# Patient Record
Sex: FEMALE | Race: WHITE | NOT HISPANIC OR LATINO | Employment: FULL TIME | URBAN - METROPOLITAN AREA
[De-identification: names, ages, dates, MRNs, and addresses within clinical notes are randomized per-mention and may not be internally consistent; named-entity substitution may affect disease eponyms.]

---

## 2017-01-03 ENCOUNTER — TELEPHONE (OUTPATIENT)
Dept: FAMILY MEDICINE | Facility: OTHER | Age: 52
End: 2017-01-03

## 2017-01-03 NOTE — TELEPHONE ENCOUNTER
Patient is due/failing the following:   Hep C, COLONOSCOPY, LDL, MAMMOGRAM and PAP    Action needed:   Patient needs office visit for Physical and come fasting. Patient needs to schedule Mammogram and colonoscopy.  Panel Management Review      Patient has the following on her problem list: None      Composite cancer screening  Chart review shows that this patient is due/due soon for the following Pap Smear, Mammogram and Colonoscopy  Summary:    Patient is due/failing the following:   Hep C, COLONOSCOPY, LDL, MAMMOGRAM and PAP    Action needed:   Patient needs office visit for Physical and come fasting. Patient needs to schedule Mammogram and colonoscopy.    Type of outreach:    Phone, spoke to patient.  Patient states she is very busy right now but she will keep this in mind and call back to schedule when things slow down.    Questions for provider review:    None                                                                                                                                    Madiha Swan MA

## 2017-05-26 ENCOUNTER — HEALTH MAINTENANCE LETTER (OUTPATIENT)
Age: 52
End: 2017-05-26

## 2017-12-21 ENCOUNTER — TELEPHONE (OUTPATIENT)
Dept: FAMILY MEDICINE | Facility: OTHER | Age: 52
End: 2017-12-21

## 2017-12-21 ENCOUNTER — OFFICE VISIT (OUTPATIENT)
Dept: FAMILY MEDICINE | Facility: OTHER | Age: 52
End: 2017-12-21
Payer: COMMERCIAL

## 2017-12-21 VITALS
HEART RATE: 60 BPM | DIASTOLIC BLOOD PRESSURE: 72 MMHG | TEMPERATURE: 99.1 F | SYSTOLIC BLOOD PRESSURE: 114 MMHG | BODY MASS INDEX: 40.9 KG/M2 | HEIGHT: 67 IN | WEIGHT: 260.6 LBS | RESPIRATION RATE: 10 BRPM

## 2017-12-21 DIAGNOSIS — R00.2 PALPITATIONS: Primary | ICD-10-CM

## 2017-12-21 DIAGNOSIS — K21.9 GASTROESOPHAGEAL REFLUX DISEASE, ESOPHAGITIS PRESENCE NOT SPECIFIED: ICD-10-CM

## 2017-12-21 PROCEDURE — 99214 OFFICE O/P EST MOD 30 MIN: CPT | Performed by: STUDENT IN AN ORGANIZED HEALTH CARE EDUCATION/TRAINING PROGRAM

## 2017-12-21 ASSESSMENT — ANXIETY QUESTIONNAIRES
7. FEELING AFRAID AS IF SOMETHING AWFUL MIGHT HAPPEN: SEVERAL DAYS
5. BEING SO RESTLESS THAT IT IS HARD TO SIT STILL: SEVERAL DAYS
1. FEELING NERVOUS, ANXIOUS, OR ON EDGE: NOT AT ALL
IF YOU CHECKED OFF ANY PROBLEMS ON THIS QUESTIONNAIRE, HOW DIFFICULT HAVE THESE PROBLEMS MADE IT FOR YOU TO DO YOUR WORK, TAKE CARE OF THINGS AT HOME, OR GET ALONG WITH OTHER PEOPLE: NOT DIFFICULT AT ALL
2. NOT BEING ABLE TO STOP OR CONTROL WORRYING: NOT AT ALL
GAD7 TOTAL SCORE: 5
3. WORRYING TOO MUCH ABOUT DIFFERENT THINGS: SEVERAL DAYS
6. BECOMING EASILY ANNOYED OR IRRITABLE: SEVERAL DAYS

## 2017-12-21 ASSESSMENT — PATIENT HEALTH QUESTIONNAIRE - PHQ9
5. POOR APPETITE OR OVEREATING: SEVERAL DAYS
SUM OF ALL RESPONSES TO PHQ QUESTIONS 1-9: 3

## 2017-12-21 NOTE — MR AVS SNAPSHOT
"              After Visit Summary   12/21/2017    Tiffanie Mcdermott    MRN: [id number]           Patient Information     Date Of Birth          1965        Visit Information        Provider Department      12/21/2017 2:00 PM Maria Eugenia Leal APRN CNP [facility]        Today's Diagnoses     Palpitations    -  1      Care Instructions    Omeprazole (Prilosec) - start 20 mg daily at bedtime for 4 weeks.    Maria Eugenia Leal, EDIL-C            Follow-ups after your visit        Future tests that were ordered for you today     Open Future Orders        Priority Expected Expires Ordered    Exercise Stress Echocardiogram Routine  12/21/2018 12/21/2017            Who to contact     If you have questions or need follow up information about today's clinic visit or your schedule please contact [facility] directly at [phone].  Normal or non-critical lab and imaging results will be communicated to you by MyChart, letter or phone within 4 business days after the clinic has received the results. If you do not hear from us within 7 days, please contact the clinic through [employer] or phone. If you have a critical or abnormal lab result, we will notify you by phone as soon as possible.  Submit refill requests through [employer] or call your pharmacy and they will forward the refill request to us. Please allow 3 business days for your refill to be completed.          Additional Information About Your Visit        MyChart Information     [employer] lets you send messages to your doctor, view your test results, renew your prescriptions, schedule appointments and more. To sign up, go to [email or web address] . Click on \"Log in\" on the left side of the screen, which will take you to the Welcome page. Then click on \"Sign up Now\" on the right side of the page.     You will be asked to enter the access code listed below, as well as some personal information. Please follow the directions to " "create your username and password.     Your access code is: [email or web address]  Expires: 3/21/2018  2:31 PM     Your access code will  in 90 days. If you need help or a new code, please call your [facility] clinic or [phone].        Care EveryWhere ID     This is your Care EveryWhere ID. This could be used by other organizations to access your [facility] medical records  [id number]        Your Vitals Were     Pulse Temperature Respirations Height Last Period BMI (Body Mass Index)    60 99.1  F (37.3  C) (Temporal) 10 5' 6.93\" (1.7 m) 2008 40.9 kg/m2       Blood Pressure from Last 3 Encounters:   17 114/72   16 120/82   06/29/15 100/76    Weight from Last 3 Encounters:   17 260 lb 9.6 oz (118.2 kg)   16 259 lb (117.5 kg)   16 266 lb (120.7 kg)               Primary Care Provider Office Phone # Fax #    Maria Eugenia Yoselin Leal, APRN [facility] [phone] [address] [address] [place] [address]        Equal Access to Services     GREGORIO STANLEY AH: Hadii aad ku hadasho Soomaali, waaxda luqadaha, qaybta kaalmada adeegyada, waxay idiin haycrystaln dean kharaelio lasergo . So [facility] [phone].    ATENCIÓN: Si habla español, tiene a pritchett disposición servicios gratuitos de asistencia lingüística. sin al [phone].    We comply with applicable federal civil rights laws and [place] laws. We do not discriminate on the basis of race, color, national origin, age, disability, sex, sexual orientation, or gender identity.            Thank you!     Thank you for choosing [facility]  for your care. Our goal is always to provide you with excellent care. Hearing back from our patients is one way we can continue to improve our services. Please take a few minutes to complete the written survey that you may receive in the mail after your visit with us. Thank you!             Your Updated Medication List - Protect others around you: Learn how to safely use, store and throw away " your medicines at www.disposemymeds.org.          This list is accurate as of: 12/21/17  2:35 PM.  Always use your most recent med list.                   Brand Name Dispense Instructions for use Diagnosis    fluticasone 50 MCG/ACT spray    FLONASE     Spray 2 sprays into both nostrils daily        ibuprofen 200 MG tablet    ADVIL/MOTRIN     Take 200 mg by mouth every 4 hours as needed.        TYLENOL PO

## 2017-12-21 NOTE — TELEPHONE ENCOUNTER
Tiffanie Mcdermott is a 52 year old female who calls with waking up in middle of night with heart racing.    NURSING ASSESSMENT:  Description:  Spoke with patient.  Woke up when alarm went off in a startle and felt weird and uncomfortable.  Then again when her son's music   Generally feels weird for about a 30 mins or so.  Was able to fall back asleep.     Allergies:   Allergies   Allergen Reactions     Adhesive Tape      Penicillins        MEDICATIONS:   Current Outpatient Prescriptions   Medication     Acetaminophen (TYLENOL PO)     fluticasone (FLONASE) 50 MCG/ACT nasal spray     ibuprofen (ADVIL,MOTRIN) 200 MG tablet     No current facility-administered medications for this visit.        NURSING PLAN: Nursing advice to patient need to schedule follow up appointment. call back with any questions or concern    RECOMMENDED DISPOSITION:  See in 24 hours -   Will comply with recommendation: Yes  If further questions/concerns or if symptoms do not improve, worsen or new symptoms develop, call your PCP or [facility] as soon as possible.      Guideline used:heart rate  Telephone Triage Protocols for Nurses, Fifth Edition, Meggan Parra  Sleep issues.     Mian Rodriguez, RN, BSN

## 2017-12-22 ASSESSMENT — ANXIETY QUESTIONNAIRES: GAD7 TOTAL SCORE: 5

## 2017-12-26 PROBLEM — R00.2 PALPITATIONS: Status: ACTIVE | Noted: 2017-12-26

## 2018-02-05 ENCOUNTER — OFFICE VISIT (OUTPATIENT)
Dept: FAMILY MEDICINE | Facility: CLINIC | Age: 53
End: 2018-02-05
Payer: COMMERCIAL

## 2018-02-05 VITALS
DIASTOLIC BLOOD PRESSURE: 72 MMHG | SYSTOLIC BLOOD PRESSURE: 106 MMHG | HEIGHT: 67 IN | OXYGEN SATURATION: 97 % | WEIGHT: 263.9 LBS | HEART RATE: 113 BPM | TEMPERATURE: 97.9 F | BODY MASS INDEX: 41.42 KG/M2

## 2018-02-05 DIAGNOSIS — J01.01 ACUTE RECURRENT MAXILLARY SINUSITIS: Primary | ICD-10-CM

## 2018-02-05 DIAGNOSIS — Z23 NEED FOR PROPHYLACTIC VACCINATION AND INOCULATION AGAINST INFLUENZA: ICD-10-CM

## 2018-02-05 PROCEDURE — 99213 OFFICE O/P EST LOW 20 MIN: CPT | Mod: 25 | Performed by: FAMILY MEDICINE

## 2018-02-05 PROCEDURE — 90688 IIV4 VACCINE SPLT 0.5 ML IM: CPT | Performed by: FAMILY MEDICINE

## 2018-02-05 PROCEDURE — 90471 IMMUNIZATION ADMIN: CPT | Performed by: FAMILY MEDICINE

## 2018-02-05 RX ORDER — AZITHROMYCIN 250 MG/1
TABLET, FILM COATED ORAL
Qty: 6 TABLET | Refills: 0 | Status: SHIPPED | OUTPATIENT
Start: 2018-02-05 | End: 2022-02-15

## 2018-02-05 NOTE — PROGRESS NOTES
SUBJECTIVE:   Tiffanie Mcdermott is a 52 year old female who presents to clinic today for the following health issues:      Acute Illness   Acute illness concerns: sinusitis   Onset: 10 days     Fever: no    Chills/Sweats: no    Headache (location?): YES- Front and face     Sinus Pressure:YES- Teeth hurt     Conjunctivitis:  no    Ear Pain: YES: right    Rhinorrhea: YES    Congestion: YES- Face     Sore Throat: no     Cough: YES-productive of green sputum    Wheeze: no    Decreased Appetite: no    Nausea: no    Vomiting: no    Diarrhea:  no    Dysuria/Freq.: no    Fatigue/Achiness: Fatigue     Sick/Strep Exposure: no     Therapies Tried and outcome: OTC Mucinex           Problem list and histories reviewed & adjusted, as indicated.  Additional history: as documented        Reviewed and updated as needed this visit by clinical staff       Reviewed and updated as needed this visit by Provider        SUBJECTIVE:  Tiffanie  is a 52 year old female who presents for: Symptoms as noted above.  She is flying tomorrow.    Past Medical History:   Diagnosis Date     Allergy, unspecified not elsewhere classified      Past Surgical History:   Procedure Laterality Date     HC LAPAROSCOPY, SURGICAL; CHOLECYSTECTOMY  10/19/2005    Cholecystectomy, Laparoscopic     Social History   Substance Use Topics     Smoking status: Never Smoker     Smokeless tobacco: Never Used      Comment: no smoking in the home     Alcohol use 1.0 oz/week     Current Outpatient Prescriptions   Medication Sig Dispense Refill     azithromycin (ZITHROMAX) 250 MG tablet Two tablets first day, then one tablet daily for four days. 6 tablet 0     omeprazole (PRILOSEC) 20 MG CR capsule 20 mg twice daily for 7 days then 20 mg at bedtime thereafter. (Patient not taking: Reported on 2/5/2018) 45 capsule 0     Acetaminophen (TYLENOL PO)        fluticasone (FLONASE) 50 MCG/ACT nasal spray Spray 2 sprays into both nostrils daily       ibuprofen (ADVIL,MOTRIN) 200 MG  "tablet Take 200 mg by mouth every 4 hours as needed.         REVIEW OF SYSTEMS:   5 point ROS negative except as noted above in HPI, including Gen., Resp, CV, GI &  system review.     OBJECTIVE:  Vitals: /72 (BP Location: Right arm, Patient Position: Chair, Cuff Size: Adult Large)  Pulse 113  Temp 97.9  F (36.6  C) (Temporal)  Ht 5' 6.93\" (1.7 m)  Wt 263 lb 14.4 oz (119.7 kg)  LMP 08/06/2008  SpO2 97%  BMI 41.42 kg/m2  BMI= Body mass index is 41.42 kg/(m^2).  Appears in no distress.  Nose a bit congested.  Throat with a little drainage posteriorly.  Her TMs look fine ear canals are clear.  Some tenderness to percussion over the maxillary sinus area.  Neck supple no adenopathy.  Lungs are clear to auscultation.  Heart regular rhythm no murmur.  Skin clear.    ASSESSMENT:  Sinusitis    PLAN:  Z-Matias.  She is using some daytime cold medicine with Mucinex.  She should take this before she flies as it has a decongestant in it.  She will get some over-the-counter omeprazole.  Given a flu shot today.        Prosper Mcgee MD  [facility]              "

## 2018-02-05 NOTE — NURSING NOTE
"Chief Complaint   Patient presents with     Sinus Problem     Sinusitis        Initial /72 (BP Location: Right arm, Patient Position: Chair, Cuff Size: Adult Large)  Pulse 113  Temp 97.9  F (36.6  C) (Temporal)  Ht 5' 6.93\" (1.7 m)  Wt 263 lb 14.4 oz (119.7 kg)  LMP 08/06/2008  SpO2 97%  BMI 41.42 kg/m2 Estimated body mass index is 41.42 kg/(m^2) as calculated from the following:    Height as of this encounter: 5' 6.93\" (1.7 m).    Weight as of this encounter: 263 lb 14.4 oz (119.7 kg).  Medication Reconciliation: complete     "

## 2018-02-05 NOTE — PROGRESS NOTES
Injectable Influenza Immunization Documentation    1.  Is the person to be vaccinated sick today?   No    2. Does the person to be vaccinated have an allergy to a component   of the vaccine?   No  Egg Allergy Algorithm Link    3. Has the person to be vaccinated ever had a serious reaction   to influenza vaccine in the past?   No    4. Has the person to be vaccinated ever had Guillain-Barré syndrome?   No    Form completed by Helen PRITCHARD CMA  Prior to injection verified patient identity using patient's name and date of birth.

## 2018-02-05 NOTE — MR AVS SNAPSHOT
"              After Visit Summary   2/5/2018    Tiffanie Mcdermott    MRN: [id number]           Patient Information     Date Of Birth          1965        Visit Information        Provider Department      2/5/2018 1:40 PM Prosper Mcgee MD [facility]        Today's Diagnoses     Acute recurrent maxillary sinusitis    -  1    Need for prophylactic vaccination and inoculation against influenza           Follow-ups after your visit        Who to contact     If you have questions or need follow up information about today's clinic visit or your schedule please contact [facility] directly at [phone].  Normal or non-critical lab and imaging results will be communicated to you by [employer], letter or phone within 4 business days after the clinic has received the results. If you do not hear from us within 7 days, please contact the clinic through [employer] or phone. If you have a critical or abnormal lab result, we will notify you by phone as soon as possible.  Submit refill requests through [employer] or call your pharmacy and they will forward the refill request to us. Please allow 3 business days for your refill to be completed.          Additional Information About Your Visit        MyChart Information     [employer] gives you secure access to your electronic health record. If you see a primary care provider, you can also send messages to your care team and make appointments. If you have questions, please call your primary care clinic.  If you do not have a primary care provider, please call [phone] and they will assist you.        Care EveryWhere ID     This is your Care EveryWhere ID. This could be used by other organizations to access your [facility] medical records  [id number]        Your Vitals Were     Pulse Temperature Height Last Period Pulse Oximetry BMI (Body Mass Index)    113 97.9  F (36.6  C) (Temporal) 5' 6.93\" (1.7 m) 08/06/2008 97% 41.42 kg/m2       Blood Pressure " from Last 3 Encounters:   02/05/18 106/72   12/21/17 114/72   02/24/16 120/82    Weight from Last 3 Encounters:   02/05/18 263 lb 14.4 oz (119.7 kg)   12/21/17 260 lb 9.6 oz (118.2 kg)   06/17/16 259 lb (117.5 kg)              We Performed the Following     FLU VACCINE, 3 YRS +, IM (QUADRIVALENT W/PRESERVATIVES/MULTI-DOSE) [51671]     Vaccine Administration, Initial [40577]          Today's Medication Changes          These changes are accurate as of 2/5/18  2:57 PM.  If you have any questions, ask your nurse or doctor.               Start taking these medicines.        Dose/Directions    azithromycin 250 MG tablet   Commonly known as:  ZITHROMAX   Used for:  Acute recurrent maxillary sinusitis   Started by:  Prosper Mcgee MD        Two tablets first day, then one tablet daily for four days.   Quantity:  6 tablet   Refills:  0            Where to get your medicines      These medications were sent to [facility] [place] [address] [facility]   [address] [facility] , [facility] [address]     Phone:  [phone]     azithromycin 250 MG tablet                Primary Care Provider Office Phone # Fax #    Maria Eugenia Yoselin Leal, HENRIETTA [facility] [phone] [address] [address] [place] [address]        Equal Access to Services     [facility] JADEN AH: Hadii joon ku hadasho Soomaali, waaxda luqadaha, qaybta kaalmada adeegyada, ignacio avelar. So [facility] [phone].    ATENCIÓN: Si habla español, tiene a pritchett disposición servicios gratuitos de asistencia lingüística. Hi al [phone].    We comply with applicable federal civil rights laws and [place] laws. We do not discriminate on the basis of race, color, national origin, age, disability, sex, sexual orientation, or gender identity.            Thank you!     Thank you for choosing [facility]  for your care. Our goal is always to provide you with excellent care. Hearing back from our patients is one  way we can continue to improve our services. Please take a few minutes to complete the written survey that you may receive in the mail after your visit with us. Thank you!             Your Updated Medication List - Protect others around you: Learn how to safely use, store and throw away your medicines at www.disposemymeds.org.          This list is accurate as of 2/5/18  2:57 PM.  Always use your most recent med list.                   Brand Name Dispense Instructions for use Diagnosis    azithromycin 250 MG tablet    ZITHROMAX    6 tablet    Two tablets first day, then one tablet daily for four days.    Acute recurrent maxillary sinusitis       fluticasone 50 MCG/ACT spray    FLONASE     Spray 2 sprays into both nostrils daily        ibuprofen 200 MG tablet    ADVIL/MOTRIN     Take 200 mg by mouth every 4 hours as needed.        omeprazole 20 MG CR capsule    priLOSEC    45 capsule    20 mg twice daily for 7 days then 20 mg at bedtime thereafter.    Gastroesophageal reflux disease, esophagitis presence not specified       TYLENOL PO

## 2018-03-20 ENCOUNTER — MYC MEDICAL ADVICE (OUTPATIENT)
Dept: FAMILY MEDICINE | Facility: OTHER | Age: 53
End: 2018-03-20

## 2018-03-30 ENCOUNTER — TELEPHONE (OUTPATIENT)
Dept: FAMILY MEDICINE | Facility: OTHER | Age: 53
End: 2018-03-30

## 2018-03-30 NOTE — LETTER
[facility]  [address] [place] [address]  Phone: [phone]  March 30, 2018      Tiffanie Mcdermott  [address] [facility] [address]      Dear Tiffanie,    We care about your health and have reviewed your health plan including your medical conditions, medications, and lab results.  Based on this review, it is recommended that you follow up regarding the following health topic(s):  -Breast Cancer Screening  -Colon Cancer Screening  -Cervical Cancer Screening    We recommend you take the following action(s):  -schedule a MAMMOGRAM which is due. Please disregard this reminder if you have had this exam elsewhere within the last 1-2 years please let us know so we can update your records.  -schedule a COLONOSCOPY to look for colon cancer (due every 10 years or 5 years in higher risk situations.)  Colonoscopies can prevent 90-95% of colon cancer deaths.  Problem lesions can be removed before they ever become cancer.  If you do not wish to do a colonoscopy or cannot afford to do one at this time, there is another option called a Fecal Immunochemical Occult Blood Test (FIT) a take home stool sample kit.  It does not replace the colonoscopy for colorectal cancer screening, but it can detect hidden bleeding in the lower colon.  It does need to be repeated every year and if a positive result is obtained, you would be referred for a colonoscopy.  If you have completed either one of these tests at another facility, please have the records sent to our clinic for our records.  -schedule a PAP SMEAR EXAM which is due.  Please disregard this reminder if you have had this exam elsewhere within the last year.  It would be helpful for us to have a copy of your recent pap smear report to update your records.     Please call us at the [facility] - [phone] (or use [employer]) to address the above recommendations.     Thank you for trusting [facility] and we appreciate the  opportunity to serve you.  We look forward to supporting your healthcare needs in the future.    Healthy Regards,    Your Health Care Team  [facility]

## 2018-03-30 NOTE — TELEPHONE ENCOUNTER
Summary:    Patient is due/failing the following:   COLONOSCOPY, MAMMOGRAM and PAP    Action needed:   Patient needs office visit for PAP. and schedule a mammogram and colonoscopy or complete a FIT test     Type of outreach:    Sent letter.    Questions for provider review:    None                                                                                                                                    Briana Ascencio     Chart routed to Care Team .      Panel Management Review      Patient has the following on her problem list: None      Composite cancer screening  Chart review shows that this patient is due/due soon for the following Pap Smear, Mammogram and Colonoscopy

## 2018-05-02 ENCOUNTER — TELEPHONE (OUTPATIENT)
Dept: FAMILY MEDICINE | Facility: OTHER | Age: 53
End: 2018-05-02

## 2018-05-02 NOTE — TELEPHONE ENCOUNTER
5/2/2018    Call Regarding [facility] Mammogram    Attempt 1    Message LEFT WITH MALE    Comments:     Other screenings that are due: Colonoscopy and Pap    Outreach   SV

## 2018-08-07 ENCOUNTER — TELEPHONE (OUTPATIENT)
Dept: FAMILY MEDICINE | Facility: OTHER | Age: 53
End: 2018-08-07

## 2018-08-07 DIAGNOSIS — Z13.220 SCREENING FOR HYPERLIPIDEMIA: Primary | ICD-10-CM

## 2018-08-07 NOTE — TELEPHONE ENCOUNTER
Summary:    Patient is due/failing the following:   COLONOSCOPY, LDL, MAMMOGRAM and PAP    Action needed:   Patient needs office visit for PAP., Patient needs fasting lab only appointment and schedule a mammogram and colonoscopy or complete a FIT test     Type of outreach:    Phone, left message for patient to call back.     Questions for provider review:    None                                                                                                                                    Briana Ascencio       Chart routed to Care Team .        Panel Management Review      Patient has the following on her problem list: None      Composite cancer screening  Chart review shows that this patient is due/due soon for the following Pap Smear, Mammogram and Colonoscopy

## 2018-08-07 NOTE — LETTER
[facility]  [address] [place] [address]  Phone: [phone]  August 23, 2018      Tiffanie Mcdermott  [address] [facility] [address]      Dear Tiffanie,    We care about your health and have reviewed your health plan including your medical conditions, medications, and lab results.  Based on this review, it is recommended that you follow up regarding the following health topic(s):  -Breast Cancer Screening  -Colon Cancer Screening  -Cervical Cancer Screening    We recommend you take the following action(s):  -schedule a MAMMOGRAM which is due. Please disregard this reminder if you have had this exam elsewhere within the last 1-2 years please let us know so we can update your records.  -schedule a COLONOSCOPY to look for colon cancer (due every 10 years or 5 years in higher risk situations.)  Colonoscopies can prevent 90-95% of colon cancer deaths.  Problem lesions can be removed before they ever become cancer.  If you do not wish to do a colonoscopy or cannot afford to do one at this time, there is another option called a Fecal Immunochemical Occult Blood Test (FIT) a take home stool sample kit.  It does not replace the colonoscopy for colorectal cancer screening, but it can detect hidden bleeding in the lower colon.  It does need to be repeated every year and if a positive result is obtained, you would be referred for a colonoscopy.  If you have completed either one of these tests at another facility, please have the records sent to our clinic for our records.  -schedule a PAP SMEAR EXAM which is due.  Please disregard this reminder if you have had this exam elsewhere within the last year.  It would be helpful for us to have a copy of your recent pap smear report to update your records.     Please call us at the [facility] - [phone] (or use [employer]) to address the above recommendations.     Thank you for trusting [facility] and we appreciate  the opportunity to serve you.  We look forward to supporting your healthcare needs in the future.    Healthy Regards,    Your Health Care Team  [facility]

## 2018-08-15 NOTE — TELEPHONE ENCOUNTER
8/15/2018      Patient declined all preventative health screenings.             Outreach ,  Ranulfo Neal

## 2018-09-14 ENCOUNTER — TELEPHONE (OUTPATIENT)
Dept: FAMILY MEDICINE | Facility: OTHER | Age: 53
End: 2018-09-14

## 2018-09-14 NOTE — TELEPHONE ENCOUNTER
Summary:    Patient is due/failing the following:   Hep c, lipids, COLONOSCOPY, MAMMOGRAM, PAP and PHYSICAL    Action needed:   Patient needs office visit for physical and pap. Due now, please help pt schedule.  Patient needs fasting lab only appointment unless doing at appt. Order placed.  Patient needs referral/order: for colonoscopy or FIT as well as mammogram. Please ask pt if willing to do any and will then place orders.    Type of outreach:    Phone, spoke to patient.  Pt declined doing all appts.     Questions for provider review:    None                                                                                                                                    Richa Weinberg CMA ([facility])       Chart routed to Care Team .    Panel Management Review      Patient has the following on her problem list: None      Composite cancer screening  Chart review shows that this patient is due/due soon for the following Pap Smear, Mammogram and Colonoscopy

## 2019-02-13 ENCOUNTER — ALLIED HEALTH/NURSE VISIT (OUTPATIENT)
Dept: FAMILY MEDICINE | Facility: OTHER | Age: 54
End: 2019-02-13
Payer: COMMERCIAL

## 2019-02-13 DIAGNOSIS — Z23 NEED FOR PROPHYLACTIC VACCINATION AND INOCULATION AGAINST INFLUENZA: Primary | ICD-10-CM

## 2019-02-13 PROCEDURE — 99207 ZZC NO CHARGE NURSE ONLY: CPT

## 2019-02-13 PROCEDURE — 90471 IMMUNIZATION ADMIN: CPT

## 2019-02-13 PROCEDURE — 90682 RIV4 VACC RECOMBINANT DNA IM: CPT

## 2019-02-13 NOTE — PROGRESS NOTES

## 2019-12-08 ENCOUNTER — HEALTH MAINTENANCE LETTER (OUTPATIENT)
Age: 54
End: 2019-12-08

## 2019-12-18 ENCOUNTER — ALLIED HEALTH/NURSE VISIT (OUTPATIENT)
Dept: FAMILY MEDICINE | Facility: OTHER | Age: 54
End: 2019-12-18
Payer: COMMERCIAL

## 2019-12-18 DIAGNOSIS — Z23 NEED FOR PROPHYLACTIC VACCINATION AND INOCULATION AGAINST INFLUENZA: Primary | ICD-10-CM

## 2019-12-18 PROCEDURE — 90471 IMMUNIZATION ADMIN: CPT

## 2019-12-18 PROCEDURE — 99207 ZZC NO CHARGE NURSE ONLY: CPT

## 2019-12-18 PROCEDURE — 90682 RIV4 VACC RECOMBINANT DNA IM: CPT

## 2020-03-15 ENCOUNTER — HEALTH MAINTENANCE LETTER (OUTPATIENT)
Age: 55
End: 2020-03-15

## 2021-01-09 ENCOUNTER — HEALTH MAINTENANCE LETTER (OUTPATIENT)
Age: 56
End: 2021-01-09

## 2021-10-23 ENCOUNTER — HEALTH MAINTENANCE LETTER (OUTPATIENT)
Age: 56
End: 2021-10-23

## 2022-02-07 ENCOUNTER — TELEPHONE (OUTPATIENT)
Dept: FAMILY MEDICINE | Facility: CLINIC | Age: 57
End: 2022-02-07

## 2022-02-07 ENCOUNTER — VIRTUAL VISIT (OUTPATIENT)
Dept: FAMILY MEDICINE | Facility: CLINIC | Age: 57
End: 2022-02-07
Payer: COMMERCIAL

## 2022-02-07 DIAGNOSIS — R11.0 NAUSEA: ICD-10-CM

## 2022-02-07 DIAGNOSIS — R30.0 DYSURIA: Primary | ICD-10-CM

## 2022-02-07 DIAGNOSIS — R10.31 RLQ ABDOMINAL PAIN: ICD-10-CM

## 2022-02-07 PROCEDURE — 99204 OFFICE O/P NEW MOD 45 MIN: CPT | Mod: 95 | Performed by: PHYSICIAN ASSISTANT

## 2022-02-07 RX ORDER — SULFAMETHOXAZOLE/TRIMETHOPRIM 800-160 MG
1 TABLET ORAL 2 TIMES DAILY
Qty: 6 TABLET | Refills: 0 | Status: SHIPPED | OUTPATIENT
Start: 2022-02-07 | End: 2022-02-10

## 2022-02-07 RX ORDER — ONDANSETRON 4 MG/1
4 TABLET, ORALLY DISINTEGRATING ORAL EVERY 8 HOURS PRN
Qty: 10 TABLET | Refills: 0 | Status: SHIPPED | OUTPATIENT
Start: 2022-02-07 | End: 2022-02-15

## 2022-02-07 NOTE — PROGRESS NOTES
Tiffanie is a 56 year old who is being evaluated via a billable video visit.      How would you like to obtain your AVS? MyChart  If the video visit is dropped, the invitation should be resent by: Text to cell phone: [phone]  Will anyone else be joining your video visit? No    Video Start Time: 9:59 AM    Assessment & Plan     Dysuria  - sulfamethoxazole-trimethoprim (BACTRIM DS) 800-160 MG tablet; Take 1 tablet by mouth 2 times daily for 3 days  - UA reflex to Microscopic - lab collect; Future  - Urine Culture Aerobic Bacterial - lab collect; Future    Nausea  - ondansetron (ZOFRAN-ODT) 4 MG ODT tab; Take 1 tablet (4 mg) by mouth every 8 hours as needed for nausea  - CBC with platelets and differential; Future  - Comprehensive metabolic panel (BMP + Alb, Alk Phos, ALT, AST, Total. Bili, TP); Future  - UA reflex to Microscopic - lab collect; Future  - Urine Culture Aerobic Bacterial - lab collect; Future    RLQ abdominal pain  - CBC with platelets and differential; Future  - Comprehensive metabolic panel (BMP + Alb, Alk Phos, ALT, AST, Total. Bili, TP); Future  - UA reflex to Microscopic - lab collect; Future  - Urine Culture Aerobic Bacterial - lab collect; Future    Start bactrim today for presumed UTI. Will stop in lab tomorrow for lab testing. Follow up with primary care provider for further evaluation if symptoms are persistent. Clinic will call with lab results.    30 minutes spent on the date of the encounter doing chart review, patient visit and documentation     No follow-ups on file.    Hyun Oquendo PA-C  [facility]   Tiffanie is a 56 year old who presents for the following health issues    HPI     Concern - abdominal pain  Onset: January  Description: stomache pain/ stomach flu and still ongoing and now more pain in back  Intensity: moderate  Progression of Symptoms:  worsening  Accompanying Signs & Symptoms: none  Previous history of similar problem:  unknown  Precipitating factors:        Worsened by: unknown  Alleviating factors:        Improved by: unknown  Therapies tried and outcome: None    Tiffanie presents via telephone for evaluation of abdominal pain. She states on Jan 16 she developed an upset stomach and vomited for about 24 hours. She had associated loose stools, no blood. No significant fever. She had eaten out that weekend and thought she had gotten food poisoning. But she states that since then she has not felt normal. She has had nausea, abdominal discomfort and looser stools than normal. She's been eating mild foods and drinking plenty of water. She notes she feels nauseated when she eats but also when she's hungry. She has tenderness to palpation of her RUQ. In the last couple days, she notes her urine is dark colored and she has some pain in her back around. She has not taken her temperature but questions whether or not she's had a low grade temp in the last couple days. She does have a history of a cholecystectomy several years ago.    Review of Systems   ROS negative except as stated above.        Objective           Vitals:  No vitals were obtained today due to virtual visit.    Physical Exam   GENERAL: Healthy, alert and no distress  EYES: Eyes grossly normal to inspection.  No discharge or erythema, or obvious scleral/conjunctival abnormalities.  RESP: No audible wheeze, cough, or visible cyanosis.  No visible retractions or increased work of breathing.    SKIN: Visible skin clear. No significant rash, abnormal pigmentation or lesions.  NEURO: Cranial nerves grossly intact.  Mentation and speech appropriate for age.  PSYCH: Mentation appears normal, affect normal/bright, judgement and insight intact, normal speech and appearance well-groomed.        Video-Visit Details    Type of service:  Video Visit    Video End Time:10:17 AM    Originating Location (pt. Location): Home    Distant Location (provider location):  [facility]  used for Video Visit: Carol

## 2022-02-07 NOTE — TELEPHONE ENCOUNTER
Patient reports ongoing stomach issues since having a viral GI illness three weeks ago. She reports abdominal pain, poor appetite, and UTI symptoms.    Virtual visit set up today to evaluate symptoms.    Aminta Spear RN

## 2022-02-08 ENCOUNTER — TELEPHONE (OUTPATIENT)
Dept: FAMILY MEDICINE | Facility: CLINIC | Age: 57
End: 2022-02-08

## 2022-02-08 ENCOUNTER — LAB (OUTPATIENT)
Dept: LAB | Facility: CLINIC | Age: 57
End: 2022-02-08
Payer: COMMERCIAL

## 2022-02-08 DIAGNOSIS — R74.8 ELEVATED LIVER ENZYMES: Primary | ICD-10-CM

## 2022-02-08 DIAGNOSIS — R30.0 DYSURIA: ICD-10-CM

## 2022-02-08 DIAGNOSIS — R10.31 RLQ ABDOMINAL PAIN: ICD-10-CM

## 2022-02-08 DIAGNOSIS — R74.8 ELEVATED LIVER ENZYMES: ICD-10-CM

## 2022-02-08 DIAGNOSIS — R11.0 NAUSEA: ICD-10-CM

## 2022-02-08 LAB
ALBUMIN SERPL-MCNC: 3.5 G/DL (ref 3.4–5)
ALBUMIN UR-MCNC: NEGATIVE MG/DL
ALP SERPL-CCNC: 346 U/L (ref 40–150)
ALT SERPL W P-5'-P-CCNC: 175 U/L (ref 0–50)
ANION GAP SERPL CALCULATED.3IONS-SCNC: 6 MMOL/L (ref 3–14)
APPEARANCE UR: CLEAR
AST SERPL W P-5'-P-CCNC: 229 U/L (ref 0–45)
BASOPHILS # BLD AUTO: 0 10E3/UL (ref 0–0.2)
BASOPHILS NFR BLD AUTO: 1 %
BILIRUB SERPL-MCNC: 1.3 MG/DL (ref 0.2–1.3)
BILIRUB UR QL STRIP: NEGATIVE
BUN SERPL-MCNC: 14 MG/DL (ref 7–30)
CALCIUM SERPL-MCNC: 9.1 MG/DL (ref 8.5–10.1)
CHLORIDE BLD-SCNC: 103 MMOL/L (ref 94–109)
CO2 SERPL-SCNC: 26 MMOL/L (ref 20–32)
COLOR UR AUTO: YELLOW
CREAT SERPL-MCNC: 1.04 MG/DL (ref 0.52–1.04)
EOSINOPHIL # BLD AUTO: 0.1 10E3/UL (ref 0–0.7)
EOSINOPHIL NFR BLD AUTO: 2 %
ERYTHROCYTE [DISTWIDTH] IN BLOOD BY AUTOMATED COUNT: 13.5 % (ref 10–15)
GFR SERPL CREATININE-BSD FRML MDRD: 62 ML/MIN/1.73M2
GLUCOSE BLD-MCNC: 168 MG/DL (ref 70–99)
GLUCOSE UR STRIP-MCNC: NEGATIVE MG/DL
HCT VFR BLD AUTO: 46.3 % (ref 35–47)
HGB BLD-MCNC: 14.4 G/DL (ref 11.7–15.7)
HGB UR QL STRIP: NEGATIVE
IMM GRANULOCYTES # BLD: 0 10E3/UL
IMM GRANULOCYTES NFR BLD: 1 %
IRON SATN MFR SERPL: 18 % (ref 15–46)
IRON SERPL-MCNC: 58 UG/DL (ref 35–180)
KETONES UR STRIP-MCNC: NEGATIVE MG/DL
LEUKOCYTE ESTERASE UR QL STRIP: ABNORMAL
LIPASE SERPL-CCNC: <10 U/L (ref 73–393)
LYMPHOCYTES # BLD AUTO: 1.3 10E3/UL (ref 0.8–5.3)
LYMPHOCYTES NFR BLD AUTO: 22 %
MCH RBC QN AUTO: 27.9 PG (ref 26.5–33)
MCHC RBC AUTO-ENTMCNC: 31.1 G/DL (ref 31.5–36.5)
MCV RBC AUTO: 90 FL (ref 78–100)
MONOCYTES # BLD AUTO: 0.6 10E3/UL (ref 0–1.3)
MONOCYTES NFR BLD AUTO: 10 %
NEUTROPHILS # BLD AUTO: 3.8 10E3/UL (ref 1.6–8.3)
NEUTROPHILS NFR BLD AUTO: 64 %
NITRATE UR QL: NEGATIVE
NRBC # BLD AUTO: 0 10E3/UL
NRBC BLD AUTO-RTO: 0 /100
PH UR STRIP: 6.5 [PH] (ref 5–7)
PLATELET # BLD AUTO: 202 10E3/UL (ref 150–450)
POTASSIUM BLD-SCNC: 4.1 MMOL/L (ref 3.4–5.3)
PROT SERPL-MCNC: 7.3 G/DL (ref 6.8–8.8)
RBC # BLD AUTO: 5.16 10E6/UL (ref 3.8–5.2)
RBC URINE: 1 /HPF
SODIUM SERPL-SCNC: 135 MMOL/L (ref 133–144)
SP GR UR STRIP: 1.01 (ref 1–1.03)
SQUAMOUS EPITHELIAL: 1 /HPF
TIBC SERPL-MCNC: 315 UG/DL (ref 240–430)
UROBILINOGEN UR STRIP-MCNC: NORMAL MG/DL
WBC # BLD AUTO: 5.8 10E3/UL (ref 4–11)
WBC URINE: 14 /HPF

## 2022-02-08 PROCEDURE — 83550 IRON BINDING TEST: CPT

## 2022-02-08 PROCEDURE — 36415 COLL VENOUS BLD VENIPUNCTURE: CPT

## 2022-02-08 PROCEDURE — 86706 HEP B SURFACE ANTIBODY: CPT

## 2022-02-08 PROCEDURE — 87086 URINE CULTURE/COLONY COUNT: CPT

## 2022-02-08 PROCEDURE — 85025 COMPLETE CBC W/AUTO DIFF WBC: CPT

## 2022-02-08 PROCEDURE — 83690 ASSAY OF LIPASE: CPT

## 2022-02-08 PROCEDURE — 81001 URINALYSIS AUTO W/SCOPE: CPT

## 2022-02-08 PROCEDURE — 80053 COMPREHEN METABOLIC PANEL: CPT

## 2022-02-08 PROCEDURE — 86803 HEPATITIS C AB TEST: CPT

## 2022-02-08 NOTE — TELEPHONE ENCOUNTER
Reason for Call:  Other questions    Detailed comments: patient did a virtual visit with Hyun Oquendo yesterday. Patient did the labs she ordered today, but wanted provider to know that she is now have pain in her right side. Please call patient to discuss and patient would also like to get her lab results.     Phone Number Patient can be reached at: Cell number on file:    Telephone Information:   Mobile [phone]       Best Time: any    Can we leave a detailed message on this number? YES    Call taken on 2/8/2022 at 9:13 AM by Jacquelin Mendoza

## 2022-02-08 NOTE — TELEPHONE ENCOUNTER
----- Message from Hyun Oquendo PA-C sent at 2/8/2022 11:32 AM CST -----  Please call Tiffanie to give her lab results. She does not have access to her MyChart.     Her liver enzymes are elevated. This may be why she has pain around her liver. I ordered Hep B and Hep C tests and these are in process. Urine shows possible urinary tract infection so I recommend finishing the antibiotic. Avoid alcohol and Tylenol at this time.  Please help her schedule an appointment with primary care provider this week for further evaluation.     Gayle Oquendo PA-C  [facility]

## 2022-02-08 NOTE — TELEPHONE ENCOUNTER
Spoke with patient and informed of results below. Patient has a lot of questions as well in regards to results. And would like a call back from provider as well to discuss.   Opal Gerber MA

## 2022-02-08 NOTE — RESULT ENCOUNTER NOTE
Please call Tiffanie to give her lab results. She does not have access to her MyChart.    Her liver enzymes are elevated. This may be why she has pain around her liver. I ordered Hep B and Hep C tests and these are in process. Urine shows possible urinary tract infection so I recommend finishing the antibiotic. Avoid alcohol and Tylenol at this time.  Please help her schedule an appointment with primary care provider this week for further evaluation.     Gayle Oquendo PA-C  [facility]

## 2022-02-08 NOTE — TELEPHONE ENCOUNTER
Ultrasound ordered. Please call her to schedule this.    Labs ordered to be redrawn next week. Appt already scheduled. Go to emergency room if pain worsens. Pain 2/10 when resting, more severe (6-7?)/10 with pressure to her abdomen.     Gayle Oquendo PA-C  [facility]

## 2022-02-09 LAB
BACTERIA UR CULT: NO GROWTH
HBV SURFACE AB SERPL IA-ACNC: 0 M[IU]/ML
HCV AB SERPL QL IA: NONREACTIVE

## 2022-02-12 ENCOUNTER — HEALTH MAINTENANCE LETTER (OUTPATIENT)
Age: 57
End: 2022-02-12

## 2022-02-14 ENCOUNTER — LAB (OUTPATIENT)
Dept: LAB | Facility: CLINIC | Age: 57
End: 2022-02-14
Payer: COMMERCIAL

## 2022-02-14 DIAGNOSIS — R74.8 ELEVATED LIVER ENZYMES: ICD-10-CM

## 2022-02-14 LAB
ALBUMIN SERPL-MCNC: 3.3 G/DL (ref 3.4–5)
ALP SERPL-CCNC: 405 U/L (ref 40–150)
ALT SERPL W P-5'-P-CCNC: 197 U/L (ref 0–50)
ANION GAP SERPL CALCULATED.3IONS-SCNC: 7 MMOL/L (ref 3–14)
AST SERPL W P-5'-P-CCNC: 315 U/L (ref 0–45)
BILIRUB SERPL-MCNC: 1.4 MG/DL (ref 0.2–1.3)
BUN SERPL-MCNC: 15 MG/DL (ref 7–30)
CALCIUM SERPL-MCNC: 9.9 MG/DL (ref 8.5–10.1)
CHLORIDE BLD-SCNC: 103 MMOL/L (ref 94–109)
CO2 SERPL-SCNC: 26 MMOL/L (ref 20–32)
CREAT SERPL-MCNC: 0.92 MG/DL (ref 0.52–1.04)
GFR SERPL CREATININE-BSD FRML MDRD: 72 ML/MIN/1.73M2
GLUCOSE BLD-MCNC: 169 MG/DL (ref 70–99)
LIPASE SERPL-CCNC: 174 U/L (ref 73–393)
POTASSIUM BLD-SCNC: 4.2 MMOL/L (ref 3.4–5.3)
PROT SERPL-MCNC: 7.6 G/DL (ref 6.8–8.8)
SODIUM SERPL-SCNC: 136 MMOL/L (ref 133–144)

## 2022-02-14 PROCEDURE — 80053 COMPREHEN METABOLIC PANEL: CPT

## 2022-02-14 PROCEDURE — 83690 ASSAY OF LIPASE: CPT

## 2022-02-14 PROCEDURE — 36415 COLL VENOUS BLD VENIPUNCTURE: CPT

## 2022-02-15 ENCOUNTER — HOSPITAL ENCOUNTER (OUTPATIENT)
Dept: ULTRASOUND IMAGING | Facility: CLINIC | Age: 57
Discharge: HOME | End: 2022-02-15
Attending: PHYSICIAN ASSISTANT | Admitting: PHYSICIAN ASSISTANT
Payer: COMMERCIAL

## 2022-02-15 ENCOUNTER — OFFICE VISIT (OUTPATIENT)
Dept: FAMILY MEDICINE | Facility: CLINIC | Age: 57
End: 2022-02-15
Payer: COMMERCIAL

## 2022-02-15 VITALS
HEIGHT: 67 IN | SYSTOLIC BLOOD PRESSURE: 120 MMHG | RESPIRATION RATE: 20 BRPM | WEIGHT: 263 LBS | DIASTOLIC BLOOD PRESSURE: 68 MMHG | OXYGEN SATURATION: 97 % | TEMPERATURE: 97.3 F | HEART RATE: 118 BPM | BODY MASS INDEX: 41.28 KG/M2

## 2022-02-15 DIAGNOSIS — K76.9 HEPATIC LESION: Primary | ICD-10-CM

## 2022-02-15 DIAGNOSIS — R11.0 NAUSEA: ICD-10-CM

## 2022-02-15 DIAGNOSIS — K59.00 CONSTIPATION, UNSPECIFIED CONSTIPATION TYPE: ICD-10-CM

## 2022-02-15 DIAGNOSIS — N63.0 LUMP OR MASS IN BREAST: ICD-10-CM

## 2022-02-15 DIAGNOSIS — R74.8 ELEVATED LIVER ENZYMES: ICD-10-CM

## 2022-02-15 PROCEDURE — 76705 ECHO EXAM OF ABDOMEN: CPT

## 2022-02-15 PROCEDURE — 99214 OFFICE O/P EST MOD 30 MIN: CPT | Performed by: PHYSICIAN ASSISTANT

## 2022-02-15 RX ORDER — SUCRALFATE ORAL 1 G/10ML
1 SUSPENSION ORAL 4 TIMES DAILY PRN
Qty: 420 ML | Refills: 0 | Status: SHIPPED | OUTPATIENT
Start: 2022-02-15 | End: 2022-02-28

## 2022-02-15 RX ORDER — ONDANSETRON 4 MG/1
4 TABLET, ORALLY DISINTEGRATING ORAL EVERY 8 HOURS PRN
Qty: 30 TABLET | Refills: 0 | Status: SHIPPED | OUTPATIENT
Start: 2022-02-15 | End: 2022-08-01

## 2022-02-15 ASSESSMENT — MIFFLIN-ST. JEOR: SCORE: 1805.59

## 2022-02-15 NOTE — PROGRESS NOTES
Assessment & Plan     1. Hepatic lesion    2. Nausea    3. Lump or mass in breast    4. Constipation, unspecified constipation type      Patient was expectedly distraught following today's visit. We discussed the findings from her recent US which identified several lesions over the liver concerning for metastasis. Patient was receptive to recommendation for CT scan of chest/ab/pelvis as well as mammogram. She did not have many questions, but was encouraged to reach out with questions after having time to speak with her spouse. Pending CT scan will likely need to set patient up with oncologist as well as GI specialist for biopsy. Patient will use fiber supplement to help with constipation. She will also have sucralfate to use in adjunct to her current therapies.    Milton Mclean PA-C  [facility]    Jessy Moreno is a 56 year old who presents for the following health issues     HPI     Follow up US abdomen done today. Patient is having a lot of abdominal pain with radiating pain lower right pelvic region, pain scale 8-9/10, severe nausea.  Refill Zofran    Patient is a 56 year old female who presents today for follow up on recent imaging. She was seen by virtually on 02/07 due to concerns about dysuria and nausea. She was treated with bactrim and had laboratory workup. Hepatic enzymes returned elevated which prompted RUQ US.     Patient informs me that her symptoms began around MLK day, 01/17, with vomiting over 24 hours. She recalls that she had been at a work convention the weekend prior to this. However, she says that she wore a mask throughout the event except when eating. Though, there was a buffet which had exposure to all attendees. Her symptoms, nausea/vomiting, persisted over the next 2 weeks prompting the virtual visit. Since completing the bactrim she says that her dysuria has improved, but she has continued to vomit. Last episode, dry heaving, was 2-3 days ago. She  "notices that she feels bloated and is not tolerating large portions of food. She has been trying to eat smaller meals more frequently throughout the day. The zofran has helped with this. She was interested in suggestions for foods which may be better tolerated. Recommended soups, pastas, applesauce, oatmeal, protein shakes, etc.     The patient was informed of the imaging results, enlarged liver with several lesions concerning for malignancy. I did discuss with her the need for additional imaging, including mammogram, as the source of these lesions. The patient informs me that she had chosen not to continue with annual mammograms as the test was painful. She says that she experiences tenderness/pain along the lateral and inferior portions of her breasts and has also had \"lumpy\" undersides of her breasts. I did recommend a breast exam today, patient declined. Denies night sweats, weight loss, low energy, weakness.     Review of Systems   Constitutional, HEENT, cardiovascular, pulmonary, gi and gu systems are negative, except as otherwise noted.      Objective    /68 (BP Location: Right arm, Patient Position: Sitting, Cuff Size: Adult Large)   Pulse 118   Temp 97.3  F (36.3  C) (Temporal)   Resp 20   Ht 1.702 m (5' 7\")   Wt 119.3 kg (263 lb)   LMP 08/06/2008   SpO2 97%   BMI 41.19 kg/m    Body mass index is 41.19 kg/m .  Physical Exam   GENERAL: healthy, alert and no distress  RESP: lungs clear to auscultation - no rales, rhonchi or wheezes  CV: regular rate and rhythm, normal S1 S2, no S3 or S4, no murmur, click or rub, no peripheral edema and peripheral pulses strong  MS: no gross musculoskeletal defects noted, no edema  PSYCH: mentation appears normal, affect normal/bright    US Abdomen Limited    Result Date: 2/15/2022  ULTRASOUND ABDOMEN LIMITED February 15, 2022 7:54 AM CLINICAL HISTORY: Elevated liver enzymes. History of cholecystectomy 2005. TECHNIQUE: Limited abdominal ultrasound. COMPARISON: " Abdominal x-rays dated 1/31/2006. FINDINGS: GALLBLADDER: Absent. BILE DUCTS: Common bile duct is dilated at 1.0 cm. Intrahepatic ducts are difficult to evaluate. LIVER: The liver is enlarged at 24.8 cm in length. There are innumerable hypoechoic lesions with a more hypoechoic rim scattered throughout liver concerning for metastases. Intrahepatic bile ducts are not well seen. RIGHT KIDNEY: No hydronephrosis. PANCREAS: Mostly obscured by bowel gas and could not be evaluated. Abdominal aorta is grossly within normal limits, where seen. Inferior vena cava is obscured. No ascites.     IMPRESSION: Hepatomegaly with innumerable probable hepatic metastases. Further evaluation with CT chest, abdomen and pelvis may be helpful to evaluate for primary malignancy. I discussed the possible multiple metastases in the liver with Jeanette Cerrato, in the absence of Hyun Oquendo, on 2/15/2022 at approximately 8:00 AM. CHETNA RODAS MD   SYSTEM ID:  [id number]

## 2022-02-15 NOTE — H&P (VIEW-ONLY)
Assessment & Plan     1. Hepatic lesion    2. Nausea    3. Lump or mass in breast    4. Constipation, unspecified constipation type      Patient was expectedly distraught following today's visit. We discussed the findings from her recent US which identified several lesions over the liver concerning for metastasis. Patient was receptive to recommendation for CT scan of chest/ab/pelvis as well as mammogram. She did not have many questions, but was encouraged to reach out with questions after having time to speak with her spouse. Pending CT scan will likely need to set patient up with oncologist as well as GI specialist for biopsy. Patient will use fiber supplement to help with constipation. She will also have sucralfate to use in adjunct to her current therapies.    Milton Mclean PA-C  [facility]    Jessy Moreno is a 56 year old who presents for the following health issues     HPI     Follow up US abdomen done today. Patient is having a lot of abdominal pain with radiating pain lower right pelvic region, pain scale 8-9/10, severe nausea.  Refill Zofran    Patient is a 56 year old female who presents today for follow up on recent imaging. She was seen by virtually on 02/07 due to concerns about dysuria and nausea. She was treated with bactrim and had laboratory workup. Hepatic enzymes returned elevated which prompted RUQ US.     Patient informs me that her symptoms began around [place] day, 01/17, with vomiting over 24 hours. She recalls that she had been at a work convention the weekend prior to this. However, she says that she wore a mask throughout the event except when eating. Though, there was a buffet which had exposure to all attendees. Her symptoms, nausea/vomiting, persisted over the next 2 weeks prompting the virtual visit. Since completing the bactrim she says that her dysuria has improved, but she has continued to vomit. Last episode, dry heaving, was 2-3 days ago. She  "notices that she feels bloated and is not tolerating large portions of food. She has been trying to eat smaller meals more frequently throughout the day. The zofran has helped with this. She was interested in suggestions for foods which may be better tolerated. Recommended soups, pastas, applesauce, oatmeal, protein shakes, etc.     The patient was informed of the imaging results, enlarged liver with several lesions concerning for malignancy. I did discuss with her the need for additional imaging, including mammogram, as the source of these lesions. The patient informs me that she had chosen not to continue with annual mammograms as the test was painful. She says that she experiences tenderness/pain along the lateral and inferior portions of her breasts and has also had \"lumpy\" undersides of her breasts. I did recommend a breast exam today, patient declined. Denies night sweats, weight loss, low energy, weakness.     Review of Systems   Constitutional, HEENT, cardiovascular, pulmonary, gi and gu systems are negative, except as otherwise noted.      Objective    /68 (BP Location: Right arm, Patient Position: Sitting, Cuff Size: Adult Large)   Pulse 118   Temp 97.3  F (36.3  C) (Temporal)   Resp 20   Ht 1.702 m (5' 7\")   Wt 119.3 kg (263 lb)   LMP 08/06/2008   SpO2 97%   BMI 41.19 kg/m    Body mass index is 41.19 kg/m .  Physical Exam   GENERAL: healthy, alert and no distress  RESP: lungs clear to auscultation - no rales, rhonchi or wheezes  CV: regular rate and rhythm, normal S1 S2, no S3 or S4, no murmur, click or rub, no peripheral edema and peripheral pulses strong  MS: no gross musculoskeletal defects noted, no edema  PSYCH: mentation appears normal, affect normal/bright    US Abdomen Limited    Result Date: 2/15/2022  ULTRASOUND ABDOMEN LIMITED February 15, 2022 7:54 AM CLINICAL HISTORY: Elevated liver enzymes. History of cholecystectomy 2005. TECHNIQUE: Limited abdominal ultrasound. COMPARISON: " Abdominal x-rays dated 1/31/2006. FINDINGS: GALLBLADDER: Absent. BILE DUCTS: Common bile duct is dilated at 1.0 cm. Intrahepatic ducts are difficult to evaluate. LIVER: The liver is enlarged at 24.8 cm in length. There are innumerable hypoechoic lesions with a more hypoechoic rim scattered throughout liver concerning for metastases. Intrahepatic bile ducts are not well seen. RIGHT KIDNEY: No hydronephrosis. PANCREAS: Mostly obscured by bowel gas and could not be evaluated. Abdominal aorta is grossly within normal limits, where seen. Inferior vena cava is obscured. No ascites.     IMPRESSION: Hepatomegaly with innumerable probable hepatic metastases. Further evaluation with CT chest, abdomen and pelvis may be helpful to evaluate for primary malignancy. I discussed the possible multiple metastases in the liver with Jeanette Cerrato, in the absence of Huyn Oquendo, on 2/15/2022 at approximately 8:00 AM. CHETNA RODAS MD   SYSTEM ID:  [id number]

## 2022-02-15 NOTE — PATIENT INSTRUCTIONS
Patient Education     Tests for Liver Disease  This sheet describes tests that may be done for liver problems. Your healthcare provider will tell you which tests you need.   Blood tests to check the liver     A simple blood test can show how your liver is working.   A small amount of blood may be taken and tested for one or more of the following:     AFP (alpha fetoprotein). This is a protein made by the liver. A high level in the blood can be a sign of liver cancer or liver injury and regeneration in adults.    Albumin. This is a liver function test. It measures a protein made by the liver. When a person has liver disease, the level of albumin in the blood (serum albumin) is often low.    Alk phos (alkaline phosphatase). This is an enzyme that is mostly made in the liver and bones. It s measured with a blood test. A high level suggests a problem with the bile ducts in the liver.    ALT (alanine aminotransferase). ALT is an enzyme made by the liver. When the liver is damaged, ALT leaks into the blood. If a blood test finds a high level of ALT, this can be a sign of liver problems such as inflammation, scarring, or a tumor.    Ammonia. This is a liver function test that shows when a harmful substance is left behind in the blood after digestion. Normally the liver removes ammonia from the blood and turns it into urea. This leaves the body with urine. If a blood test shows that the ammonia level is too high, this process isn t happening as it should. This test is very inaccurate for liver function and should rarely be used.    AST (aspartate aminotransferase). AST is another enzyme made by the liver as well as by other organs such as muscle. It too is measured with a blood test. High levels of AST may be a sign of liver injury, especially if the ALT level is also high.    Bilirubin. This is a liver function test. It measures the yellow substance made when the body breaks down red blood cells. Bilirubin is collected  by the liver to be sent out of the body with stool. When something is wrong with the liver or bile ducts, bilirubin may build up in the body. This causes yellowing of the skin and the whites of the eyes (jaundice). Two measurements may be taken from this test: total bilirubin and direct bilirubin. A high bilirubin level may be the result of liver disease or a blockage in the bile ducts. A high indirect bilirubin can mean a condition called Gilbert syndrome. Only a small portion of people have Gilbert syndrome. Gilbert syndrome is not a sign of disease. A high indirect bilirubin can also be a sign of rapid red blood cell breakdown.    CBC (complete blood count). This is a test that measures all the parts of the blood. These are red blood cells, white blood cells, and platelets. Problems with these counts can mean infection or illness. They can also be a sign of a problem with the spleen. The spleen is an organ close to the liver that can be affected by liver disease. A low platelet count is common with advanced fibrosis of the liver. It also happens when the spleen becomes enlarged and begins to absorb platelets.    GGT (gamma-glutamyl transpeptidase). This is a liver enzyme that s often measured along with other enzymes to gauge liver problems. GGT is measured with a blood test. If alk phos and GGT are both higher than normal, it may be a sign that the bile ducts in the liver may be diseased or blocked. It also can be a sign of fatty liver or alcohol damage.    Glucose. This is a sugar in the blood and the body's most important source of energy. A healthy liver helps the body maintain a normal glucose level. If a blood test shows that glucose is low, this may mean the liver is not working properly.    Infectious hepatitis. This is a disease and can be found with antibody and antigen tests for Hepatitis A, B, C, D, and E.    PT (prothrombin time) or INR (international normalized ratio). This checks how long it takes  for blood to form clots. The liver makes a protein that helps with clotting. Problems with clotting can be a sign of liver disease.     5NT (5'-nucleotidase). This is enzyme is made is several organs, but only released into the blood by the liver. A high or low level may be a sign of liver disease.    SBA (serum bile acid). This test measures the amount of bile acid in the blood. A high level may mean that bile ducts are blocked or that the liver is unable to excrete bile acid. This test is rarely done.    Vitamins A, D, E, and K. These vitamins are stored in the liver and fat and released over time (fat-soluble). They are absorbed by the liver, with help from bile. If a blood test shows that these vitamin levels are low, this could mean the liver is not absorbing them properly.    Zinc. This is a nutrient that is absorbed by the liver. If a blood test shows a low zinc level, this could mean the liver isn t absorbing zinc properly. This can worsen conditions brought on by high levels of ammonia.  Several other lab tests may be done to check for specific liver problems once liver damage is found. These include:     Autoimmune antibodies    Ceruloplasmin (Madhu disease)    An iron panel (hemochromatosis)    Alpha-1-antitrypsin (alpha-1-antitrypsin deficiency)  Other tests to check the liver  The tests below may be done to check the liver s condition or function. These tests can also check related organs, such as the gallbladder or bile ducts.     Liver biopsy. This is a test to look for damage in liver tissue. A needle is used to take a small amount of tissue from the liver. The tissue is sent to a lab, where it is checked for signs of inflammation, scarring, or other problems.    CT scan. A CT scan is a series of X-rays that make a 3-D picture of the liver and gallbladder. This can show gallstones, abscesses, abnormal blood vessels, or tumors.    ERCP (endoscopic retrograde cholangiopancreatography). This test can  show if the bile ducts are blocked or narrowed. It can also take pictures of the gallbladder. During this test, a small flexible tube (endoscope) is put into the mouth. The tube is moved down the esophagus and stomach to the top of the small intestine. This is where the bile ducts are. Dye is released through the tube to make the bile ducts show up on an X-ray. The healthcare provider may also use small tools to take tiny samples of tissue or fluid. These are sent to a lab to be studied.    HIDA scan. This test checks gallbladder and liver function. A small amount of radioactive fluid is put into the body. This fluid will be seen on a scan as it travels through the liver to the gallbladder and into the intestine. It can show if bile ducts are missing or blocked. It can show if the gallbladder is working properly. It can also show other problems in the bile ducts.    MRI. This test uses magnets, radio waves, and a computer to create an image of the organs and tissues in your body.    MRCP (magnetic resonance cholangipancreatography). This is a type of MRI that is more detailed than a standard MRI. It can show abnormal or narrow bile ducts, tumors, gallstones, or all three.    Ultrasound (sonogram). This test uses harmless sound waves and a computer to create a picture of the liver, gallbladder, and bile ducts. It can show gallstones, tumors, or fat in the liver. It is also used to check the condition of the blood vessels and look for bile collections where bile may leak out of the liver. A special ultrasound called elastography gives more information about scarring in the liver (cirrhosis.)  Meryl last reviewed this educational content on 3/1/2020    7105-7859 [employer]. All rights reserved. This information is not intended as a substitute for professional medical care. Always follow your healthcare professional's instructions.           Patient Education     How the Liver Works  The liver is  located in the upper right part of your belly (abdomen). Most of it is protected by your ribs. It is a vital organ that has many jobs. The liver:    Makes bile, a substance that helps your body absorb fat    Makes proteins and blood-clotting factors that your body needs.    Controls sugar (glucose) in the blood and stores extra sugar    Works with your stomach and intestines to digest food    Controls production and removal of cholesterol    Stores vitamins and minerals    Removes poisonous (toxic) substances from your blood, as well as many prescription and over-the-counter medicines    Regulates other organs, such as the kidneys    Is part of the immune system, which helps your body fight infection  The parts of the liver and nearby organs    Biliary tree. A network of tubes that carry bile out of the liver and into the gallbladder and the duodenum.    Bile ducts. Tubes between the liver and small intestine. Bile drains into them from the liver.    Common bile duct. The duct from the liver (hepatic duct) and the duct from the gallbladder (cystic duct) meet to form the common bile duct. The common bile duct carries bile to the duodenum.    Duodenum. The first part of the small intestine. When bile leaves the liver, it flows through the bile ducts into the duodenum. Here, bile starts mixing with food to help digest fat.    Gallbladder. The organ that stores bile.    Spleen. An organ that filters blood. It also helps keep your body healthy and free of infection, as part of the immune system.    Splenic vein. A blood vessel that carries blood from the spleen to the portal vein.    Hepatic portal vein. A blood vessel that carries blood containing nutrients and oxygen from the digestive tract and spleen into the liver.    Inferior vena cava. A large vein that carries blood from the liver, intestines, legs, and kidneys to the heart.    Hepatic veins. Blood vessels that carry blood out of the liver to the inferior vena  cava.    Hepatic artery. A blood vessel that carries blood with a fresh supply of oxygen into the liver from the heart.    Blood flows through the liver  All the blood that flows from the intestines, stomach, and spleen is filtered through the liver. The blood flows into the liver through the hepatic portal vein. It filters through the liver in a system of smaller and smaller veins. As blood passes over liver cells, these cells process nutrients in the blood. This processing makes products like proteins and sugars that your body can use. Blood is carried out of the liver through the hepatic veins to the heart.  Meryl last reviewed this educational content on 6/1/2019 2000-2021 [employer]. All rights reserved. This information is not intended as a substitute for professional medical care. Always follow your healthcare professional's instructions.

## 2022-02-17 ENCOUNTER — PATIENT OUTREACH (OUTPATIENT)
Dept: ONCOLOGY | Facility: CLINIC | Age: 57
End: 2022-02-17
Payer: COMMERCIAL

## 2022-02-17 ENCOUNTER — HOSPITAL ENCOUNTER (OUTPATIENT)
Dept: CT IMAGING | Facility: CLINIC | Age: 57
Discharge: HOME | End: 2022-02-17
Attending: PHYSICIAN ASSISTANT | Admitting: PHYSICIAN ASSISTANT
Payer: COMMERCIAL

## 2022-02-17 DIAGNOSIS — C50.511 MALIGNANT NEOPLASM OF LOWER-OUTER QUADRANT OF RIGHT FEMALE BREAST, UNSPECIFIED ESTROGEN RECEPTOR STATUS (H): Primary | ICD-10-CM

## 2022-02-17 DIAGNOSIS — K76.9 HEPATIC LESION: ICD-10-CM

## 2022-02-17 DIAGNOSIS — C78.7 METASTASIS TO LIVER (H): ICD-10-CM

## 2022-02-17 DIAGNOSIS — R11.0 NAUSEA: ICD-10-CM

## 2022-02-17 PROBLEM — K21.9 GASTROESOPHAGEAL REFLUX DISEASE: Status: ACTIVE | Noted: 2017-12-26

## 2022-02-17 PROCEDURE — 250N000011 HC RX IP 250 OP 636: Performed by: RADIOLOGY

## 2022-02-17 PROCEDURE — 250N000009 HC RX 250: Performed by: RADIOLOGY

## 2022-02-17 PROCEDURE — 74177 CT ABD & PELVIS W/CONTRAST: CPT

## 2022-02-17 RX ORDER — IOPAMIDOL 755 MG/ML
200 INJECTION, SOLUTION INTRAVASCULAR ONCE
Status: COMPLETED | OUTPATIENT
Start: 2022-02-17 | End: 2022-02-17

## 2022-02-17 RX ADMIN — IOPAMIDOL 100 ML: 755 INJECTION, SOLUTION INTRAVENOUS at 08:50

## 2022-02-17 RX ADMIN — SODIUM CHLORIDE 60 ML: 9 INJECTION, SOLUTION INTRAVENOUS at 08:51

## 2022-02-17 NOTE — PROGRESS NOTES
"New Patient Oncology Nurse Navigator Note     Referring provider: Milton Mclean PA-C      Referring Clinic/Organization: [facility]     Referred to (specialty:) Medical Oncology and Cancer Surgery      Date Referral Received: February 17, 2022     Evaluation for:  Findings of metastatic right breast cancer seen on CT     Clinical History (per Nurse review of records provided):    Patient presented to primary care on 2/7/21 with complaints of nausea, dysuria and RLQ abdominal pain.  Tenderness to palpation of RUQ.  Provider ordered abdominal US and that was completed on 2/15 showing hepatomegaly with innumerable probable hepatic metastases. Further evaluation with CT chest, abdomen and pelvis may be helpful to evaluate for primary malignancy.    2/17/2022  CT Chest/abdomen/Pelvis showed findings of metastatic right breast cancer, multiple right breast masses consistent with malignancy with diffuse involvement of the right breast skin, right axillary lymphadenopathy consistent with metastasis, upperabdominal lymphadenopathy also suspicious for metastasis. There are multiple liver metastases and indeterminate 3 mm left upper lobe pulmonary nodule.      Bilateral diagnostic mammogram and bilateral breast ultrasound are scheduled for 2/21/22 at [place]. Tiffanie had declined annual mammograms as the test was painful. She says that she experiences tenderness/pain along the lateral and inferior portions of her breasts and has also had \"lumpy\" undersides of her breasts. She declined clinical breast exam at appointment with Milton Mclean PA-C on 2/15/22.      Records Location: Bookmarked materials     Additional testing needed prior to consult: TBD - at least breast imaging, likely breast biopsy, and possibly liver biopsy (GI specialist for biopsy stated in [facility] note).      Telephoned and spoke with Tiffanie to reassure her we are aware of referrals to medical oncology and cancer surgery and " appointments and timing are being prioritized.  We discussed providers at [facility] and the time frame for consults.  Writer will watch for results of 2/21 imaging and potential biopsies or follow up.

## 2022-02-18 NOTE — PROGRESS NOTES
Tiffanie was schedule by local clinic to meet with Dr. Turner on 2/22.    Telephoned and spoke with patient to review appointment with Dr. Terrazas, medical oncologist.  Call transferred to NPS to finalize appointment on 2/28 at . Kyra Haley RN

## 2022-02-21 ENCOUNTER — HOSPITAL ENCOUNTER (OUTPATIENT)
Dept: MAMMOGRAPHY | Facility: CLINIC | Age: 57
End: 2022-02-21
Attending: PHYSICIAN ASSISTANT
Payer: COMMERCIAL

## 2022-02-21 ENCOUNTER — HOSPITAL ENCOUNTER (OUTPATIENT)
Dept: ULTRASOUND IMAGING | Facility: CLINIC | Age: 57
End: 2022-02-21
Attending: PHYSICIAN ASSISTANT
Payer: COMMERCIAL

## 2022-02-21 DIAGNOSIS — N63.0 LUMP OR MASS IN BREAST: ICD-10-CM

## 2022-02-21 DIAGNOSIS — R11.0 NAUSEA: ICD-10-CM

## 2022-02-21 DIAGNOSIS — N63.0 BREAST MASS: ICD-10-CM

## 2022-02-21 DIAGNOSIS — K76.9 HEPATIC LESION: ICD-10-CM

## 2022-02-21 PROCEDURE — 76641 ULTRASOUND BREAST COMPLETE: CPT | Mod: 50

## 2022-02-21 PROCEDURE — 19083 BX BREAST 1ST LESION US IMAG: CPT | Mod: RT

## 2022-02-21 PROCEDURE — 77062 BREAST TOMOSYNTHESIS BI: CPT

## 2022-02-21 PROCEDURE — 999N000065 MA POST PROCEDURE RIGHT

## 2022-02-21 PROCEDURE — 88305 TISSUE EXAM BY PATHOLOGIST: CPT | Mod: TC | Performed by: PHYSICIAN ASSISTANT

## 2022-02-21 PROCEDURE — 88377 M/PHMTRC ALYS ISHQUANT/SEMIQ: CPT | Mod: 26 | Performed by: MEDICAL GENETICS

## 2022-02-21 PROCEDURE — 272N000431 US BREAST BIOPSY CORE NEEDLE RIGHT

## 2022-02-21 PROCEDURE — 88377 M/PHMTRC ALYS ISHQUANT/SEMIQ: CPT | Performed by: PHYSICIAN ASSISTANT

## 2022-02-21 PROCEDURE — 250N000009 HC RX 250: Performed by: PHYSICIAN ASSISTANT

## 2022-02-21 RX ORDER — LIDOCAINE HYDROCHLORIDE 10 MG/ML
10 INJECTION, SOLUTION EPIDURAL; INFILTRATION; INTRACAUDAL; PERINEURAL ONCE
Status: COMPLETED | OUTPATIENT
Start: 2022-02-21 | End: 2022-02-21

## 2022-02-21 RX ADMIN — LIDOCAINE HYDROCHLORIDE 8 ML: 10 INJECTION, SOLUTION EPIDURAL; INFILTRATION; INTRACAUDAL; PERINEURAL at 15:30

## 2022-02-22 NOTE — PROGRESS NOTES
RECORDS STATUS - BREAST    RECORDS REQUESTED FROM: EPIC   DATE REQUESTED: 2/28/2022   NOTES DETAILS STATUS   OFFICE NOTE from referring provider Complete Epic   Milton Mclean PA-C   OFFICE NOTE from surgeon Complete Internal Biopsy is in Process    OFFICE NOTE from radiation oncologist     DISCHARGE REPORT from the ER     OPERATIVE REPORT Complete Biopsy is in Process    MEDICATION LIST Complete [facility]   CLINICAL TRIAL TREATMENTS TO DATE     LABS     REQUEST BLOCKS FOR ALL BREAST CANCER PTS     PATHOLOGY REPORTS  (Tissue diagnosis, Stage, ER/NC percentage positive and intensity of staining, HER2 IHC, FISH, and all biopsies from breast and any distant metastasis)                  2/21/2022 (In Process)    GENONOMIC TESTING     TYPE:   (Next Generation Sequencing, including Foundation One testing, and Oncotype score)     IMAGING (NEED IMAGES & REPORT)     CT SCANS Complete CT Chest Abdomen 2/17/2022   MRI     MAMMO Complete 2/21/2022   ULTRASOUND Complete US Breast Biopsy 2/21/2022    PET     BONE SCAN     BRAIN MRI       Action    Action Taken 2/22/2022 9:20AM YEHUDA     I called pt Tiffanie- unavailable.

## 2022-02-23 ENCOUNTER — TELEPHONE (OUTPATIENT)
Dept: MAMMOGRAPHY | Facility: CLINIC | Age: 57
End: 2022-02-23

## 2022-02-23 ENCOUNTER — OFFICE VISIT (OUTPATIENT)
Dept: SURGERY | Facility: CLINIC | Age: 57
End: 2022-02-23
Attending: PHYSICIAN ASSISTANT
Payer: COMMERCIAL

## 2022-02-23 VITALS
DIASTOLIC BLOOD PRESSURE: 82 MMHG | BODY MASS INDEX: 41.28 KG/M2 | TEMPERATURE: 97.3 F | HEIGHT: 67 IN | WEIGHT: 263 LBS | SYSTOLIC BLOOD PRESSURE: 118 MMHG

## 2022-02-23 DIAGNOSIS — E66.01 MORBID OBESITY (H): ICD-10-CM

## 2022-02-23 DIAGNOSIS — C50.511 MALIGNANT NEOPLASM OF LOWER-OUTER QUADRANT OF RIGHT FEMALE BREAST, UNSPECIFIED ESTROGEN RECEPTOR STATUS (H): Primary | ICD-10-CM

## 2022-02-23 DIAGNOSIS — C78.7 METASTASIS TO LIVER (H): ICD-10-CM

## 2022-02-23 PROCEDURE — 11104 PUNCH BX SKIN SINGLE LESION: CPT | Performed by: SURGERY

## 2022-02-23 PROCEDURE — 99245 OFF/OP CONSLTJ NEW/EST HI 55: CPT | Mod: 25 | Performed by: SURGERY

## 2022-02-23 PROCEDURE — 88305 TISSUE EXAM BY PATHOLOGIST: CPT | Performed by: DERMATOLOGY

## 2022-02-23 PROCEDURE — 88360 TUMOR IMMUNOHISTOCHEM/MANUAL: CPT | Performed by: DERMATOLOGY

## 2022-02-23 PROCEDURE — 88342 IMHCHEM/IMCYTCHM 1ST ANTB: CPT | Mod: 59 | Performed by: DERMATOLOGY

## 2022-02-23 NOTE — TELEPHONE ENCOUNTER
Message sent to Dr. Turner to inform of patients US Guided Right Breast Needle Biopsy results(2/21/2022) below.  Patient was seen in clinic this afternoon by Dr. Turner and she was able to inform patient of the results and follow up recommendations.     Patient is also scheduled for a Medical Oncology Consult with Dr. Kim on  2/28/22 @ 8:30a.mini Camilo RN BSN  Breast Nurse Care Coordinator  [facility] [place] [place]  [phone]      Tiffanie Mcdermott [id number]  F, 1965  Surgical Pathology Report (Final result) [id number]  Authorizing Provider: Milton Mclean PA-C Ordering Provider: Milton Mclean PA-C  Ordering Location: [facility]  Imaging  Collected: 02/21/2022 03:33 PM  Pathologist: Arnold Espinoza MD Received: 02/21/2022 03:35 PM  .  Specimens  A Breast, Right  .  .  Final Diagnosis  Breast, right, not otherwise specified, biopsy-  Invasive ductal carcinoma, Anahi grade 3  Electronically signed by Arnold Espinoza MD on 2/23/2022 at 1:26 PM

## 2022-02-23 NOTE — PROGRESS NOTES
"  Assessment & Plan   Problem List Items Addressed This Visit        Digestive    Morbid obesity (H)      Other Visit Diagnoses     Malignant neoplasm of lower-outer quadrant of right female breast, unspecified estrogen receptor status (H)    -  Primary    Relevant Orders    Dermatological Path Order and Indications    Metastasis to liver (H)             Patient has stage IV metastatic breast cancer.  I informed patient and her  of this. No surgical intervention is discussed at this time.  Informed the patient that she will need a PowerPort for systemic chemotherapy depending on when this will be scheduled or if it is scheduled per medical oncology.  I recommend that we do a skin punch biopsy as these nodules on her lower aspect of the breast and the nipple areolar complex is the likely skin involvement of the existing breast cancer.  I discussed the risks, benefits, alternatives with the patient patient is understanding of this.  I can place a port for her and we can just have a phone conversation about this once medical oncology is done with their work-up and she is ready for systemic chemotherapy.          We will call her with her final pathology result.  Echo oncology to further notify me regarding the PowerPort.    Review of the result(s) of each unique test - CT CAP, Mammogram, US, final pathology  80 minutes spent on the date of the encounter doing chart review, history and exam, documentation, discussion with radiology regarding her imaging, discussing her pathology and imaging with medical oncology and further activities per the note       BMI:   Estimated body mass index is 41.19 kg/m  as calculated from the following:    Height as of this encounter: 1.702 m (5' 7\").    Weight as of this encounter: 119.3 kg (263 lb).       No follow-ups on file.    Sage Turner MD  [facility] is [facility] 56 year old who presents for the following health issues:    Jan 2022 - " had stomach flu or food poisoning (diarrhea and vomiting); no one else in family had similar symptoms.  But still wasn't feeling well after two weeks.  Saw her PCP who ordered GBUS as she was having RUQ pain and suprapubic pain.  GBUS noted liver lesions concerning for metastatic disease of unknown primary; thus a CT CAP was obtained.  Noted multiple right breast masses, liver mets, and enlarge josue hepatis concerning for metastatic disease.  Thus, pt underwent a mammogram and US followed by a right breast core biopsy which confirmed right breast invasive ductal carcinoma as the primary source of her metastatic disease.  Thus, she presented today for further evaluation.      +fatigue since Dec; decrease appetite the past 2 weeks; +nausea; +abdominal pain.  Pt thought it was gastritis; was placed on carafate and PPI - improved somewhat helps with nausea.  But still having decreased appetite and nausea.   But no melena; no hematochezia, no diarrhea; and no hematoemesis.      Noted right breast mass in axilla in late December but haven't had the chance to checked it out.  Hx of fibrocystic breast so didn't have much red flag to any the masses as she thought it was due to her dense breast.  Pt has hx of dermatitis or cystic dermal issue around the lower portion of her breast so she didn't think much of any changes to the right breast skin lesions and nodule.  No nipple discharge; no nipple inversion.  No mastitis; no signs of active infection; no seepage or discharge from any lesions around the breast.      No significant weight loss despite having no appetite. Last mammogram per EMR is .  Haven't had a colonoscopy.  Never MI; CVA.  Never been smoker.        BREAST-SPECIFIC HISTORY:  Prior breast biopsies: no  Prior breast surgeries: no  Prior radiation history: no  Hormone replacement therapy: no  Bra size: 44D  Dominant hand: right     GYN HISTORY:  [id number]  Age at 1st pregnancy: 34 yo  Age at menarche:  11  Breastfeeding history: yes  Menopausal? post     Reproductive PSH includes: n/a    Cancer history in self: none      FAMILY HISTORY:  Breast ca: no  Ovarian ca: none  Pancreatic ca: none  Gastric ca: no   Melanoma: no  Colon ca: no  Other cancer: no         Review of Systems   Constitutional, HEENT, cardiovascular, pulmonary, GI, , musculoskeletal, neuro, skin, endocrine and psych systems are negative, except as otherwise noted.      Objective    LMP 08/06/2008   There is no height or weight on file to calculate BMI.  Physical Exam  Vitals reviewed.   HENT:      Head: Normocephalic.      Nose: Nose normal.   Eyes:      Conjunctiva/sclera: Conjunctivae normal.   Cardiovascular:      Rate and Rhythm: Normal rate.   Pulmonary:      Effort: Pulmonary effort is normal.   Chest:   Breasts:      Right: Swelling, mass, skin change, tenderness and axillary adenopathy present. No supraclavicular adenopathy.      Left: Normal.         Abdominal:      Palpations: Abdomen is soft.   Musculoskeletal:         General: Normal range of motion.      Cervical back: Normal range of motion.   Lymphadenopathy:      Upper Body:      Right upper body: Axillary adenopathy present. No supraclavicular adenopathy.   Skin:     General: Skin is warm.      Capillary Refill: Capillary refill takes less than 2 seconds.   Neurological:      General: No focal deficit present.      Mental Status: She is alert.   Psychiatric:         Mood and Affect: Mood normal.        MAMMOGRAPHY DIAGNOSTIC BILATERAL W/ OLGA LIDIA, DIGITAL w/CAD;   TARGETED ULTRASOUND, RIGHT BREAST - 2/21/2022 2:26 PM     HISTORY: Hepatic metastases seen on ultrasound. Follow-up CT chest,  abdomen and pelvis demonstrate multiple hepatic metastases and  multiple right breast masses as well as right axillary  lymphadenopathy. This is thought to represent breast cancer.     COMPARISON: Limited abdominal ultrasound dated 2/15/2022, CT chest,  abdomen and pelvis dated  2/17/2022.     BREAST DENSITY: Heterogeneously dense limiting mammographic  sensitivity.     FINDINGS: There are two large masses noted in the right breast  corresponding with the palpable findings. One in the upper aspect of  the right breast measures approximately 3.7 cm in diameter. One in the  lower outer aspect of the right breast measures up to 4.6 cm. There  are multiple other smaller masses scattered through the right breast  likely also the same etiology. There is another mass in the far  posterior aspect of the right upper outer breast near the chest wall  measuring up to 2.3 cm. There is increased density in the retroareolar  right breast. The anterior right breast skin is abnormally thickened  at approximately 0.8 cm in thickness.   No definite mass in left breast is seen. Benign-appearing lymph node  in the upper outer posterior left breast is noted.     Targeted ultrasound of the right breast demonstrates multiple  hypoechoic lobulated shadowing masses in the right breast. One at the  7 o'clock position 8 cm from the nipple measures 3.7 x 3.1 x 3.5 cm.  One in the upper right breast 11 o'clock position 15 cm from the  nipple measures 3.5 x 3.0 x 3.5 cm. These represent the two largest  palpable masses. There are multiple other smaller masses scattered  throughout the breast. One in the far posterior upper outer right  breast is also noted on the live examination, but was not definitely  saved on the images. The skin is abnormally thickened in the anterior  aspect of the breast measuring up to 0.7 cm in thickness.                                                                      IMPRESSION: BI-RADS CATEGORY: 5 - Highly Suggestive of  Malignancy-Appropriate Action Should Be Taken.     RECOMMENDED FOLLOW-UP: Biopsy right breast.     I discussed the findings and recommendations with the patient at the  time of the exam.     CHETNA RODAS MD       MAMMOGRAPHY DIAGNOSTIC BILATERAL W/ OLGA LIDIA, DIGITAL w/CAD;    TARGETED ULTRASOUND, RIGHT BREAST - 2/21/2022 2:26 PM     HISTORY: Hepatic metastases seen on ultrasound. Follow-up CT chest,  abdomen and pelvis demonstrate multiple hepatic metastases and  multiple right breast masses as well as right axillary  lymphadenopathy. This is thought to represent breast cancer.     COMPARISON: Limited abdominal ultrasound dated 2/15/2022, CT chest,  abdomen and pelvis dated 2/17/2022.     BREAST DENSITY: Heterogeneously dense limiting mammographic  sensitivity.     FINDINGS: There are two large masses noted in the right breast  corresponding with the palpable findings. One in the upper aspect of  the right breast measures approximately 3.7 cm in diameter. One in the  lower outer aspect of the right breast measures up to 4.6 cm. There  are multiple other smaller masses scattered through the right breast  likely also the same etiology. There is another mass in the far  posterior aspect of the right upper outer breast near the chest wall  measuring up to 2.3 cm. There is increased density in the retroareolar  right breast. The anterior right breast skin is abnormally thickened  at approximately 0.8 cm in thickness.   No definite mass in left breast is seen. Benign-appearing lymph node  in the upper outer posterior left breast is noted.     Targeted ultrasound of the right breast demonstrates multiple  hypoechoic lobulated shadowing masses in the right breast. One at the  7 o'clock position 8 cm from the nipple measures 3.7 x 3.1 x 3.5 cm.  One in the upper right breast 11 o'clock position 15 cm from the  nipple measures 3.5 x 3.0 x 3.5 cm. These represent the two largest  palpable masses. There are multiple other smaller masses scattered  throughout the breast. One in the far posterior upper outer right  breast is also noted on the live examination, but was not definitely  saved on the images. The skin is abnormally thickened in the anterior  aspect of the breast measuring up to 0.7 cm in  thickness.                                                                      IMPRESSION: BI-RADS CATEGORY: 5 - Highly Suggestive of  Malignancy-Appropriate Action Should Be Taken.     RECOMMENDED FOLLOW-UP: Biopsy right breast.     I discussed the findings and recommendations with the patient at the  time of the exam.     CHETNA RODAS MD

## 2022-02-23 NOTE — PROGRESS NOTES
Malignant Path:  Pathology report reviewed with our breast radiologist Dr. Shubham More, who confirmed the recent breast imaging is concordant with the final surgical pathology results below.    Dr. Turner informed patient today(2/23/22) during clinic visit of Ultrasound Guided Right Breast Biopsy results showing Invasive Ductal Carcinoma.   Recommendations for follow up are Surgical/Medical Oncology consultation and management of care.    Patient is already scheduled for Medical Oncology Consult with Dr. Kim on 3/1/22.    Yuliana Camilo RN, BSN  Breast Care Nurse Coordinator  [facility] [place] [place]  [phone]        Tiffanie Mcdermott [id number]  F, 1965  Surgical Pathology Report (Final result) [id number]  Authorizing Provider: Milton Mclean PA-C Ordering Provider: Milton Mclean PA-C  Ordering Location: [facility]  Imaging  Collected: 02/21/2022 03:33 PM  Pathologist: Arnold Espinoza MD Received: 02/21/2022 03:35 PM  .  Specimens  A Breast, Right  .  .  Final Diagnosis  Breast, right, not otherwise specified, biopsy-    Invasive ductal carcinoma, [place] grade 3    Electronically signed by Arnold Espinoza MD on 2/23/2022 at 1:26 PM

## 2022-02-23 NOTE — LETTER
2/23/2022         RE: Tiffanie Mcdermott  [id number] [address] [place] [id number]        Dear Colleague,    Thank you for referring your patient, Tiffanie Mcdermott, to the [facility]. Please see a copy of my visit note below.      Assessment & Plan   Problem List Items Addressed This Visit        Digestive    Morbid obesity (H)      Other Visit Diagnoses     Malignant neoplasm of lower-outer quadrant of right female breast, unspecified estrogen receptor status (H)    -  Primary    Relevant Orders    Dermatological Path Order and Indications    Metastasis to liver (H)             Patient has stage IV metastatic breast cancer.  I informed patient and her  of this. No surgical intervention is discussed at this time.  Informed the patient that she will need a PowerPort for systemic chemotherapy depending on when this will be scheduled or if it is scheduled per medical oncology.  I recommend that we do a skin punch biopsy as these nodules on her lower aspect of the breast and the nipple areolar complex is the likely skin involvement of the existing breast cancer.  I discussed the risks, benefits, alternatives with the patient patient is understanding of this.  I can place a port for her and we can just have a phone conversation about this once medical oncology is done with their work-up and she is ready for systemic chemotherapy.          We will call her with her final pathology result.  Echo oncology to further notify me regarding the PowerPort.    Review of the result(s) of each unique test - CT CAP, Mammogram, US, final pathology  80 minutes spent on the date of the encounter doing chart review, history and exam, documentation, discussion with radiology regarding her imaging, discussing her pathology and imaging with medical oncology and further activities per the note       BMI:   Estimated body mass index is 41.19 kg/m  as calculated from the following:    Height as of  "this encounter: 1.702 m (5' 7\").    Weight as of this encounter: 119.3 kg (263 lb).       No follow-ups on file.    Sage Turner MD  [facility] 56 year [facility] who presents for the following health issues:    Jan 2022 - had stomach flu or food poisoning (diarrhea and vomiting); no one else in family had similar symptoms.  But still wasn't feeling well after two weeks.  Saw her PCP who ordered GBUS as she was having RUQ pain and suprapubic pain.  GBUS noted liver lesions concerning for metastatic disease of unknown primary; thus a CT CAP was obtained.  Noted multiple right breast masses, liver mets, and enlarge josue hepatis concerning for metastatic disease.  Thus, pt underwent a mammogram and US followed by a right breast core biopsy which confirmed right breast invasive ductal carcinoma as the primary source of her metastatic disease.  Thus, she presented today for further evaluation.      +fatigue since Dec; decrease appetite the past 2 weeks; +nausea; +abdominal pain.  Pt thought it was gastritis; was placed on carafate and PPI - improved somewhat helps with nausea.  But still having decreased appetite and nausea.   But no melena; no hematochezia, no diarrhea; and no hematoemesis.      Noted right breast mass in axilla in late December but haven't had the chance to checked it out.  Hx of fibrocystic breast so didn't have much red flag to any the masses as she thought it was due to her dense breast.  Pt has hx of dermatitis or cystic dermal issue around the lower portion of her breast so she didn't think much of any changes to the right breast skin lesions and nodule.  No nipple discharge; no nipple inversion.  No mastitis; no signs of active infection; no seepage or discharge from any lesions around the breast.      No significant weight loss despite having no appetite. Last mammogram per EMR is 2007.  Haven't had a colonoscopy.  Never MI; CVA.  Never been smoker.  "       BREAST-SPECIFIC HISTORY:  Prior breast biopsies: no  Prior breast surgeries: no  Prior radiation history: no  Hormone replacement therapy: no  Bra size: 44D  Dominant hand: right     GYN HISTORY:  [id number]  Age at 1st pregnancy: 32 yo  Age at menarche: 11  Breastfeeding history: yes  Menopausal? post     Reproductive PSH includes: n/a    Cancer history in self: none      FAMILY HISTORY:  Breast ca: no  Ovarian ca: none  Pancreatic ca: none  Gastric ca: no   Melanoma: no  Colon ca: no  Other cancer: no         Review of Systems   Constitutional, HEENT, cardiovascular, pulmonary, GI, , musculoskeletal, neuro, skin, endocrine and psych systems are negative, except as otherwise noted.      Objective    LMP 2008   There is no height or weight on file to calculate BMI.  Physical Exam  Vitals reviewed.   HENT:      Head: Normocephalic.      Nose: Nose normal.   Eyes:      Conjunctiva/sclera: Conjunctivae normal.   Cardiovascular:      Rate and Rhythm: Normal rate.   Pulmonary:      Effort: Pulmonary effort is normal.   Chest:   Breasts:      Right: Swelling, mass, skin change, tenderness and axillary adenopathy present. No supraclavicular adenopathy.      Left: Normal.         Abdominal:      Palpations: Abdomen is soft.   Musculoskeletal:         General: Normal range of motion.      Cervical back: Normal range of motion.   Lymphadenopathy:      Upper Body:      Right upper body: Axillary adenopathy present. No supraclavicular adenopathy.   Skin:     General: Skin is warm.      Capillary Refill: Capillary refill takes less than 2 seconds.   Neurological:      General: No focal deficit present.      Mental Status: She is alert.   Psychiatric:         Mood and Affect: Mood normal.        MAMMOGRAPHY DIAGNOSTIC BILATERAL W/ OLGA LIDIA, DIGITAL w/CAD;   TARGETED ULTRASOUND, RIGHT BREAST - 2022 2:26 PM     HISTORY: Hepatic metastases seen on ultrasound. Follow-up CT chest,  abdomen and pelvis demonstrate multiple  hepatic metastases and  multiple right breast masses as well as right axillary  lymphadenopathy. This is thought to represent breast cancer.     COMPARISON: Limited abdominal ultrasound dated 2/15/2022, CT chest,  abdomen and pelvis dated 2/17/2022.     BREAST DENSITY: Heterogeneously dense limiting mammographic  sensitivity.     FINDINGS: There are two large masses noted in the right breast  corresponding with the palpable findings. One in the upper aspect of  the right breast measures approximately 3.7 cm in diameter. One in the  lower outer aspect of the right breast measures up to 4.6 cm. There  are multiple other smaller masses scattered through the right breast  likely also the same etiology. There is another mass in the far  posterior aspect of the right upper outer breast near the chest wall  measuring up to 2.3 cm. There is increased density in the retroareolar  right breast. The anterior right breast skin is abnormally thickened  at approximately 0.8 cm in thickness.   No definite mass in left breast is seen. Benign-appearing lymph node  in the upper outer posterior left breast is noted.     Targeted ultrasound of the right breast demonstrates multiple  hypoechoic lobulated shadowing masses in the right breast. One at the  7 o'clock position 8 cm from the nipple measures 3.7 x 3.1 x 3.5 cm.  One in the upper right breast 11 o'clock position 15 cm from the  nipple measures 3.5 x 3.0 x 3.5 cm. These represent the two largest  palpable masses. There are multiple other smaller masses scattered  throughout the breast. One in the far posterior upper outer right  breast is also noted on the live examination, but was not definitely  saved on the images. The skin is abnormally thickened in the anterior  aspect of the breast measuring up to 0.7 cm in thickness.                                                                      IMPRESSION: BI-RADS CATEGORY: 5 - Highly Suggestive of  Malignancy-Appropriate Action  Should Be Taken.     RECOMMENDED FOLLOW-UP: Biopsy right breast.     I discussed the findings and recommendations with the patient at the  time of the exam.     CHETNA RODAS MD       MAMMOGRAPHY DIAGNOSTIC BILATERAL W/ OLGA LIDIA, DIGITAL w/CAD;   TARGETED ULTRASOUND, RIGHT BREAST - 2/21/2022 2:26 PM     HISTORY: Hepatic metastases seen on ultrasound. Follow-up CT chest,  abdomen and pelvis demonstrate multiple hepatic metastases and  multiple right breast masses as well as right axillary  lymphadenopathy. This is thought to represent breast cancer.     COMPARISON: Limited abdominal ultrasound dated 2/15/2022, CT chest,  abdomen and pelvis dated 2/17/2022.     BREAST DENSITY: Heterogeneously dense limiting mammographic  sensitivity.     FINDINGS: There are two large masses noted in the right breast  corresponding with the palpable findings. One in the upper aspect of  the right breast measures approximately 3.7 cm in diameter. One in the  lower outer aspect of the right breast measures up to 4.6 cm. There  are multiple other smaller masses scattered through the right breast  likely also the same etiology. There is another mass in the far  posterior aspect of the right upper outer breast near the chest wall  measuring up to 2.3 cm. There is increased density in the retroareolar  right breast. The anterior right breast skin is abnormally thickened  at approximately 0.8 cm in thickness.   No definite mass in left breast is seen. Benign-appearing lymph node  in the upper outer posterior left breast is noted.     Targeted ultrasound of the right breast demonstrates multiple  hypoechoic lobulated shadowing masses in the right breast. One at the  7 o'clock position 8 cm from the nipple measures 3.7 x 3.1 x 3.5 cm.  One in the upper right breast 11 o'clock position 15 cm from the  nipple measures 3.5 x 3.0 x 3.5 cm. These represent the two largest  palpable masses. There are multiple other smaller masses scattered  throughout  the breast. One in the far posterior upper outer right  breast is also noted on the live examination, but was not definitely  saved on the images. The skin is abnormally thickened in the anterior  aspect of the breast measuring up to 0.7 cm in thickness.                                                                      IMPRESSION: BI-RADS CATEGORY: 5 - Highly Suggestive of  Malignancy-Appropriate Action Should Be Taken.     RECOMMENDED FOLLOW-UP: Biopsy right breast.     I discussed the findings and recommendations with the patient at the  time of the exam.     CHETNA RODAS MD           Again, thank you for allowing me to participate in the care of your patient.        Sincerely,        Sage Turner MD

## 2022-02-24 LAB
INTERPRETATION: NORMAL
PATH REPORT.COMMENTS IMP SPEC: ABNORMAL
PATH REPORT.COMMENTS IMP SPEC: YES
PATH REPORT.FINAL DX SPEC: ABNORMAL
PATH REPORT.GROSS SPEC: ABNORMAL
PATH REPORT.MICROSCOPIC SPEC OTHER STN: ABNORMAL
PATH REPORT.RELEVANT HX SPEC: ABNORMAL
PATHOLOGY SYNOPTIC REPORT: ABNORMAL
PHOTO IMAGE: ABNORMAL

## 2022-02-24 PROCEDURE — 88305 TISSUE EXAM BY PATHOLOGIST: CPT | Mod: 26 | Performed by: PATHOLOGY

## 2022-02-24 PROCEDURE — 88360 TUMOR IMMUNOHISTOCHEM/MANUAL: CPT | Mod: 26 | Performed by: PATHOLOGY

## 2022-02-26 NOTE — PROCEDURES
PUNCH BIOPSY PROCEDURE NOTE    Name: Tiffanie STEWART [facility]: [unfilled]   : 1965, 56 year old Room/Bed: Room/bed info not found   CSN: [id number] Admission: No admission date for patient encounter.   MRN: [id number] Today: 2022,     PCP: No Ref-Primary, Physician Attending: Milton Mclean PA-C       PROCEDURE:   1. Punch biopsy of right breast skin nodule    INDICATION: right breast cancer    PRE-PROCEDURE     : Sage Turner MD  CONSENT: signed an Informed Consent Document.      PROCEDURE     The RIGHT breast  area was prepped and appropriately anesthetized with 1% lidocaine with epinephrine.  There was a small tiny nodule with in the numerous nodule around the nipple areolar complex.  Using the usual technique, punch biopsy size 6 mm was performed.  Entire nodule was completely excised.  This was sent for pathology.  The skin was then closed with a 4-0 Prolene in a simple interrupted fashion x2.  Stasis was achieved by holding pressure.  Specimen was sent to Pathology      POST-PROCEDURE     The patient tolerated the procedure well    COMPLICATIONS: none    Plan:  1. Instructed to keep the wound dry and covered for 24-48h and clean thereafter.  2. Warning signs of infection were reviewed.    3. Recommended that the patient use OTC acetaminophen, OTC ibuprofen as needed for pain.         Electronically signed by: Sage Turner MD; 2022

## 2022-02-28 ENCOUNTER — PATIENT OUTREACH (OUTPATIENT)
Dept: ONCOLOGY | Facility: CLINIC | Age: 57
End: 2022-02-28

## 2022-02-28 ENCOUNTER — ONCOLOGY VISIT (OUTPATIENT)
Dept: ONCOLOGY | Facility: CLINIC | Age: 57
End: 2022-02-28
Attending: PHYSICIAN ASSISTANT
Payer: COMMERCIAL

## 2022-02-28 ENCOUNTER — PATIENT OUTREACH (OUTPATIENT)
Dept: CARE COORDINATION | Facility: CLINIC | Age: 57
End: 2022-02-28

## 2022-02-28 ENCOUNTER — ALLIED HEALTH/NURSE VISIT (OUTPATIENT)
Dept: FAMILY MEDICINE | Facility: CLINIC | Age: 57
End: 2022-02-28
Payer: COMMERCIAL

## 2022-02-28 ENCOUNTER — PRE VISIT (OUTPATIENT)
Dept: ONCOLOGY | Facility: CLINIC | Age: 57
End: 2022-02-28

## 2022-02-28 VITALS
SYSTOLIC BLOOD PRESSURE: 120 MMHG | BODY MASS INDEX: 40.06 KG/M2 | RESPIRATION RATE: 20 BRPM | WEIGHT: 255.2 LBS | OXYGEN SATURATION: 95 % | DIASTOLIC BLOOD PRESSURE: 75 MMHG | HEIGHT: 67 IN | HEART RATE: 123 BPM

## 2022-02-28 DIAGNOSIS — R63.5 ABNORMAL WEIGHT GAIN: ICD-10-CM

## 2022-02-28 DIAGNOSIS — C78.7 BREAST CANCER METASTASIZED TO LIVER, RIGHT (H): ICD-10-CM

## 2022-02-28 DIAGNOSIS — Z48.01 ENCOUNTER FOR SURGICAL WOUND DRESSING CHANGE: Primary | ICD-10-CM

## 2022-02-28 DIAGNOSIS — C50.911 BREAST CANCER METASTASIZED TO LIVER, RIGHT (H): ICD-10-CM

## 2022-02-28 DIAGNOSIS — C50.511 MALIGNANT NEOPLASM OF LOWER-OUTER QUADRANT OF RIGHT FEMALE BREAST, UNSPECIFIED ESTROGEN RECEPTOR STATUS (H): ICD-10-CM

## 2022-02-28 DIAGNOSIS — C50.911 HER2-POSITIVE CARCINOMA OF RIGHT BREAST (H): ICD-10-CM

## 2022-02-28 DIAGNOSIS — Z17.31 HER2-POSITIVE CARCINOMA OF RIGHT BREAST (H): ICD-10-CM

## 2022-02-28 DIAGNOSIS — Z17.1 STAGE IV CARCINOMA OF BREAST, ER-, RIGHT (H): Primary | ICD-10-CM

## 2022-02-28 DIAGNOSIS — C50.911 STAGE IV CARCINOMA OF BREAST, ER-, RIGHT (H): Primary | ICD-10-CM

## 2022-02-28 DIAGNOSIS — R63.0 LOSS OF APPETITE: ICD-10-CM

## 2022-02-28 PROCEDURE — 99204 OFFICE O/P NEW MOD 45 MIN: CPT | Performed by: INTERNAL MEDICINE

## 2022-02-28 PROCEDURE — 99207 PR NO CHARGE NURSE ONLY: CPT

## 2022-02-28 RX ORDER — DIPHENHYDRAMINE HYDROCHLORIDE 50 MG/ML
50 INJECTION INTRAMUSCULAR; INTRAVENOUS
Status: CANCELLED
Start: 2022-03-21

## 2022-02-28 RX ORDER — DIPHENHYDRAMINE HCL 25 MG
50 CAPSULE ORAL
Status: CANCELLED
Start: 2022-04-18

## 2022-02-28 RX ORDER — ALBUTEROL SULFATE 90 UG/1
1-2 AEROSOL, METERED RESPIRATORY (INHALATION)
Status: CANCELLED
Start: 2022-04-25

## 2022-02-28 RX ORDER — ALBUTEROL SULFATE 0.83 MG/ML
2.5 SOLUTION RESPIRATORY (INHALATION)
Status: CANCELLED | OUTPATIENT
Start: 2022-02-28

## 2022-02-28 RX ORDER — ALBUTEROL SULFATE 0.83 MG/ML
2.5 SOLUTION RESPIRATORY (INHALATION)
Status: CANCELLED | OUTPATIENT
Start: 2022-04-04

## 2022-02-28 RX ORDER — HEPARIN SODIUM (PORCINE) LOCK FLUSH IV SOLN 100 UNIT/ML 100 UNIT/ML
5 SOLUTION INTRAVENOUS
Status: CANCELLED | OUTPATIENT
Start: 2022-04-18

## 2022-02-28 RX ORDER — EPINEPHRINE 1 MG/ML
0.3 INJECTION, SOLUTION INTRAMUSCULAR; SUBCUTANEOUS EVERY 5 MIN PRN
Status: CANCELLED | OUTPATIENT
Start: 2022-05-02

## 2022-02-28 RX ORDER — DIPHENHYDRAMINE HCL 25 MG
50 CAPSULE ORAL
Status: CANCELLED
Start: 2022-04-04

## 2022-02-28 RX ORDER — MEPERIDINE HYDROCHLORIDE 25 MG/ML
25 INJECTION INTRAMUSCULAR; INTRAVENOUS; SUBCUTANEOUS EVERY 30 MIN PRN
Status: CANCELLED | OUTPATIENT
Start: 2022-04-04

## 2022-02-28 RX ORDER — MEPERIDINE HYDROCHLORIDE 25 MG/ML
25 INJECTION INTRAMUSCULAR; INTRAVENOUS; SUBCUTANEOUS EVERY 30 MIN PRN
Status: CANCELLED | OUTPATIENT
Start: 2022-04-18

## 2022-02-28 RX ORDER — MEPERIDINE HYDROCHLORIDE 25 MG/ML
25 INJECTION INTRAMUSCULAR; INTRAVENOUS; SUBCUTANEOUS EVERY 30 MIN PRN
Status: CANCELLED | OUTPATIENT
Start: 2022-03-28

## 2022-02-28 RX ORDER — HEPARIN SODIUM,PORCINE 10 UNIT/ML
5 VIAL (ML) INTRAVENOUS
Status: CANCELLED | OUTPATIENT
Start: 2022-04-04

## 2022-02-28 RX ORDER — EPINEPHRINE 1 MG/ML
0.3 INJECTION, SOLUTION INTRAMUSCULAR; SUBCUTANEOUS EVERY 5 MIN PRN
Status: CANCELLED | OUTPATIENT
Start: 2022-04-18

## 2022-02-28 RX ORDER — HEPARIN SODIUM,PORCINE 10 UNIT/ML
5 VIAL (ML) INTRAVENOUS
Status: CANCELLED | OUTPATIENT
Start: 2022-04-11

## 2022-02-28 RX ORDER — EPINEPHRINE 1 MG/ML
0.3 INJECTION, SOLUTION INTRAMUSCULAR; SUBCUTANEOUS EVERY 5 MIN PRN
Status: CANCELLED | OUTPATIENT
Start: 2022-02-28

## 2022-02-28 RX ORDER — DIPHENHYDRAMINE HCL 25 MG
50 CAPSULE ORAL
Status: CANCELLED
Start: 2022-04-11

## 2022-02-28 RX ORDER — ALBUTEROL SULFATE 90 UG/1
1-2 AEROSOL, METERED RESPIRATORY (INHALATION)
Status: CANCELLED
Start: 2022-05-02

## 2022-02-28 RX ORDER — METHYLPREDNISOLONE SODIUM SUCCINATE 125 MG/2ML
125 INJECTION, POWDER, LYOPHILIZED, FOR SOLUTION INTRAMUSCULAR; INTRAVENOUS
Status: CANCELLED
Start: 2022-04-18

## 2022-02-28 RX ORDER — MEPERIDINE HYDROCHLORIDE 25 MG/ML
25 INJECTION INTRAMUSCULAR; INTRAVENOUS; SUBCUTANEOUS EVERY 30 MIN PRN
Status: CANCELLED | OUTPATIENT
Start: 2022-05-02

## 2022-02-28 RX ORDER — DIPHENHYDRAMINE HCL 25 MG
50 CAPSULE ORAL
Status: CANCELLED
Start: 2022-04-25

## 2022-02-28 RX ORDER — NALOXONE HYDROCHLORIDE 0.4 MG/ML
0.2 INJECTION, SOLUTION INTRAMUSCULAR; INTRAVENOUS; SUBCUTANEOUS
Status: CANCELLED | OUTPATIENT
Start: 2022-02-28

## 2022-02-28 RX ORDER — ACETAMINOPHEN 325 MG/1
650 TABLET ORAL ONCE
Status: CANCELLED | OUTPATIENT
Start: 2022-02-28

## 2022-02-28 RX ORDER — EPINEPHRINE 1 MG/ML
0.3 INJECTION, SOLUTION INTRAMUSCULAR; SUBCUTANEOUS EVERY 5 MIN PRN
Status: CANCELLED | OUTPATIENT
Start: 2022-03-28

## 2022-02-28 RX ORDER — NALOXONE HYDROCHLORIDE 0.4 MG/ML
0.2 INJECTION, SOLUTION INTRAMUSCULAR; INTRAVENOUS; SUBCUTANEOUS
Status: CANCELLED | OUTPATIENT
Start: 2022-05-02

## 2022-02-28 RX ORDER — ALBUTEROL SULFATE 90 UG/1
1-2 AEROSOL, METERED RESPIRATORY (INHALATION)
Status: CANCELLED
Start: 2022-03-08

## 2022-02-28 RX ORDER — ACETAMINOPHEN 325 MG/1
650 TABLET ORAL
Status: CANCELLED | OUTPATIENT
Start: 2022-03-28

## 2022-02-28 RX ORDER — MEPERIDINE HYDROCHLORIDE 25 MG/ML
25 INJECTION INTRAMUSCULAR; INTRAVENOUS; SUBCUTANEOUS EVERY 30 MIN PRN
Status: CANCELLED | OUTPATIENT
Start: 2022-03-21

## 2022-02-28 RX ORDER — METHYLPREDNISOLONE SODIUM SUCCINATE 125 MG/2ML
125 INJECTION, POWDER, LYOPHILIZED, FOR SOLUTION INTRAMUSCULAR; INTRAVENOUS
Status: CANCELLED
Start: 2022-04-11

## 2022-02-28 RX ORDER — METHYLPREDNISOLONE SODIUM SUCCINATE 125 MG/2ML
125 INJECTION, POWDER, LYOPHILIZED, FOR SOLUTION INTRAMUSCULAR; INTRAVENOUS
Status: CANCELLED
Start: 2022-03-21

## 2022-02-28 RX ORDER — HEPARIN SODIUM,PORCINE 10 UNIT/ML
5 VIAL (ML) INTRAVENOUS
Status: CANCELLED | OUTPATIENT
Start: 2022-04-18

## 2022-02-28 RX ORDER — ALBUTEROL SULFATE 90 UG/1
1-2 AEROSOL, METERED RESPIRATORY (INHALATION)
Status: CANCELLED
Start: 2022-03-28

## 2022-02-28 RX ORDER — DIPHENHYDRAMINE HYDROCHLORIDE 50 MG/ML
50 INJECTION INTRAMUSCULAR; INTRAVENOUS
Status: CANCELLED
Start: 2022-04-04

## 2022-02-28 RX ORDER — DIPHENHYDRAMINE HCL 25 MG
50 CAPSULE ORAL ONCE
Status: CANCELLED
Start: 2022-03-08

## 2022-02-28 RX ORDER — NALOXONE HYDROCHLORIDE 0.4 MG/ML
0.2 INJECTION, SOLUTION INTRAMUSCULAR; INTRAVENOUS; SUBCUTANEOUS
Status: CANCELLED | OUTPATIENT
Start: 2022-04-04

## 2022-02-28 RX ORDER — HEPARIN SODIUM (PORCINE) LOCK FLUSH IV SOLN 100 UNIT/ML 100 UNIT/ML
5 SOLUTION INTRAVENOUS
Status: CANCELLED | OUTPATIENT
Start: 2022-03-21

## 2022-02-28 RX ORDER — HEPARIN SODIUM (PORCINE) LOCK FLUSH IV SOLN 100 UNIT/ML 100 UNIT/ML
5 SOLUTION INTRAVENOUS
Status: CANCELLED | OUTPATIENT
Start: 2022-03-28

## 2022-02-28 RX ORDER — ALBUTEROL SULFATE 0.83 MG/ML
2.5 SOLUTION RESPIRATORY (INHALATION)
Status: CANCELLED | OUTPATIENT
Start: 2022-04-11

## 2022-02-28 RX ORDER — ALBUTEROL SULFATE 0.83 MG/ML
2.5 SOLUTION RESPIRATORY (INHALATION)
Status: CANCELLED | OUTPATIENT
Start: 2022-03-28

## 2022-02-28 RX ORDER — HEPARIN SODIUM (PORCINE) LOCK FLUSH IV SOLN 100 UNIT/ML 100 UNIT/ML
5 SOLUTION INTRAVENOUS
Status: CANCELLED | OUTPATIENT
Start: 2022-05-02

## 2022-02-28 RX ORDER — HEPARIN SODIUM (PORCINE) LOCK FLUSH IV SOLN 100 UNIT/ML 100 UNIT/ML
5 SOLUTION INTRAVENOUS
Status: CANCELLED | OUTPATIENT
Start: 2022-03-08

## 2022-02-28 RX ORDER — ALBUTEROL SULFATE 0.83 MG/ML
2.5 SOLUTION RESPIRATORY (INHALATION)
Status: CANCELLED | OUTPATIENT
Start: 2022-03-21

## 2022-02-28 RX ORDER — NALOXONE HYDROCHLORIDE 0.4 MG/ML
0.2 INJECTION, SOLUTION INTRAMUSCULAR; INTRAVENOUS; SUBCUTANEOUS
Status: CANCELLED | OUTPATIENT
Start: 2022-03-08

## 2022-02-28 RX ORDER — ACETAMINOPHEN 325 MG/1
650 TABLET ORAL
Status: CANCELLED | OUTPATIENT
Start: 2022-04-18

## 2022-02-28 RX ORDER — HEPARIN SODIUM,PORCINE 10 UNIT/ML
5 VIAL (ML) INTRAVENOUS
Status: CANCELLED | OUTPATIENT
Start: 2022-05-02

## 2022-02-28 RX ORDER — ALBUTEROL SULFATE 90 UG/1
1-2 AEROSOL, METERED RESPIRATORY (INHALATION)
Status: CANCELLED
Start: 2022-04-04

## 2022-02-28 RX ORDER — DIPHENHYDRAMINE HYDROCHLORIDE 50 MG/ML
50 INJECTION INTRAMUSCULAR; INTRAVENOUS
Status: CANCELLED
Start: 2022-05-02

## 2022-02-28 RX ORDER — NALOXONE HYDROCHLORIDE 0.4 MG/ML
0.2 INJECTION, SOLUTION INTRAMUSCULAR; INTRAVENOUS; SUBCUTANEOUS
Status: CANCELLED | OUTPATIENT
Start: 2022-04-18

## 2022-02-28 RX ORDER — DIPHENHYDRAMINE HCL 25 MG
50 CAPSULE ORAL ONCE
Status: CANCELLED
Start: 2022-02-28

## 2022-02-28 RX ORDER — DIPHENHYDRAMINE HYDROCHLORIDE 50 MG/ML
50 INJECTION INTRAMUSCULAR; INTRAVENOUS
Status: CANCELLED
Start: 2022-04-18

## 2022-02-28 RX ORDER — HEPARIN SODIUM,PORCINE 10 UNIT/ML
5 VIAL (ML) INTRAVENOUS
Status: CANCELLED | OUTPATIENT
Start: 2022-03-08

## 2022-02-28 RX ORDER — ALBUTEROL SULFATE 0.83 MG/ML
2.5 SOLUTION RESPIRATORY (INHALATION)
Status: CANCELLED | OUTPATIENT
Start: 2022-04-18

## 2022-02-28 RX ORDER — DIPHENHYDRAMINE HYDROCHLORIDE 50 MG/ML
50 INJECTION INTRAMUSCULAR; INTRAVENOUS
Status: CANCELLED
Start: 2022-02-28

## 2022-02-28 RX ORDER — MEPERIDINE HYDROCHLORIDE 25 MG/ML
25 INJECTION INTRAMUSCULAR; INTRAVENOUS; SUBCUTANEOUS EVERY 30 MIN PRN
Status: CANCELLED | OUTPATIENT
Start: 2022-04-25

## 2022-02-28 RX ORDER — ALBUTEROL SULFATE 0.83 MG/ML
2.5 SOLUTION RESPIRATORY (INHALATION)
Status: CANCELLED | OUTPATIENT
Start: 2022-04-25

## 2022-02-28 RX ORDER — NALOXONE HYDROCHLORIDE 0.4 MG/ML
0.2 INJECTION, SOLUTION INTRAMUSCULAR; INTRAVENOUS; SUBCUTANEOUS
Status: CANCELLED | OUTPATIENT
Start: 2022-03-28

## 2022-02-28 RX ORDER — DIPHENHYDRAMINE HYDROCHLORIDE 50 MG/ML
50 INJECTION INTRAMUSCULAR; INTRAVENOUS
Status: CANCELLED
Start: 2022-04-11

## 2022-02-28 RX ORDER — OLANZAPINE 2.5 MG/1
2.5 TABLET, FILM COATED ORAL AT BEDTIME
Qty: 30 TABLET | Refills: 3 | Status: SHIPPED | OUTPATIENT
Start: 2022-02-28 | End: 2022-05-19

## 2022-02-28 RX ORDER — METHYLPREDNISOLONE SODIUM SUCCINATE 125 MG/2ML
125 INJECTION, POWDER, LYOPHILIZED, FOR SOLUTION INTRAMUSCULAR; INTRAVENOUS
Status: CANCELLED
Start: 2022-04-25

## 2022-02-28 RX ORDER — ALBUTEROL SULFATE 90 UG/1
1-2 AEROSOL, METERED RESPIRATORY (INHALATION)
Status: CANCELLED
Start: 2022-04-11

## 2022-02-28 RX ORDER — NALOXONE HYDROCHLORIDE 0.4 MG/ML
0.2 INJECTION, SOLUTION INTRAMUSCULAR; INTRAVENOUS; SUBCUTANEOUS
Status: CANCELLED | OUTPATIENT
Start: 2022-03-21

## 2022-02-28 RX ORDER — DIPHENHYDRAMINE HCL 25 MG
50 CAPSULE ORAL
Status: CANCELLED
Start: 2022-05-02

## 2022-02-28 RX ORDER — HEPARIN SODIUM,PORCINE 10 UNIT/ML
5 VIAL (ML) INTRAVENOUS
Status: CANCELLED | OUTPATIENT
Start: 2022-03-21

## 2022-02-28 RX ORDER — METHYLPREDNISOLONE SODIUM SUCCINATE 125 MG/2ML
125 INJECTION, POWDER, LYOPHILIZED, FOR SOLUTION INTRAMUSCULAR; INTRAVENOUS
Status: CANCELLED
Start: 2022-03-08

## 2022-02-28 RX ORDER — HEPARIN SODIUM,PORCINE 10 UNIT/ML
5 VIAL (ML) INTRAVENOUS
Status: CANCELLED | OUTPATIENT
Start: 2022-03-28

## 2022-02-28 RX ORDER — HEPARIN SODIUM (PORCINE) LOCK FLUSH IV SOLN 100 UNIT/ML 100 UNIT/ML
5 SOLUTION INTRAVENOUS
Status: CANCELLED | OUTPATIENT
Start: 2022-04-11

## 2022-02-28 RX ORDER — ALBUTEROL SULFATE 0.83 MG/ML
2.5 SOLUTION RESPIRATORY (INHALATION)
Status: CANCELLED | OUTPATIENT
Start: 2022-03-08

## 2022-02-28 RX ORDER — HEPARIN SODIUM (PORCINE) LOCK FLUSH IV SOLN 100 UNIT/ML 100 UNIT/ML
5 SOLUTION INTRAVENOUS
Status: CANCELLED | OUTPATIENT
Start: 2022-04-04

## 2022-02-28 RX ORDER — DIPHENHYDRAMINE HCL 25 MG
50 CAPSULE ORAL
Status: CANCELLED
Start: 2022-03-28

## 2022-02-28 RX ORDER — HEPARIN SODIUM,PORCINE 10 UNIT/ML
5 VIAL (ML) INTRAVENOUS
Status: CANCELLED | OUTPATIENT
Start: 2022-02-28

## 2022-02-28 RX ORDER — DIPHENHYDRAMINE HYDROCHLORIDE 50 MG/ML
50 INJECTION INTRAMUSCULAR; INTRAVENOUS
Status: CANCELLED
Start: 2022-03-08

## 2022-02-28 RX ORDER — MEPERIDINE HYDROCHLORIDE 25 MG/ML
25 INJECTION INTRAMUSCULAR; INTRAVENOUS; SUBCUTANEOUS EVERY 30 MIN PRN
Status: CANCELLED | OUTPATIENT
Start: 2022-04-11

## 2022-02-28 RX ORDER — EPINEPHRINE 1 MG/ML
0.3 INJECTION, SOLUTION INTRAMUSCULAR; SUBCUTANEOUS EVERY 5 MIN PRN
Status: CANCELLED | OUTPATIENT
Start: 2022-04-25

## 2022-02-28 RX ORDER — MEPERIDINE HYDROCHLORIDE 25 MG/ML
25 INJECTION INTRAMUSCULAR; INTRAVENOUS; SUBCUTANEOUS EVERY 30 MIN PRN
Status: CANCELLED | OUTPATIENT
Start: 2022-02-28

## 2022-02-28 RX ORDER — EPINEPHRINE 1 MG/ML
0.3 INJECTION, SOLUTION INTRAMUSCULAR; SUBCUTANEOUS EVERY 5 MIN PRN
Status: CANCELLED | OUTPATIENT
Start: 2022-03-21

## 2022-02-28 RX ORDER — METHYLPREDNISOLONE SODIUM SUCCINATE 125 MG/2ML
125 INJECTION, POWDER, LYOPHILIZED, FOR SOLUTION INTRAMUSCULAR; INTRAVENOUS
Status: CANCELLED
Start: 2022-02-28

## 2022-02-28 RX ORDER — DIPHENHYDRAMINE HYDROCHLORIDE 50 MG/ML
50 INJECTION INTRAMUSCULAR; INTRAVENOUS
Status: CANCELLED
Start: 2022-03-28

## 2022-02-28 RX ORDER — EPINEPHRINE 1 MG/ML
0.3 INJECTION, SOLUTION INTRAMUSCULAR; SUBCUTANEOUS EVERY 5 MIN PRN
Status: CANCELLED | OUTPATIENT
Start: 2022-03-08

## 2022-02-28 RX ORDER — METHYLPREDNISOLONE SODIUM SUCCINATE 125 MG/2ML
125 INJECTION, POWDER, LYOPHILIZED, FOR SOLUTION INTRAMUSCULAR; INTRAVENOUS
Status: CANCELLED
Start: 2022-05-02

## 2022-02-28 RX ORDER — HEPARIN SODIUM,PORCINE 10 UNIT/ML
5 VIAL (ML) INTRAVENOUS
Status: CANCELLED | OUTPATIENT
Start: 2022-04-25

## 2022-02-28 RX ORDER — ALBUTEROL SULFATE 0.83 MG/ML
2.5 SOLUTION RESPIRATORY (INHALATION)
Status: CANCELLED | OUTPATIENT
Start: 2022-05-02

## 2022-02-28 RX ORDER — EPINEPHRINE 1 MG/ML
0.3 INJECTION, SOLUTION INTRAMUSCULAR; SUBCUTANEOUS EVERY 5 MIN PRN
Status: CANCELLED | OUTPATIENT
Start: 2022-04-04

## 2022-02-28 RX ORDER — NALOXONE HYDROCHLORIDE 0.4 MG/ML
0.2 INJECTION, SOLUTION INTRAMUSCULAR; INTRAVENOUS; SUBCUTANEOUS
Status: CANCELLED | OUTPATIENT
Start: 2022-04-11

## 2022-02-28 RX ORDER — METHYLPREDNISOLONE SODIUM SUCCINATE 125 MG/2ML
125 INJECTION, POWDER, LYOPHILIZED, FOR SOLUTION INTRAMUSCULAR; INTRAVENOUS
Status: CANCELLED
Start: 2022-04-04

## 2022-02-28 RX ORDER — ALBUTEROL SULFATE 90 UG/1
1-2 AEROSOL, METERED RESPIRATORY (INHALATION)
Status: CANCELLED
Start: 2022-02-28

## 2022-02-28 RX ORDER — ALBUTEROL SULFATE 90 UG/1
1-2 AEROSOL, METERED RESPIRATORY (INHALATION)
Status: CANCELLED
Start: 2022-04-18

## 2022-02-28 RX ORDER — ALBUTEROL SULFATE 90 UG/1
1-2 AEROSOL, METERED RESPIRATORY (INHALATION)
Status: CANCELLED
Start: 2022-03-21

## 2022-02-28 RX ORDER — DIPHENHYDRAMINE HYDROCHLORIDE 50 MG/ML
50 INJECTION INTRAMUSCULAR; INTRAVENOUS
Status: CANCELLED
Start: 2022-04-25

## 2022-02-28 RX ORDER — HEPARIN SODIUM (PORCINE) LOCK FLUSH IV SOLN 100 UNIT/ML 100 UNIT/ML
5 SOLUTION INTRAVENOUS
Status: CANCELLED | OUTPATIENT
Start: 2022-02-28

## 2022-02-28 RX ORDER — MEPERIDINE HYDROCHLORIDE 25 MG/ML
25 INJECTION INTRAMUSCULAR; INTRAVENOUS; SUBCUTANEOUS EVERY 30 MIN PRN
Status: CANCELLED | OUTPATIENT
Start: 2022-03-08

## 2022-02-28 RX ORDER — DIPHENHYDRAMINE HCL 25 MG
50 CAPSULE ORAL
Status: CANCELLED
Start: 2022-03-21

## 2022-02-28 RX ORDER — HEPARIN SODIUM (PORCINE) LOCK FLUSH IV SOLN 100 UNIT/ML 100 UNIT/ML
5 SOLUTION INTRAVENOUS
Status: CANCELLED | OUTPATIENT
Start: 2022-04-25

## 2022-02-28 RX ORDER — NALOXONE HYDROCHLORIDE 0.4 MG/ML
0.2 INJECTION, SOLUTION INTRAMUSCULAR; INTRAVENOUS; SUBCUTANEOUS
Status: CANCELLED | OUTPATIENT
Start: 2022-04-25

## 2022-02-28 RX ORDER — METHYLPREDNISOLONE SODIUM SUCCINATE 125 MG/2ML
125 INJECTION, POWDER, LYOPHILIZED, FOR SOLUTION INTRAMUSCULAR; INTRAVENOUS
Status: CANCELLED
Start: 2022-03-28

## 2022-02-28 RX ORDER — EPINEPHRINE 1 MG/ML
0.3 INJECTION, SOLUTION INTRAMUSCULAR; SUBCUTANEOUS EVERY 5 MIN PRN
Status: CANCELLED | OUTPATIENT
Start: 2022-04-11

## 2022-02-28 ASSESSMENT — PAIN SCALES - GENERAL: PAINLEVEL: NO PAIN (0)

## 2022-02-28 NOTE — PROGRESS NOTES
Social Work Note: Telephone Call  Oncology Clinic    Data/Intervention:  Patient Name:  Tiffanie Mcdermott  /Age:  1965 (56 year old)    Call From: FRANDY  Reason for Call:  Psychosocial introduction    Assessment:  SW called and left message, introducing self and reason for call. FRANDY provided contact information and enouraged Tiffanie to call back when they are able to discuss support and resource needs.         Plan:  SW will await Tiffanie's call back.     HUGO Sawyer, [facility]  Clinical , Adult Oncology  Phone: [phone]

## 2022-02-28 NOTE — PROGRESS NOTES
Per Dr Kim request, sent IB to [employer] nurse for trial of  trial for patient.  Dolly Frank RN  Care Coordinator- Cancer Clinic

## 2022-02-28 NOTE — PROGRESS NOTES
Suture removal:     Date sutures applied: 2/21/22         Where (setting) in which they applied:hospital stay    Description:  Type: sutures  Location: Right lower breast    History:    Cause of laceration: Breast Biopsy     Accompanying Signs & Symptoms: (staff: if yes-describe)  Redness: no  Warmth: no  Drainage: no  Still bleeding: no  Fevers: no    Last tetanus shot: tetanus status unknown to the patient        Amina Gardner RN on 2/28/2022 at 2:11 PM

## 2022-02-28 NOTE — PROGRESS NOTES
[facility] Hematology / Oncology  Initial Visit / Consultation Note 2022  Name: Tiffanie Mcdermott  :  1965  MRN:  [id number]    --------------------    Assessment / Plan:  Clinical stage IV, hormone negative, HER-2 positive breast cancer  Subacute liver failure secondary to above.  Neoplastic fatigue, loss of appetite and queasiness secondary to above.    Over the course of our visit, Abdelrahman Moreno and I reviewed the natural history of what appears to be stage IV hormone negative HER-2 positive breast cancer with dense liver involvement.  I do suspect the burden of her symptoms relates to her metastatic disease as well as heavy liver involvement.  We reviewed that the goal of any therapy is control not cure.  To achieve this, we will look at a regimen of systemic therapy using weekly Taxol x12 weeks accompanied by Herceptin and Perjeta every 3 weeks.  I think getting this started as soon as possible will be the fastest way to get her feeling better and hopefully into remission.  We reviewed favorable response rates associated with this regimen.  From a staging standpoint it would be nice to get a PET scan prior to starting therapy to evaluate for bony metastatic disease as well as a brain MRI.  Following completion of therapy we would also likely restage and reassess her treatment response with either a PET or a CT scan.  We will be hopeful that Dr. Turner could assist with port placement.  We will plan on getting an echocardiogram around the start of therapy but would not delay starting chemotherapy for the port or echo for staging above.  For the low-level queasiness as well as appetite stimulant, will plan to start low-dose Zyprexa.  We placed a dietitian consult.  We will consider palliative care consult pending response to therapy.  We reviewed that this could go 1 of 2 ways and that she may have progressive subacute liver failure or she could demonstrate a nice response to therapy and get  her disease under control.    Thank you kindly for this consultation.  Adiel Kim MD    --------------------    Interval History:  Tiffanie present for evaluation of breast cancer.  She presented with worsening abdominal pain.  Abdominal ultrasound February 2022 revealed hepatomegaly with liver measuring up to 24.8 cm in length with innumerable hypoechoic lesions scattered throughout the liver.  The common bile duct was dilated at 1 cm.  This was then followed up with a CT scan of the chest abdomen and pelvis that revealed multiple right breast masses consistent with malignancy and diffuse involvement of the right breast skin as well as right axillary adenopathy,, innumerable liver metastasis, and indeterminate 3 mm left upper lobe pulmonary nodule and some incidental findings of right intrarenal calculus, colonic diverticulosis and diffuse hepatic steatosis.  Breast biopsy was performed February 2022 which revealed grade 3 invasive ductal carcinoma, ER negative, ND negative, HER-2 positive.  At the time of our visit, Tiffanie is not doing super well.  She is lost weight.  She is incredibly tired.  She has been dealing with more abdominal pain.    --------------------    Review of Systems:  10 point ROS negative except for that above.    Past Medical / Surgical History:  Past Medical History:   Diagnosis Date     Allergy, unspecified not elsewhere classified      Past Surgical History:   Procedure Laterality Date     HC LAPAROSCOPY, SURGICAL; CHOLECYSTECTOMY  10/19/2005    Cholecystectomy, Laparoscopic       Family History:  Family History   Problem Relation Age of Onset     Allergies Mother      Anesthesia Reaction Mother         vomiting     Lipids Mother      Gastrointestinal Disease Mother      Heart Disease Maternal Grandmother      Hypertension Maternal Grandmother      Gastrointestinal Disease Maternal Grandmother         cholesystectyomy     Alcohol/Drug Maternal Grandfather      Allergies Sister   "      Social History:  Social History     Socioeconomic History     Marital status:      Spouse name: roxana     Number of children: 1     Years of education: 16     Highest education level: None   Occupational History     Occupation:      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   Tobacco Use     Smoking status: Never Smoker     Smokeless tobacco: Never Used     Tobacco comment: no smoking in the home   Vaping Use     Vaping Use: Never used   Substance and Sexual Activity     Alcohol use: Yes     Alcohol/week: 1.7 standard drinks     Drug use: No     Sexual activity: Yes     Partners: Male     Comment: none   Other Topics Concern      Service No     Blood Transfusions No     Caffeine Concern No     Comment: 0     Occupational Exposure No     Hobby Hazards No     Sleep Concern Yes     Comment: with work     Stress Concern Yes     Comment: with work     Weight Concern No     Special Diet No     Back Care No     Exercise No     Bike Helmet Not Asked     Comment: na     Seat Belt Yes     Self-Exams No     Comment: bse     Parent/sibling w/ CABG, MI or angioplasty before 65F 55M? Not Asked   Social History Narrative     None     Social Determinants of Health     Financial Resource Strain: Not on file   Food Insecurity: Not on file   Transportation Needs: Not on file   Physical Activity: Not on file   Stress: Not on file   Social Connections: Not on file   Intimate Partner Violence: Not on file   Housing Stability: Not on file       Medications / Allergies:  Reviewed in EMR.    --------------------    Physical Exam:  VS: /75 (BP Location: Left arm)   Pulse (!) 123   Resp 20   Ht 1.702 m (5' 7.01\")   Wt 115.8 kg (255 lb 3.2 oz)   LMP 08/06/2008   SpO2 95%   BMI 39.96 kg/m    GEN: Unwell appearing.  HEENT: PERRL, EOMI, no scleral icterus or injection; OP clear, moist mucous membranes, no oral lesions.  NECK: Supple.  KENNEDY: No cervical, supraclavicular, axillary or inguinal " KENNEDY.  CHEST: Breathing comfortably, good airflow bilaterally, no crackles, no wheezing.  CV: RRR, s1/s2, no murmurs; strong distal pulses.  ABD: Non-tender, non-distended, soft, positive bowel sounds.  EXT: Warm and well perfused; no edema; no clubbing.  SKIN: Warm and dry, no visible rashes.  NEURO: Alert, engaged; CN 2-12 grossly intact; no gross sensorimotor deficits; gait and coordination intact; speech clear and fluent.    Labs / Imaging / Path:  We reviewed labs, personally reviewed imaging and reviewed pathology reports

## 2022-02-28 NOTE — NURSING NOTE
"Oncology Rooming Note    February 28, 2022 8:41 AM   Tiffanie Mcdermott is a 56 year old female who presents for:    Chief Complaint   Patient presents with     Oncology Clinic Visit     New Patient     Initial Vitals: /75 (BP Location: Left arm)   Pulse (!) 123   Resp 20   Ht 1.702 m (5' 7.01\")   Wt 115.8 kg (255 lb 3.2 oz)   LMP 08/06/2008   SpO2 95%   BMI 39.96 kg/m   Estimated body mass index is 39.96 kg/m  as calculated from the following:    Height as of this encounter: 1.702 m (5' 7.01\").    Weight as of this encounter: 115.8 kg (255 lb 3.2 oz). Body surface area is 2.34 meters squared.  No Pain (0) Comment: Data Unavailable   Patient's last menstrual period was 08/06/2008.  Allergies reviewed: Yes  Medications reviewed: Yes    Medications: Medication refills not needed today.  Pharmacy name entered into [facility]:    [facility] [place], [place] - [address] [place] [address] PHARMACY - [facility] [place], [place] [facility] [address]     Clinical concerns: Extreme fatigue, nausea- has lost 7 lb in the past 5 days alone.       Jennifer Blanchard LPN              "

## 2022-02-28 NOTE — PATIENT INSTRUCTIONS
1) PET scan.  2) Brain MRI.  3) ECHO.  4) PORT w/ Dr. Turner.  5) Taxol / Herceptin / Perjeta STAT (do not wait for studies above).  6) Provider weeks 1/4/7/10.    Adiel Kim MD.

## 2022-03-01 ENCOUNTER — TELEPHONE (OUTPATIENT)
Dept: SURGERY | Facility: CLINIC | Age: 57
End: 2022-03-01

## 2022-03-01 ENCOUNTER — OFFICE VISIT (OUTPATIENT)
Dept: SURGERY | Facility: CLINIC | Age: 57
End: 2022-03-01
Payer: COMMERCIAL

## 2022-03-01 VITALS
BODY MASS INDEX: 40.02 KG/M2 | HEART RATE: 82 BPM | HEIGHT: 67 IN | TEMPERATURE: 96.6 F | DIASTOLIC BLOOD PRESSURE: 82 MMHG | RESPIRATION RATE: 18 BRPM | OXYGEN SATURATION: 100 % | WEIGHT: 255 LBS | SYSTOLIC BLOOD PRESSURE: 122 MMHG

## 2022-03-01 DIAGNOSIS — E66.01 MORBID OBESITY (H): ICD-10-CM

## 2022-03-01 DIAGNOSIS — C50.511 MALIGNANT NEOPLASM OF LOWER-OUTER QUADRANT OF RIGHT FEMALE BREAST, UNSPECIFIED ESTROGEN RECEPTOR STATUS (H): Primary | ICD-10-CM

## 2022-03-01 LAB
PATH REPORT.COMMENTS IMP SPEC: ABNORMAL
PATH REPORT.COMMENTS IMP SPEC: YES
PATH REPORT.FINAL DX SPEC: ABNORMAL
PATH REPORT.GROSS SPEC: ABNORMAL
PATH REPORT.MICROSCOPIC SPEC OTHER STN: ABNORMAL
PATH REPORT.RELEVANT HX SPEC: ABNORMAL
SARS-COV-2 RNA RESP QL NAA+PROBE: NEGATIVE

## 2022-03-01 PROCEDURE — 99214 OFFICE O/P EST MOD 30 MIN: CPT | Performed by: SURGERY

## 2022-03-01 PROCEDURE — 87635 SARS-COV-2 COVID-19 AMP PRB: CPT | Performed by: SURGERY

## 2022-03-01 ASSESSMENT — PAIN SCALES - GENERAL: PAINLEVEL: EXTREME PAIN (8)

## 2022-03-01 NOTE — PROGRESS NOTES
"  Assessment & Plan   Problem List Items Addressed This Visit        Digestive    Morbid obesity (H)       Other    Malignant neoplasm of lower-outer quadrant of right female breast, unspecified estrogen receptor status (H) - Primary    Relevant Orders    Asymptomatic COVID-19 Virus (Coronavirus) by PCR Nasopharyngeal (Completed)    Case Request: Placement of power port, left possible right (Completed)         I discussed about a PowerPort placement and its indication.  Metastatic right breast Cancer;  thus I will plan to place the PowerPort on the right internal jugular.  I explained the risks, benefits, alternatives to the patient including pneumothorax, delayed pneumothorax, signs and symptoms of pneumothorax, bleeding, infection, injury to the neurovascular bundle with in the neck.  We also discussed a positioning of the port, the port not functioning secondary to occlusion from fibrin sheath formation, and the need to replace the PowerPort or other more invasive procedures.  All of questions were answered to his/her satisfaction.          25 minutes spent on the date of the encounter doing chart review, history and exam, documentation and further activities per the note       BMI:   Estimated body mass index is 39.93 kg/m  as calculated from the following:    Height as of this encounter: 1.702 m (5' 7.01\").    Weight as of this encounter: 115.7 kg (255 lb).       No follow-ups on file.    Sage Turner MD  [facility] GOMEZ Moreno is a 56 year old who presents for the following health issues:    Patient has confirmed metastatic disease from primary breast cancer.  The liver is diffusely involved.  Her biomarkers came back as ER/TX negative with HER-2 positive.  He has seen a medical oncologist who plans for systemic chemotherapy starting with Taxol.  She understands that chemotherapy is not curative and it will decrease the disease burden.  She is having nausea which is somewhat " "controlled with Zofran.  Overall she does not feeling well mentally since this diagnosis.       Review of Systems   Constitutional, HEENT, cardiovascular, pulmonary, gi and gu systems are negative, except as otherwise noted.      Objective    /82   Pulse 82   Temp (!) 96.6  F (35.9  C) (Temporal)   Resp 18   Ht 1.702 m (5' 7.01\")   Wt 115.7 kg (255 lb)   LMP 08/06/2008   SpO2 100%   BMI 39.93 kg/m    Body mass index is 39.93 kg/m .  Physical Exam   n/a            "

## 2022-03-01 NOTE — TELEPHONE ENCOUNTER
Type of surgery: Placement of power port, left possible right (Left)     Location of surgery: [facility]  Date and time of surgery: 3/2  Surgeon: Turner  Pre-Op Appt Date: Turner texting oncologist for clearance   Post-Op Appt Date: TBD   Packet sent out: No  Pre-cert/Authorization completed:  Not Applicable  Date: na

## 2022-03-01 NOTE — PROGRESS NOTES
[facility]: Chemotherapy Education Notes    Chemotherapy education was completed with patient today in clinic. Handouts from [facility] were discussed and provided to the patient to take home. Reviewed the following information with the patient and her .     Chemotherapy Regimen and Schedule: Taxol weekly with , herceptin and projeta every 3 weeks.    Tests and/or procedures required prior to chemotherapy start:   1. port  2. chemo  3. PET scan    General Chemotherapy Information: Specific medication names and medication delivery methods; possible chemotherapy side effects and management of side effects, including but not limited to: skin changes/nail changes, anemia, neutropenia, thrombocytopenia, diarrhea/constipation, nausea/vomiting, hair loss, memory changes, mouth sores, taste changes, appetite changes, peripheral neuropathy, fatigue, infertility, myelosuppression, increased risk for infection, increased risk for bleeding and/or bruising, possible allergic or hypersensitivity reaction.  Reviewed the importance of infection prevention, and ways to stay healthy during chemotherapy including eating a health, well-balanced diet, adequate protein intake, and drinking plenty of fluids.  Reviewed the practice of closely monitoring lab values throughout chemotherapy treatment, what lab tests will be checked (and what changes of these values meant), along with the possibility of IV hydration or blood product transfusion, or the need to defer or hold treatment.  Also reviewed signs/symptoms that should be reported to care team or on-call provider immediately, including: temperature of 100.4 degrees or higher, shortness of breath, chest pain, unusual bruising, bleeding symptoms, extreme fatigue, >4-6 episodes of diarrhea in 24 hours, uncontrolled nausea/vomiting, symptoms of dehydration, or signs of an allergic reaction.      Reviewed importance of Central line care (port-a-cath) or IV site care.   "Provided [employer] handout \"Vascular Access Port Implantation,\" discussed port placement procedure and rationale for port placement, as well as usage and purpose of EMLA cream prior to port access. Patient will need ordered from [facility]    MD has discussed potential gonadotoxicity and detrimental effects on fertility related to chemotherapy treatment.  Patient verbalizes understanding.     Written Information: Patient was provided with the following handouts from [employer]: \"Getting Ready for Chemotherapy: What to Expect, Before, During, and After your Treatment,\" \"Eating Hints: Before, During, and After Cancer Treatment,\" printouts on possible chemotherapy side effects/ways to cope with side effects, \"When to Call the Doctor,\" and \"Self-Care Tips During Cancer Treatment.\"  Patient was also provided with drug-specific handouts from [facility].  Patient was also provided with information regarding various programs offered at [facility], hair loss resources (if applicable), a list of resources for cancer patients, as well as important contact information for our cancer clinic including scheduling team, nurse triage line, direct phone number for RN Care Coordinator, and the after-hours Nurse Advice Line.    No barriers to learning identified. Patient and  verbalized understanding of all written and verbal information. All questions answered to patient s satisfaction.  Learning barriers and method preference are documented in the patient education flowsheet. Patient states understanding and is in agreement with this plan.  Patient understands that they will be receiving a phone call from a member of our scheduling team to schedule future appointments.  Patient instructed to call with further questions or concerns.    Dolly Frank RN  Oncology Care Coordinator  [facility]        "

## 2022-03-01 NOTE — LETTER
"    3/1/2022         RE: Tiffanie Mcdermott  [address] [place] [id number]        Dear Colleague,    Thank you for referring your patient, Tiffanie Mcdermott, to the [facility]. Please see a copy of my visit note below.      Assessment & Plan   Problem List Items Addressed This Visit        Digestive    Morbid obesity (H)       Other    Malignant neoplasm of lower-outer quadrant of right female breast, unspecified estrogen receptor status (H) - Primary    Relevant Orders    Asymptomatic COVID-19 Virus (Coronavirus) by PCR Nasopharyngeal (Completed)    Case Request: Placement of power port, left possible right (Completed)         I discussed about a PowerPort placement and its indication.  Metastatic right breast Cancer;  thus I will plan to place the PowerPort on the right internal jugular.  I explained the risks, benefits, alternatives to the patient including pneumothorax, delayed pneumothorax, signs and symptoms of pneumothorax, bleeding, infection, injury to the neurovascular bundle with in the neck.  We also discussed a positioning of the port, the port not functioning secondary to occlusion from fibrin sheath formation, and the need to replace the PowerPort or other more invasive procedures.  All of questions were answered to his/her satisfaction.          25 minutes spent on the date of the encounter doing chart review, history and exam, documentation and further activities per the note       BMI:   Estimated body mass index is 39.93 kg/m  as calculated from the following:    Height as of this encounter: 1.702 m (5' 7.01\").    Weight as of this encounter: 115.7 kg (255 lb).       No follow-ups on file.    Sage Turner MD  [facility]    Subjective   Tiffanie is a 56 year old who presents for the following health issues:    Patient has confirmed metastatic disease from primary breast cancer.  The liver is diffusely involved.  Her biomarkers came back " "as ER/MA negative with HER-2 positive.  He has seen a medical oncologist who plans for systemic chemotherapy starting with Taxol.  She understands that chemotherapy is not curative and it will decrease the disease burden.  She is having nausea which is somewhat controlled with Zofran.  Overall she does not feeling well mentally since this diagnosis.       Review of Systems   Constitutional, HEENT, cardiovascular, pulmonary, gi and gu systems are negative, except as otherwise noted.      Objective    /82   Pulse 82   Temp (!) 96.6  F (35.9  C) (Temporal)   Resp 18   Ht 1.702 m (5' 7.01\")   Wt 115.7 kg (255 lb)   LMP 08/06/2008   SpO2 100%   BMI 39.93 kg/m    Body mass index is 39.93 kg/m .  Physical Exam   n/a                Again, thank you for allowing me to participate in the care of your patient.        Sincerely,        Sage Turner MD    "

## 2022-03-02 ENCOUNTER — APPOINTMENT (OUTPATIENT)
Dept: GENERAL RADIOLOGY | Facility: CLINIC | Age: 57
End: 2022-03-02
Attending: SURGERY
Payer: COMMERCIAL

## 2022-03-02 ENCOUNTER — ANESTHESIA (OUTPATIENT)
Dept: SURGERY | Facility: CLINIC | Age: 57
End: 2022-03-02
Payer: COMMERCIAL

## 2022-03-02 ENCOUNTER — HOSPITAL ENCOUNTER (OUTPATIENT)
Dept: MRI IMAGING | Facility: CLINIC | Age: 57
End: 2022-03-02
Attending: INTERNAL MEDICINE | Admitting: SURGERY
Payer: COMMERCIAL

## 2022-03-02 ENCOUNTER — HOSPITAL ENCOUNTER (OUTPATIENT)
Facility: CLINIC | Age: 57
Discharge: HOME | End: 2022-03-02
Attending: SURGERY | Admitting: SURGERY
Payer: COMMERCIAL

## 2022-03-02 ENCOUNTER — ANESTHESIA EVENT (OUTPATIENT)
Dept: SURGERY | Facility: CLINIC | Age: 57
End: 2022-03-02
Payer: COMMERCIAL

## 2022-03-02 VITALS
RESPIRATION RATE: 16 BRPM | DIASTOLIC BLOOD PRESSURE: 80 MMHG | TEMPERATURE: 97.5 F | HEART RATE: 110 BPM | OXYGEN SATURATION: 95 % | SYSTOLIC BLOOD PRESSURE: 119 MMHG

## 2022-03-02 DIAGNOSIS — C50.911 HER2-POSITIVE CARCINOMA OF RIGHT BREAST (H): ICD-10-CM

## 2022-03-02 DIAGNOSIS — C50.911 STAGE IV CARCINOMA OF BREAST, ER-, RIGHT (H): ICD-10-CM

## 2022-03-02 DIAGNOSIS — Z17.1 STAGE IV CARCINOMA OF BREAST, ER-, RIGHT (H): ICD-10-CM

## 2022-03-02 DIAGNOSIS — C50.511 MALIGNANT NEOPLASM OF LOWER-OUTER QUADRANT OF RIGHT FEMALE BREAST, UNSPECIFIED ESTROGEN RECEPTOR STATUS (H): Primary | ICD-10-CM

## 2022-03-02 DIAGNOSIS — Z17.31 HER2-POSITIVE CARCINOMA OF RIGHT BREAST (H): ICD-10-CM

## 2022-03-02 PROCEDURE — 77001 FLUOROGUIDE FOR VEIN DEVICE: CPT | Mod: 26 | Performed by: SURGERY

## 2022-03-02 PROCEDURE — 999N000179 XR SURGERY CARM FLUORO LESS THAN 5 MIN W STILLS

## 2022-03-02 PROCEDURE — 272N000001 HC OR GENERAL SUPPLY STERILE: Performed by: SURGERY

## 2022-03-02 PROCEDURE — 250N000011 HC RX IP 250 OP 636: Performed by: NURSE ANESTHETIST, CERTIFIED REGISTERED

## 2022-03-02 PROCEDURE — 255N000002 HC RX 255 OP 636: Performed by: INTERNAL MEDICINE

## 2022-03-02 PROCEDURE — 710N000012 HC RECOVERY PHASE 2, PER MINUTE: Performed by: SURGERY

## 2022-03-02 PROCEDURE — 250N000013 HC RX MED GY IP 250 OP 250 PS 637: Performed by: SURGERY

## 2022-03-02 PROCEDURE — 999N000141 HC STATISTIC PRE-PROCEDURE NURSING ASSESSMENT: Performed by: SURGERY

## 2022-03-02 PROCEDURE — 360N000082 HC SURGERY LEVEL 2 W/ FLUORO, PER MIN: Performed by: SURGERY

## 2022-03-02 PROCEDURE — 999N000065 XR CHEST PORT 1 VIEW

## 2022-03-02 PROCEDURE — 250N000009 HC RX 250: Performed by: SURGERY

## 2022-03-02 PROCEDURE — 250N000011 HC RX IP 250 OP 636: Performed by: SURGERY

## 2022-03-02 PROCEDURE — C1769 GUIDE WIRE: HCPCS | Performed by: SURGERY

## 2022-03-02 PROCEDURE — A9585 GADOBUTROL INJECTION: HCPCS | Performed by: INTERNAL MEDICINE

## 2022-03-02 PROCEDURE — 70553 MRI BRAIN STEM W/O & W/DYE: CPT

## 2022-03-02 PROCEDURE — 370N000017 HC ANESTHESIA TECHNICAL FEE, PER MIN: Performed by: SURGERY

## 2022-03-02 PROCEDURE — 250N000009 HC RX 250: Performed by: NURSE ANESTHETIST, CERTIFIED REGISTERED

## 2022-03-02 PROCEDURE — 36561 INSERT TUNNELED CV CATH: CPT | Performed by: SURGERY

## 2022-03-02 PROCEDURE — C1788 PORT, INDWELLING, IMP: HCPCS | Performed by: SURGERY

## 2022-03-02 PROCEDURE — 76937 US GUIDE VASCULAR ACCESS: CPT | Mod: 26 | Performed by: SURGERY

## 2022-03-02 PROCEDURE — 258N000003 HC RX IP 258 OP 636: Performed by: SURGERY

## 2022-03-02 PROCEDURE — 258N000003 HC RX IP 258 OP 636: Performed by: NURSE ANESTHETIST, CERTIFIED REGISTERED

## 2022-03-02 DEVICE — CATH PORT POWERPORT 8FR SL W/SUTURE PLUGS 1708000: Type: IMPLANTABLE DEVICE | Site: NECK | Status: FUNCTIONAL

## 2022-03-02 RX ORDER — LIDOCAINE 40 MG/G
CREAM TOPICAL
Status: DISCONTINUED | OUTPATIENT
Start: 2022-03-02 | End: 2022-03-02 | Stop reason: HOSPADM

## 2022-03-02 RX ORDER — SODIUM CHLORIDE, SODIUM LACTATE, POTASSIUM CHLORIDE, CALCIUM CHLORIDE 600; 310; 30; 20 MG/100ML; MG/100ML; MG/100ML; MG/100ML
INJECTION, SOLUTION INTRAVENOUS CONTINUOUS
Status: DISCONTINUED | OUTPATIENT
Start: 2022-03-02 | End: 2022-03-02 | Stop reason: HOSPADM

## 2022-03-02 RX ORDER — ONDANSETRON 4 MG/1
4 TABLET, ORALLY DISINTEGRATING ORAL EVERY 30 MIN PRN
Status: DISCONTINUED | OUTPATIENT
Start: 2022-03-02 | End: 2022-03-02 | Stop reason: HOSPADM

## 2022-03-02 RX ORDER — LIDOCAINE HYDROCHLORIDE 20 MG/ML
INJECTION, SOLUTION INFILTRATION; PERINEURAL PRN
Status: DISCONTINUED | OUTPATIENT
Start: 2022-03-02 | End: 2022-03-02

## 2022-03-02 RX ORDER — HEPARIN SODIUM (PORCINE) LOCK FLUSH IV SOLN 100 UNIT/ML 100 UNIT/ML
SOLUTION INTRAVENOUS PRN
Status: DISCONTINUED | OUTPATIENT
Start: 2022-03-02 | End: 2022-03-02 | Stop reason: HOSPADM

## 2022-03-02 RX ORDER — ACETAMINOPHEN 325 MG/1
975 TABLET ORAL ONCE
Status: COMPLETED | OUTPATIENT
Start: 2022-03-02 | End: 2022-03-02

## 2022-03-02 RX ORDER — OXYCODONE HYDROCHLORIDE 5 MG/1
5 TABLET ORAL
Status: DISCONTINUED | OUTPATIENT
Start: 2022-03-02 | End: 2022-03-02 | Stop reason: HOSPADM

## 2022-03-02 RX ORDER — PROPOFOL 10 MG/ML
INJECTION, EMULSION INTRAVENOUS CONTINUOUS PRN
Status: DISCONTINUED | OUTPATIENT
Start: 2022-03-02 | End: 2022-03-02

## 2022-03-02 RX ORDER — BUPIVACAINE HYDROCHLORIDE AND EPINEPHRINE 2.5; 5 MG/ML; UG/ML
INJECTION, SOLUTION EPIDURAL; INFILTRATION; INTRACAUDAL; PERINEURAL PRN
Status: DISCONTINUED | OUTPATIENT
Start: 2022-03-02 | End: 2022-03-02 | Stop reason: HOSPADM

## 2022-03-02 RX ORDER — ONDANSETRON 2 MG/ML
4 INJECTION INTRAMUSCULAR; INTRAVENOUS EVERY 30 MIN PRN
Status: DISCONTINUED | OUTPATIENT
Start: 2022-03-02 | End: 2022-03-02 | Stop reason: HOSPADM

## 2022-03-02 RX ORDER — FENTANYL CITRATE 50 UG/ML
25 INJECTION, SOLUTION INTRAMUSCULAR; INTRAVENOUS
Status: DISCONTINUED | OUTPATIENT
Start: 2022-03-02 | End: 2022-03-02 | Stop reason: HOSPADM

## 2022-03-02 RX ORDER — ALBUTEROL SULFATE 0.83 MG/ML
2.5 SOLUTION RESPIRATORY (INHALATION) EVERY 4 HOURS PRN
Status: CANCELLED | OUTPATIENT
Start: 2022-03-02

## 2022-03-02 RX ORDER — PROPOFOL 10 MG/ML
INJECTION, EMULSION INTRAVENOUS PRN
Status: DISCONTINUED | OUTPATIENT
Start: 2022-03-02 | End: 2022-03-02

## 2022-03-02 RX ORDER — GADOBUTROL 604.72 MG/ML
10 INJECTION INTRAVENOUS ONCE
Status: COMPLETED | OUTPATIENT
Start: 2022-03-02 | End: 2022-03-02

## 2022-03-02 RX ORDER — OXYCODONE HYDROCHLORIDE 5 MG/1
5 TABLET ORAL EVERY 6 HOURS PRN
Qty: 6 TABLET | Refills: 0 | Status: ON HOLD | OUTPATIENT
Start: 2022-03-02 | End: 2022-03-17

## 2022-03-02 RX ORDER — ONDANSETRON 2 MG/ML
INJECTION INTRAMUSCULAR; INTRAVENOUS PRN
Status: DISCONTINUED | OUTPATIENT
Start: 2022-03-02 | End: 2022-03-02

## 2022-03-02 RX ORDER — CLINDAMYCIN PHOSPHATE 900 MG/50ML
900 INJECTION, SOLUTION INTRAVENOUS SEE ADMIN INSTRUCTIONS
Status: DISCONTINUED | OUTPATIENT
Start: 2022-03-02 | End: 2022-03-02 | Stop reason: HOSPADM

## 2022-03-02 RX ORDER — LIDOCAINE HYDROCHLORIDE AND EPINEPHRINE 10; 10 MG/ML; UG/ML
INJECTION, SOLUTION INFILTRATION; PERINEURAL PRN
Status: DISCONTINUED | OUTPATIENT
Start: 2022-03-02 | End: 2022-03-02 | Stop reason: HOSPADM

## 2022-03-02 RX ORDER — CLINDAMYCIN PHOSPHATE 900 MG/50ML
900 INJECTION, SOLUTION INTRAVENOUS
Status: COMPLETED | OUTPATIENT
Start: 2022-03-02 | End: 2022-03-02

## 2022-03-02 RX ORDER — SCOLOPAMINE TRANSDERMAL SYSTEM 1 MG/1
1 PATCH, EXTENDED RELEASE TRANSDERMAL ONCE
Status: COMPLETED | OUTPATIENT
Start: 2022-03-02 | End: 2022-03-02

## 2022-03-02 RX ADMIN — SCOPALAMINE 1 PATCH: 1 PATCH, EXTENDED RELEASE TRANSDERMAL at 09:39

## 2022-03-02 RX ADMIN — LIDOCAINE HYDROCHLORIDE 60 MG: 20 INJECTION, SOLUTION INFILTRATION; PERINEURAL at 09:55

## 2022-03-02 RX ADMIN — PROPOFOL 50 MG: 10 INJECTION, EMULSION INTRAVENOUS at 09:55

## 2022-03-02 RX ADMIN — PROPOFOL 20 MG: 10 INJECTION, EMULSION INTRAVENOUS at 10:11

## 2022-03-02 RX ADMIN — ACETAMINOPHEN 975 MG: 325 TABLET, FILM COATED ORAL at 07:30

## 2022-03-02 RX ADMIN — ONDANSETRON 4 MG: 2 INJECTION INTRAMUSCULAR; INTRAVENOUS at 10:02

## 2022-03-02 RX ADMIN — GADOBUTROL 10 ML: 604.72 INJECTION INTRAVENOUS at 16:57

## 2022-03-02 RX ADMIN — LIDOCAINE HYDROCHLORIDE 0.1 ML: 10 INJECTION, SOLUTION EPIDURAL; INFILTRATION; INTRACAUDAL; PERINEURAL at 08:05

## 2022-03-02 RX ADMIN — PROPOFOL 150 MCG/KG/MIN: 10 INJECTION, EMULSION INTRAVENOUS at 09:55

## 2022-03-02 RX ADMIN — MIDAZOLAM 2 MG: 1 INJECTION INTRAMUSCULAR; INTRAVENOUS at 09:45

## 2022-03-02 RX ADMIN — PROPOFOL 20 MG: 10 INJECTION, EMULSION INTRAVENOUS at 10:16

## 2022-03-02 RX ADMIN — CLINDAMYCIN PHOSPHATE 900 MG: 900 INJECTION, SOLUTION INTRAVENOUS at 08:58

## 2022-03-02 RX ADMIN — SODIUM CHLORIDE, POTASSIUM CHLORIDE, SODIUM LACTATE AND CALCIUM CHLORIDE: 600; 310; 30; 20 INJECTION, SOLUTION INTRAVENOUS at 08:04

## 2022-03-02 NOTE — INTERVAL H&P NOTE
I have reviewed the surgical (or preoperative) H&P that is linked to this encounter, and examined the patient. There are no significant changes    [facility], DO

## 2022-03-02 NOTE — DISCHARGE INSTRUCTIONS
Discharge Instructions   Following Port/Tunneled Catheter Placement    Today you had a Tunneled catheter placed, left internal jugular    Your site(s) has been closed with: Dermabond (skin glue). This thin layer will slough off in 7-10 days., and dressed with Nothing    If there is any oozing or bleeding from the site, apply direct pressure for 5-10 minutes with a gauze pad. If bleeding continues after 10 minutes call your primary doctor. If bleeding cannot be controlled with direct pressure, call 911    Call your Doctor if:    Bleeding as above    Swelling in your neck or arm    Fever greater than 100.5 degrees F    Other signs of infection such as, redness, tenderness, or drainage from the  wound  If you were given sedation:    We recommend an adult stay with you for the first 24 hours    No driving or alcoholic beverages for 24 hours     ADDITIONAL INSTRUCTIONS     No heavy lifting greater than 10 lbs. for one week     No tub bath, hot tub, or swimming until Derma Gao (Skin Glue) is removed     It is OK to shower and get the incision web but immediately pat it dry     Call Your Physician if You Have:    Redness, increased swelling or cloudy drainage from your incision.    A temperature of more than 101 degrees F.    Worsening pain in your incision not relieved by your prescription pain pills and/or a short rest.  Persistent nausea/vomiting. Jaundice. Worsening abdominal pain. Shortness of breath or difficulty swallowing.    Go to Emergency department immediately if you have chest pain, shortness of breath, difficulty breathing.      Any questions or concerns about your recovery, please call [phone].    Follow-up Care:    Make an appointment 1 week after your surgery.  Call [phone].    I have received and understand my discharge instructions and I have all of my personal belongings.      __________________________________________________      Patient/Significant Other's  Signature     Relationship        __________________________________________________  Nurse Signature  3/8/2018

## 2022-03-02 NOTE — ANESTHESIA PREPROCEDURE EVALUATION
Anesthesia Pre-Procedure Evaluation    Patient: Tiffanie Mcdermott   MRN: [id number] : 1965        Procedure : Procedure(s):  Placement of power port, left possible right          Past Medical History:   Diagnosis Date     Allergy, unspecified not elsewhere classified      PONV (postoperative nausea and vomiting)     requests scopalomine patch      Past Surgical History:   Procedure Laterality Date     HC LAPAROSCOPY, SURGICAL; CHOLECYSTECTOMY  10/19/2005    Cholecystectomy, Laparoscopic      Allergies   Allergen Reactions     Adhesive Tape      Penicillins       Social History     Tobacco Use     Smoking status: Never Smoker     Smokeless tobacco: Never Used     Tobacco comment: no smoking in the home   Substance Use Topics     Alcohol use: Yes     Alcohol/week: 1.7 standard drinks      Wt Readings from Last 1 Encounters:   22 115.7 kg (255 lb)        Anesthesia Evaluation   Pt has had prior anesthetic.     History of anesthetic complications  - PONV.      ROS/MED HX  ENT/Pulmonary:     (+) sleep apnea, doesn't use CPAP,     Neurologic:  - neg neurologic ROS     Cardiovascular:  - neg cardiovascular ROS   (+) Dyslipidemia hypertension-----No previous cardiac testing     METS/Exercise Tolerance:     Hematologic:       Musculoskeletal:       GI/Hepatic:     (+) GERD, Symptomatic,     Renal/Genitourinary:       Endo:     (+) Obesity,     Psychiatric/Substance Use:     (+) psychiatric history anxiety and depression     Infectious Disease:       Malignancy:   (+) Malignancy, History of Breast.Breast CA Active status post.        Other:            Physical Exam    Airway        Mallampati: II   TM distance: > 3 FB   Neck ROM: full   Mouth opening: > 3 cm    Respiratory Devices and Support         Dental  no notable dental history         Cardiovascular   cardiovascular exam normal       Rhythm and rate: regular and normal     Pulmonary   pulmonary exam normal        breath sounds clear to auscultation            OUTSIDE LABS:  CBC:   Lab Results   Component Value Date    WBC 5.8 02/08/2022    WBC 7.7 01/31/2006    HGB 14.4 02/08/2022    HGB 12.5 08/22/2008    HCT 46.3 02/08/2022    HCT 38.3 01/31/2006     02/08/2022     01/31/2006     BMP:   Lab Results   Component Value Date     02/14/2022     02/08/2022    POTASSIUM 4.2 02/14/2022    POTASSIUM 4.1 02/08/2022    CHLORIDE 103 02/14/2022    CHLORIDE 103 02/08/2022    CO2 26 02/14/2022    CO2 26 02/08/2022    BUN 15 02/14/2022    BUN 14 02/08/2022    CR 0.92 02/14/2022    CR 1.04 02/08/2022     (H) 02/14/2022     (H) 02/08/2022     COAGS: No results found for: PTT, INR, FIBR  POC:   Lab Results   Component Value Date    HCG Negative 01/31/2006     HEPATIC:   Lab Results   Component Value Date    ALBUMIN 3.3 (L) 02/14/2022    PROTTOTAL 7.6 02/14/2022     (H) 02/14/2022     (H) 02/14/2022    ALKPHOS 405 (H) 02/14/2022    BILITOTAL 1.4 (H) 02/14/2022     OTHER:   Lab Results   Component Value Date    PH 7.0 03/27/2003    SARAI 9.9 02/14/2022    LIPASE 174 02/14/2022    AMYLASE 45 01/31/2006       Anesthesia Plan    ASA Status:  3   NPO Status:  NPO Appropriate    Anesthesia Type: MAC.     - Reason for MAC: chronic cardiopulmonary disease, immobility needed, straight local not clinically adequate   Induction: Intravenous, Propofol.   Maintenance: TIVA.        Consents    Anesthesia Plan(s) and associated risks, benefits, and realistic alternatives discussed. Questions answered and patient/representative(s) expressed understanding.    - Discussed:     - Discussed with:  Patient         Postoperative Care    Pain management: IV analgesics.   PONV prophylaxis: Ondansetron (or other 5HT-3)     Comments:    Other Comments: The risks and benefits of anesthesia, and the alternatives where applicable, have been discussed with the patient, and they wish to proceed.            HENRIETTA Lancaster CRNA

## 2022-03-02 NOTE — ANESTHESIA CARE TRANSFER NOTE
Patient: Tiffanie Mcdermott    Procedure: Procedure(s):  Placement of power port, left side       Diagnosis: Malignant neoplasm of lower-outer quadrant of right female breast, unspecified estrogen receptor status (H) [C50.511]  Diagnosis Additional Information: No value filed.    Anesthesia Type:   No value filed.     Note:    Oropharynx: oropharynx clear of all foreign objects and spontaneously breathing  Level of Consciousness: drowsy  Oxygen Supplementation: face mask    Independent Airway: airway patency satisfactory and stable  Dentition: dentition unchanged  Vital Signs Stable: post-procedure vital signs reviewed and stable  Report to RN Given: handoff report given  Patient transferred to: Phase II    Handoff Report: Identifed the Patient, Identified the Reponsible Provider, Reviewed the pertinent medical history, Discussed the surgical course, Reviewed Intra-OP anesthesia mangement and issues during anesthesia, Set expectations for post-procedure period and Allowed opportunity for questions and acknowledgement of understanding      Vitals:  Vitals Value Taken Time   BP     Temp     Pulse     Resp     SpO2         Electronically Signed By: HENRIETTA Rodriguez CRNA  March 2, 2022  11:15 AM

## 2022-03-02 NOTE — ANESTHESIA POSTPROCEDURE EVALUATION
Patient: Tiffanie Mcdermott    Procedure: Procedure(s):  Placement of power port, left side       Anesthesia Type:  No value filed.    Note:  Disposition: Outpatient   Postop Pain Control: Uneventful            Sign Out: Well controlled pain   PONV: No   Neuro/Psych: Uneventful            Sign Out: Acceptable/Baseline neuro status   Airway/Respiratory: Uneventful            Sign Out: Acceptable/Baseline resp. status   CV/Hemodynamics: Uneventful            Sign Out: Acceptable CV status   Other NRE: NONE   DID A NON-ROUTINE EVENT OCCUR? No    Event details/Postop Comments:  Pt was happy with anesthesia care.  No complications.  I will follow up with the pt if needed.           Last vitals:  Vitals Value Taken Time   /90 03/02/22 1145   Temp     Pulse 103 03/02/22 1145   Resp 16 03/02/22 1130   SpO2 95 % 03/02/22 1156   Vitals shown include unvalidated device data.    Electronically Signed By: HENRIETTA Rodriguez CRNA  March 2, 2022  11:58 AM

## 2022-03-02 NOTE — OP NOTE
OPERATIVE NOTE  [facility] SURGERY    DATE:   3/2/2022    SURGEON:   Sage Turner DO    ASSISTANT: Dr. Rodriguez : A second attending surgeon was needed due to the complex nature of the case and high risk anatomy and as no qualified alternative practitioner (resident, certified first assistant or physician assistant) was available.      PRE-OPERATIVE DIAGNOSIS:   Malignant neoplasm of lower-outer quadrant of right female breast, unspecified estrogen receptor status (H) [C50.511]  Metastatic breast cancer    POSTOPERATIVE DIAGNOSIS:   same    OPERATION:  Placement of left internal jugular intravascular tunneled Power Port.    ANESTHESIA: Monitored anesthesia care.     INDICATIONS FOR PROCEDURE: Tiffanie Mcdermott is a 56 year old female who presented with diagnosis of metastatic breast cancer and was recommended for intravascular access for chemotherapy.    PROCEDURE:   The patient was brought to the operating room and placed in the supine position upon the operating table. Nursing and anesthesia assured proper positioning prior to the procedure. A time-out was taken to assure proper patient, site and procedure with all in the operating room. Ultrasound was utilized to localize the left internal jugular vein low in the neck. Local anesthesia was instilled and finder needle used to gain access. Wire was next threaded and visualized in the superior vena cava (SVC) utilizing fluoroscopy. My attention was next turned to the chest and transverse incision created in the superior lateral chest and pocket created for the port and up to the prior neck incision in the neck after injection of local. The power port was placed and catheter tunneled to the prior neck nick incision.  Fluoroscopy was used and catheter cut to appropriate length to lay in the distal SVC/right atrium. Dilator was placed; however catheter was not able to advance appropriately under fluoroscopy.  Thus the catheter was removed wire was then placed  back in, these were done under fluoroscopy.  We attempted this several times with different wires, and dilator and finally the wire went down as normal with no kinked or curling on itself.  Thus dilator was then placed and the catheter was then placed with ease under fluoroscopy and was in the appropriate place.  The port was secured with 2 prolene stitches. Final fluoroscopy picture was taken and noted the catheter tip at the appropiate place and no pneumothorax was noted.   Access from the port noted it flushes and draws well.  The tubing was then heparized. The chest incision re approximated deeply with 3-0 monocryl. Skin incisions were closed with 4-0 Monocryl in a subcuticular fashion followed by Exofin.  The patient tolerated the procedure well and was transferred to the postanesthesia recovery room in stable condition.    [facility], [facility] Surgery

## 2022-03-03 ENCOUNTER — PATIENT OUTREACH (OUTPATIENT)
Dept: CARE COORDINATION | Facility: CLINIC | Age: 57
End: 2022-03-03

## 2022-03-03 NOTE — PROGRESS NOTES
Social Work Note: Telephone Call  Oncology Clinic    Data/Intervention:  Patient Name:  Tiffanie Mcdermott  /Age:  1965 (56 year old)    Call From:  FRANDY  Reason for Call:  Psychosocial check in    Assessment:  SW called and left message, introducing self and reason for call, offering support and resource navigation. SW provided contact information and enouraged Tiffanie to call back when they are able to discuss support and resource needs.       Plan:  SW will continue to remain available for ongoing support and resource needs.       HUGO Sawyer, [facility]  Clinical , Adult Oncology  Phone: [phone]

## 2022-03-04 ENCOUNTER — TELEPHONE (OUTPATIENT)
Dept: ONCOLOGY | Facility: CLINIC | Age: 57
End: 2022-03-04
Payer: COMMERCIAL

## 2022-03-04 RX ORDER — LIDOCAINE/PRILOCAINE 2.5 %-2.5%
CREAM (GRAM) TOPICAL
Qty: 5 G | Refills: 3 | Status: SHIPPED | OUTPATIENT
Start: 2022-03-04 | End: 2023-02-02

## 2022-03-04 NOTE — TELEPHONE ENCOUNTER
Patient called requesting EMLA cream for port site prior to infusion. Script was sent for patient. Patient did have questions regarding chemo as well. Writer did discuss homecare and important signs and symptoms to watch for. Writer also discussed preparing for chemo. Writer will meet with patient Monday to have a face to face discussion for chemo teaching as well as giving patient hand outs on medications. Patient comfortable with this plan.      Amina Gardner RN on 3/4/2022 at 1:57 PM

## 2022-03-07 ENCOUNTER — INFUSION THERAPY VISIT (OUTPATIENT)
Dept: INFUSION THERAPY | Facility: CLINIC | Age: 57
End: 2022-03-07
Attending: INTERNAL MEDICINE
Payer: COMMERCIAL

## 2022-03-07 VITALS
OXYGEN SATURATION: 94 % | RESPIRATION RATE: 24 BRPM | TEMPERATURE: 98.1 F | BODY MASS INDEX: 38.44 KG/M2 | DIASTOLIC BLOOD PRESSURE: 78 MMHG | HEART RATE: 116 BPM | SYSTOLIC BLOOD PRESSURE: 150 MMHG | WEIGHT: 245.5 LBS

## 2022-03-07 VITALS — WEIGHT: 254.5 LBS | BODY MASS INDEX: 39.85 KG/M2

## 2022-03-07 DIAGNOSIS — C50.511 MALIGNANT NEOPLASM OF LOWER-OUTER QUADRANT OF RIGHT FEMALE BREAST, UNSPECIFIED ESTROGEN RECEPTOR STATUS (H): Primary | ICD-10-CM

## 2022-03-07 LAB
ALBUMIN SERPL-MCNC: 2.7 G/DL (ref 3.4–5)
ALP SERPL-CCNC: 682 U/L (ref 40–150)
ALT SERPL W P-5'-P-CCNC: 196 U/L (ref 0–50)
ANION GAP SERPL CALCULATED.3IONS-SCNC: 9 MMOL/L (ref 3–14)
AST SERPL W P-5'-P-CCNC: 815 U/L (ref 0–45)
BASOPHILS # BLD AUTO: 0 10E3/UL (ref 0–0.2)
BASOPHILS NFR BLD AUTO: 0 %
BILIRUB SERPL-MCNC: 11.3 MG/DL (ref 0.2–1.3)
BUN SERPL-MCNC: 17 MG/DL (ref 7–30)
CALCIUM SERPL-MCNC: 10.6 MG/DL (ref 8.5–10.1)
CANCER AG27-29 SERPL-ACNC: 406 U/ML (ref 0–39)
CHLORIDE BLD-SCNC: 100 MMOL/L (ref 94–109)
CO2 SERPL-SCNC: 24 MMOL/L (ref 20–32)
CREAT SERPL-MCNC: 0.9 MG/DL (ref 0.52–1.04)
EOSINOPHIL # BLD AUTO: 0 10E3/UL (ref 0–0.7)
EOSINOPHIL NFR BLD AUTO: 1 %
ERYTHROCYTE [DISTWIDTH] IN BLOOD BY AUTOMATED COUNT: 18.8 % (ref 10–15)
GFR SERPL CREATININE-BSD FRML MDRD: 74 ML/MIN/1.73M2
GLUCOSE BLD-MCNC: 86 MG/DL (ref 70–99)
HCT VFR BLD AUTO: 41.4 % (ref 35–47)
HGB BLD-MCNC: 13.6 G/DL (ref 11.7–15.7)
IMM GRANULOCYTES # BLD: 0.1 10E3/UL
IMM GRANULOCYTES NFR BLD: 1 %
LYMPHOCYTES # BLD AUTO: 0.9 10E3/UL (ref 0.8–5.3)
LYMPHOCYTES NFR BLD AUTO: 18 %
MCH RBC QN AUTO: 28.5 PG (ref 26.5–33)
MCHC RBC AUTO-ENTMCNC: 32.9 G/DL (ref 31.5–36.5)
MCV RBC AUTO: 87 FL (ref 78–100)
MONOCYTES # BLD AUTO: 0.6 10E3/UL (ref 0–1.3)
MONOCYTES NFR BLD AUTO: 12 %
NEUTROPHILS # BLD AUTO: 3.4 10E3/UL (ref 1.6–8.3)
NEUTROPHILS NFR BLD AUTO: 68 %
NRBC # BLD AUTO: 0 10E3/UL
NRBC BLD AUTO-RTO: 0 /100
PLATELET # BLD AUTO: 175 10E3/UL (ref 150–450)
POTASSIUM BLD-SCNC: 3.9 MMOL/L (ref 3.4–5.3)
PROT SERPL-MCNC: 6.2 G/DL (ref 6.8–8.8)
RBC # BLD AUTO: 4.78 10E6/UL (ref 3.8–5.2)
SODIUM SERPL-SCNC: 133 MMOL/L (ref 133–144)
WBC # BLD AUTO: 5 10E3/UL (ref 4–11)

## 2022-03-07 PROCEDURE — 250N000013 HC RX MED GY IP 250 OP 250 PS 637: Performed by: INTERNAL MEDICINE

## 2022-03-07 PROCEDURE — 85025 COMPLETE CBC W/AUTO DIFF WBC: CPT | Performed by: INTERNAL MEDICINE

## 2022-03-07 PROCEDURE — 258N000003 HC RX IP 258 OP 636: Performed by: INTERNAL MEDICINE

## 2022-03-07 PROCEDURE — 96375 TX/PRO/DX INJ NEW DRUG ADDON: CPT

## 2022-03-07 PROCEDURE — 96417 CHEMO IV INFUS EACH ADDL SEQ: CPT

## 2022-03-07 PROCEDURE — 80053 COMPREHEN METABOLIC PANEL: CPT | Performed by: INTERNAL MEDICINE

## 2022-03-07 PROCEDURE — 86300 IMMUNOASSAY TUMOR CA 15-3: CPT | Performed by: INTERNAL MEDICINE

## 2022-03-07 PROCEDURE — 36415 COLL VENOUS BLD VENIPUNCTURE: CPT | Performed by: INTERNAL MEDICINE

## 2022-03-07 PROCEDURE — 250N000011 HC RX IP 250 OP 636: Performed by: INTERNAL MEDICINE

## 2022-03-07 PROCEDURE — 250N000009 HC RX 250: Performed by: INTERNAL MEDICINE

## 2022-03-07 PROCEDURE — 96415 CHEMO IV INFUSION ADDL HR: CPT

## 2022-03-07 PROCEDURE — 96413 CHEMO IV INFUSION 1 HR: CPT

## 2022-03-07 RX ORDER — LORAZEPAM 2 MG/ML
0.5 INJECTION INTRAMUSCULAR EVERY 4 HOURS PRN
Status: DISCONTINUED | OUTPATIENT
Start: 2022-03-07 | End: 2022-03-07 | Stop reason: HOSPADM

## 2022-03-07 RX ORDER — ACETAMINOPHEN 325 MG/1
650 TABLET ORAL ONCE
Status: COMPLETED | OUTPATIENT
Start: 2022-03-07 | End: 2022-03-07

## 2022-03-07 RX ORDER — DIPHENHYDRAMINE HYDROCHLORIDE 50 MG/ML
50 INJECTION INTRAMUSCULAR; INTRAVENOUS ONCE
Status: COMPLETED | OUTPATIENT
Start: 2022-03-07 | End: 2022-03-07

## 2022-03-07 RX ORDER — HEPARIN SODIUM (PORCINE) LOCK FLUSH IV SOLN 100 UNIT/ML 100 UNIT/ML
5 SOLUTION INTRAVENOUS
Status: DISCONTINUED | OUTPATIENT
Start: 2022-03-07 | End: 2022-03-07 | Stop reason: HOSPADM

## 2022-03-07 RX ORDER — DEXAMETHASONE SODIUM PHOSPHATE 10 MG/ML
20 INJECTION, SOLUTION INTRAMUSCULAR; INTRAVENOUS ONCE
Status: COMPLETED | OUTPATIENT
Start: 2022-03-07 | End: 2022-03-07

## 2022-03-07 RX ADMIN — ACETAMINOPHEN 650 MG: 325 TABLET, FILM COATED ORAL at 13:26

## 2022-03-07 RX ADMIN — Medication 5 ML: at 17:25

## 2022-03-07 RX ADMIN — FAMOTIDINE 20 MG: 10 INJECTION, SOLUTION INTRAVENOUS at 15:34

## 2022-03-07 RX ADMIN — PACLITAXEL 187 MG: 6 INJECTION, SOLUTION INTRAVENOUS at 16:07

## 2022-03-07 RX ADMIN — SODIUM CHLORIDE 990 MG: 9 INJECTION, SOLUTION INTRAVENOUS at 14:28

## 2022-03-07 RX ADMIN — DEXAMETHASONE SODIUM PHOSPHATE 20 MG: 10 INJECTION, SOLUTION INTRAMUSCULAR; INTRAVENOUS at 15:29

## 2022-03-07 RX ADMIN — DIPHENHYDRAMINE HYDROCHLORIDE 50 MG: 50 INJECTION, SOLUTION INTRAMUSCULAR; INTRAVENOUS at 13:33

## 2022-03-07 RX ADMIN — PERTUZUMAB 840 MG: 30 INJECTION, SOLUTION, CONCENTRATE INTRAVENOUS at 12:22

## 2022-03-07 RX ADMIN — LORAZEPAM 0.5 MG: 2 INJECTION INTRAMUSCULAR; INTRAVENOUS at 14:02

## 2022-03-07 RX ADMIN — SODIUM CHLORIDE 250 ML: 9 INJECTION, SOLUTION INTRAVENOUS at 11:33

## 2022-03-07 ASSESSMENT — PAIN SCALES - GENERAL: PAINLEVEL: MODERATE PAIN (4)

## 2022-03-07 NOTE — PROGRESS NOTES
1343-Patient c/o chills, muscles feel tense, shaky about 10 min after benadryl IVP given.  Patient has been anxious and nauseous off/on today. Difficulty with eating tends to vomit  after coughing. Patient did vomit  with dry heaves about 100ml . VS taken.  Hands cold to touch and pale. Temp- up to 99.4 to 100.4 temporal. Lorazepam given. DR. Kim called and updated. Plan to continue with treatment today. Plan for IVF over the next 3 days.   Patient feeling better, resting quietly in recliner. Updated on POC.

## 2022-03-07 NOTE — PROGRESS NOTES
"Infusion Nursing Note:  Tiffanie Mcdermott presents today for cycle 1 day 1 of chemotherapy.    Patient seen by provider today: No   present during visit today: Not Applicable.    Note: Patient here today for c1d1 of treatment.  Patient oriented to room and plan of care for the day. Patient started with Perjeta infusion. After the completion the Perjeta patient was given oral tylenol and IV benadryl.  Approx 5-10 minutes after the tylenol and benadryl patient stated that she felt \"a chill\" go through her body.  Patient then proceeded to develop nausea and vomiting.    was paged and returned call. After some discussion it was decided to proceed with treatment.  Patient was given IV ativan as well to help with the nausea and anxiety which seemed to help.  Patient tolerated the trazimera without incident.  Decadron and pepcid given after completion of trazimera, prior to the start of taxol. Patient tolerated the taxol without incident .     After talking with , because of patients treatment and the patients increasing nausea and vomiting will have patient come in this week for IV hydration.  states he will place orders. Staff reminder sent to him.        Intravenous Access:  Implanted Port.    Treatment Conditions:  Lab Results   Component Value Date    HGB 13.6 03/07/2022    WBC 5.0 03/07/2022    ANEU 5.5 01/31/2006    ANEUTAUTO 3.4 03/07/2022     03/07/2022      Lab Results   Component Value Date     03/07/2022    POTASSIUM 3.9 03/07/2022    CR 0.90 03/07/2022    SARAI 10.6 (H) 03/07/2022    BILITOTAL 11.3 (H) 03/07/2022    ALBUMIN 2.7 (L) 03/07/2022     (H) 03/07/2022     (HH) 03/07/2022     Results reviewed, labs MET treatment parameters, ok to proceed with treatment.      Post Infusion Assessment:  Patient tolerated infusion without incident.  Patient observed for 15 minutes post infusion per protocol.  Blood return noted pre and post " infusion.  Site patent and intact, free from redness, edema or discomfort.  No evidence of extravasations.  Access discontinued per protocol.   Patient did have some bleeding from port site.  Pressure dressing placed over port.       Discharge Plan:   Patient discharged in stable condition accompanied by: self.  Departure Mode: Wheelchair  Patient will return tomorrow to infusion center for IV hydration. .      Cindy Robbins RN

## 2022-03-07 NOTE — PROGRESS NOTES
Infusion Nursing Note:  Tiffanie Mcdermott presents today for Port labs.    Patient seen by provider today: No   present during visit today: Not Applicable.    Note: Port side very bruised from neck to below port site. Tender to touch. Incisions intact with glue.      Intravenous Access:  Labs drawn without difficulty.  Implanted Port.    RED/GEL, GREEN AND PURPLE TUBES DRAWN.  Flushed with 20ml NS after.     Alicia Mcgee RN

## 2022-03-08 ENCOUNTER — INFUSION THERAPY VISIT (OUTPATIENT)
Dept: INFUSION THERAPY | Facility: CLINIC | Age: 57
End: 2022-03-08
Attending: INTERNAL MEDICINE
Payer: COMMERCIAL

## 2022-03-08 ENCOUNTER — HOSPITAL ENCOUNTER (OUTPATIENT)
Dept: CARDIOLOGY | Facility: CLINIC | Age: 57
End: 2022-03-08
Attending: INTERNAL MEDICINE
Payer: COMMERCIAL

## 2022-03-08 VITALS
OXYGEN SATURATION: 94 % | TEMPERATURE: 98.2 F | DIASTOLIC BLOOD PRESSURE: 81 MMHG | HEART RATE: 110 BPM | RESPIRATION RATE: 24 BRPM | SYSTOLIC BLOOD PRESSURE: 137 MMHG

## 2022-03-08 DIAGNOSIS — Z17.1 STAGE IV CARCINOMA OF BREAST, ER-, RIGHT (H): Primary | ICD-10-CM

## 2022-03-08 DIAGNOSIS — Z17.31 HER2-POSITIVE CARCINOMA OF RIGHT BREAST (H): ICD-10-CM

## 2022-03-08 DIAGNOSIS — E86.0 DEHYDRATION: Primary | ICD-10-CM

## 2022-03-08 DIAGNOSIS — C50.511 MALIGNANT NEOPLASM OF LOWER-OUTER QUADRANT OF RIGHT FEMALE BREAST, UNSPECIFIED ESTROGEN RECEPTOR STATUS (H): ICD-10-CM

## 2022-03-08 DIAGNOSIS — C50.911 HER2-POSITIVE CARCINOMA OF RIGHT BREAST (H): ICD-10-CM

## 2022-03-08 DIAGNOSIS — C50.911 STAGE IV CARCINOMA OF BREAST, ER-, RIGHT (H): Primary | ICD-10-CM

## 2022-03-08 LAB — LVEF ECHO: NORMAL

## 2022-03-08 PROCEDURE — 93325 DOPPLER ECHO COLOR FLOW MAPG: CPT | Mod: 26 | Performed by: INTERNAL MEDICINE

## 2022-03-08 PROCEDURE — 96360 HYDRATION IV INFUSION INIT: CPT

## 2022-03-08 PROCEDURE — 93308 TTE F-UP OR LMTD: CPT

## 2022-03-08 PROCEDURE — 250N000011 HC RX IP 250 OP 636: Performed by: INTERNAL MEDICINE

## 2022-03-08 PROCEDURE — 93321 DOPPLER ECHO F-UP/LMTD STD: CPT | Mod: 26 | Performed by: INTERNAL MEDICINE

## 2022-03-08 PROCEDURE — 93325 DOPPLER ECHO COLOR FLOW MAPG: CPT

## 2022-03-08 PROCEDURE — 258N000003 HC RX IP 258 OP 636: Performed by: INTERNAL MEDICINE

## 2022-03-08 PROCEDURE — 93308 TTE F-UP OR LMTD: CPT | Mod: 26 | Performed by: INTERNAL MEDICINE

## 2022-03-08 PROCEDURE — 96361 HYDRATE IV INFUSION ADD-ON: CPT

## 2022-03-08 RX ORDER — HEPARIN SODIUM (PORCINE) LOCK FLUSH IV SOLN 100 UNIT/ML 100 UNIT/ML
5 SOLUTION INTRAVENOUS
Status: CANCELLED | OUTPATIENT
Start: 2022-03-08

## 2022-03-08 RX ORDER — HEPARIN SODIUM,PORCINE 10 UNIT/ML
5 VIAL (ML) INTRAVENOUS
Status: CANCELLED | OUTPATIENT
Start: 2022-03-08

## 2022-03-08 RX ORDER — HEPARIN SODIUM (PORCINE) LOCK FLUSH IV SOLN 100 UNIT/ML 100 UNIT/ML
5 SOLUTION INTRAVENOUS
Status: DISCONTINUED | OUTPATIENT
Start: 2022-03-08 | End: 2022-03-08 | Stop reason: HOSPADM

## 2022-03-08 RX ORDER — HEPARIN SODIUM,PORCINE 10 UNIT/ML
5 VIAL (ML) INTRAVENOUS
Status: DISCONTINUED | OUTPATIENT
Start: 2022-03-08 | End: 2022-03-08 | Stop reason: HOSPADM

## 2022-03-08 RX ADMIN — Medication 5 ML: at 13:35

## 2022-03-08 RX ADMIN — SODIUM CHLORIDE, POTASSIUM CHLORIDE, SODIUM LACTATE AND CALCIUM CHLORIDE 1000 ML: 600; 310; 30; 20 INJECTION, SOLUTION INTRAVENOUS at 12:12

## 2022-03-08 ASSESSMENT — PAIN SCALES - GENERAL: PAINLEVEL: MODERATE PAIN (4)

## 2022-03-08 NOTE — PROGRESS NOTES
Infusion Nursing Note:  Tiffanie Mcdermott presents today for IV hydration.    Patient seen by provider today: No   present during visit today: Not Applicable.    Note: Patient presents to infusion for IV hydration today.  First day of chemo was yesterday. Denies any changes in her physical assessment except for slightly looser stools. .      Intravenous Access:  Implanted Port.    Treatment Conditions:  Not Applicable.      Post Infusion Assessment:  Patient tolerated infusion without incident.  Blood return noted pre and post infusion.  Site patent and intact, free from redness, edema or discomfort.  No evidence of extravasations.  Access discontinued per protocol.   Patient did have some bleeding from needle insertion port site.  Pressure applied for approx 5 minutes. Once bleeding had stopped a pressure dressing was applied. Patient was sent home with tape and gauze to use as needed.       Discharge Plan:   Patient discharged in stable condition accompanied by: son.  Departure Mode:  Wheelchair.      Cindy Robbins RN

## 2022-03-08 NOTE — PROGRESS NOTES
Hematology/Oncology Visit    Care Team:  - Oncologist: Dr. Kim  - PCP: Physician No Ref-Primary    Reason for visit: lab and symptom check post initiation of C1 paclitaxel, pertuzumab, trazimera    Diagnosis: stage IV breast cancer, HR-, HER2+    Cancer and Treatment Hx:  Feb 2022: presented with several weeks of abdominal pain and nausea, found to have elevated liver enzymes, abdominal US noted hepatomegaly with innumerable hepatic metastasis. 2/21 biopsy of R breast mass demonstrated invasive ductal carcinoma, Anahi grade 3, HR-, HER2+. 2/23 punch biopsy of right breast skin nodule identified cutaneous involvement of breast cancer.  March 2022: weekly paclitaxel + trazimera and pertuzumab    Interval History:  Tiffanie presents today for symptom check after initiation of cycle 1 chemo. She reports that the first infusion went well but she felt chilled and the shakes with the second infusion (trazimera). She has generally been feeling well since chemo 2 days ago. Her main symptom is fatigue. Nausea is improved with use of zyprexa. She is eating small meals/snacks every few hours. She drinks at least 60oz of water per day. She has noticed mild diarrhea, about 5 small loose stools daily. She has not taken anything for it yet. No fevers, abdominal cramping, or blood in stool. She continues to have dull R-sided abdominal discomfort, no other pain. Port placement went well aside from bruising and bleeding with accessing port. No other bleeding noted. She is the main source of household income and is wondering about length of treatment and resources given her current use of FMLA. She has a child's wedding this August and is hoping to be well enough to participate. She is also wondering about a handicapped parking pass. We discussed liver failure and symptoms that would indicate ED visit.    Medications:  Discussed pertinent medications.    Physical Exam:   GEN: pleasantly conversant female no acute distress, in  "wheelchair for comfort  SKIN: mild jaundiced appearance, resolving ecchymosis around L IVAD, not accessed   ENT: icteric sclera, mucous membranes are slightly yellowed, no notable oral sores  LYMPH: no notable cervical or supraclavicular lymphadenopathy  BREAST: right breast with nodularity on and below nipple with skin crusting, no visible drainage, small scab from biopsy  RESP: clear to auscultation bilaterally, on room air  CARDS: regular rate and rhythm, no notable murmurs   GI: abd soft, pt preferred exam in wheelchair so abdominal exam limited due to positioning and body habitus, unable to specifically appreciate hepatomegaly, reports mild tender to palpation of entire abdomen  MSK: no notable lower extremity edema  NEURO: alert and oriented without obvious focal deficit    /62   Pulse 67   Temp 98.1  F (36.7  C) (Temporal)   Ht 1.702 m (5' 7\")   Wt 115.2 kg (254 lb)   LMP 08/06/2008   SpO2 95%   BMI 39.78 kg/m       Wt Readings from Last 4 Encounters:   03/09/22 115.2 kg (254 lb)   03/07/22 111.4 kg (245 lb 8 oz)   03/07/22 115.4 kg (254 lb 8 oz)   03/01/22 115.7 kg (255 lb)     Labs:  Results for orders placed or performed in visit on 03/09/22   Ionized Calcium     Status: Normal   Result Value Ref Range    Calcium Ionized 4.8 4.4 - 5.2 mg/dL   Bilirubin Direct and Total     Status: Abnormal   Result Value Ref Range    Bilirubin Direct 10.7 (H) 0.0 - 0.2 mg/dL    Bilirubin Total 13.6 (H) 0.2 - 1.3 mg/dL     Imaging:  Reviewed read of CT C/A/P from 2/17/22:  IMPRESSION:  1.  Findings of metastatic right breast cancer.  2.  Multiple right breast masses consistent with malignancy with  diffuse involvement of the right breast skin.  3.  Right axillary lymphadenopathy consistent with metastasis. Upper  abdominal lymphadenopathy also suspicious for metastasis.  4.  Multiple liver metastases.  5.  Indeterminate 3 mm left upper lobe pulmonary nodule.  6.  Diffuse hepatic steatosis.  7.  Nonobstructing " right intrarenal calculus.  8.  Colonic diverticulosis.    Reviewed read of mammogram from 2/21/22:  FINDINGS: There are two large masses noted in the right breast  corresponding with the palpable findings. One in the upper aspect of  the right breast measures approximately 3.7 cm in diameter. One in the  lower outer aspect of the right breast measures up to 4.6 cm. There  are multiple other smaller masses scattered through the right breast  likely also the same etiology. There is another mass in the far  posterior aspect of the right upper outer breast near the chest wall  measuring up to 2.3 cm. There is increased density in the retroareolar  right breast. The anterior right breast skin is abnormally thickened  at approximately 0.8 cm in thickness.   No definite mass in left breast is seen. Benign-appearing lymph node  in the upper outer posterior left breast is noted.  Targeted ultrasound of the right breast demonstrates multiple  hypoechoic lobulated shadowing masses in the right breast. One at the  7 o'clock position 8 cm from the nipple measures 3.7 x 3.1 x 3.5 cm.  One in the upper right breast 11 o'clock position 15 cm from the  nipple measures 3.5 x 3.0 x 3.5 cm. These represent the two largest  palpable masses. There are multiple other smaller masses scattered  throughout the breast. One in the far posterior upper outer right  breast is also noted on the live examination, but was not definitely  saved on the images. The skin is abnormally thickened in the anterior  aspect of the breast measuring up to 0.7 cm in thickness.                                                              IMPRESSION: BI-RADS CATEGORY: 5 - Highly Suggestive of  Malignancy-Appropriate Action Should Be Taken.    Reviewed read of brain MRI from 3/3/22:  IMPRESSION:  1.  No acute infarct, mass, mass effect, or hemorrhage.  2.  No definite enhancement to suggest intracranial metastases.  3.  Subtle increase in T2 signal within left  posterior skull near vertex with subtle enhancement (best appreciated on coronal postcontrast T1 sequence image 86). In view of patient's history, evaluation with whole-body nuclear medicine bone scan recommended.    Assessment and Plan:  Metastatic breast cancer, HR-, HER2+  - Initiated paclitaxel, pertuzumab, and trazimera on 3/7   - Discussed with pharmacy, no hepatic dose reduction for paclitaxel  - Baseline echo 3/8 demonstrated EF 60-65%, normal strain, repeat every 3 months  - Baseline PET scheduled for 3/18  - Baseline CA 27-29 is 406  - Follow with Oncology weekly for monitoring while initiating treatment    Transaminitis  Indirect Hyperbilirubinemia  - Bilirubin prior to starting chemotherapy was 11.3, recheck today 13.6  - Liver US 2/15 with innumerable hyperechoic lesions throughout liver concerning for metastasis, intrahepatic bile ducts not well seen  - Discussed with Dr. Kim, given innumerable liver lesions, stent placement would likely not be beneficial, will continue to monitor post-initiation of chemotherapy  - Discussed symptoms that would indicate emergent evaluation in ED such as encephalopathy/confusion, bleeding, or acute severe new pain    Asymptomatic hypercalcemia of malignancy  - When corrected for albumin, calcium level was around 12 on 3/7  - Received IVF daily x3, ionized calcium WNL today    Diarrhea  -  Discussed initiation of imodium    Nausea  - Improved, continue zyprexa at bedtime with zofran PRN  - Appt with dietician 3/15      Future Appointments   Date Time Provider Department Center   3/8/2022 11:00 AM PHECHR1 [facility]   3/8/2022 12:00 PM PH INFUSION NURSE [facility]   3/9/2022  9:00 AM Gina Dudley APRN CNP [facility]   3/9/2022 10:00 AM PH SPECIALTY RN [facility]   3/9/2022 11:30 AM PH INFUSION NURSE [facility]   3/10/2022  2:00 PM PH INFUSION NURSE [facility]   3/14/2022 11:30 AM PH INFUSION NURSE [facility]    3/14/2022 12:00 PM PH INFUSION NURSE [facility]   3/15/2022 12:30 PM Jeanette Price RD [facility]   3/18/2022  3:30 PM PHPET1 [facility]   3/21/2022 12:30 PM PH INFUSION CHAIR 8 [facility]   3/21/2022  1:00 PM PH INFUSION NURSE [facility]   3/28/2022 10:00 AM PH INFUSION CHAIR 13 [facility]   3/28/2022 10:30 AM PH INFUSION CHAIR 13 [facility]   4/4/2022 10:00 AM PH INFUSION CHAIR 8 [facility]   4/4/2022 10:30 AM PH INFUSION CHAIR 8 [facility]   4/6/2022  9:00 AM Gina Dudley APRN CNP [facility]   The total time of this encounter amounted to 40 minutes today. This time includes face-to-face time spent with the patient, prep work, ordering tests, and performing post-visit documentation.    Gina Dudley, CNP

## 2022-03-09 ENCOUNTER — INFUSION THERAPY VISIT (OUTPATIENT)
Dept: INFUSION THERAPY | Facility: CLINIC | Age: 57
End: 2022-03-09
Attending: INTERNAL MEDICINE
Payer: COMMERCIAL

## 2022-03-09 ENCOUNTER — ALLIED HEALTH/NURSE VISIT (OUTPATIENT)
Dept: FAMILY MEDICINE | Facility: CLINIC | Age: 57
End: 2022-03-09
Payer: COMMERCIAL

## 2022-03-09 ENCOUNTER — ONCOLOGY VISIT (OUTPATIENT)
Dept: ONCOLOGY | Facility: CLINIC | Age: 57
End: 2022-03-09
Payer: COMMERCIAL

## 2022-03-09 VITALS
WEIGHT: 254 LBS | BODY MASS INDEX: 39.87 KG/M2 | TEMPERATURE: 98.1 F | OXYGEN SATURATION: 95 % | HEIGHT: 67 IN | SYSTOLIC BLOOD PRESSURE: 122 MMHG | DIASTOLIC BLOOD PRESSURE: 62 MMHG | HEART RATE: 67 BPM

## 2022-03-09 VITALS — HEART RATE: 93 BPM | DIASTOLIC BLOOD PRESSURE: 83 MMHG | SYSTOLIC BLOOD PRESSURE: 135 MMHG

## 2022-03-09 DIAGNOSIS — C50.911 STAGE IV CARCINOMA OF BREAST, ER-, RIGHT (H): Primary | ICD-10-CM

## 2022-03-09 DIAGNOSIS — E83.52 HYPERCALCEMIA OF MALIGNANCY: ICD-10-CM

## 2022-03-09 DIAGNOSIS — R19.7 DIARRHEA, UNSPECIFIED TYPE: ICD-10-CM

## 2022-03-09 DIAGNOSIS — C50.911 HER2-POSITIVE CARCINOMA OF RIGHT BREAST (H): ICD-10-CM

## 2022-03-09 DIAGNOSIS — Z17.1 STAGE IV CARCINOMA OF BREAST, ER-, RIGHT (H): Primary | ICD-10-CM

## 2022-03-09 DIAGNOSIS — C78.7 BREAST CANCER METASTASIZED TO LIVER, RIGHT (H): ICD-10-CM

## 2022-03-09 DIAGNOSIS — E80.6 HYPERBILIRUBINEMIA: ICD-10-CM

## 2022-03-09 DIAGNOSIS — E86.0 DEHYDRATION: Primary | ICD-10-CM

## 2022-03-09 DIAGNOSIS — C50.911 BREAST CANCER METASTASIZED TO LIVER, RIGHT (H): ICD-10-CM

## 2022-03-09 DIAGNOSIS — C50.511 MALIGNANT NEOPLASM OF LOWER-OUTER QUADRANT OF RIGHT FEMALE BREAST, UNSPECIFIED ESTROGEN RECEPTOR STATUS (H): ICD-10-CM

## 2022-03-09 DIAGNOSIS — E86.0 DEHYDRATION: ICD-10-CM

## 2022-03-09 DIAGNOSIS — R74.01 TRANSAMINITIS: ICD-10-CM

## 2022-03-09 DIAGNOSIS — Z17.31 HER2-POSITIVE CARCINOMA OF RIGHT BREAST (H): ICD-10-CM

## 2022-03-09 DIAGNOSIS — N63.0 BREAST MASS: Primary | ICD-10-CM

## 2022-03-09 DIAGNOSIS — R11.2 NON-INTRACTABLE VOMITING WITH NAUSEA, UNSPECIFIED VOMITING TYPE: ICD-10-CM

## 2022-03-09 LAB
BILIRUB DIRECT SERPL-MCNC: 10.7 MG/DL (ref 0–0.2)
BILIRUB SERPL-MCNC: 13.6 MG/DL (ref 0.2–1.3)
CA-I BLD-MCNC: 4.8 MG/DL (ref 4.4–5.2)

## 2022-03-09 PROCEDURE — 82330 ASSAY OF CALCIUM: CPT

## 2022-03-09 PROCEDURE — 99207 PR NO CHARGE NURSE ONLY: CPT

## 2022-03-09 PROCEDURE — 99215 OFFICE O/P EST HI 40 MIN: CPT | Performed by: NURSE PRACTITIONER

## 2022-03-09 PROCEDURE — 258N000003 HC RX IP 258 OP 636: Performed by: INTERNAL MEDICINE

## 2022-03-09 PROCEDURE — 96360 HYDRATION IV INFUSION INIT: CPT

## 2022-03-09 PROCEDURE — 36591 DRAW BLOOD OFF VENOUS DEVICE: CPT

## 2022-03-09 PROCEDURE — 82248 BILIRUBIN DIRECT: CPT

## 2022-03-09 PROCEDURE — 96361 HYDRATE IV INFUSION ADD-ON: CPT

## 2022-03-09 PROCEDURE — 250N000011 HC RX IP 250 OP 636: Performed by: INTERNAL MEDICINE

## 2022-03-09 RX ORDER — LOPERAMIDE HCL 2 MG
2 CAPSULE ORAL ONCE
Status: DISCONTINUED | OUTPATIENT
Start: 2022-03-09 | End: 2022-03-09 | Stop reason: HOSPADM

## 2022-03-09 RX ORDER — HEPARIN SODIUM (PORCINE) LOCK FLUSH IV SOLN 100 UNIT/ML 100 UNIT/ML
500 SOLUTION INTRAVENOUS ONCE
Status: COMPLETED | OUTPATIENT
Start: 2022-03-09 | End: 2022-03-09

## 2022-03-09 RX ORDER — SODIUM CHLORIDE, SODIUM LACTATE, POTASSIUM CHLORIDE, CALCIUM CHLORIDE 600; 310; 30; 20 MG/100ML; MG/100ML; MG/100ML; MG/100ML
INJECTION, SOLUTION INTRAVENOUS CONTINUOUS
Status: CANCELLED
Start: 2022-03-09

## 2022-03-09 RX ORDER — SODIUM CHLORIDE, SODIUM LACTATE, POTASSIUM CHLORIDE, CALCIUM CHLORIDE 600; 310; 30; 20 MG/100ML; MG/100ML; MG/100ML; MG/100ML
INJECTION, SOLUTION INTRAVENOUS CONTINUOUS
Status: ACTIVE | OUTPATIENT
Start: 2022-03-09 | End: 2022-03-09

## 2022-03-09 RX ORDER — HEPARIN SODIUM (PORCINE) LOCK FLUSH IV SOLN 100 UNIT/ML 100 UNIT/ML
500 SOLUTION INTRAVENOUS ONCE
Status: CANCELLED
Start: 2022-03-09 | End: 2022-03-09

## 2022-03-09 RX ADMIN — SODIUM CHLORIDE, POTASSIUM CHLORIDE, SODIUM LACTATE AND CALCIUM CHLORIDE: 600; 310; 30; 20 INJECTION, SOLUTION INTRAVENOUS at 11:40

## 2022-03-09 RX ADMIN — Medication 500 UNITS: at 13:19

## 2022-03-09 ASSESSMENT — PAIN SCALES - GENERAL: PAINLEVEL: MILD PAIN (3)

## 2022-03-09 NOTE — PROGRESS NOTES
DISCHARGE PLAN:  Next appointments: See patient instruction section  Departure Mode: W/C  Accompanied by: self  5 minutes for nursing discharge (face to face time)     Tiffanie Mcdermott is here today for breast cancer.  Writing nurse saw patient after Medical Oncology appointment to address questions/concerns/coordinate care.  Patient to infusion to schedule port labs (if needed), follow up, and infusion.  Imaging scheduled if ordered.  See patient instructions and Oncologist's Progress note for further details. Questions and concerns addressed to patient's satisfaction. Patient verbalized and demonstrated understanding of plan.  Contact information provided and patient is encouraged to call with any that arise in the interim of care.    Amina Gardner RN  [facility]   [phone]  3/9/2022, 11:52 AM

## 2022-03-09 NOTE — PROGRESS NOTES
"Chemotherapy Education    Patient is here today for chemotherapy education, accompanied by self.  Pt has a cancer diagnosis of Stage IV carcinoma of breast.  Their Oncologist is Dr. Kim, and their PCP is Milton Mclean PA-C.    Reviewed the following with the patient and their support person:    Treatment Goal: Curative    Regimen: Taxol, Trazimera, Perjeta    Duration: x 3 Cycles.  Reviewed rationale for strict adherence, specific supportive medication, patient is using imodium, and Zyprexa side effects (including long-term and short-term) and management of treatment; skin changes/hand-foot syndrome, anemia, neutropenia, thrombocytopenia, diarrhea/constipation, hair loss syndrome, memory changes/ \"chemobrain\", mouth sores, taste changes, neuropathy, fatigue, infertility, myelosuppression, and risk of extravasation or infiltration.    Infection prevention and monitoring of lab values, what lab tests and what changes of these values meant, along with the possibility of hydration or blood product transfusion, or the need to defer or hold treatment.      Reviewed pathophysiology of disease and role chemotherapy plays in treatment. Reinforced patient and families knowledge of own disease. Reviewed details as needed.    General Chemotherapy Information, including ways it is excreted from the body and cleaning and containment of vomitus or other bodily fluid, use of the bathroom, sexual health and intimacy, what to do if needing to miss a treatment, when to call a provider and the need for staff to wear protective equipment.    Oral Chemotherapy: No    Importance of Central line care (port) or IV site care.    Written Information:    Health Folder including:  \"Getting Ready for Chemotherapy: What to Expect, Before, During, and After your Treatment\"  \"Self-Care Tips During Cancer Treatment\"  Via Oncology medication information sheets  Information on when to contact the provider  Clinic, infusion, and after-hours " nurse advice contact information  Various programs offered at [facility]  Business card with contact information given; Oncology Clinic, RN Case Manager.  No barriers to learning identified. Patient and family verbalized understanding of all written and verbal information. All questions answered to patients satisfaction.   General Orientation to the Medical Oncology department, Infusion Services department, Huc/scheduling, bathrooms and usual flow of the treatment day provided as well as introduction to the Infusion nurses, including tour of facility and demonstration for check in process and where to go.    Bottle Pump Infuser: N/A General review.  Infusion nurses will review in detail and instructions provided by home infusion. [facility] contact information provided.      Neulasta Onpro Kit: N/A General review.  Infusion nurses will review in detail.    Other Concerns: None    Comments:None    Pt instructed to call with further questions or concerns. Patient states understanding and is in agreement with this plan. Copy of appointments and after visit summary if done in appointment.    Amina Gardner RN  [facility]  [phone]  3/9/2022, 11:31 AM

## 2022-03-09 NOTE — PROGRESS NOTES
Infusion Nursing Note:  Tiffanie STEWART Sharron presents today for IVF.    Patient seen by provider today: Yes: Gina Dudley.   present during visit today: Not Applicable.    Note: Patient had office visit and also education with Oncology Care Coordinator prior to IVF.   Requested Imodium due to loose stools so provider was contacted and order placed but patient's  came to give her Imodium that he had purchased in town.       Intravenous Access:  Implanted Port. L chest wall.  Significant bruising around site, mild swelling. Tender.   Accessed with Noncoring 20G,1in. Needle.   Good blood return, flushes easily, no swelling with saline flushes.   Labs drawn without difficulty.     Treatment Conditions:  Not Applicable.      Post Infusion Assessment:  Patient tolerated infusion without incident.  Blood return noted pre and post infusion.  Site patent and intact, free from redness, edema or discomfort.  No evidence of extravasations.  Access discontinued per protocol.   Gauze pressure drsg placed. Pressure held on site for 5-10 minutes and no active bleeding or soaking gauze at discharge.       Discharge Plan:   Copy of AVS reviewed with patient.  Patient will return 3/10 for next appointment.  Patient discharged in stable condition accompanied by: .  Departure Mode: Wheelchair.      Constance Maynard RN

## 2022-03-09 NOTE — PATIENT INSTRUCTIONS
- Imodium for diarrhea  - Repeat labs today  - IVF today  - Follow up in 1 week with Oncology       If you have any questions or concerns please feel free to call.    If you need to reschedule please call:  Clinic or Lab Appointment - [phone]  Infusion - [phone]  Imaging - [phone]    Amina Gardner RN  [facility]   [phone]  3/9/2022, 11:52 AM    After Hours Oncology Nurse Line - [phone]

## 2022-03-09 NOTE — LETTER
3/9/2022         RE: Tiffanie Mcdermott  [address] [address] [place] [id number]      Hematology/Oncology Visit    Care Team:  - Oncologist: Dr. Kim  - PCP: Physician No Ref-Primary    Reason for visit: lab and symptom check post initiation of C1 paclitaxel, pertuzumab, trazimera    Diagnosis: stage IV breast cancer, HR-, HER2+    Cancer and Treatment Hx:  Feb 2022: presented with several weeks of abdominal pain and nausea, found to have elevated liver enzymes, abdominal US noted hepatomegaly with innumerable hepatic metastasis. 2/21 biopsy of R breast mass demonstrated invasive ductal carcinoma, [place] grade 3, HR-, HER2+. 2/23 punch biopsy of right breast skin nodule identified cutaneous involvement of breast cancer.  March 2022: weekly paclitaxel + trazimera and pertuzumab    Interval History:  Tiffanie presents today for symptom check after initiation of cycle 1 chemo. She reports that the first infusion went well but she felt chilled and the shakes with the second infusion (trazimera). She has generally been feeling well since chemo 2 days ago. Her main symptom is fatigue. Nausea is improved with use of zyprexa. She is eating small meals/snacks every few hours. She drinks at least 60oz of water per day. She has noticed mild diarrhea, about 5 small loose stools daily. She has not taken anything for it yet. No fevers, abdominal cramping, or blood in stool. She continues to have dull R-sided abdominal discomfort, no other pain. Port placement went well aside from bruising and bleeding with accessing port. No other bleeding noted. She is the main source of household income and is wondering about length of treatment and resources given her current use of FMLA. She has a child's wedding this August and is hoping to be well enough to participate. She is also wondering about a handicapped parking pass. We discussed liver failure and symptoms that would indicate ED visit.    Medications:  Discussed  "pertinent medications.    Physical Exam:   GEN: pleasantly conversant female no acute distress, in wheelchair for comfort  SKIN: mild jaundiced appearance, resolving ecchymosis around L IVAD, not accessed   ENT: icteric sclera, mucous membranes are slightly yellowed, no notable oral sores  LYMPH: no notable cervical or supraclavicular lymphadenopathy  BREAST: right breast with nodularity on and below nipple with skin crusting, no visible drainage, small scab from biopsy  RESP: clear to auscultation bilaterally, on room air  CARDS: regular rate and rhythm, no notable murmurs   GI: abd soft, pt preferred exam in wheelchair so abdominal exam limited due to positioning and body habitus, unable to specifically appreciate hepatomegaly, reports mild tender to palpation of entire abdomen  MSK: no notable lower extremity edema  NEURO: alert and oriented without obvious focal deficit    /62   Pulse 67   Temp 98.1  F (36.7  C) (Temporal)   Ht 1.702 m (5' 7\")   Wt 115.2 kg (254 lb)   LMP 08/06/2008   SpO2 95%   BMI 39.78 kg/m       Wt Readings from Last 4 Encounters:   03/09/22 115.2 kg (254 lb)   03/07/22 111.4 kg (245 lb 8 oz)   03/07/22 115.4 kg (254 lb 8 oz)   03/01/22 115.7 kg (255 lb)     Labs:  Results for orders placed or performed in visit on 03/09/22   Ionized Calcium     Status: Normal   Result Value Ref Range    Calcium Ionized 4.8 4.4 - 5.2 mg/dL   Bilirubin Direct and Total     Status: Abnormal   Result Value Ref Range    Bilirubin Direct 10.7 (H) 0.0 - 0.2 mg/dL    Bilirubin Total 13.6 (H) 0.2 - 1.3 mg/dL     Imaging:  Reviewed read of CT C/A/P from 2/17/22:  IMPRESSION:  1.  Findings of metastatic right breast cancer.  2.  Multiple right breast masses consistent with malignancy with  diffuse involvement of the right breast skin.  3.  Right axillary lymphadenopathy consistent with metastasis. Upper  abdominal lymphadenopathy also suspicious for metastasis.  4.  Multiple liver metastases.  5.  " Indeterminate 3 mm left upper lobe pulmonary nodule.  6.  Diffuse hepatic steatosis.  7.  Nonobstructing right intrarenal calculus.  8.  Colonic diverticulosis.    Reviewed read of mammogram from 2/21/22:  FINDINGS: There are two large masses noted in the right breast  corresponding with the palpable findings. One in the upper aspect of  the right breast measures approximately 3.7 cm in diameter. One in the  lower outer aspect of the right breast measures up to 4.6 cm. There  are multiple other smaller masses scattered through the right breast  likely also the same etiology. There is another mass in the far  posterior aspect of the right upper outer breast near the chest wall  measuring up to 2.3 cm. There is increased density in the retroareolar  right breast. The anterior right breast skin is abnormally thickened  at approximately 0.8 cm in thickness.   No definite mass in left breast is seen. Benign-appearing lymph node  in the upper outer posterior left breast is noted.  Targeted ultrasound of the right breast demonstrates multiple  hypoechoic lobulated shadowing masses in the right breast. One at the  7 o'clock position 8 cm from the nipple measures 3.7 x 3.1 x 3.5 cm.  One in the upper right breast 11 o'clock position 15 cm from the  nipple measures 3.5 x 3.0 x 3.5 cm. These represent the two largest  palpable masses. There are multiple other smaller masses scattered  throughout the breast. One in the far posterior upper outer right  breast is also noted on the live examination, but was not definitely  saved on the images. The skin is abnormally thickened in the anterior  aspect of the breast measuring up to 0.7 cm in thickness.                                                              IMPRESSION: BI-RADS CATEGORY: 5 - Highly Suggestive of  Malignancy-Appropriate Action Should Be Taken.    Reviewed read of brain MRI from 3/3/22:  IMPRESSION:  1.  No acute infarct, mass, mass effect, or hemorrhage.  2.  No  definite enhancement to suggest intracranial metastases.  3.  Subtle increase in T2 signal within left posterior skull near vertex with subtle enhancement (best appreciated on coronal postcontrast T1 sequence image 86). In view of patient's history, evaluation with whole-body nuclear medicine bone scan recommended.    Assessment and Plan:  Metastatic breast cancer, HR-, HER2+  - Initiated paclitaxel, pertuzumab, and trazimera on 3/7   - Discussed with pharmacy, no hepatic dose reduction for paclitaxel  - Baseline echo 3/8 demonstrated EF 60-65%, normal strain, repeat every 3 months  - Baseline PET scheduled for 3/18  - Baseline CA 27-29 is 406  - Follow with Oncology weekly for monitoring while initiating treatment    Transaminitis  Indirect Hyperbilirubinemia  - Bilirubin prior to starting chemotherapy was 11.3, recheck today 13.6  - Liver US 2/15 with innumerable hyperechoic lesions throughout liver concerning for metastasis, intrahepatic bile ducts not well seen  - Discussed with Dr. Kim, given innumerable liver lesions, stent placement would likely not be beneficial, will continue to monitor post-initiation of chemotherapy  - Discussed symptoms that would indicate emergent evaluation in ED such as encephalopathy/confusion, bleeding, or acute severe new pain    Asymptomatic hypercalcemia of malignancy  - When corrected for albumin, calcium level was around 12 on 3/7  - Received IVF daily x3, ionized calcium WNL today    Diarrhea  -  Discussed initiation of imodium    Nausea  - Improved, continue zyprexa at bedtime with zofran PRN  - Appt with dietician 3/15      Future Appointments   Date Time Provider Department Center   3/8/2022 11:00 [facility]   3/8/2022 12:00 PM PH INFUSION NURSE YANCI MARISCAL [facility]   3/9/2022  9:00 AM Gina Dudley APRN CNP [facility]   3/9/2022 10:00 AM PH SPECIALTY RN PHFP [facility]   3/9/2022 11:30 AM PH INFUSION NURSE YANCI MARISCAL [facility]   3/10/2022   2:00 PM PH INFUSION NURSE [facility]   3/14/2022 11:30 AM PH INFUSION NURSE [facility]   3/14/2022 12:00 PM PH INFUSION NURSE [facility]   3/15/2022 12:30 PM Jeanette Price, CED [facility]   3/18/2022  3:30 PM PHPET1 [facility]   3/21/2022 12:30 PM PH INFUSION CHAIR 8 [facility]   3/21/2022  1:00 PM PH INFUSION NURSE [facility]   3/28/2022 10:00 AM PH INFUSION CHAIR 13 [facility]   3/28/2022 10:30 AM PH INFUSION CHAIR 13 [facility]   4/4/2022 10:00 AM PH INFUSION CHAIR 8 [facility]   4/4/2022 10:30 AM PH INFUSION CHAIR 8 [facility]   4/6/2022  9:00 AM Gina Dudley APRN CNP [facility]   The total time of this encounter amounted to 40 minutes today. This time includes face-to-face time spent with the patient, prep work, ordering tests, and performing post-visit documentation.    Gina Dudley CNP      DISCHARGE PLAN:  Next appointments: See patient instruction section  Departure Mode: W/C  Accompanied by: self  5 minutes for nursing discharge (face to face time)     Tiffanie Mcdermott is here today for breast cancer.  Writing nurse saw patient after Medical Oncology appointment to address questions/concerns/coordinate care.  Patient to infusion to schedule port labs (if needed), follow up, and infusion.  Imaging scheduled if ordered.  See patient instructions and Oncologist's Progress note for further details. Questions and concerns addressed to patient's satisfaction. Patient verbalized and demonstrated understanding of plan.  Contact information provided and patient is encouraged to call with any that arise in the interim of care.    Amina Gardner RN  [facility]   [phone]  3/9/2022, 11:52 AM          HENRIETTA Landis CNP

## 2022-03-10 ENCOUNTER — PATIENT OUTREACH (OUTPATIENT)
Dept: CARE COORDINATION | Facility: CLINIC | Age: 57
End: 2022-03-10

## 2022-03-10 ENCOUNTER — INFUSION THERAPY VISIT (OUTPATIENT)
Dept: INFUSION THERAPY | Facility: CLINIC | Age: 57
End: 2022-03-10
Attending: INTERNAL MEDICINE
Payer: COMMERCIAL

## 2022-03-10 VITALS
DIASTOLIC BLOOD PRESSURE: 72 MMHG | TEMPERATURE: 97.5 F | RESPIRATION RATE: 20 BRPM | HEART RATE: 94 BPM | SYSTOLIC BLOOD PRESSURE: 140 MMHG | OXYGEN SATURATION: 97 %

## 2022-03-10 DIAGNOSIS — E86.0 DEHYDRATION: Primary | ICD-10-CM

## 2022-03-10 PROCEDURE — 96360 HYDRATION IV INFUSION INIT: CPT

## 2022-03-10 PROCEDURE — 258N000003 HC RX IP 258 OP 636: Performed by: INTERNAL MEDICINE

## 2022-03-10 PROCEDURE — 250N000011 HC RX IP 250 OP 636: Performed by: INTERNAL MEDICINE

## 2022-03-10 RX ORDER — SODIUM CHLORIDE, SODIUM LACTATE, POTASSIUM CHLORIDE, CALCIUM CHLORIDE 600; 310; 30; 20 MG/100ML; MG/100ML; MG/100ML; MG/100ML
INJECTION, SOLUTION INTRAVENOUS CONTINUOUS
Status: CANCELLED
Start: 2022-03-10

## 2022-03-10 RX ORDER — SODIUM CHLORIDE, SODIUM LACTATE, POTASSIUM CHLORIDE, CALCIUM CHLORIDE 600; 310; 30; 20 MG/100ML; MG/100ML; MG/100ML; MG/100ML
INJECTION, SOLUTION INTRAVENOUS CONTINUOUS
Status: ACTIVE | OUTPATIENT
Start: 2022-03-10 | End: 2022-03-10

## 2022-03-10 RX ORDER — HEPARIN SODIUM (PORCINE) LOCK FLUSH IV SOLN 100 UNIT/ML 100 UNIT/ML
500 SOLUTION INTRAVENOUS ONCE
Status: CANCELLED
Start: 2022-03-10 | End: 2022-03-10

## 2022-03-10 RX ORDER — HEPARIN SODIUM (PORCINE) LOCK FLUSH IV SOLN 100 UNIT/ML 100 UNIT/ML
500 SOLUTION INTRAVENOUS ONCE
Status: COMPLETED | OUTPATIENT
Start: 2022-03-10 | End: 2022-03-10

## 2022-03-10 RX ADMIN — SODIUM CHLORIDE, POTASSIUM CHLORIDE, SODIUM LACTATE AND CALCIUM CHLORIDE: 600; 310; 30; 20 INJECTION, SOLUTION INTRAVENOUS at 14:36

## 2022-03-10 RX ADMIN — Medication 500 UNITS: at 16:04

## 2022-03-10 ASSESSMENT — PAIN SCALES - GENERAL: PAINLEVEL: MILD PAIN (2)

## 2022-03-10 NOTE — PROGRESS NOTES
Oncology Distress Screening Follow-up  Clinical Social Work  [facility]    Identified Concern and Score From Distress Screenin. How concerned are you about your ability to eat?  8 Abnormal        2. How concerned are you about unintended weight loss or your current weight?  8 Abnormal        3. How concerned are you about feeling depressed or very sad?  5       4. How concerned are you about feeling anxious or very scared?  7 Abnormal        5. Do you struggle with the loss of meaning and allen in your life?  Quite a bit Abnormal        6. How concerned are you about work and home life issues that may be affected by your cancer?  5       7. How concerned are you about knowing what resources are available to help you?  5       8. Do you currently have what you would describe as Anabaptism or spiritual struggles?             Not at all                 Date of Distress Screening: 3/9/22      Data: FRANDY met with Tiffanie in the infusion center today, she is here for IVF.      Intervention/Education Provided:: FRANDY met with Tiffanie in infusion center, introducing self and reason for for check in. Provided education on SW role. Tiffanie was pleasant and engaged well in discussion. She acknowledges that this has been very hard, there is a lot of anxiety as she navigates this process and states the call earlier this week was quite worrying. FRANDY provided some supportive counseling. Discussed strategies for managing anxiety, including meditation resources, grounding activities, and the idea of working with a counselor. At this time, Tiffanie isn't sure if she wants counseling or support groups. She plans to think through it and will let FRANDY know.     Tiffanie lives with her  and 18 year old son, her 24 year old son. 24 year old is getting  in August, which brings another layer of worry for her as she is facing this advanced illness. She has been very active in her community and has been working. She has been contacting people to take  more of these things off of her plate. SW acknowledged at how these losses are a significant and certainly something she can take time to grieve.     Tiffanie is navigating FMLA, short term disability and decisions about early detention/social security, etc. SW talked Tiffanie through some of these processes as well as helped her consider questions to ask and who she needs to talk to. Also reviewed steps to start with so she doesn't get too overwhelmed. Reviewed healthcare and legal/financial directives. Tiffanie does not have these documents at this time, but is thinking/working on them. SW offered to help with Honoring Choices form if she needs that support.     No other immediate needs at this time. FRANDY provided her card and encouraged Tiffanie to call back at any time.      Follow-up Required: FRANDY will plan to connect with Tiffanie again in the coming weeks to follow up on any questions or needs.           HUGO Sawyer, [facility]  Clinical , Adult Oncology  Phone: [phone]

## 2022-03-10 NOTE — PROGRESS NOTES
"Infusion Nursing Note:  Tiffanie Mcdermott presents today for IVF.    Patient seen by provider today: No   present during visit today: Not Applicable.    Note: Patient does not have new symptoms or concerns stated today. 3/10 widespread intermittent dull aching abdominal pain. Skin and sclera jaundiced. Also states her skin is drier and itchier than usual. VSS, afebrile.       Intravenous Access:  Implanted Port.    Treatment Conditions:  Not Applicable.      Post Infusion Assessment:  Patient tolerated infusion without incident. Did mention that her stomach was a little \"gurgly and gassy\" during last part of infusion. No other symptoms. Pt did have a snack and juice during appt.   Blood return noted pre and post infusion.  Site patent and intact, free from increased edema or discomfort.  No evidence of extravasations.  Access discontinued per protocol.   Gauze pressure drsg held in place for approx 8 minutes and no active bleeding or blood noted through gauze at discharge.       Discharge Plan:   Copy of AVS reviewed with patient and/or family.  Patient will return 3/14 for next appointment.  Patient discharged in stable condition accompanied by: .  Departure Mode: Wheelchair.      Constance Maynard RN                    "

## 2022-03-14 ENCOUNTER — TELEPHONE (OUTPATIENT)
Dept: ONCOLOGY | Facility: CLINIC | Age: 57
End: 2022-03-14

## 2022-03-14 ENCOUNTER — INFUSION THERAPY VISIT (OUTPATIENT)
Dept: INFUSION THERAPY | Facility: CLINIC | Age: 57
End: 2022-03-14
Attending: INTERNAL MEDICINE
Payer: COMMERCIAL

## 2022-03-14 VITALS
SYSTOLIC BLOOD PRESSURE: 141 MMHG | RESPIRATION RATE: 20 BRPM | DIASTOLIC BLOOD PRESSURE: 84 MMHG | WEIGHT: 238.2 LBS | HEART RATE: 110 BPM | TEMPERATURE: 98.6 F | OXYGEN SATURATION: 96 % | BODY MASS INDEX: 37.31 KG/M2

## 2022-03-14 DIAGNOSIS — C50.911 STAGE IV CARCINOMA OF BREAST, ER-, RIGHT (H): Primary | ICD-10-CM

## 2022-03-14 DIAGNOSIS — C50.511 MALIGNANT NEOPLASM OF LOWER-OUTER QUADRANT OF RIGHT FEMALE BREAST, UNSPECIFIED ESTROGEN RECEPTOR STATUS (H): ICD-10-CM

## 2022-03-14 DIAGNOSIS — Z17.1 STAGE IV CARCINOMA OF BREAST, ER-, RIGHT (H): Primary | ICD-10-CM

## 2022-03-14 DIAGNOSIS — E86.0 DEHYDRATION: Primary | ICD-10-CM

## 2022-03-14 DIAGNOSIS — C50.511 MALIGNANT NEOPLASM OF LOWER-OUTER QUADRANT OF RIGHT FEMALE BREAST, UNSPECIFIED ESTROGEN RECEPTOR STATUS (H): Primary | ICD-10-CM

## 2022-03-14 DIAGNOSIS — E86.0 DEHYDRATION: ICD-10-CM

## 2022-03-14 LAB
ALBUMIN SERPL-MCNC: 2.5 G/DL (ref 3.4–5)
ALP SERPL-CCNC: 457 U/L (ref 40–150)
ALT SERPL W P-5'-P-CCNC: 115 U/L (ref 0–50)
APTT PPP: 33 SECONDS (ref 22–38)
AST SERPL W P-5'-P-CCNC: 195 U/L (ref 0–45)
BASOPHILS # BLD AUTO: 0 10E3/UL (ref 0–0.2)
BASOPHILS NFR BLD AUTO: 3 %
BILIRUB DIRECT SERPL-MCNC: 4.1 MG/DL (ref 0–0.2)
BILIRUB SERPL-MCNC: 5.6 MG/DL (ref 0.2–1.3)
EOSINOPHIL # BLD AUTO: 0.1 10E3/UL (ref 0–0.7)
EOSINOPHIL NFR BLD AUTO: 6 %
ERYTHROCYTE [DISTWIDTH] IN BLOOD BY AUTOMATED COUNT: 16.7 % (ref 10–15)
FIBRINOGEN PPP-MCNC: 565 MG/DL (ref 170–490)
HCT VFR BLD AUTO: 38 % (ref 35–47)
HGB BLD-MCNC: 12.3 G/DL (ref 11.7–15.7)
IMM GRANULOCYTES # BLD: 0 10E3/UL
IMM GRANULOCYTES NFR BLD: 0 %
INR PPP: 1.06 (ref 0.85–1.15)
LYMPHOCYTES # BLD AUTO: 0.6 10E3/UL (ref 0.8–5.3)
LYMPHOCYTES NFR BLD AUTO: 75 %
MCH RBC QN AUTO: 28.5 PG (ref 26.5–33)
MCHC RBC AUTO-ENTMCNC: 32.4 G/DL (ref 31.5–36.5)
MCV RBC AUTO: 88 FL (ref 78–100)
MONOCYTES # BLD AUTO: 0.1 10E3/UL (ref 0–1.3)
MONOCYTES NFR BLD AUTO: 12 %
NEUTROPHILS # BLD AUTO: 0 10E3/UL (ref 1.6–8.3)
NEUTROPHILS NFR BLD AUTO: 4 %
NRBC # BLD AUTO: 0 10E3/UL
NRBC BLD AUTO-RTO: 0 /100
PLATELET # BLD AUTO: 111 10E3/UL (ref 150–450)
PROT SERPL-MCNC: 6.2 G/DL (ref 6.8–8.8)
RBC # BLD AUTO: 4.31 10E6/UL (ref 3.8–5.2)
WBC # BLD AUTO: 0.8 10E3/UL (ref 4–11)

## 2022-03-14 PROCEDURE — 85610 PROTHROMBIN TIME: CPT | Performed by: INTERNAL MEDICINE

## 2022-03-14 PROCEDURE — 85025 COMPLETE CBC W/AUTO DIFF WBC: CPT | Performed by: INTERNAL MEDICINE

## 2022-03-14 PROCEDURE — 96360 HYDRATION IV INFUSION INIT: CPT

## 2022-03-14 PROCEDURE — 80076 HEPATIC FUNCTION PANEL: CPT | Performed by: INTERNAL MEDICINE

## 2022-03-14 PROCEDURE — 258N000003 HC RX IP 258 OP 636: Performed by: INTERNAL MEDICINE

## 2022-03-14 PROCEDURE — 85384 FIBRINOGEN ACTIVITY: CPT | Performed by: INTERNAL MEDICINE

## 2022-03-14 PROCEDURE — 85730 THROMBOPLASTIN TIME PARTIAL: CPT | Performed by: INTERNAL MEDICINE

## 2022-03-14 PROCEDURE — 250N000011 HC RX IP 250 OP 636: Performed by: INTERNAL MEDICINE

## 2022-03-14 PROCEDURE — 87086 URINE CULTURE/COLONY COUNT: CPT | Performed by: INTERNAL MEDICINE

## 2022-03-14 RX ORDER — HEPARIN SODIUM (PORCINE) LOCK FLUSH IV SOLN 100 UNIT/ML 100 UNIT/ML
500 SOLUTION INTRAVENOUS ONCE
Status: COMPLETED | OUTPATIENT
Start: 2022-03-14 | End: 2022-03-14

## 2022-03-14 RX ORDER — HEPARIN SODIUM (PORCINE) LOCK FLUSH IV SOLN 100 UNIT/ML 100 UNIT/ML
500 SOLUTION INTRAVENOUS ONCE
Status: CANCELLED
Start: 2022-03-14 | End: 2022-03-14

## 2022-03-14 RX ORDER — SODIUM CHLORIDE, SODIUM LACTATE, POTASSIUM CHLORIDE, CALCIUM CHLORIDE 600; 310; 30; 20 MG/100ML; MG/100ML; MG/100ML; MG/100ML
INJECTION, SOLUTION INTRAVENOUS CONTINUOUS
Status: CANCELLED
Start: 2022-03-14

## 2022-03-14 RX ORDER — SODIUM CHLORIDE, SODIUM LACTATE, POTASSIUM CHLORIDE, CALCIUM CHLORIDE 600; 310; 30; 20 MG/100ML; MG/100ML; MG/100ML; MG/100ML
INJECTION, SOLUTION INTRAVENOUS CONTINUOUS
Status: ACTIVE | OUTPATIENT
Start: 2022-03-14 | End: 2022-03-14

## 2022-03-14 RX ORDER — MEPERIDINE HYDROCHLORIDE 25 MG/ML
25 INJECTION INTRAMUSCULAR; INTRAVENOUS; SUBCUTANEOUS EVERY 30 MIN PRN
Status: CANCELLED | OUTPATIENT
Start: 2022-03-21

## 2022-03-14 RX ORDER — DIPHENHYDRAMINE HCL 25 MG
50 CAPSULE ORAL ONCE
Status: CANCELLED
Start: 2022-03-21

## 2022-03-14 RX ORDER — HEPARIN SODIUM,PORCINE 10 UNIT/ML
5 VIAL (ML) INTRAVENOUS
Status: CANCELLED | OUTPATIENT
Start: 2022-03-21

## 2022-03-14 RX ORDER — NALOXONE HYDROCHLORIDE 0.4 MG/ML
0.2 INJECTION, SOLUTION INTRAMUSCULAR; INTRAVENOUS; SUBCUTANEOUS
Status: CANCELLED | OUTPATIENT
Start: 2022-03-21

## 2022-03-14 RX ORDER — LORAZEPAM 2 MG/ML
0.5 INJECTION INTRAMUSCULAR EVERY 4 HOURS PRN
Status: CANCELLED | OUTPATIENT
Start: 2022-03-21

## 2022-03-14 RX ORDER — EPINEPHRINE 1 MG/ML
0.3 INJECTION, SOLUTION, CONCENTRATE INTRAVENOUS EVERY 5 MIN PRN
Status: CANCELLED | OUTPATIENT
Start: 2022-03-21

## 2022-03-14 RX ORDER — METHYLPREDNISOLONE SODIUM SUCCINATE 125 MG/2ML
125 INJECTION, POWDER, LYOPHILIZED, FOR SOLUTION INTRAMUSCULAR; INTRAVENOUS
Status: CANCELLED
Start: 2022-03-21

## 2022-03-14 RX ORDER — ALBUTEROL SULFATE 0.83 MG/ML
2.5 SOLUTION RESPIRATORY (INHALATION)
Status: CANCELLED | OUTPATIENT
Start: 2022-03-21

## 2022-03-14 RX ORDER — DIPHENHYDRAMINE HYDROCHLORIDE 50 MG/ML
50 INJECTION INTRAMUSCULAR; INTRAVENOUS
Status: CANCELLED
Start: 2022-03-21

## 2022-03-14 RX ORDER — ALBUTEROL SULFATE 90 UG/1
1-2 AEROSOL, METERED RESPIRATORY (INHALATION)
Status: CANCELLED
Start: 2022-03-21

## 2022-03-14 RX ORDER — HEPARIN SODIUM (PORCINE) LOCK FLUSH IV SOLN 100 UNIT/ML 100 UNIT/ML
5 SOLUTION INTRAVENOUS
Status: CANCELLED | OUTPATIENT
Start: 2022-03-21

## 2022-03-14 RX ADMIN — SODIUM CHLORIDE, POTASSIUM CHLORIDE, SODIUM LACTATE AND CALCIUM CHLORIDE 1000 ML: 600; 310; 30; 20 INJECTION, SOLUTION INTRAVENOUS at 13:55

## 2022-03-14 RX ADMIN — Medication 500 UNITS: at 15:22

## 2022-03-14 ASSESSMENT — PAIN SCALES - GENERAL: PAINLEVEL: SEVERE PAIN (7)

## 2022-03-14 NOTE — PROGRESS NOTES
Infusion Nursing Note:  Tiffanie Mcdermott presents today for port labs.          Intravenous Access:  Labs drawn without difficulty.  Implanted Port.  Flushed with 20 ml NS after.      Alicia Mcgee RN

## 2022-03-14 NOTE — PROGRESS NOTES
Infusion Nursing Note:  Tiffanie Mcdermott presents today for C1D8 .    Patient seen by provider today: No   present during visit today: Not Applicable.    Note: Patient c/o mouth sores, worse at night and in am. Doing salt/soda rinses. Difficulty drinking/eating at times, but feels it helps.   Burning with starting to urinate since sat britney/sunday am. No odor. Afebrile. Feels she hasn't had adequate fluids in d/t mouth sores.   Rash areas on back and sides, but looks like bruising from possibly scratching or rubbing skin.     Dr. Kim notified and updated on assessment and labs. Orders received for more labs, Urine culture, defer chemo, Granix daily x 4 days w/CBC. If ANC above 1.0 to Hold  Granix dose. Magic mouthwash will be ordered.       Intravenous Access:  Labs drawn without difficulty.  Implanted Port.    Treatment Conditions:  Lab Results   Component Value Date    HGB 12.3 03/14/2022    WBC 0.8 (LL) 03/14/2022    ANEU 5.5 01/31/2006    ANEUTAUTO 0.0 (LL) 03/14/2022     (L) 03/14/2022      Results reviewed, labs did NOT meet treatment parameters: ANC.  Additional labs ordered. .  Patient unable to give UA specimen, drinking water. IVF given. UA obtained before discharge.    Post Infusion Assessment:  Patient tolerated infusion without incident.  Blood return noted pre and post infusion.  Access discontinued per protocol.       Discharge Plan:   Discharge instructions reviewed with: Patient.  Patient and/or family verbalized understanding of discharge instructions and all questions answered.  Patient discharged in stable condition accompanied by: self and .  Departure Mode: Wheelchair.  Neutropenic precautions reviewed. Instructed patient to do good handwashing and monitor temp.   Plan to return tomorrow for CBC and Neupogen. .      Alicia Mcgee RN

## 2022-03-14 NOTE — PROGRESS NOTES
"Tiffanie is a 56 year old who is being evaluated via a billable video visit.      How would you like to obtain your AVS? MyChart  If the video visit is dropped, the invitation should be resent by: Text to cell phone: [phone]  Will anyone else be joining your video visit? No  Chaya Johnstonradha VF    Video-Visit Details  Video Start Time: 11:30  Video End Time:11:56  Originating Location (pt. Location): Home  Distant Location (provider location):  [facility]   Platform used for Video Visit: [employer]    Hematology/Oncology Video Visit  Care Team:  - Oncologist: Dr. Kim  - PCP: Physician No Ref-Primary    Reason for visit: review labs and symptom check    Diagnosis: stage IV breast cancer, HR-, HER2+    Cancer and Treatment Hx:  Feb 2022: presented with several weeks of abdominal pain and nausea, found to have elevated liver enzymes, abdominal US noted hepatomegaly with innumerable hepatic metastasis. 2/21 biopsy of R breast mass demonstrated invasive ductal carcinoma, Anahi grade 3, HR-, HER2+. 2/23 punch biopsy of right breast skin nodule identified cutaneous involvement of breast cancer.  March 2022: weekly paclitaxel + trazimera and pertuzumab    Interval History:  Tiffanie has experienced a number of side effects since starting chemotherapy. She developed mouth sores 3/12 and yesterday was only able to eat smoothies and drink fluids. She started using salt/soda rinses and also MAGIC mouthwash. We discussed that mouth sores will not heal until WBC has recovered but rinses can help with discomfort. She was having regular stools but had a loose BM this AM that was \"explosive.\" No fevers, abdominal pain, or further diarrhea so far. We discussed that day 8 treatment was deferred due to neutropenia and that she would receive daily neupogen to help WBC recover. She reports being stressed that she lost her EMLA cream and her insurance will not pay for another one until end of April, " co-pay is around $40. We discussed liver labs showing improvement indicating response to treatment and she was happy to hear that. No other questions or concerns. We discussed altering her schedule so she is seen in-person prior to the start of each cycle.    Medications:  Discussed pertinent medications.    Physical Exam:  GEN: pleasantly conversant female no acute distress  SKIN: generally intact, no visible rash, bruising, or sores   ENT: eyes non-icteric, EOMI  RESP: breathing easily on room air, no cough  MSK: appears to have normal range of motion based on visualized movements  NEURO:  no notable tremors, facial movements symmetric, alert and oriented without obvious focal deficit  The rest of a comprehensive physical examination is deferred due to PHE (public health emergency) video restrictions    Labs:   3/14/2022 12:01   Albumin 2.5 (L)   Protein Total 6.2 (L)   Bilirubin Total 5.6(H)   Alkaline Phos 457 (H)    (H)    (H)   Bilirubin Direct 4.1 (H)   WBC 0.8 (LL)   Hemoglobin 12.3   Platelet Count 111 (L)   ANC 0.0 (LL)   INR 1.06   PTT 33   Fibrinogen 565 (H)     Imaging:  Reviewed read of recent MRI brain, CT C/A/P, and mammogram.    Assessment and Plan:  Metastatic breast cancer, HR-, HER2+  Neutropenia secondary to chemotherapy  Thrombocytopenia secondary to chemotherapy  - Continues with paclitaxel, pertuzumab, and trazimera   - Day 8 paclitaxel deferred 1wk due to neutropenia   - Neupogen daily until WBC > 1.0  - Baseline echo 3/8 demonstrated EF 60-65%, normal strain, repeat every 3 months  - Baseline PET scheduled for 3/18  - Baseline CA 27-29 is 406  - Follow with Oncology weekly for monitoring while initiating treatment, try to arrange schedule to be seen in-person prior to start of each cycle    **ADDENDUM: informed by infusion nurses that patient was tachycardic 110s and temp of 99.5. Recommended they administer 1L IVF and give patient instructions regarding neutropenic fever (a  temp of 101F at any time or 100.4 that lasts for an hour or more). Neutropenic fever is an oncologic emergency and requires urgent infectious work-up and antibiotics, she should proceed to ED if fever occurs.     Transaminitis, improved  Indirect Hyperbilirubinemia, improved  - Liver US 2/15 with innumerable hyperechoic lesions throughout liver concerning for metastasis  - Bilirubin (peak 13.6) improved to 5.6 after initiation of chemo  - Liver enzymes remain elevated but are significantly improved since initiation of chemo  - Avoid hepatotoxic medications     Mucositis secondary to chemotherapy  - Continue salt/soda rinses and MAGIC mouthwash as needed  - Prescribed viscous lidocaine for spot treatment of sores as well  - Meeting with Dietician later today    Rash on sides  - Unable to assess via video visit but not painful or pruritic  - Continue Aquafor, consider hydrocortisone 1% if not improved    Diarrhea  - Improved after use of imodium once, continue to monitor     Nausea  - Improved with use of zyprexa at bedtime, zofran PRN      Future Appointments   Date Time Provider Department Center   3/15/2022 11:30 AM Gina Dudley APRN CNP [facility]   3/15/2022 12:30 PM Jeanette Price, CED [facility]   3/15/2022  1:45 PM [facility]   3/15/2022  2:00 PM PH INFUSION NURSE [facility]   3/16/2022  2:30 PM PH LAB [facility]   3/16/2022  3:00 PM PH INFUSION CHAIR 13 [facility]   3/17/2022  2:30 PM PH LAB [facility]   3/17/2022  3:00 PM PH INFUSION NURSE [facility]   3/18/2022  2:00 PM PH INFUSION CHAIR 12 [facility]   3/18/2022  3:30 PM PHP[facility]   The total time of this encounter amounted to 30 minutes today. This time includes video time spent with the patient, prep work, ordering tests, and performing post-visit documentation.    Gina Dudley, CLAYTON

## 2022-03-14 NOTE — TELEPHONE ENCOUNTER
Per provider, ok to send magic mouthwash to pharm for patient.    Amina Gardner RN on 3/14/2022 at 3:45 PM

## 2022-03-15 ENCOUNTER — TELEPHONE (OUTPATIENT)
Dept: ONCOLOGY | Facility: CLINIC | Age: 57
End: 2022-03-15

## 2022-03-15 ENCOUNTER — VIRTUAL VISIT (OUTPATIENT)
Dept: ONCOLOGY | Facility: CLINIC | Age: 57
End: 2022-03-15
Attending: NURSE PRACTITIONER
Payer: COMMERCIAL

## 2022-03-15 ENCOUNTER — APPOINTMENT (OUTPATIENT)
Dept: LAB | Facility: CLINIC | Age: 57
End: 2022-03-15
Payer: COMMERCIAL

## 2022-03-15 ENCOUNTER — VIRTUAL VISIT (OUTPATIENT)
Dept: ONCOLOGY | Facility: CLINIC | Age: 57
End: 2022-03-15
Payer: COMMERCIAL

## 2022-03-15 ENCOUNTER — APPOINTMENT (OUTPATIENT)
Dept: GENERAL RADIOLOGY | Facility: CLINIC | Age: 57
End: 2022-03-15
Attending: FAMILY MEDICINE
Payer: COMMERCIAL

## 2022-03-15 ENCOUNTER — HOSPITAL ENCOUNTER (INPATIENT)
Facility: CLINIC | Age: 57
LOS: 3 days | Discharge: HOME | End: 2022-03-18
Attending: FAMILY MEDICINE | Admitting: HOSPITALIST
Payer: COMMERCIAL

## 2022-03-15 ENCOUNTER — INFUSION THERAPY VISIT (OUTPATIENT)
Dept: INFUSION THERAPY | Facility: CLINIC | Age: 57
End: 2022-03-15
Attending: INTERNAL MEDICINE
Payer: COMMERCIAL

## 2022-03-15 VITALS
OXYGEN SATURATION: 96 % | RESPIRATION RATE: 16 BRPM | SYSTOLIC BLOOD PRESSURE: 137 MMHG | HEART RATE: 118 BPM | TEMPERATURE: 100 F | DIASTOLIC BLOOD PRESSURE: 80 MMHG

## 2022-03-15 DIAGNOSIS — Z17.31 HER2-POSITIVE CARCINOMA OF RIGHT BREAST (H): ICD-10-CM

## 2022-03-15 DIAGNOSIS — K12.31 MUCOSITIS DUE TO CHEMOTHERAPY: ICD-10-CM

## 2022-03-15 DIAGNOSIS — C50.911 HER2-POSITIVE CARCINOMA OF RIGHT BREAST (H): ICD-10-CM

## 2022-03-15 DIAGNOSIS — E86.0 DEHYDRATION: ICD-10-CM

## 2022-03-15 DIAGNOSIS — C78.7 BREAST CANCER METASTASIZED TO LIVER, RIGHT (H): ICD-10-CM

## 2022-03-15 DIAGNOSIS — A41.89 SEPSIS DUE TO OTHER ETIOLOGY (H): Primary | ICD-10-CM

## 2022-03-15 DIAGNOSIS — E83.52 HYPERCALCEMIA OF MALIGNANCY: ICD-10-CM

## 2022-03-15 DIAGNOSIS — C50.511 MALIGNANT NEOPLASM OF LOWER-OUTER QUADRANT OF RIGHT FEMALE BREAST, UNSPECIFIED ESTROGEN RECEPTOR STATUS (H): Primary | ICD-10-CM

## 2022-03-15 DIAGNOSIS — C50.911 BREAST CANCER METASTASIZED TO LIVER, RIGHT (H): ICD-10-CM

## 2022-03-15 DIAGNOSIS — R50.81 NEUTROPENIC FEVER (H): ICD-10-CM

## 2022-03-15 DIAGNOSIS — R19.7 DIARRHEA, UNSPECIFIED TYPE: ICD-10-CM

## 2022-03-15 DIAGNOSIS — E80.6 HYPERBILIRUBINEMIA: ICD-10-CM

## 2022-03-15 DIAGNOSIS — C50.911 STAGE IV CARCINOMA OF BREAST, ER-, RIGHT (H): ICD-10-CM

## 2022-03-15 DIAGNOSIS — Z17.1 STAGE IV CARCINOMA OF BREAST, ER-, RIGHT (H): ICD-10-CM

## 2022-03-15 DIAGNOSIS — R11.2 NON-INTRACTABLE VOMITING WITH NAUSEA, UNSPECIFIED VOMITING TYPE: ICD-10-CM

## 2022-03-15 DIAGNOSIS — C50.911 STAGE IV CARCINOMA OF BREAST, ER-, RIGHT (H): Primary | ICD-10-CM

## 2022-03-15 DIAGNOSIS — R21 RASH: ICD-10-CM

## 2022-03-15 DIAGNOSIS — Z17.1 STAGE IV CARCINOMA OF BREAST, ER-, RIGHT (H): Primary | ICD-10-CM

## 2022-03-15 DIAGNOSIS — D70.1 AGRANULOCYTOSIS SECONDARY TO CANCER CHEMOTHERAPY (CODE) (H): ICD-10-CM

## 2022-03-15 DIAGNOSIS — Z11.52 ENCOUNTER FOR SCREENING LABORATORY TESTING FOR SEVERE ACUTE RESPIRATORY SYNDROME CORONAVIRUS 2 (SARS-COV-2): ICD-10-CM

## 2022-03-15 DIAGNOSIS — R74.01 TRANSAMINITIS: ICD-10-CM

## 2022-03-15 DIAGNOSIS — C50.919 MALIGNANT NEOPLASM OF FEMALE BREAST, UNSPECIFIED ESTROGEN RECEPTOR STATUS, UNSPECIFIED LATERALITY, UNSPECIFIED SITE OF BREAST (H): ICD-10-CM

## 2022-03-15 DIAGNOSIS — D70.9 NEUTROPENIC FEVER (H): ICD-10-CM

## 2022-03-15 LAB
ALBUMIN SERPL-MCNC: 2.5 G/DL (ref 3.4–5)
ALBUMIN UR-MCNC: NEGATIVE MG/DL
ALP SERPL-CCNC: 360 U/L (ref 40–150)
ALT SERPL W P-5'-P-CCNC: 100 U/L (ref 0–50)
ANION GAP SERPL CALCULATED.3IONS-SCNC: 4 MMOL/L (ref 3–14)
APPEARANCE UR: CLEAR
AST SERPL W P-5'-P-CCNC: 143 U/L (ref 0–45)
BASOPHILS # BLD AUTO: 0 10E3/UL (ref 0–0.2)
BASOPHILS # BLD MANUAL: 0 10E3/UL (ref 0–0.2)
BASOPHILS NFR BLD MANUAL: 0 %
BILIRUB SERPL-MCNC: 5 MG/DL (ref 0.2–1.3)
BILIRUB UR QL STRIP: NEGATIVE
BUN SERPL-MCNC: 11 MG/DL (ref 7–30)
CALCIUM SERPL-MCNC: 8.8 MG/DL (ref 8.5–10.1)
CHLORIDE BLD-SCNC: 103 MMOL/L (ref 94–109)
CO2 SERPL-SCNC: 25 MMOL/L (ref 20–32)
COLOR UR AUTO: ABNORMAL
CREAT SERPL-MCNC: 0.8 MG/DL (ref 0.52–1.04)
CRP SERPL-MCNC: 113 MG/L (ref 0–8)
EOSINOPHIL # BLD AUTO: 0 10E3/UL (ref 0–0.7)
EOSINOPHIL # BLD MANUAL: 0 10E3/UL (ref 0–0.7)
EOSINOPHIL NFR BLD MANUAL: 0 %
ERYTHROCYTE [DISTWIDTH] IN BLOOD BY AUTOMATED COUNT: 15.9 % (ref 10–15)
ERYTHROCYTE [DISTWIDTH] IN BLOOD BY AUTOMATED COUNT: 15.9 % (ref 10–15)
GFR SERPL CREATININE-BSD FRML MDRD: 85 ML/MIN/1.73M2
GLUCOSE BLD-MCNC: 101 MG/DL (ref 70–99)
GLUCOSE UR STRIP-MCNC: NEGATIVE MG/DL
HCT VFR BLD AUTO: 37.5 % (ref 35–47)
HCT VFR BLD AUTO: 39.5 % (ref 35–47)
HGB BLD-MCNC: 11.9 G/DL (ref 11.7–15.7)
HGB BLD-MCNC: 12.5 G/DL (ref 11.7–15.7)
HGB UR QL STRIP: NEGATIVE
KETONES UR STRIP-MCNC: NEGATIVE MG/DL
LACTATE SERPL-SCNC: 1.2 MMOL/L (ref 0.7–2)
LEUKOCYTE ESTERASE UR QL STRIP: NEGATIVE
LYMPHOCYTES # BLD AUTO: 0.7 10E3/UL (ref 0.8–5.3)
LYMPHOCYTES # BLD MANUAL: 1 10E3/UL (ref 0.8–5.3)
LYMPHOCYTES NFR BLD AUTO: 50 %
LYMPHOCYTES NFR BLD MANUAL: 54 %
MCH RBC QN AUTO: 28.3 PG (ref 26.5–33)
MCH RBC QN AUTO: 28.5 PG (ref 26.5–33)
MCHC RBC AUTO-ENTMCNC: 31.6 G/DL (ref 31.5–36.5)
MCHC RBC AUTO-ENTMCNC: 31.7 G/DL (ref 31.5–36.5)
MCV RBC AUTO: 89 FL (ref 78–100)
MCV RBC AUTO: 90 FL (ref 78–100)
MONOCYTES # BLD AUTO: 0.5 10E3/UL (ref 0–1.3)
MONOCYTES # BLD MANUAL: 0.7 10E3/UL (ref 0–1.3)
MONOCYTES NFR BLD AUTO: 37 %
MONOCYTES NFR BLD MANUAL: 39 %
MUCOUS THREADS #/AREA URNS LPF: PRESENT /LPF
NEUTROPHILS # BLD AUTO: 0.2 10E3/UL (ref 1.6–8.3)
NEUTROPHILS # BLD MANUAL: 0.1 10E3/UL (ref 1.6–8.3)
NEUTROPHILS NFR BLD AUTO: 13 %
NEUTROPHILS NFR BLD MANUAL: 7 %
NITRATE UR QL: NEGATIVE
NRBC # BLD AUTO: 0 10E3/UL
NRBC BLD AUTO-RTO: 0 /100
PH UR STRIP: 6 [PH] (ref 5–7)
PLAT MORPH BLD: ABNORMAL
PLATELET # BLD AUTO: 182 10E3/UL (ref 150–450)
PLATELET # BLD AUTO: 191 10E3/UL (ref 150–450)
POTASSIUM BLD-SCNC: 4.3 MMOL/L (ref 3.4–5.3)
PROT SERPL-MCNC: 6.6 G/DL (ref 6.8–8.8)
RBC # BLD AUTO: 4.18 10E6/UL (ref 3.8–5.2)
RBC # BLD AUTO: 4.42 10E6/UL (ref 3.8–5.2)
RBC MORPH BLD: ABNORMAL
RBC URINE: 1 /HPF
SODIUM SERPL-SCNC: 132 MMOL/L (ref 133–144)
SP GR UR STRIP: 1.01 (ref 1–1.03)
UROBILINOGEN UR STRIP-MCNC: 2 MG/DL
WBC # BLD AUTO: 1.3 10E3/UL (ref 4–11)
WBC # BLD AUTO: 1.3 10E3/UL (ref 4–11)
WBC # BLD AUTO: 1.8 10E3/UL (ref 4–11)
WBC URINE: 0 /HPF

## 2022-03-15 PROCEDURE — 96372 THER/PROPH/DIAG INJ SC/IM: CPT | Mod: XS | Performed by: INTERNAL MEDICINE

## 2022-03-15 PROCEDURE — 85027 COMPLETE CBC AUTOMATED: CPT | Performed by: FAMILY MEDICINE

## 2022-03-15 PROCEDURE — 36415 COLL VENOUS BLD VENIPUNCTURE: CPT | Performed by: FAMILY MEDICINE

## 2022-03-15 PROCEDURE — 97802 MEDICAL NUTRITION INDIV IN: CPT | Mod: 95 | Performed by: DIETITIAN, REGISTERED

## 2022-03-15 PROCEDURE — 99285 EMERGENCY DEPT VISIT HI MDM: CPT | Mod: 25 | Performed by: FAMILY MEDICINE

## 2022-03-15 PROCEDURE — 250N000013 HC RX MED GY IP 250 OP 250 PS 637: Performed by: FAMILY MEDICINE

## 2022-03-15 PROCEDURE — 258N000003 HC RX IP 258 OP 636: Performed by: INTERNAL MEDICINE

## 2022-03-15 PROCEDURE — 258N000003 HC RX IP 258 OP 636: Performed by: FAMILY MEDICINE

## 2022-03-15 PROCEDURE — 85027 COMPLETE CBC AUTOMATED: CPT | Performed by: INTERNAL MEDICINE

## 2022-03-15 PROCEDURE — 86140 C-REACTIVE PROTEIN: CPT | Performed by: FAMILY MEDICINE

## 2022-03-15 PROCEDURE — 71045 X-RAY EXAM CHEST 1 VIEW: CPT

## 2022-03-15 PROCEDURE — 99214 OFFICE O/P EST MOD 30 MIN: CPT | Mod: 95 | Performed by: NURSE PRACTITIONER

## 2022-03-15 PROCEDURE — 96367 TX/PROPH/DG ADDL SEQ IV INF: CPT | Performed by: FAMILY MEDICINE

## 2022-03-15 PROCEDURE — 80053 COMPREHEN METABOLIC PANEL: CPT | Performed by: FAMILY MEDICINE

## 2022-03-15 PROCEDURE — 83605 ASSAY OF LACTIC ACID: CPT | Performed by: FAMILY MEDICINE

## 2022-03-15 PROCEDURE — 96365 THER/PROPH/DIAG IV INF INIT: CPT | Performed by: FAMILY MEDICINE

## 2022-03-15 PROCEDURE — 250N000011 HC RX IP 250 OP 636: Performed by: FAMILY MEDICINE

## 2022-03-15 PROCEDURE — 99285 EMERGENCY DEPT VISIT HI MDM: CPT | Performed by: FAMILY MEDICINE

## 2022-03-15 PROCEDURE — C9803 HOPD COVID-19 SPEC COLLECT: HCPCS | Performed by: FAMILY MEDICINE

## 2022-03-15 PROCEDURE — 96360 HYDRATION IV INFUSION INIT: CPT

## 2022-03-15 PROCEDURE — 250N000011 HC RX IP 250 OP 636: Performed by: INTERNAL MEDICINE

## 2022-03-15 PROCEDURE — 87040 BLOOD CULTURE FOR BACTERIA: CPT | Performed by: FAMILY MEDICINE

## 2022-03-15 PROCEDURE — 87086 URINE CULTURE/COLONY COUNT: CPT | Performed by: FAMILY MEDICINE

## 2022-03-15 PROCEDURE — 120N000001 HC R&B MED SURG/OB

## 2022-03-15 PROCEDURE — 81001 URINALYSIS AUTO W/SCOPE: CPT | Performed by: FAMILY MEDICINE

## 2022-03-15 PROCEDURE — 87635 SARS-COV-2 COVID-19 AMP PRB: CPT | Performed by: FAMILY MEDICINE

## 2022-03-15 RX ORDER — HEPARIN SODIUM (PORCINE) LOCK FLUSH IV SOLN 100 UNIT/ML 100 UNIT/ML
500 SOLUTION INTRAVENOUS ONCE
Status: CANCELLED
Start: 2022-03-15 | End: 2022-03-15

## 2022-03-15 RX ORDER — LORATADINE 10 MG/1
10 TABLET ORAL DAILY
COMMUNITY

## 2022-03-15 RX ORDER — CEFAZOLIN SODIUM 1 G/50ML
1250 SOLUTION INTRAVENOUS EVERY 12 HOURS
Status: DISCONTINUED | OUTPATIENT
Start: 2022-03-15 | End: 2022-03-18 | Stop reason: HOSPADM

## 2022-03-15 RX ORDER — HEPARIN SODIUM (PORCINE) LOCK FLUSH IV SOLN 100 UNIT/ML 100 UNIT/ML
500 SOLUTION INTRAVENOUS ONCE
Status: COMPLETED | OUTPATIENT
Start: 2022-03-15 | End: 2022-03-15

## 2022-03-15 RX ORDER — SODIUM CHLORIDE 9 MG/ML
INJECTION, SOLUTION INTRAVENOUS CONTINUOUS
Status: DISCONTINUED | OUTPATIENT
Start: 2022-03-15 | End: 2022-03-16

## 2022-03-15 RX ORDER — SODIUM CHLORIDE, SODIUM LACTATE, POTASSIUM CHLORIDE, CALCIUM CHLORIDE 600; 310; 30; 20 MG/100ML; MG/100ML; MG/100ML; MG/100ML
INJECTION, SOLUTION INTRAVENOUS CONTINUOUS
Status: CANCELLED
Start: 2022-03-15

## 2022-03-15 RX ORDER — LIDOCAINE HYDROCHLORIDE 20 MG/ML
5 SOLUTION OROPHARYNGEAL
Qty: 100 ML | Refills: 1 | Status: SHIPPED | OUTPATIENT
Start: 2022-03-15 | End: 2022-06-29

## 2022-03-15 RX ORDER — ACETAMINOPHEN 325 MG/1
975 TABLET ORAL ONCE
Status: COMPLETED | OUTPATIENT
Start: 2022-03-15 | End: 2022-03-15

## 2022-03-15 RX ORDER — SODIUM CHLORIDE, SODIUM LACTATE, POTASSIUM CHLORIDE, CALCIUM CHLORIDE 600; 310; 30; 20 MG/100ML; MG/100ML; MG/100ML; MG/100ML
INJECTION, SOLUTION INTRAVENOUS CONTINUOUS
Status: ACTIVE | OUTPATIENT
Start: 2022-03-15 | End: 2022-03-15

## 2022-03-15 RX ORDER — NYSTATIN 100000/ML
SUSPENSION, ORAL (FINAL DOSE FORM) ORAL
Status: ON HOLD | COMMUNITY
Start: 2022-03-14 | End: 2022-03-17

## 2022-03-15 RX ADMIN — Medication 500 UNITS: at 17:10

## 2022-03-15 RX ADMIN — FILGRASTIM 480 MCG: 480 INJECTION, SOLUTION INTRAVENOUS; SUBCUTANEOUS at 14:56

## 2022-03-15 RX ADMIN — SODIUM CHLORIDE 1000 ML: 9 INJECTION, SOLUTION INTRAVENOUS at 21:12

## 2022-03-15 RX ADMIN — CEFEPIME HYDROCHLORIDE 2 G: 2 INJECTION, POWDER, FOR SOLUTION INTRAVENOUS at 23:09

## 2022-03-15 RX ADMIN — VANCOMYCIN HYDROCHLORIDE 1250 MG: 1 INJECTION, POWDER, LYOPHILIZED, FOR SOLUTION INTRAVENOUS at 23:43

## 2022-03-15 RX ADMIN — SODIUM CHLORIDE, POTASSIUM CHLORIDE, SODIUM LACTATE AND CALCIUM CHLORIDE: 600; 310; 30; 20 INJECTION, SOLUTION INTRAVENOUS at 15:56

## 2022-03-15 RX ADMIN — ACETAMINOPHEN 975 MG: 325 TABLET, FILM COATED ORAL at 21:15

## 2022-03-15 RX ADMIN — SODIUM CHLORIDE: 9 INJECTION, SOLUTION INTRAVENOUS at 23:46

## 2022-03-15 ASSESSMENT — PAIN SCALES - GENERAL: PAINLEVEL: SEVERE PAIN (6)

## 2022-03-15 NOTE — PROGRESS NOTES
DISCHARGE PLAN:  Next appointments: See patient instruction section  VIDEO VISIT  0 minutes for nursing discharge (face to face time)     Tiffanie STEWART Sharron is here today for follow up breast cancer.  Patient was not seen by writing nurse at time of appointment.  Patient to infusion to schedule port labs (if needed), follow up, and infusion.  Imaging scheduled if ordered.  See patient instructions and Oncologist's Progress note for further details. Questions and concerns addressed to patient's satisfaction. Patient verbalized and demonstrated understanding of plan.  Contact information provided and patient is encouraged to call with any that arise in the interim of care.    Amina Gardner RN  [facility]   [phone]  3/15/2022, 2:36 PM

## 2022-03-15 NOTE — PATIENT INSTRUCTIONS
Symone Moreno,     I have attached the following resources for your reference (attached to your requested email):  --Sources of Protein  --High calorie/High Protein recipes for shakes, smoothies, soft foods etc  --Tips to increase calories in your diet    Here are your nutrition needs:  --Calories: >3100-7305/day  --Protein: >75-90 grams/day  --Fluids: >8 cups water/electrolyte beverages/day  --5-6 small, frequent meals - ensure to have a protein source with each meal    Please reach out to me with any questions along the way!    Be well,     Jeanette Morales RD, LD  [facility]  Oncology Services  [address] [place] [address]  [email or web address]  [email or web address]  Office: [phone]  Fax: [phone]

## 2022-03-15 NOTE — LETTER
"    3/15/2022         RE: Tiffanie Mcdermott  [id number] [address] [place] [id number]      Tiffanie is a 56 year old who is being evaluated via a billable video visit.      How would you like to obtain your AVS? MyChart  If the video visit is dropped, the invitation should be resent by: Text to cell phone: [phone]  Will anyone else be joining your video visit? No  Chaya GALLEGOS    Video-Visit Details  Video Start Time: 11:30  Video End Time:11:56  Originating Location (pt. Location): Home  Distant Location (provider location):  [facility]   Platform used for Video Visit: [employer]    Hematology/Oncology Video Visit  Care Team:  - Oncologist: Dr. Kim  - PCP: Physician No Ref-Primary    Reason for visit: review labs and symptom check    Diagnosis: stage IV breast cancer, HR-, HER2+    Cancer and Treatment Hx:  Feb 2022: presented with several weeks of abdominal pain and nausea, found to have elevated liver enzymes, abdominal US noted hepatomegaly with innumerable hepatic metastasis. 2/21 biopsy of R breast mass demonstrated invasive ductal carcinoma, Fowler grade 3, HR-, HER2+. 2/23 punch biopsy of right breast skin nodule identified cutaneous involvement of breast cancer.  March 2022: weekly paclitaxel + trazimera and pertuzumab    Interval History:  Tiffanie has experienced a number of side effects since starting chemotherapy. She developed mouth sores 3/12 and yesterday was only able to eat smoothies and drink fluids. She started using salt/soda rinses and also MAGIC mouthwash. We discussed that mouth sores will not heal until WBC has recovered but rinses can help with discomfort. She was having regular stools but had a loose BM this AM that was \"explosive.\" No fevers, abdominal pain, or further diarrhea so far. We discussed that day 8 treatment was deferred due to neutropenia and that she would receive daily neupogen to help WBC recover. She reports being stressed " that she lost her EMLA cream and her insurance will not pay for another one until end of April, co-pay is around $40. We discussed liver labs showing improvement indicating response to treatment and she was happy to hear that. No other questions or concerns. We discussed altering her schedule so she is seen in-person prior to the start of each cycle.    Medications:  Discussed pertinent medications.    Physical Exam:  GEN: pleasantly conversant female no acute distress  SKIN: generally intact, no visible rash, bruising, or sores   ENT: eyes non-icteric, EOMI  RESP: breathing easily on room air, no cough  MSK: appears to have normal range of motion based on visualized movements  NEURO:  no notable tremors, facial movements symmetric, alert and oriented without obvious focal deficit  The rest of a comprehensive physical examination is deferred due to PHE (public health emergency) video restrictions    Labs:   3/14/2022 12:01   Albumin 2.5 (L)   Protein Total 6.2 (L)   Bilirubin Total 5.6(H)   Alkaline Phos 457 (H)    (H)    (H)   Bilirubin Direct 4.1 (H)   WBC 0.8 (LL)   Hemoglobin 12.3   Platelet Count 111 (L)   ANC 0.0 (LL)   INR 1.06   PTT 33   Fibrinogen 565 (H)     Imaging:  Reviewed read of recent MRI brain, CT C/A/P, and mammogram.    Assessment and Plan:  Metastatic breast cancer, HR-, HER2+  Neutropenia secondary to chemotherapy  Thrombocytopenia secondary to chemotherapy  - Continues with paclitaxel, pertuzumab, and trazimera   - Day 8 paclitaxel deferred 1wk due to neutropenia   - Neupogen daily until WBC > 1.0  - Baseline echo 3/8 demonstrated EF 60-65%, normal strain, repeat every 3 months  - Baseline PET scheduled for 3/18  - Baseline CA 27-29 is 406  - Follow with Oncology weekly for monitoring while initiating treatment, try to arrange schedule to be seen in-person prior to start of each cycle     Transaminitis, improved  Indirect Hyperbilirubinemia, improved  - Liver US 2/15 with  innumerable hyperechoic lesions throughout liver concerning for metastasis  - Bilirubin (peak 13.6) improved to 5.6 after initiation of chemo  - Liver enzymes remain elevated but are significantly improved since initiation of chemo  - Avoid hepatotoxic medications     Mucositis secondary to chemotherapy  - Continue salt/soda rinses and MAGIC mouthwash as needed  - Prescribed viscous lidocaine for spot treatment of sores as well  - Meeting with Dietician later today    Rash on sides  - Unable to assess via video visit but not painful or pruritic  - Continue Aquafor, consider hydrocortisone 1% if not improved    Diarrhea  - Improved after use of imodium once, continue to monitor     Nausea  - Improved with use of zyprexa at bedtime, zofran PRN      Future Appointments   Date Time Provider Department Center   3/15/2022 11:30 AM Gina Dudley APRN CNP [facility]   3/15/2022 12:30 PM Jeanette Price, CED [facility]   3/15/2022  1:45 PM PH LAB [facility]   3/15/2022  2:00 PM PH INFUSION NURSE [facility]   3/16/2022  2:30 PM PH LAB [facility]   3/16/2022  3:00 PM PH INFUSION CHAIR 13 [facility]   3/17/2022  2:30 PM PH LAB [facility]   3/17/2022  3:00 PM PH INFUSION NURSE [facility]   3/18/2022  2:00 PM PH INFUSION CHAIR 12 [facility]   3/18/2022  3:30 PM PHPET1 [facility]   The total time of this encounter amounted to 30 minutes today. This time includes video time spent with the patient, prep work, ordering tests, and performing post-visit documentation.    Gina Dudley CNP      DISCHARGE PLAN:  Next appointments: See patient instruction section  VIDEO VISIT  0 minutes for nursing discharge (face to face time)     Tiffanie Mcdermott is here today for follow up breast cancer.  Patient was not seen by writing nurse at time of appointment.  Patient to infusion to schedule port labs (if needed), follow up, and infusion.  Imaging  scheduled if ordered.  See patient instructions and Oncologist's Progress note for further details. Questions and concerns addressed to patient's satisfaction. Patient verbalized and demonstrated understanding of plan.  Contact information provided and patient is encouraged to call with any that arise in the interim of care.    Amina Gardner RN  [facility]   [phone]  3/15/2022, 2:36 PM          HENRIETTA Landis CNP

## 2022-03-15 NOTE — PROGRESS NOTES
Infusion Nursing Note:  Tiffanie Mcdermott presents today for neupogen.    Patient seen by provider today: Yes: Patient had a visti today with Gina Dudley and also with diatician.    present during visit today: Not Applicable.    Note: Patient initially came to infusion today for neupogen injection. Patient was noted to have an elevated temporal temperature while here that did elevate by one degree after injection to 100.5.  Gina Dudley was contacted in the clinic.  Verbal order given to initiate her IV hydration therapy plan.  Port was accessed. 1 liter of IV fluids given to patient.  Temps prior to hydration, during, and after were taken orally for better accuracy.  At time of discharge patients temp was 100.0 oral.  Reviewed with patient and her  the importance of presenting to the ED immediately for any temperature over 101.0 orally.  Also instructed them that if temp is in the range of 100.4-100.9 orally for more then an hour they also need to present to the ED.  This was also written down for them as a reminder.     During visit patient also complained of skin peeling and painful lips.  Gina Dudley ordered a prescription for the patient and the patient will pick that up at the pharmacy when she leaves today.  Mouth swabs were sent home with her to use to apply the medication to her lips per Gina Do request.       Intravenous Access:  Implanted Port.    Treatment Conditions:  Lab Results   Component Value Date    HGB 12.5 03/15/2022    WBC 1.3 (L) 03/15/2022    WBC 1.3 (L) 03/15/2022    ANEU 5.5 01/31/2006    ANEUTAUTO 0.2 (LL) 03/15/2022     03/15/2022      Results reviewed, labs MET treatment parameters, ok to proceed with treatment.      Post Infusion Assessment:  Patient tolerated infusion without incident.  Patient tolerated injection without incident.  Blood return noted pre and post infusion.  Site patent and intact, free from redness, edema or discomfort.  No evidence  of extravasations.       Discharge Plan:   Patient discharged in stable condition accompanied by: .  Departure Mode: Wheelchair.      Cindy Robbins RN

## 2022-03-15 NOTE — TELEPHONE ENCOUNTER
Prior Authorization Retail Medication Request    Medication/Dose: lidocaine-prilocaine (EMLA) 2.5-2.5 % external cream  ICD code (if different than what is on RX): Stage IV carcinoma of breast, ER-, right (H) [C50.911, Z17.1] - HER2-positive carcinoma of right breast (H) [C50.911] - Breast cancer metastasized to liver, right (H) [C50.911, C78.7] - Malignant neoplasm of lower-outer quadrant of right female breast, unspecified estrogen receptor status (H) [C50.511]   Previously Tried and Failed:    Rationale:      Insurance Name:  [employer]  Insurance ID: [id number]    Pharmacy Information (if different than what is on RX)  Name:  [facility] [address]   Phone: [phone]

## 2022-03-15 NOTE — PATIENT INSTRUCTIONS
Today:  Follow up as planned     If you have any questions or concerns please feel free to call.    If you need to reschedule please call:  Clinic or Lab Appointment - [phone]  Infusion - [phone]  Imaging - [phone]    Amina Gardner RN  [facility]   [phone]  3/15/2022, 2:34 PM    After Hours Oncology Nurse Line - [phone]

## 2022-03-15 NOTE — PROGRESS NOTES
"Video-Visit Details     Type of service:  Video Visit     Video Start Time (time video started): 12:22pm     Video End Time (time video stopped): 12:46pm    Originating Location (pt. Location): Home     Distant Location (provider location):  [facility] NUTRITION SERVICES      Mode of Communication:  Video Conference via [facility] NUTRITION SERVICES - ASSESSMENT NOTE    Tiffanie Mcdermott 56 year old referred for MNT related to breast cancer    Time Spent: self minutes  Visit Type: video  Pt accompanied by: her son  Referring Physician: Julio 2/28/22  C50.911, Z17.1 (ICD-10-CM) - Stage IV carcinoma of breast, ER-, right (H)    NUTRITION HISTORY  Factors affecting nutrition intake include:decreased appetite, mouth sores and nausea  Current diet/appetite: general, bland diet/poor appetite and poor intake  Chemotherapy: [facility] tells me that her intake has been very poor since she started chemo.   -She has lost 17 lb (7%) in the past several weeks.    -She has tried Ensure shakes but cannot drink them as they are too sweet.  She tried adding them to smoothies and cannot drink them that way either.    -She has been trying to focus on small, frequent meals which has helped her nausea.  She is also taking zyprexa at night which has also seemed to help her nausea.    -She is receptive to trying suggested CIB shakes as she is already drinking whole milk.    -She has to be careful how much dairy she eats as it tends to cause more phlegm.      Diet Recall  Breakfast 1 egg, jello   Lunch skips   Dinner tator tot dish or tortilla with sour cream and cheese   Snacks Fruit smoothie (strawberries w/ whole)   Beverages Mostly water      ANTHROPOMETRICS  Height: 67\"  Weight: 254 lbs/115kg  BMI: 37  Weight Status:  Obesity Grade II BMI 35-39.9  IBW: 135 lbs (188%)  Weight History:   Wt Readings from Last 7 Encounters:   03/14/22 108 kg (238 lb 3.2 oz)   03/09/22 115.2 kg (254 lb)   03/07/22 111.4 " kg (245 lb 8 oz)   03/07/22 115.4 kg (254 lb 8 oz)   03/01/22 115.7 kg (255 lb)   02/28/22 115.8 kg (255 lb 3.2 oz)   02/23/22 119.3 kg (263 lb)     Dosing Weight: 74kg    Medications/vitamins/minerals/herbals:   Reviewed    Labs:   Labs reviewed    NUTRITION FOCUSED PHYSICAL ASSESSMENT FOR DIAGNOSING MALNUTRITION:  Not observed due to Covid 19 pandemic - no clinic contact  Consult for education only      ASSESSED NUTRITION NEEDS:  Estimated Energy Needs: 1800 kcals (25 Kcal/Kg)  Justification: maintenance  Estimated Protein Needs: 75-90 grams protein (1-1.2 g pro/Kg)  Justification: Repletion  Estimated Fluid Needs: 2000  mL   Justification: maintenance    MALNUTRITION:  % Weight Loss:  > 5% in 1 month (severe malnutrition)  % Intake:  </= 50% for >/= 5 days (severe malnutrition)  Subcutaneous Fat Loss:  None observed  Muscle Loss:  None observed  Fluid Retention:  None noted    Malnutrition Diagnosis: Severe malnutrition  In Context of:  Acute illness or injury    NUTRITION DIAGNOSIS:  Inadequate oral intake related to decreased appetite with mucositis, nausea as evidenced by 7% wt loss x past 1 week.     INTERVENTIONS  Provided written & verbal education:     - Reviewed nutrition and hydration needs. Advised pt to aim for at least 1800kcal and 75-90g protein daily. Advised pt to aim for 8 cups non-caffeine containing beverages (water/electrolytes) daily.    - Discussed strategies to help fortify meals and snacks. Encouraged to focus on small, frequent meals.   - Reviewed sources of protein. Encouraged to have a protein source with each meal and snack.    - Reviewed common barriers to eating with cancer treatment.  Discussed ways to cope with mucositis and nausea.   - Reviewed oral nutrition supplement options (Culdesac breakfast essential with whole milk).  Reviewed where to find CIB in the grocery stores.  Discussed that this is a better option for patients with taste aversions.   - Encouraged utilizing these  ONS in home made shakes/smoothies to prevent flavor fatigue.      Provided pt with corresponding education materials/handouts on:  High Calorie/High Protein Recipe booklet, Tips to Increase the Calories in Your Diet and Sources of Protein    Pt verbalize understanding of materials provided during consult.   Patient Understanding: good  Expected patient engagement: good    Goals  1.  Aim for 5-6 small frequent meals  2.  Aim for 1800kcal and 75-90g protein/day  3. Weight maintenance     Follow-Up Plans: Pt has RD contact information for questions.      Jeanette Morales RD, LD

## 2022-03-16 ENCOUNTER — TELEPHONE (OUTPATIENT)
Dept: NUTRITION | Facility: CLINIC | Age: 57
End: 2022-03-16

## 2022-03-16 LAB
ALBUMIN SERPL-MCNC: 2.1 G/DL (ref 3.4–5)
ALP SERPL-CCNC: 280 U/L (ref 40–150)
ALT SERPL W P-5'-P-CCNC: 80 U/L (ref 0–50)
ANION GAP SERPL CALCULATED.3IONS-SCNC: 7 MMOL/L (ref 3–14)
AST SERPL W P-5'-P-CCNC: 100 U/L (ref 0–45)
BASOPHILS # BLD MANUAL: 0 10E3/UL (ref 0–0.2)
BASOPHILS NFR BLD MANUAL: 1 %
BILIRUB SERPL-MCNC: 4 MG/DL (ref 0.2–1.3)
BUN SERPL-MCNC: 10 MG/DL (ref 7–30)
CALCIUM SERPL-MCNC: 7.8 MG/DL (ref 8.5–10.1)
CHLORIDE BLD-SCNC: 108 MMOL/L (ref 94–109)
CO2 SERPL-SCNC: 20 MMOL/L (ref 20–32)
CREAT SERPL-MCNC: 0.76 MG/DL (ref 0.52–1.04)
EOSINOPHIL # BLD MANUAL: 0 10E3/UL (ref 0–0.7)
EOSINOPHIL NFR BLD MANUAL: 0 %
ERYTHROCYTE [DISTWIDTH] IN BLOOD BY AUTOMATED COUNT: 15.9 % (ref 10–15)
GFR SERPL CREATININE-BSD FRML MDRD: 90 ML/MIN/1.73M2
GLUCOSE BLD-MCNC: 158 MG/DL (ref 70–99)
HCT VFR BLD AUTO: 33.2 % (ref 35–47)
HGB BLD-MCNC: 10.5 G/DL (ref 11.7–15.7)
LACTATE SERPL-SCNC: 1.1 MMOL/L (ref 0.7–2)
LYMPHOCYTES # BLD MANUAL: 1.4 10E3/UL (ref 0.8–5.3)
LYMPHOCYTES NFR BLD MANUAL: 46 %
MCH RBC QN AUTO: 28.8 PG (ref 26.5–33)
MCHC RBC AUTO-ENTMCNC: 31.6 G/DL (ref 31.5–36.5)
MCV RBC AUTO: 91 FL (ref 78–100)
MONOCYTES # BLD MANUAL: 1.2 10E3/UL (ref 0–1.3)
MONOCYTES NFR BLD MANUAL: 40 %
NEUTROPHILS # BLD MANUAL: 0.4 10E3/UL (ref 1.6–8.3)
NEUTROPHILS NFR BLD MANUAL: 13 %
PLAT MORPH BLD: ABNORMAL
PLATELET # BLD AUTO: 223 10E3/UL (ref 150–450)
POTASSIUM BLD-SCNC: 4.4 MMOL/L (ref 3.4–5.3)
PROT SERPL-MCNC: 5.8 G/DL (ref 6.8–8.8)
RBC # BLD AUTO: 3.65 10E6/UL (ref 3.8–5.2)
RBC MORPH BLD: ABNORMAL
SARS-COV-2 RNA RESP QL NAA+PROBE: NEGATIVE
SODIUM SERPL-SCNC: 135 MMOL/L (ref 133–144)
STOMATOCYTES BLD QL SMEAR: SLIGHT
WBC # BLD AUTO: 3 10E3/UL (ref 4–11)
WBC # BLD AUTO: 3.1 10E3/UL (ref 4–11)

## 2022-03-16 PROCEDURE — 80053 COMPREHEN METABOLIC PANEL: CPT | Performed by: NURSE PRACTITIONER

## 2022-03-16 PROCEDURE — 85014 HEMATOCRIT: CPT | Performed by: NURSE PRACTITIONER

## 2022-03-16 PROCEDURE — 250N000013 HC RX MED GY IP 250 OP 250 PS 637: Performed by: HOSPITALIST

## 2022-03-16 PROCEDURE — 83605 ASSAY OF LACTIC ACID: CPT | Performed by: INTERNAL MEDICINE

## 2022-03-16 PROCEDURE — 250N000011 HC RX IP 250 OP 636: Performed by: HOSPITALIST

## 2022-03-16 PROCEDURE — 258N000003 HC RX IP 258 OP 636: Performed by: HOSPITALIST

## 2022-03-16 PROCEDURE — 99207 PR NOT IN PERSON INPATIENT CONSULT STATISTICAL MARKER: CPT | Performed by: HOSPITALIST

## 2022-03-16 PROCEDURE — 36415 COLL VENOUS BLD VENIPUNCTURE: CPT | Performed by: NURSE PRACTITIONER

## 2022-03-16 PROCEDURE — 120N000001 HC R&B MED SURG/OB

## 2022-03-16 PROCEDURE — 99223 1ST HOSP IP/OBS HIGH 75: CPT | Mod: AI | Performed by: HOSPITALIST

## 2022-03-16 PROCEDURE — 36415 COLL VENOUS BLD VENIPUNCTURE: CPT | Performed by: INTERNAL MEDICINE

## 2022-03-16 PROCEDURE — 85048 AUTOMATED LEUKOCYTE COUNT: CPT | Performed by: HOSPITALIST

## 2022-03-16 PROCEDURE — 250N000013 HC RX MED GY IP 250 OP 250 PS 637: Performed by: INTERNAL MEDICINE

## 2022-03-16 PROCEDURE — 85048 AUTOMATED LEUKOCYTE COUNT: CPT | Performed by: NURSE PRACTITIONER

## 2022-03-16 PROCEDURE — 82040 ASSAY OF SERUM ALBUMIN: CPT | Performed by: NURSE PRACTITIONER

## 2022-03-16 PROCEDURE — 250N000013 HC RX MED GY IP 250 OP 250 PS 637: Performed by: FAMILY MEDICINE

## 2022-03-16 PROCEDURE — 250N000009 HC RX 250: Performed by: HOSPITALIST

## 2022-03-16 RX ORDER — LORAZEPAM 0.5 MG/1
0.5 TABLET ORAL EVERY 4 HOURS PRN
Status: DISCONTINUED | OUTPATIENT
Start: 2022-03-16 | End: 2022-03-18 | Stop reason: HOSPADM

## 2022-03-16 RX ORDER — DIPHENHYDRAMINE HYDROCHLORIDE AND LIDOCAINE HYDROCHLORIDE AND ALUMINUM HYDROXIDE AND MAGNESIUM HYDRO
5 KIT EVERY 4 HOURS PRN
Status: DISCONTINUED | OUTPATIENT
Start: 2022-03-16 | End: 2022-03-16

## 2022-03-16 RX ORDER — HEPARIN SODIUM,PORCINE 10 UNIT/ML
5-10 VIAL (ML) INTRAVENOUS
Status: DISCONTINUED | OUTPATIENT
Start: 2022-03-16 | End: 2022-03-18 | Stop reason: HOSPADM

## 2022-03-16 RX ORDER — PROCHLORPERAZINE MALEATE 5 MG
10 TABLET ORAL EVERY 6 HOURS PRN
Status: DISCONTINUED | OUTPATIENT
Start: 2022-03-16 | End: 2022-03-18 | Stop reason: HOSPADM

## 2022-03-16 RX ORDER — IBUPROFEN 200 MG
400 TABLET ORAL EVERY 6 HOURS PRN
Status: DISCONTINUED | OUTPATIENT
Start: 2022-03-16 | End: 2022-03-18 | Stop reason: HOSPADM

## 2022-03-16 RX ORDER — ONDANSETRON 2 MG/ML
4 INJECTION INTRAMUSCULAR; INTRAVENOUS EVERY 6 HOURS PRN
Status: DISCONTINUED | OUTPATIENT
Start: 2022-03-16 | End: 2022-03-18 | Stop reason: HOSPADM

## 2022-03-16 RX ORDER — DEXTROSE MONOHYDRATE, SODIUM CHLORIDE, AND POTASSIUM CHLORIDE 50; 1.49; 4.5 G/1000ML; G/1000ML; G/1000ML
INJECTION, SOLUTION INTRAVENOUS CONTINUOUS
Status: DISCONTINUED | OUTPATIENT
Start: 2022-03-16 | End: 2022-03-17

## 2022-03-16 RX ORDER — BENZOCAINE/MENTHOL 6 MG-10 MG
LOZENGE MUCOUS MEMBRANE 2 TIMES DAILY PRN
Status: DISCONTINUED | OUTPATIENT
Start: 2022-03-16 | End: 2022-03-18 | Stop reason: HOSPADM

## 2022-03-16 RX ORDER — NALOXONE HYDROCHLORIDE 0.4 MG/ML
0.2 INJECTION, SOLUTION INTRAMUSCULAR; INTRAVENOUS; SUBCUTANEOUS
Status: DISCONTINUED | OUTPATIENT
Start: 2022-03-16 | End: 2022-03-18 | Stop reason: HOSPADM

## 2022-03-16 RX ORDER — LIDOCAINE 40 MG/G
CREAM TOPICAL
Status: DISCONTINUED | OUTPATIENT
Start: 2022-03-16 | End: 2022-03-18 | Stop reason: HOSPADM

## 2022-03-16 RX ORDER — DIPHENHYDRAMINE HYDROCHLORIDE AND LIDOCAINE HYDROCHLORIDE AND ALUMINUM HYDROXIDE AND MAGNESIUM HYDRO
5 KIT EVERY 4 HOURS
Status: DISCONTINUED | OUTPATIENT
Start: 2022-03-16 | End: 2022-03-17

## 2022-03-16 RX ORDER — ACETAMINOPHEN 325 MG/1
650 TABLET ORAL EVERY 4 HOURS PRN
Status: DISCONTINUED | OUTPATIENT
Start: 2022-03-16 | End: 2022-03-18 | Stop reason: HOSPADM

## 2022-03-16 RX ORDER — NALOXONE HYDROCHLORIDE 0.4 MG/ML
0.4 INJECTION, SOLUTION INTRAMUSCULAR; INTRAVENOUS; SUBCUTANEOUS
Status: DISCONTINUED | OUTPATIENT
Start: 2022-03-16 | End: 2022-03-18 | Stop reason: HOSPADM

## 2022-03-16 RX ORDER — PROCHLORPERAZINE 25 MG
25 SUPPOSITORY, RECTAL RECTAL EVERY 12 HOURS PRN
Status: DISCONTINUED | OUTPATIENT
Start: 2022-03-16 | End: 2022-03-18 | Stop reason: HOSPADM

## 2022-03-16 RX ORDER — HYDROMORPHONE HCL IN WATER/PF 6 MG/30 ML
0.2 PATIENT CONTROLLED ANALGESIA SYRINGE INTRAVENOUS
Status: DISCONTINUED | OUTPATIENT
Start: 2022-03-16 | End: 2022-03-18 | Stop reason: HOSPADM

## 2022-03-16 RX ORDER — HEPARIN SODIUM (PORCINE) LOCK FLUSH IV SOLN 100 UNIT/ML 100 UNIT/ML
5-10 SOLUTION INTRAVENOUS
Status: DISCONTINUED | OUTPATIENT
Start: 2022-03-16 | End: 2022-03-18 | Stop reason: HOSPADM

## 2022-03-16 RX ORDER — LORAZEPAM 2 MG/ML
0.5 INJECTION INTRAMUSCULAR EVERY 4 HOURS PRN
Status: DISCONTINUED | OUTPATIENT
Start: 2022-03-16 | End: 2022-03-18 | Stop reason: HOSPADM

## 2022-03-16 RX ORDER — OXYCODONE HYDROCHLORIDE 5 MG/1
5-10 TABLET ORAL
Status: DISCONTINUED | OUTPATIENT
Start: 2022-03-16 | End: 2022-03-18 | Stop reason: HOSPADM

## 2022-03-16 RX ORDER — OLANZAPINE 2.5 MG/1
2.5 TABLET, FILM COATED ORAL AT BEDTIME
Status: DISCONTINUED | OUTPATIENT
Start: 2022-03-16 | End: 2022-03-18 | Stop reason: HOSPADM

## 2022-03-16 RX ORDER — ONDANSETRON 4 MG/1
4 TABLET, ORALLY DISINTEGRATING ORAL EVERY 6 HOURS PRN
Status: DISCONTINUED | OUTPATIENT
Start: 2022-03-16 | End: 2022-03-18 | Stop reason: HOSPADM

## 2022-03-16 RX ORDER — HEPARIN SODIUM,PORCINE 10 UNIT/ML
5-10 VIAL (ML) INTRAVENOUS EVERY 24 HOURS
Status: DISCONTINUED | OUTPATIENT
Start: 2022-03-16 | End: 2022-03-18 | Stop reason: HOSPADM

## 2022-03-16 RX ADMIN — CEFEPIME HYDROCHLORIDE 2 G: 2 INJECTION, POWDER, FOR SOLUTION INTRAVENOUS at 16:13

## 2022-03-16 RX ADMIN — SODIUM CHLORIDE: 9 INJECTION, SOLUTION INTRAVENOUS at 01:54

## 2022-03-16 RX ADMIN — IBUPROFEN 400 MG: 200 TABLET, FILM COATED ORAL at 22:07

## 2022-03-16 RX ADMIN — Medication 5 ML: at 16:11

## 2022-03-16 RX ADMIN — VANCOMYCIN HYDROCHLORIDE 1250 MG: 1 INJECTION, POWDER, LYOPHILIZED, FOR SOLUTION INTRAVENOUS at 12:18

## 2022-03-16 RX ADMIN — ENOXAPARIN SODIUM 40 MG: 40 INJECTION SUBCUTANEOUS at 02:22

## 2022-03-16 RX ADMIN — Medication 10 ML: at 08:41

## 2022-03-16 RX ADMIN — CEFEPIME HYDROCHLORIDE 2 G: 2 INJECTION, POWDER, FOR SOLUTION INTRAVENOUS at 23:26

## 2022-03-16 RX ADMIN — POTASSIUM CHLORIDE, DEXTROSE MONOHYDRATE AND SODIUM CHLORIDE: 150; 5; 450 INJECTION, SOLUTION INTRAVENOUS at 02:20

## 2022-03-16 RX ADMIN — POTASSIUM CHLORIDE, DEXTROSE MONOHYDRATE AND SODIUM CHLORIDE: 150; 5; 450 INJECTION, SOLUTION INTRAVENOUS at 11:31

## 2022-03-16 RX ADMIN — IBUPROFEN 400 MG: 200 TABLET, FILM COATED ORAL at 10:36

## 2022-03-16 RX ADMIN — OLANZAPINE 2.5 MG: 2.5 TABLET, FILM COATED ORAL at 00:14

## 2022-03-16 RX ADMIN — OLANZAPINE 2.5 MG: 2.5 TABLET, FILM COATED ORAL at 22:08

## 2022-03-16 RX ADMIN — ENOXAPARIN SODIUM 40 MG: 40 INJECTION SUBCUTANEOUS at 22:07

## 2022-03-16 RX ADMIN — LIDOCAINE: 40 CREAM TOPICAL at 06:45

## 2022-03-16 RX ADMIN — DIPHENHYDRAMINE HYDROCHLORIDE AND LIDOCAINE HYDROCHLORIDE AND ALUMINUM HYDROXIDE AND MAGNESIUM HYDRO 5 ML: KIT at 12:17

## 2022-03-16 RX ADMIN — CEFEPIME HYDROCHLORIDE 2 G: 2 INJECTION, POWDER, FOR SOLUTION INTRAVENOUS at 10:03

## 2022-03-16 ASSESSMENT — ACTIVITIES OF DAILY LIVING (ADL)
ADLS_ACUITY_SCORE: 3
DRESSING/BATHING_DIFFICULTY: NO
FALL_HISTORY_WITHIN_LAST_SIX_MONTHS: NO
TOILETING_ISSUES: NO
ADLS_ACUITY_SCORE: 3
WEAR_GLASSES_OR_BLIND: NO
ADLS_ACUITY_SCORE: 3
ADLS_ACUITY_SCORE: 3
DIFFICULTY_COMMUNICATING: NO
WALKING_OR_CLIMBING_STAIRS_DIFFICULTY: NO
ADLS_ACUITY_SCORE: 3
ADLS_ACUITY_SCORE: 3
CHANGE_IN_FUNCTIONAL_STATUS_SINCE_ONSET_OF_CURRENT_ILLNESS/INJURY: NO
ADLS_ACUITY_SCORE: 3
ADLS_ACUITY_SCORE: 3
HEARING_DIFFICULTY_OR_DEAF: NO
DOING_ERRANDS_INDEPENDENTLY_DIFFICULTY: NO
ADLS_ACUITY_SCORE: 3
ADLS_ACUITY_SCORE: 3
ADLS_ACUITY_SCORE: 7
DIFFICULTY_EATING/SWALLOWING: NO
ADLS_ACUITY_SCORE: 3
ADLS_ACUITY_SCORE: 3
CONCENTRATING,_REMEMBERING_OR_MAKING_DECISIONS_DIFFICULTY: NO
ADLS_ACUITY_SCORE: 3
ADLS_ACUITY_SCORE: 7
ADLS_ACUITY_SCORE: 3

## 2022-03-16 NOTE — PROGRESS NOTES
CLINICAL NUTRITION SERVICES - BRIEF NOTE    Patient is currently admitted inpatient. RD discussed with patient  concerns about ability to eat. Spoke with patient for 10 minutes regarding ways to increase kcals/protein given ongoing nausea, mouth sores, taste changes.    Cameron Thompson RDN, LD  Clinical Dietitian   Office: [phone]

## 2022-03-16 NOTE — ED PROVIDER NOTES
History     Chief Complaint   Patient presents with     Fever     HPI  Tiffanie Mcdermott is a 56 year old female who presents to the emergency department with concerns of a fever in light of being neutropenic and getting cancer treatment.  Patient is currently being treated for metastatic breast cancer.  Had her first round of chemo a week ago.  Patient had a follow-up visit yesterday and found out the patient was neutropenic.  They had the patient set up today for a Neupogen infusion to try and get her neutrophils up, when she was leaving the appointment they noted that she had a very low-grade fever.  She was told to come to the emergency department if it did get over 101.  Around 5 or 6:00 tonight she did start getting fevers over 101 so she proceeded to come in.  Patient denies any cough or sore throat or runny nose.  Denies any dysuria or hematuria.  Denies any diarrhea.  Denies any new rashes in the last few days.    Allergies:  Allergies   Allergen Reactions     Adhesive Tape      Penicillins        Problem List:    Patient Active Problem List    Diagnosis Date Noted     Rash 03/15/2022     Priority: Medium     Mucositis due to chemotherapy 03/15/2022     Priority: Medium     Neutropenic fever (H) 03/15/2022     Priority: Medium     Hyperbilirubinemia 03/09/2022     Priority: Medium     Hypercalcemia of malignancy 03/09/2022     Priority: Medium     Stage IV carcinoma of breast, ER-, right (H) 03/09/2022     Priority: Medium     HER2-positive carcinoma of right breast (H) 03/09/2022     Priority: Medium     Breast cancer metastasized to liver, right (H) 03/09/2022     Priority: Medium     Transaminitis 03/09/2022     Priority: Medium     Diarrhea, unspecified type 03/09/2022     Priority: Medium     Non-intractable vomiting with nausea, unspecified vomiting type 03/09/2022     Priority: Medium     Dehydration 03/08/2022     Priority: Medium     Malignant neoplasm of lower-outer quadrant of right female  breast, unspecified estrogen receptor status (H) 02/28/2022     Priority: Medium     Morbid obesity (H) 02/23/2022     Priority: Medium     Gastroesophageal reflux disease, esophagitis presence not specified 12/26/2017     Priority: Medium     IMO Regulatory Load OCT 2020       Palpitations 12/26/2017     Priority: Medium     Calculus of gallbladder with other cholecystitis, without mention of obstruction 09/30/2005     Priority: Medium     Allergic state      Priority: Medium     Problem list name updated by automated process. Provider to review          Past Medical History:    Past Medical History:   Diagnosis Date     Allergy, unspecified not elsewhere classified      Motion sickness      PONV (postoperative nausea and vomiting)        Past Surgical History:    Past Surgical History:   Procedure Laterality Date     HC LAPAROSCOPY, SURGICAL; CHOLECYSTECTOMY  10/19/2005    Cholecystectomy, Laparoscopic     INSERT PORT VASCULAR ACCESS Left 3/2/2022    Procedure: Placement of power port, left side;  Surgeon: Sage Turner MD;  Location:  OR       Family History:    Family History   Problem Relation Age of Onset     Allergies Mother      Anesthesia Reaction Mother         vomiting     Lipids Mother      Gastrointestinal Disease Mother      Heart Disease Maternal Grandmother      Hypertension Maternal Grandmother      Gastrointestinal Disease Maternal Grandmother         cholesystectyomy     Alcohol/Drug Maternal Grandfather      Allergies Sister        Social History:  Marital Status:   [2]  Social History     Tobacco Use     Smoking status: Never Smoker     Smokeless tobacco: Never Used     Tobacco comment: no smoking in the home   Vaping Use     Vaping Use: Never used   Substance Use Topics     Alcohol use: Yes     Alcohol/week: 1.7 standard drinks     Drug use: No        Medications:    Acetaminophen (TYLENOL PO)  fluticasone (FLONASE) 50 MCG/ACT nasal spray  ibuprofen (ADVIL,MOTRIN) 200 MG  tablet  lidocaine, viscous, (XYLOCAINE) 2 % solution  lidocaine-prilocaine (EMLA) 2.5-2.5 % external cream  magic mouthwash (ENTER INGREDIENTS IN COMMENTS) suspension  methylcellulose (CITRUCEL) powder  Multiple Vitamins-Minerals (MULTIPLE VITAMINS/WOMENS PO)  nystatin (MYCOSTATIN) 418227 UNIT/ML suspension  OLANZapine (ZYPREXA) 2.5 MG tablet  omeprazole (PRILOSEC) 20 MG CR capsule  ondansetron (ZOFRAN-ODT) 4 MG ODT tab  oxyCODONE (ROXICODONE) 5 MG tablet          Review of Systems   All other systems reviewed and are negative.      Physical Exam   BP: 132/76  Pulse: (!) 134  Temp: (!) 101.1  F (38.4  C)  Resp: 20  Weight: 108 kg (238 lb)  SpO2: 99 %      Physical Exam  Vitals and nursing note reviewed.   Constitutional:       General: She is not in acute distress.     Appearance: She is well-developed. She is not diaphoretic.   HENT:      Head: Normocephalic and atraumatic.      Nose: Nose normal.      Mouth/Throat:      Pharynx: No oropharyngeal exudate.      Comments: There are some aphthous ulcer lesions noted on the inner lip area.  Eyes:      Conjunctiva/sclera: Conjunctivae normal.   Cardiovascular:      Rate and Rhythm: Regular rhythm. Tachycardia present.      Heart sounds: Normal heart sounds. No murmur heard.    No friction rub.   Pulmonary:      Effort: Pulmonary effort is normal. No respiratory distress.      Breath sounds: Normal breath sounds. No stridor. No wheezing or rales.   Abdominal:      General: Bowel sounds are normal. There is no distension.      Palpations: Abdomen is soft. There is no mass.      Tenderness: There is no abdominal tenderness. There is no guarding.   Musculoskeletal:         General: No tenderness. Normal range of motion.      Cervical back: Normal range of motion and neck supple.   Skin:     General: Skin is warm and dry.      Capillary Refill: Capillary refill takes less than 2 seconds.      Findings: Rash (There is a dry macular rash noted on her sides and left face area,  consistent with a dry skin dermatitis.) present. No erythema.   Neurological:      Mental Status: She is alert and oriented to person, place, and time.   Psychiatric:         Judgment: Judgment normal.         ED Course                 Procedures        Results for orders placed or performed during the hospital encounter of 03/15/22 (from the past 24 hour(s))   Comprehensive metabolic panel   Result Value Ref Range    Sodium 132 (L) 133 - 144 mmol/L    Potassium 4.3 3.4 - 5.3 mmol/L    Chloride 103 94 - 109 mmol/L    Carbon Dioxide (CO2) 25 20 - 32 mmol/L    Anion Gap 4 3 - 14 mmol/L    Urea Nitrogen 11 7 - 30 mg/dL    Creatinine 0.80 0.52 - 1.04 mg/dL    Calcium 8.8 8.5 - 10.1 mg/dL    Glucose 101 (H) 70 - 99 mg/dL    Alkaline Phosphatase 360 (H) 40 - 150 U/L     (H) 0 - 45 U/L     (H) 0 - 50 U/L    Protein Total 6.6 (L) 6.8 - 8.8 g/dL    Albumin 2.5 (L) 3.4 - 5.0 g/dL    Bilirubin Total 5.0 (H) 0.2 - 1.3 mg/dL    GFR Estimate 85 >60 mL/min/1.73m2   CBC with platelets differential    Narrative    The following orders were created for panel order CBC with platelets differential.  Procedure                               Abnormality         Status                     ---------                               -----------         ------                     CBC with platelets and d...[[id number]]  Abnormal            Final result               Manual Differential[[id number]]          Abnormal            Final result                 Please view results for these tests on the individual orders.   Lactic acid whole blood STAT   Result Value Ref Range    Lactic Acid 1.2 0.7 - 2.0 mmol/L   CRP inflammation   Result Value Ref Range    CRP Inflammation 113.0 (H) 0.0 - 8.0 mg/L   CBC with platelets and differential   Result Value Ref Range    WBC Count 1.8 (L) 4.0 - 11.0 10e3/uL    RBC Count 4.18 3.80 - 5.20 10e6/uL    Hemoglobin 11.9 11.7 - 15.7 g/dL    Hematocrit 37.5 35.0 - 47.0 %    MCV 90 78 - 100 fL    MCH 28.5  26.5 - 33.0 pg    MCHC 31.7 31.5 - 36.5 g/dL    RDW 15.9 (H) 10.0 - 15.0 %    Platelet Count 191 150 - 450 10e3/uL   Manual Differential   Result Value Ref Range    % Neutrophils 7 %    % Lymphocytes 54 %    % Monocytes 39 %    % Eosinophils 0 %    % Basophils 0 %    Absolute Neutrophils 0.1 (LL) 1.6 - 8.3 10e3/uL    Absolute Lymphocytes 1.0 0.8 - 5.3 10e3/uL    Absolute Monocytes 0.7 0.0 - 1.3 10e3/uL    Absolute Eosinophils 0.0 0.0 - 0.7 10e3/uL    Absolute Basophils 0.0 0.0 - 0.2 10e3/uL    RBC Morphology Confirmed RBC Indices     Platelet Assessment (A) Automated Count Confirmed. Platelet morphology is normal.     Automated Count Confirmed. Giant platelets are present.   UA with Microscopic reflex to Culture    Specimen: Urine, Clean Catch   Result Value Ref Range    Color Urine Anayeli (A) Colorless, Straw, Light Yellow, Yellow    Appearance Urine Clear Clear    Glucose Urine Negative Negative mg/dL    Bilirubin Urine Negative Negative    Ketones Urine Negative Negative mg/dL    Specific Gravity Urine 1.012 1.003 - 1.035    Blood Urine Negative Negative    pH Urine 6.0 5.0 - 7.0    Protein Albumin Urine Negative Negative mg/dL    Urobilinogen Urine 2.0 Normal, 2.0 mg/dL    Nitrite Urine Negative Negative    Leukocyte Esterase Urine Negative Negative    Mucus Urine Present (A) None Seen /LPF    RBC Urine 1 <=2 /HPF    WBC Urine 0 <=5 /HPF    Narrative    Urine Culture not indicated   XR Chest Port 1 View    Narrative    EXAM: XR CHEST PORT 1 VIEW  LOCATION: [facility]  DATE/TIME: 3/15/2022 10:31 PM    INDICATION: fever, on chemo for breast ca  COMPARISON: 3/2/2022.    FINDINGS: Left chest wall port. No pneumothorax. Small calcified granuloma in the right lung apex. The lungs are otherwise clear. The heart size is normal.      Impression    IMPRESSION: No acute abnormality.       Medications   0.9% sodium chloride BOLUS ( Intravenous Restarted 3/15/22 3685)     Followed by   sodium  chloride 0.9% infusion (has no administration in time range)   ceFEPIme (MAXIPIME) 2 g vial to attach to  ml bag for ADULTS or 50 ml bag for PEDS (2 g Intravenous New Bag 3/15/22 2309)   vancomycin (VANCOCIN) 1,250 mg in sodium chloride 0.9 % 250 mL intermittent infusion (has no administration in time range)   acetaminophen (TYLENOL) tablet 975 mg (975 mg Oral Given 3/15/22 2115)     This is a 56-year-old female with metastatic breast cancer who is 1 week out from her last chemo infusion, is known to be neutropenic, presents with fever today.  There is no obvious source.  Work-up does show the patient is neutropenic here but does not appear to be septic.  Chest x-ray and urine were clear.  I discussed the case with oncology at the [facility] and they do agree with her tachycardia and neutropenia, would recommend starting her on IV antibiotics and admission to the hospital while we await culture results.  Patient was given some Tylenol and fever is starting to come down, given some fluids and her heart rate is starting to improve also.  Patient was started on cefepime and vancomycin.  Oncology said cefepime was a good choice in the vancomycin may be could hold further doses depending on what the culture is and how the patient looks.  Will discuss the case with the hospitalist and plan on admission to the hospital for observation stay.    Assessments & Plan (with Medical Decision Making)  Neutropenic fever     I have reviewed the nursing notes.    I have reviewed the findings, diagnosis, plan and need for follow up with the patient.          Final diagnoses:   Neutropenic fever (H)       3/15/2022   [facility] EMERGENCY DEPT     Valdez Dhillon MD  03/15/22 4022

## 2022-03-16 NOTE — PLAN OF CARE
S-(situation): Patient arrives to room 254 via cart from ED    B-(background): Neutropenic fever, Metastatic Breast Cancer with start of chemotherapy last week.    A-(assessment): Alert and oriented. Ambulated from cart to bed. Vitals stable. Denies need for pain and nausea intervention. Oral cares for mouth pain. Right breast lesions around areola. Bruising and edema around port site on left chest wall. Port not access from ED. Fluids infusing left hand.     R-(recommendations): Orders reviewed with patient. Will monitor patient per MD orders.     Inpatient nursing criteria listed below were met:    Health care directives status obtained and documented: Yes  SCD's Documented: Yes  Skin issues/needs documented:Yes  Isolation addressed and Signage used: Yes  Fall Prevention: Care plan updated NA Education given and documented NA  Care Plan initiated and Co-Morbidities added: Yes  Education Assessment documented:Yes  Admission Education Documented: Yes  If present CAUTI/CLABI Education done: No, Central line not accessed.   New medication patient education completed and documented (Possible Side Effects of Common Medications handout): Yes  Allergies Reviewed: Yes  Admission Medication Reconciliation completed: Yes  Home medications if not able to send immediately home with family stored here: Yes  Reminder note placed in discharge instructions regarding home meds: Yes  Individualized care needs/preferences addressed and charted: Yes

## 2022-03-16 NOTE — ED TRIAGE NOTES
Started chemo infusions - infusion today low grade fever. Told to come to ED if fever went over 101 degrees. Tonight was 101.8 and 101.6 - has not taken any otc meds. Pt has a cough but mouth sores so coughing has been per baseline - not new.

## 2022-03-16 NOTE — ED NOTES
Pt transferred from ED 03 to ED 11 - repositioned for comfort, son at bedside. Provided ice water. Call light in reach.

## 2022-03-16 NOTE — PROGRESS NOTES
[facility]    Medicine Progress Note - Hospitalist Service    Date of Admission:  3/15/2022    Assessment & Plan    Tiffanie is a 56-year-old female with past medical history as outlined below who presented to [facility] emergency department on 3/15/2022 for evaluation and management of tachycardia and temperature greater than 100.4.  She is currently undergoing chemotherapy for stage IV metastatic breast cancer.  Chemotherapy was deferred on 3/15/2022 due to neutropenia and she was given a dose of Neupogen with instruction to present to the emergency department for any fever greater than 100.4.  In the emergency department she was noted to be tachycardic, febrile to 101.1.  WBC 1.3 in the emergency department and she is without obvious source of sepsis.  She was deemed suitable for observation admission for ongoing management of presumed neutropenic fever.    Concern for neutropenic fever versus sepsis  Tachycardia, secondary to fever versus sepsis  Tiffanie is without obvious source of sepsis.  Chest x-ray is without obvious infiltrate or other source of infection, urinalysis is reassuring.  Blood and urine cultures are pending.  COVID-19 negative.  Her heart rate improved with administration of IV fluid, fever improved with Tylenol.  The emergency department physician spoke with on-call [facility] oncology and she was initiated on empiric cefepime and 1 dose of vancomycin with subsequent doses of vancomycin based on culture and patient condition.  Per medical record review patient has a preliminary urine culture on 3/14/2022 with 10-50,000 E. coli.  Urinalysis in the emergency department was reassuring and repeat urine culture is pending.  -Continue to follow fever curve  -Continue to monitor for obvious signs of sepsis  -Continue to monitor WBC  -Follow blood and urine cultures, narrow antibiotics as appropriate  -Continue cefepime  -Discussed vancomycin with pharmacist,  will continue vancomycin for now  -Continue IV fluid for now    Mucositis secondary to chemotherapy, treated  Skin rash, bilateral sides  Stomatitis developed on 3/12/2022 and she has been using salt/soda rinses and Magic mouthwash.  She has been advised by oncology that the sores will not heal until her WBC count has recovered.  -Continue Magic mouthwash  -May use Aquaphor or similar product or as needed hydrocortisone 1% cream for side rash if needed    Nausea, treated  Diarrhea, secondary to chemotherapy  She shares with me she had 1 unformed, nonbloody stool this morning.  Intermittent nausea has been noted over the past several weeks, however her nausea is improved and she is able to eat every 3 hours.  -Encouraged patient to have family bring food she likes and is able to eat from home  -Zofran and Compazine available as needed    Status post port implantation, 3/2/2022  Dr. Turner, general surgery, placed left chest port on 3/2/2022 at [facility].  On exam the port has ecchymosis, no obvious signs of infection, and the lázaro-port tissue is not exquisitely tender.  Nursing staff had difficulty obtaining port access on 3/15/2022, however the port was successfully accessed on 3/16/2022 but patient has some ongoing burning.  Dr. Turner has reviewed the chest x-ray and notes that the port is in adequate position.  -Continue to monitor, no obvious signs of infection at this time  -Leave port un-accessed at this time, use PIV   -I spoke to Dr. Turner; she will be available to look at the port later today    Breast cancer, stage IV, HR negative, HER-2 positive   Transaminitis, improving, secondary to liver metastasis  Per medical record review patient presented with several weeks of abdominal pain and nausea; she was noted to have elevated liver enzymes and abdominal ultrasound noted hepatomegaly with innumerable hepatic metastases.  On 2/21/2022 biopsy of a right breast mass demonstrated invasive ductal carcinoma,  "[place] grade 3, HR negative, HER-2 positive.  On 2/23/2022 punch biopsy of the right breast skin nodule identified cutaneous involvement of breast cancer.  She was initiated on weekly paclitaxel + trazimera and pertuzumab with her last dose of chemotherapy on 3/7/2022.  She has had mid multiple side effects due to chemotherapy including stomatitis, loose stools.  On 3/15/2022 she presented to oncology and treatment was deferred due to neutropenia; she was given a dose of Neupogen to assist with RBC recovery.  Oncology notes that transaminitis has improved.  Her primary oncologist is Dr. Kim, [facility] oncology.  -Chemotherapy has been on hold  -Highly recommend continued follow-up post hospital stay with [place] oncology  -[place] [facility]ology is aware of her hospital admission    Hyponatremia, on admission  Likely secondary to mild dehydration.  -Continue to monitor sodium          Obesity: Estimated body mass index is 37.79 kg/m  as calculated from the following: Height as of this encounter: 1.702 m (5' 7\"). Weight as of this encounter: 109.5 kg (241 lb 4.8 oz).    -Continue to monitor as she has recently lost approximately 17 pounds with new diagnosis of cancer       Diet: Regular Diet Adult  Room Service    DVT Prophylaxis: Enoxaparin (Lovenox) SQ  Phipps Catheter: Not present  Central Lines: PRESENT     Cardiac Monitoring: ACTIVE order. Indication: tachycardia, sepsis  Code Status: Full Code      Disposition Plan   Expected Discharge:3/18/2022  Anticipated discharge location: Home with  and son.  Delays: on-going fever        The patient's care was discussed with the Attending Physician, Dr. Camara, bedside nurse, and during multidisciplinary rounding.    HENRIETTA Garcia, CNP  Hospitalist Service  [facility]  Securely message with the Vocera Web Console (learn more here)  Text page via [employer] Paging/Directory "     ______________________________________________________________________    Interval History   No new events overnight.  She specifically denies headache, shortness of breath, chest pain, abdominal pain.  One loose stool this morning which she describes as nonformed, nonbloody.  Her appetite is poor however she has nausea if she does not eat.  Difficulty with food choices given stomatitis.    Data reviewed today: I reviewed all medications, new labs and imaging results over the last 24 hours. I personally reviewed no images or EKG's today.    Physical Exam   Vital Signs: Temp: (!) 102  F (38.9  C) Temp src: Oral BP: 115/56 Pulse: 118   Resp: 20 SpO2: 95 % O2 Device: None (Room air)    Weight: 241 lbs 4.8 oz  General Appearance: Ill-appearing 56-year-old female laying in bed.  Respiratory: Lung fields are clear.  No tachypnea.  She is not hypoxic.  Cardiovascular: Regular rate and rhythm, S1, S2.  No obvious murmur, rub, or gallop.  She appears adequately perfused.  GI: Soft, nondistended, nontender.  Skin: No obvious new concerning rashes or lesions on exposed skin.  Stomatitis noted.  Extremities: No obvious peripheral edema.  Neurologic: Awake, alert, oriented x4.  No obvious focal neurologic deficit.  Psychologic/mental health: Very flat affect.    Data   Recent Labs   Lab 03/15/22  2131 03/15/22  1350 03/14/22  1255 03/14/22  1201   WBC 1.8* 1.3*  1.3*  --  0.8*   HGB 11.9 12.5  --  12.3   MCV 90 89  --  88    182  --  111*   INR  --   --  1.06  --    *  --   --   --    POTASSIUM 4.3  --   --   --    CHLORIDE 103  --   --   --    CO2 25  --   --   --    BUN 11  --   --   --    CR 0.80  --   --   --    ANIONGAP 4  --   --   --    SARAI 8.8  --   --   --    *  --   --   --    ALBUMIN 2.5*  --  2.5*  --    PROTTOTAL 6.6*  --  6.2*  --    BILITOTAL 5.0*  --  5.6*  --    ALKPHOS 360*  --  457*  --    *  --  115*  --    *  --  195*  --      Recent Results (from the past 24 hour(s))    XR Chest Port 1 View    Narrative    EXAM: XR CHEST PORT 1 VIEW  LOCATION: [facility]  DATE/TIME: 3/15/2022 10:31 PM    INDICATION: fever, on chemo for breast ca  COMPARISON: 3/2/2022.    FINDINGS: Left chest wall port. No pneumothorax. Small calcified granuloma in the right lung apex. The lungs are otherwise clear. The heart size is normal.      Impression    IMPRESSION: No acute abnormality.

## 2022-03-16 NOTE — ED NOTES
ED Nursing criteria listed below was addressed during verbal handoff:     Abnormal vitals: No  Abnormal results: Yes  Med Reconciliation completed: Yes  Meds given in ED: Yes  Any Overdue Meds: No  Core Measures: N/A  Isolation: Yes - neutropenic precautions.  Special needs: N/A  Skin assessment: Yes - redness, bruising noted L anterior chest wall - port.   Observation Patient  Education provided: N/A - admit.

## 2022-03-16 NOTE — ED NOTES
Port accessed, port flushed but unable to obtain blood. Blood would not pull back. IV was started and pt complains of burning, IV stopped.

## 2022-03-16 NOTE — PLAN OF CARE
Goal Outcome Evaluation:    Plan of Care Reviewed With: patient     Overall Patient Progress: no change    Outcome Evaluation: Patient is alert and oriented. Reports pain but has refused medication. Magic mouthwash is now scheduled-patient has been on and off refusing this. Sores in mouth are painful. Minimal food and fluid intake.  Port is currently saline locked with verbal order not to use. Multiple offers of medications to help patient have a more comfortable experience have continued to be denied.

## 2022-03-16 NOTE — PROGRESS NOTES
CLINICAL NUTRITION SERVICES - ASSESSMENT NOTE     Nutrition Prescription    RECOMMENDATIONS FOR MDs/PROVIDERS TO ORDER:  None at this time     Malnutrition Status:    Severe malnutrition in the context of acute on chronic illness     Recommendations already ordered by Registered Dietitian (RD):  None at this time     Future/Additional Recommendations:  1. Monitor PO intake. Please offer patient additional snacks and supplements available on the unit to help with intake.   2. Monitor weight trends, labs      REASON FOR ASSESSMENT  Tiffanie Mcdermott is a/an 56 year old female assessed by the dietitian for Admission Nutrition Risk Screen for positive decreased appetite and unintended weight loss of 14-23 lbs    NUTRITION HISTORY  -PMH: metastatic breast cancer who is 1 week out from her last chemo infusion, is known to be neutropenic, presents with fever today  -Mouth sores noted per nursing documentation. Tolerating some foods (toast, applesauce).   -Pt seen by OP oncology RD 3/15. Noted decreased appetite, poor intake, mouth sores. Reviewed ways to increase kcal and protein intake throughout the day.   -Per patient, reports poor oral intake since January 2022 (< 50-75% of normal). Reports nausea, mouth sores, and taste changes all affecting intake. Reports one good day of eating in the last few days, otherwise very minimal intake. Has been having soft foods due to mouth sores (cottage cheese, jello). Pt reports weight loss and acknowledges that proper nutrition is important to maintain weight during this time. Has tried Boost supplements in the past but reports these are too sweet for her. Pt says  is going to bring in [place] Instant Breakfast packets in for patient to try. Pt on room service and able to order own meals, feels this will help with intake.      CURRENT NUTRITION ORDERS  Diet: Regular  Intake/Tolerance: No intake documented yet this admission. Per patient, did not have breakfast this morning  "as she was very tired and wanted to sleep. Pt reports she is somewhat hungry and wanting to give lunch meal a try.     LABS  Labs reviewed  Sodium 132 (L)    MEDICATIONS  Medications reviewed    ANTHROPOMETRICS  Height: 170.2 cm (5' 7\")  Most Recent Weight: 109.5 kg (241 lb 4.8 oz)    IBW: 61 kg  BMI: Obesity Grade II BMI 35-39.9  Weight History:   -8.2% weight loss in one month (severe)   Wt Readings from Last 10 Encounters:   03/16/22 109.5 kg (241 lb 4.8 oz)   03/14/22 108 kg (238 lb 3.2 oz)   03/09/22 115.2 kg (254 lb)   03/07/22 111.4 kg (245 lb 8 oz)   03/07/22 115.4 kg (254 lb 8 oz)   03/01/22 115.7 kg (255 lb)   02/28/22 115.8 kg (255 lb 3.2 oz)   02/23/22 119.3 kg (263 lb)   02/15/22 119.3 kg (263 lb)   02/05/18 119.7 kg (263 lb 14.4 oz)       Dosing Weight: 73 kg (adjusted)     ASSESSED NUTRITION NEEDS  Estimated Energy Needs: 7674-4263 kcals/day (25 - 30 kcals/kg)  Justification: Increased needs  Estimated Protein Needs: 73-88 grams protein/day (1 - 1.2 grams of pro/kg)  Justification: Increased needs  Estimated Fluid Needs: 1 mL/kcal   Justification: Per provider pending fluid status    PHYSICAL FINDINGS  See malnutrition section below.    MALNUTRITION  % Intake: </= 50% for >/= 5 days (severe)  % Weight Loss: > 5% in 1 month (severe)  Subcutaneous Fat Loss: None observed  Muscle Loss: None observed  Fluid Accumulation/Edema: None noted  Malnutrition Diagnosis: Severe malnutrition in the context of acute on chronic illness     NUTRITION DIAGNOSIS  Inadequate oral intake related to nausea, mouth sores, taste changes as evidenced by patient recall, meeting less than 50% of needs for >/= 5 days       INTERVENTIONS  Implementation  Nutrition Education: Introduced self and role of RD in patient care. Encouraged adequate oral intake as tolerated. Encouraged patient to explore menu and try new/different foods given taste changes and sensitivity to textures. Pt declines supplements available at this time. "  to bring in CIB from home for patient.      Collaboration with other providers - IDT rounds     Goals  Patient to consume % of nutritionally adequate meal trays TID, or the equivalent with supplements/snacks.     Monitoring/Evaluation  Progress toward goals will be monitored and evaluated per protocol.    Cameron Thompson RDN, LD  Clinical Dietitian   Office: [phone]  Weekend Pager: [phone]

## 2022-03-16 NOTE — ED NOTES
Unsuccessful access port from prior nurse, pain and burning with flushing attempts, little to no blood returned.  Spoke with pharmacist who stated heparin flush was not needed as port not accessed correctly, however the heparin flush would not cause harm the patient but not needed as it would not do anything either.  Primary RN updated.

## 2022-03-16 NOTE — H&P
"[facility]    History and Physical - Hospitalist Service       Date of Admission:  3/15/2022    Assessment & Plan      Tiffanie Mcdermott is a 56 year old female admitted on 3/15/2022. She presents to the ED at [facility] due to fever in the setting of neutropenia due to chemotherapy. Patient had just received a dose of neupogen.    Neutropenic fever  -empirically cover with cefepime and vancomycin  -unable to rule out PORT infection  -neutropenic precautions    Malfunctioning PORT  -will need evaluation by IR    Metastatic breast cancer to liver  -liver enzymes trending down  -on chemotherapy     Diet: Regular Diet Adult    DVT Prophylaxis: Enoxaparin (Lovenox) SQ  Phipps Catheter: Not present  Central Lines: PRESENT     Cardiac Monitoring: ACTIVE order. Indication: tachycardia, sepsis  Code Status: Full Code      Clinically Significant Risk Factors Present on Admission         # Hyponatremia: Na = 132 mmol/L (Ref range: 133 - 144 mmol/L) on admission, will monitor as appropriate         # Obesity: Estimated body mass index is 37.79 kg/m  as calculated from the following:    Height as of this encounter: 1.702 m (5' 7\").    Weight as of this encounter: 109.5 kg (241 lb 4.8 oz).      Disposition Plan   Expected Discharge:  greater than 2 midnights anticipated   Anticipated discharge location:  Awaiting care coordination huddle         The patient's care was discussed with the Patient.    Leopoldo Ontiveros MD  Hospitalist Service  [facility]  Securely message with the Vocera Web Console (learn more here)  Text page via [employer] Paging/Directory         ______________________________________________________________________    Chief Complaint   fever    History is obtained from the patient    History of Present Illness   Tiffanie Mcdermott is a 56 year old female who has a history of metastatic breast cancer receiving chemotherapy.    Patient has had neutropenia " due to chemotherapy. She was seen at the infusion site on the day of admission and was administered neupogen. Patient had a low grade fever and was instructed to present to the ED if should elevated > 100F.    Patient's fever elevated to 101F. She subsequently presented to the ED. In the ED work up was not conclusive of a site of infection. She was started on empiric antibiotics with cefepime and vancomycin. Findings and therapy was discussed with oncology.     Patient denies cough, dysuria, diarrhea. She did have erythematous plaques over her right side. Non-puritic.    Review of Systems    The 10 point Review of Systems is negative other than noted in the HPI or here.     Past Medical History    I have reviewed this patient's medical history and updated it with pertinent information if needed.   Past Medical History:   Diagnosis Date     Allergy, unspecified not elsewhere classified      Motion sickness      PONV (postoperative nausea and vomiting)     requests scopalomine patch       Past Surgical History   I have reviewed this patient's surgical history and updated it with pertinent information if needed.  Past Surgical History:   Procedure Laterality Date     HC LAPAROSCOPY, SURGICAL; CHOLECYSTECTOMY  10/19/2005    Cholecystectomy, Laparoscopic     INSERT PORT VASCULAR ACCESS Left 3/2/2022    Procedure: Placement of power port, left side;  Surgeon: Sage Turner MD;  Location:  OR       Social History   I have reviewed this patient's social history and updated it with pertinent information if needed.  Social History     Tobacco Use     Smoking status: Never Smoker     Smokeless tobacco: Never Used     Tobacco comment: no smoking in the home   Vaping Use     Vaping Use: Never used   Substance Use Topics     Alcohol use: Yes     Alcohol/week: 1.7 standard drinks     Drug use: No       Family History   I have reviewed this patient's family history and updated it with pertinent information if needed.  Family  History   Problem Relation Age of Onset     Allergies Mother      Anesthesia Reaction Mother         vomiting     Lipids Mother      Gastrointestinal Disease Mother      Heart Disease Maternal Grandmother      Hypertension Maternal Grandmother      Gastrointestinal Disease Maternal Grandmother         cholesystectyomy     Alcohol/Drug Maternal Grandfather      Allergies Sister        Prior to Admission Medications   Prior to Admission Medications   Prescriptions Last Dose Informant Patient Reported? Taking?   Acetaminophen (TYLENOL PO) Past Week at Unknown time  Yes Yes   Multiple Vitamins-Minerals (MULTIPLE VITAMINS/WOMENS PO) 3/15/2022 at AM  Yes Yes   OLANZapine (ZYPREXA) 2.5 MG tablet 3/14/2022 at 2200  No Yes   Sig: Take 1 tablet (2.5 mg) by mouth At Bedtime   fluticasone (FLONASE) 50 MCG/ACT nasal spray   Yes No   Sig: Spray 2 sprays into both nostrils daily    Patient not taking: Reported on 3/9/2022   ibuprofen (ADVIL,MOTRIN) 200 MG tablet Past Week at Unknown time  Yes Yes   Sig: Take 200 mg by mouth every 4 hours as needed    lidocaine, viscous, (XYLOCAINE) 2 % solution  at PRN  No No   Sig: Apply 5 mLs topically every 3 hours as needed for other (use sponge stick to apply to painful mouth sores) ; Max 8 doses/24 hour period.   lidocaine-prilocaine (EMLA) 2.5-2.5 % external cream  at PRN  No No   Sig: Apply to port site 45 minutes to 1 hour prior to infusion. Cover site with plastic wrap to protect clothing and secure with skin tape.   loratadine (CLARITIN) 10 MG tablet 3/15/2022 at PM  Yes Yes   Sig: Take 10 mg by mouth daily   magic mouthwash (ENTER INGREDIENTS IN COMMENTS) suspension 3/15/2022 at AM  No Yes   Sig: Swish and spit 5 mLs in mouth every 4 hours as needed (every four hours)   methylcellulose (CITRUCEL) powder   No No   Si g (2.5 level teaspoons) in 8 oz (240 mL) of cold water; increase as needed by 2.5 level teaspoons up to 3 times/day   Patient not taking: Reported on 2022    nystatin (MYCOSTATIN) 543636 UNIT/ML suspension   Yes No   omeprazole (PRILOSEC) 20 MG CR capsule Past Week at Unknown time  No Yes   Si mg twice daily for 7 days then 20 mg at bedtime thereafter.   ondansetron (ZOFRAN-ODT) 4 MG ODT tab More than a month at Unknown time  No Yes   Sig: Take 1 tablet (4 mg) by mouth every 8 hours as needed for nausea   oxyCODONE (ROXICODONE) 5 MG tablet   No No   Sig: Take 1 tablet (5 mg) by mouth every 6 hours as needed for moderate to severe pain   Patient not taking: Reported on 3/7/2022      Facility-Administered Medications: None     Allergies   Allergies   Allergen Reactions     Adhesive Tape      Penicillins        Physical Exam   Vital Signs: Temp: 100.4  F (38  C) Temp src: Oral BP: 116/69 Pulse: 117   Resp: 20 SpO2: 98 % O2 Device: None (Room air)    Weight: 241 lbs 4.8 oz    Constitutional: awake, alert, cooperative but irritable and no apparent distress, ill appearing  Eyes: lids and lashes normal, pupils equal, round and reactive to light and extra-ocular muscles intact  Respiratory: No increased work of breathing  Cardiovascular: regular rate and rhythm and no edema  GI: normal bowel sounds, soft, non-distended and non-tender  Skin: bruising and swelling over port site  Musculoskeletal: no lower extremity pitting edema present  Neurologic: Mental Status Exam:  Level of Alertness:   awake  Orientation:   person, place, time  Cranial Nerves:  cranial nerves II-XII are grossly intact    Data   Data reviewed today: I reviewed all medications, new labs and imaging results over the last 24 hours.    Recent Labs   Lab 03/15/22  2131 03/15/22  1350 22  1255 22  1201   WBC 1.8* 1.3*  1.3*  --  0.8*   HGB 11.9 12.5  --  12.3   MCV 90 89  --  88    182  --  111*   INR  --   --  1.06  --    *  --   --   --    POTASSIUM 4.3  --   --   --    CHLORIDE 103  --   --   --    CO2 25  --   --   --    BUN 11  --   --   --    CR 0.80  --   --   --    ANIONGAP  4  --   --   --    SARAI 8.8  --   --   --    *  --   --   --    ALBUMIN 2.5*  --  2.5*  --    PROTTOTAL 6.6*  --  6.2*  --    BILITOTAL 5.0*  --  5.6*  --    ALKPHOS 360*  --  457*  --    *  --  115*  --    *  --  195*  --      Recent Results (from the past 24 hour(s))   XR Chest Port 1 View    Narrative    EXAM: XR CHEST PORT 1 VIEW  LOCATION: [facility]  DATE/TIME: 3/15/2022 10:31 PM    INDICATION: fever, on chemo for breast ca  COMPARISON: 3/2/2022.    FINDINGS: Left chest wall port. No pneumothorax. Small calcified granuloma in the right lung apex. The lungs are otherwise clear. The heart size is normal.      Impression    IMPRESSION: No acute abnormality.        Visit/Communication Style   Virtual (Video) communication was used to evaluate Tiffanie.  Tiffanie consented to the use of video communication: yes  Video START time: 1:46am, 3/16/2022  Video STOP time: 2:00am, 3/16/2022   Patient's location: [facility]   Provider's location during the visit: [facility] Tele-medicine site

## 2022-03-16 NOTE — ED NOTES
Med rec completed, pt belongings completed. IV abx infusing. VS monitoring. Pt has been up to ambulate to restroom, tolerated well. Call light in reach. Son remains in room with pt at bedside. Pt has been applying topical viscous lidocaine to lips for sores - has been tolerating some mild foods (toast, applesauce).

## 2022-03-16 NOTE — PHARMACY-VANCOMYCIN DOSING SERVICE
Pharmacy Vancomycin Initial Note  Date of Service 2022  Patient's  1965  56 year old, female    Indication: Febrile Neutropenia    Current estimated CrCl = Estimated Creatinine Clearance: 97.8 mL/min (based on SCr of 0.8 mg/dL).    Creatinine for last 3 days  3/15/2022:  9:31 PM Creatinine 0.80 mg/dL    Recent Vancomycin Level(s) for last 3 days  No results found for requested labs within last 72 hours.      Vancomycin IV Administrations (past 72 hours)                   vancomycin (VANCOCIN) 1,250 mg in sodium chloride 0.9 % 250 mL intermittent infusion (mg) 1,250 mg New Bag 03/15/22 2343                Nephrotoxins and other renal medications (From now, onward)    Start     Dose/Rate Route Frequency Ordered Stop    03/15/22 2330  vancomycin (VANCOCIN) 1,250 mg in sodium chloride 0.9 % 250 mL intermittent infusion         1,250 mg  over 90 Minutes Intravenous EVERY 12 HOURS 03/15/22 2251            Contrast Orders - past 72 hours (72h ago, onward)            None          [employer] Prediction of Planned Initial Vancomycin Regimen  Regimen: 1250 mg IV every 12 hours.   Start time: 23:20 on 03/15/2022   Exposure target: AUC24 (range)400-600 mg/L.hr   AUC24,ss: 506 mg/L.hr   Probability of AUC24 > 400: 75 %   Ctrough,ss: 16.3 mg/L   Probability of Ctrough,ss > 20: 32 %   Probability of nephrotoxicity (Lodise KESHA ): 12 %        Plan:  1. Start vancomycin  1250 mg IV q12h.   2. Vancomycin monitoring method: AUC  3. Vancomycin therapeutic monitoring goal: 400-600 mg*h/L  4. Pharmacy will check vancomycin levels as appropriate in 1-3 Days.    5. Serum creatinine levels will be ordered daily for the first week of therapy and at least twice weekly for subsequent weeks.      Sami Sparks, PharmD  March 15, 2022

## 2022-03-17 LAB
ALBUMIN SERPL-MCNC: 1.9 G/DL (ref 3.4–5)
ALP SERPL-CCNC: 235 U/L (ref 40–150)
ALT SERPL W P-5'-P-CCNC: 67 U/L (ref 0–50)
ANION GAP SERPL CALCULATED.3IONS-SCNC: 3 MMOL/L (ref 3–14)
AST SERPL W P-5'-P-CCNC: 70 U/L (ref 0–45)
BACTERIA UR CULT: ABNORMAL
BACTERIA UR CULT: ABNORMAL
BACTERIA UR CULT: NORMAL
BASOPHILS # BLD MANUAL: 0.1 10E3/UL (ref 0–0.2)
BASOPHILS NFR BLD MANUAL: 2 %
BILIRUB SERPL-MCNC: 3 MG/DL (ref 0.2–1.3)
BUN SERPL-MCNC: 9 MG/DL (ref 7–30)
CALCIUM SERPL-MCNC: 8.2 MG/DL (ref 8.5–10.1)
CHLORIDE BLD-SCNC: 112 MMOL/L (ref 94–109)
CO2 SERPL-SCNC: 23 MMOL/L (ref 20–32)
CREAT SERPL-MCNC: 0.75 MG/DL (ref 0.52–1.04)
CRP SERPL-MCNC: 130 MG/L (ref 0–8)
EOSINOPHIL # BLD MANUAL: 0 10E3/UL (ref 0–0.7)
EOSINOPHIL NFR BLD MANUAL: 0 %
ERYTHROCYTE [DISTWIDTH] IN BLOOD BY AUTOMATED COUNT: 16 % (ref 10–15)
GFR SERPL CREATININE-BSD FRML MDRD: >90 ML/MIN/1.73M2
GLUCOSE BLD-MCNC: 130 MG/DL (ref 70–99)
HCT VFR BLD AUTO: 35.1 % (ref 35–47)
HGB BLD-MCNC: 10.6 G/DL (ref 11.7–15.7)
LYMPHOCYTES # BLD MANUAL: 4.1 10E3/UL (ref 0.8–5.3)
LYMPHOCYTES NFR BLD MANUAL: 58 %
MCH RBC QN AUTO: 28.3 PG (ref 26.5–33)
MCHC RBC AUTO-ENTMCNC: 30.2 G/DL (ref 31.5–36.5)
MCV RBC AUTO: 94 FL (ref 78–100)
METAMYELOCYTES # BLD MANUAL: 0.1 10E3/UL
METAMYELOCYTES NFR BLD MANUAL: 1 %
MONOCYTES # BLD MANUAL: 1.1 10E3/UL (ref 0–1.3)
MONOCYTES NFR BLD MANUAL: 15 %
MYELOCYTES # BLD MANUAL: 0.1 10E3/UL
MYELOCYTES NFR BLD MANUAL: 1 %
NEUTROPHILS # BLD MANUAL: 1.6 10E3/UL (ref 1.6–8.3)
NEUTROPHILS NFR BLD MANUAL: 23 %
PLAT MORPH BLD: ABNORMAL
PLATELET # BLD AUTO: 280 10E3/UL (ref 150–450)
POTASSIUM BLD-SCNC: 4.1 MMOL/L (ref 3.4–5.3)
PROT SERPL-MCNC: 5.5 G/DL (ref 6.8–8.8)
RBC # BLD AUTO: 3.74 10E6/UL (ref 3.8–5.2)
RBC MORPH BLD: ABNORMAL
SODIUM SERPL-SCNC: 138 MMOL/L (ref 133–144)
WBC # BLD AUTO: 7.1 10E3/UL (ref 4–11)

## 2022-03-17 PROCEDURE — 258N000003 HC RX IP 258 OP 636: Performed by: HOSPITALIST

## 2022-03-17 PROCEDURE — 85027 COMPLETE CBC AUTOMATED: CPT | Performed by: NURSE PRACTITIONER

## 2022-03-17 PROCEDURE — 80053 COMPREHEN METABOLIC PANEL: CPT | Performed by: NURSE PRACTITIONER

## 2022-03-17 PROCEDURE — 250N000013 HC RX MED GY IP 250 OP 250 PS 637: Performed by: HOSPITALIST

## 2022-03-17 PROCEDURE — 250N000011 HC RX IP 250 OP 636: Performed by: NURSE PRACTITIONER

## 2022-03-17 PROCEDURE — 258N000003 HC RX IP 258 OP 636: Performed by: INTERNAL MEDICINE

## 2022-03-17 PROCEDURE — 36415 COLL VENOUS BLD VENIPUNCTURE: CPT | Performed by: NURSE PRACTITIONER

## 2022-03-17 PROCEDURE — 99232 SBSQ HOSP IP/OBS MODERATE 35: CPT | Performed by: NURSE PRACTITIONER

## 2022-03-17 PROCEDURE — 250N000013 HC RX MED GY IP 250 OP 250 PS 637: Performed by: NURSE PRACTITIONER

## 2022-03-17 PROCEDURE — 250N000011 HC RX IP 250 OP 636: Performed by: HOSPITALIST

## 2022-03-17 PROCEDURE — 120N000001 HC R&B MED SURG/OB

## 2022-03-17 PROCEDURE — C9113 INJ PANTOPRAZOLE SODIUM, VIA: HCPCS | Performed by: NURSE PRACTITIONER

## 2022-03-17 PROCEDURE — 86140 C-REACTIVE PROTEIN: CPT | Performed by: NURSE PRACTITIONER

## 2022-03-17 PROCEDURE — 250N000009 HC RX 250

## 2022-03-17 RX ORDER — SODIUM CHLORIDE 9 MG/ML
INJECTION, SOLUTION INTRAVENOUS CONTINUOUS
Status: DISCONTINUED | OUTPATIENT
Start: 2022-03-17 | End: 2022-03-18 | Stop reason: HOSPADM

## 2022-03-17 RX ORDER — WATER 10 ML/10ML
INJECTION INTRAMUSCULAR; INTRAVENOUS; SUBCUTANEOUS
Status: COMPLETED
Start: 2022-03-17 | End: 2022-03-17

## 2022-03-17 RX ORDER — DIPHENHYDRAMINE HYDROCHLORIDE AND LIDOCAINE HYDROCHLORIDE AND ALUMINUM HYDROXIDE AND MAGNESIUM HYDRO
5 KIT
Status: DISCONTINUED | OUTPATIENT
Start: 2022-03-17 | End: 2022-03-18 | Stop reason: HOSPADM

## 2022-03-17 RX ADMIN — VANCOMYCIN HYDROCHLORIDE 1250 MG: 1 INJECTION, POWDER, LYOPHILIZED, FOR SOLUTION INTRAVENOUS at 12:04

## 2022-03-17 RX ADMIN — POTASSIUM CHLORIDE, DEXTROSE MONOHYDRATE AND SODIUM CHLORIDE: 150; 5; 450 INJECTION, SOLUTION INTRAVENOUS at 00:12

## 2022-03-17 RX ADMIN — VANCOMYCIN HYDROCHLORIDE 1250 MG: 1 INJECTION, POWDER, LYOPHILIZED, FOR SOLUTION INTRAVENOUS at 00:07

## 2022-03-17 RX ADMIN — ENOXAPARIN SODIUM 40 MG: 40 INJECTION SUBCUTANEOUS at 22:19

## 2022-03-17 RX ADMIN — WATER 10 ML: 1 INJECTION INTRAMUSCULAR; INTRAVENOUS; SUBCUTANEOUS at 14:10

## 2022-03-17 RX ADMIN — SODIUM CHLORIDE: 9 INJECTION, SOLUTION INTRAVENOUS at 09:42

## 2022-03-17 RX ADMIN — DIPHENHYDRAMINE HYDROCHLORIDE AND LIDOCAINE HYDROCHLORIDE AND ALUMINUM HYDROXIDE AND MAGNESIUM HYDRO 5 ML: KIT at 07:53

## 2022-03-17 RX ADMIN — CEFEPIME HYDROCHLORIDE 2 G: 2 INJECTION, POWDER, FOR SOLUTION INTRAVENOUS at 16:46

## 2022-03-17 RX ADMIN — DIPHENHYDRAMINE HYDROCHLORIDE AND LIDOCAINE HYDROCHLORIDE AND ALUMINUM HYDROXIDE AND MAGNESIUM HYDRO 5 ML: KIT at 12:04

## 2022-03-17 RX ADMIN — SODIUM CHLORIDE: 9 INJECTION, SOLUTION INTRAVENOUS at 22:30

## 2022-03-17 RX ADMIN — CEFEPIME HYDROCHLORIDE 2 G: 2 INJECTION, POWDER, FOR SOLUTION INTRAVENOUS at 23:25

## 2022-03-17 RX ADMIN — PANTOPRAZOLE SODIUM 40 MG: 40 INJECTION, POWDER, FOR SOLUTION INTRAVENOUS at 14:11

## 2022-03-17 RX ADMIN — OLANZAPINE 2.5 MG: 2.5 TABLET, FILM COATED ORAL at 22:18

## 2022-03-17 RX ADMIN — CEFEPIME HYDROCHLORIDE 2 G: 2 INJECTION, POWDER, FOR SOLUTION INTRAVENOUS at 09:45

## 2022-03-17 ASSESSMENT — ACTIVITIES OF DAILY LIVING (ADL)
ADLS_ACUITY_SCORE: 3

## 2022-03-17 NOTE — PROGRESS NOTES
[facility]    Medicine Progress Note - Hospitalist Service    Date of Admission:  3/15/2022    Assessment & Plan        Tiffanie is a 56-year-old female with past medical history as outlined below who presented to [facility] emergency department on 3/15/2022 for evaluation and management of tachycardia and temperature greater than 100.4.  She is currently undergoing chemotherapy for stage IV metastatic breast cancer.  Chemotherapy was deferred on 3/15/2022 due to neutropenia and she was given a dose of Neupogen with instruction to present to the emergency department for any fever greater than 100.4.  In the emergency department she was noted to be tachycardic, febrile to 101.1.  WBC 1.3 in the emergency department and she is without obvious source of sepsis.  She was deemed suitable for observation admission for ongoing management of presumed neutropenic fever.    Concern for neutropenic fever versus sepsis  Tachycardia, secondary to fever versus sepsis  Tiffanie is without obvious source of sepsis.  Chest x-ray is without obvious infiltrate or other source of infection, urinalysis is reassuring.  Blood and urine cultures are pending.  COVID-19 negative.  Her heart rate improved with administration of IV fluid, fever improved with Tylenol.  The emergency department physician spoke with on-call [place] oncology and she was initiated on empiric cefepime and 1 dose of vancomycin with subsequent doses of vancomycin based on culture and patient condition.  Per medical record review patient has a preliminary urine culture on 3/14/2022 with 10-50,000 E. coli.  Urinalysis in the emergency department was reassuring and repeat urine culture is pending. 3/17-Repeat urine culture drawn 3/15 negative. Blood cultures remain negative. Fever up to 101.2 yesterday evening but has been afebrile today. WBC normal. ANC now normal at 1.6. HR improving.   Plan:   -Continue to follow fever  curve  -Continue to monitor for obvious signs of sepsis  -Continue to monitor WBC  -Follow blood and urine cultures, narrow antibiotics as appropriate  -Continue cefepime  -Continue IV fluid for now    Mucositis secondary to chemotherapy, treated  Skin rash, bilateral sides  Stomatitis developed on 3/12/2022 and she has been using salt/soda rinses and Magic mouthwash.  She has been advised by oncology that the sores will not heal until her WBC count has recovered. 3/17-Patient notes significant improvement in oral sores. States she was able to eat an entire grilled cheese sandwich for lunch.   Plan:   -Continue Magic mouthwash  -May use Aquaphor or similar product or as needed hydrocortisone 1% cream for side rash if needed    Nausea, treated  Diarrhea, secondary to chemotherapy  She shares with me she had 1 unformed, nonbloody stool this morning.  Intermittent nausea has been noted over the past several weeks, however her nausea is improved and she is able to eat every 3 hours.3/17-Improved today. Tolerating oral intake.   -Encouraged patient to have family bring food she likes and is able to eat from home  -Zofran and Compazine available as needed    Status post port implantation, 3/2/2022  Dr. Turner, general surgery, placed left chest port on 3/2/2022 at [facility].  On exam the port has ecchymosis, no obvious signs of infection, and the lázaro-port tissue is not exquisitely tender.  Nursing staff had difficulty obtaining port access on 3/15/2022, however the port was successfully accessed on 3/16/2022 but patient has some ongoing burning.  Dr. Turner has reviewed the chest x-ray and notes that the port is in adequate position.  Plan:   -Continue to monitor, no obvious signs of infection at this time  -Leave port un-accessed at this time, use PIV     Breast cancer, stage IV, HR negative, HER-2 positive   Transaminitis, improving, secondary to liver metastasis  Per medical record review patient presented with  "several weeks of abdominal pain and nausea; she was noted to have elevated liver enzymes and abdominal ultrasound noted hepatomegaly with innumerable hepatic metastases.  On 2/21/2022 biopsy of a right breast mass demonstrated invasive ductal carcinoma, Anahi grade 3, HR negative, HER-2 positive.  On 2/23/2022 punch biopsy of the right breast skin nodule identified cutaneous involvement of breast cancer.  She was initiated on weekly paclitaxel + trazimera and pertuzumab with her last dose of chemotherapy on 3/7/2022.  She has had mid multiple side effects due to chemotherapy including stomatitis, loose stools.  On 3/15/2022 she presented to oncology and treatment was deferred due to neutropenia; she was given a dose of Neupogen to assist with RBC recovery.  Oncology notes that transaminitis has improved.  Her primary oncologist is Dr. Kim, [facility] oncology. 3/17- LFT's trending down  -Chemotherapy has been on hold  -Highly recommend continued follow-up post hospital stay with [place] oncology  -[place] oncology is aware of her hospital admission    Hyponatremia, on admission  Likely secondary to mild dehydration. 3/17-Resolved with normal sodium for last two days  -Continue to monitor sodium          Obesity: Estimated body mass index is 37.79 kg/m  as calculated from the following: Height as of this encounter: 1.702 m (5' 7\"). Weight as of this encounter: 109.5 kg (241 lb 4.8 oz).    -Continue to monitor as she has recently lost approximately 17 pounds with new diagnosis of cancer       Diet: Regular Diet Adult  Room Service    DVT Prophylaxis: Enoxaparin (Lovenox) SQ  Phipps Catheter: Not present  Central Lines: None  Cardiac Monitoring: None  Code Status: Full Code      Disposition Plan   Expected Discharge: 03/18/2022     Anticipated discharge location:  Awaiting care coordination huddle       The patient's care was discussed with the Attending Physician, Dr. Camara, Patient   Prasad Javed, " "NP  Hospitalist Service  [facility]  Securely message with the [employer] Web Console (learn more here)  Text page via [employer] Paging/Directory         Clinically Significant Risk Factors Present on Admission             # Obesity: Estimated body mass index is 37.79 kg/m  as calculated from the following:    Height as of this encounter: 1.702 m (5' 7\").    Weight as of this encounter: 109.5 kg (241 lb 4.8 oz).  # Severe Malnutrition: based on nutrition assessment     ______________________________________________________________________    Interval History   Patient seen sitting up in bed noting improvement in symptoms. Notes mild oral discomfort which is much improved. Able to eat an entire grilled cheese sandwich. Denies nausea. Denies shortness of breath and patient is maintaining oxygen saturations above 90% on room air.  She is afebrile today and hemodynamically stable.     Data reviewed today: I reviewed all medications, new labs and imaging results over the last 24 hours.     Physical Exam   Vital Signs: Temp: 98  F (36.7  C) Temp src: Oral BP: 120/62 Pulse: 100   Resp: 20 SpO2: 98 % O2 Device: None (Room air)    Weight: 241 lbs 4.8 oz  Constitutional: awake, alert, cooperative, no apparent distress, and appears stated age  Respiratory: No increased work of breathing, good air exchange, clear to auscultation bilaterally, no crackles or wheezing  Cardiovascular: tachycardic with regular rhythm and normal S1 and S2  GI: normal bowel sounds, non-distended and non-tender  Skin: normal skin color, texture, turgor  Musculoskeletal: no lower extremity pitting edema present  Neurologic: Awake, alert, oriented to name, place and time; No focal neurologic deficits     Data   Recent Labs   Lab 03/17/22  0604 03/16/22  1207 03/15/22  2131 03/15/22  1350 03/14/22  1255   WBC 7.1 3.0*  3.1* 1.8*   < >  --    HGB 10.6* 10.5* 11.9   < >  --    MCV 94 91 90   < >  --     223 191   < >  --  "   INR  --   --   --   --  1.06    135 132*  --   --    POTASSIUM 4.1 4.4 4.3  --   --    CHLORIDE 112* 108 103  --   --    CO2 23 20 25  --   --    BUN 9 10 11  --   --    CR 0.75 0.76 0.80  --   --    ANIONGAP 3 7 4  --   --    SARAI 8.2* 7.8* 8.8  --   --    * 158* 101*  --   --    ALBUMIN 1.9* 2.1* 2.5*  --  2.5*   PROTTOTAL 5.5* 5.8* 6.6*  --  6.2*   BILITOTAL 3.0* 4.0* 5.0*  --  5.6*   ALKPHOS 235* 280* 360*  --  457*   ALT 67* 80* 100*  --  115*   AST 70* 100* 143*  --  195*    < > = values in this interval not displayed.     No results found for this or any previous visit (from the past 24 hour(s)).  Medications     sodium chloride 100 mL/hr at 03/17/22 0942       ceFEPIme (MAXIPIME) IV  2 g Intravenous Q8H     enoxaparin ANTICOAGULANT  40 mg Subcutaneous Q24H     heparin  5-10 mL Intracatheter Q28 Days     heparin lock flush  5-10 mL Intracatheter Q24H     magic mouthwash suspension (diphenhydrAMINE, lidocaine, aluminum-magnesium & simethicone)  5 mL Swish & Spit Q4H     OLANZapine  2.5 mg Oral At Bedtime     pantoprazole (PROTONIX) IV  40 mg Intravenous Daily with breakfast     sodium chloride (PF)  10-20 mL Intracatheter Q28 Days     sodium chloride (PF)  3 mL Intracatheter Q8H     vancomycin  1,250 mg Intravenous Q12H

## 2022-03-17 NOTE — PLAN OF CARE
Goal Outcome Evaluation:    Plan of Care Reviewed With: patient, spouse          Outcome Evaluation: afebrile. appetite improving.

## 2022-03-17 NOTE — PLAN OF CARE
Patient: Ashlie Mccallum Date: 2018   : 1962 Attending: Chinmay Roldan MD   55 year old female      Principal Diagnosis:  Multiple Myeloma    Chief Complaint: ASCT Day +10    History of Present Illness:continues to complain of \"bone pain\" in her hips primarily. She says nausea is only mild and no vomiting. Only 1 episode of diarrhea yesterday. Denies fever, chills, mouth sores. Eating some, mostly fruit. Walking halls.      Vital Last Value 24 Hour Range   Temperature 98.8 °F (37.1 °C) Temp  Min: 97.8 °F (36.6 °C)  Max: 98.8 °F (37.1 °C)   Pulse 89 Pulse  Min: 76  Max: 121   Respiratory 18 Resp  Min: 16  Max: 18   Non-Invasive  Blood Pressure 110/68 (18 0935) BP  Min: 90/64  Max: 137/66   Pulse Oximetry 96 % SpO2  Min: 93 %  Max: 99 %     Vital Today Admitted   Weight 77.6 kg Weight: 77 kg   Height N/A Height: 5' 1.02\" (155 cm)     Weight over the past 48 Hours:  Patient Vitals for the past 48 hrs:   Weight   18 1730 77.6 kg   18 0530 77.6 kg   18 1710 77.6 kg        Intake/Output:    Last Stool Occurrence: 1 (small amount) (18 0543)    I/O this shift:  In: 100 [P.O.:100]  Out: 600 [Urine:600]    I/O last 3 completed shifts:  In: 3179 [P.O.:360; I.V.:2819]  Out: 4250 [Urine:4250]      Intake/Output Summary (Last 24 hours) at 18 0943  Last data filed at 18 0822   Gross per 24 hour   Intake             3279 ml   Output             4450 ml   Net            -1171 ml       Review of Systems:  A comprehensive review of systems was negative except for those mentioned in the history of present illness    PHYSICAL EXAMINATION:  Ashlie Mccallum is a 55 year old female who is alert, oriented times 3, in no apparent distress.  VITALS:    Visit Vitals  /68 (BP Location: [facility], Patient Position: Semi-Schaffer's)   Pulse 89   Temp 98.8 °F (37.1 °C) (Oral)   Resp 18   Ht 5' 1\" (1.549 m)   Wt 77.6 kg   LMP  (Within Months) Comment: 2017   SpO2 96%   BMI 32.33  Goal Outcome Evaluation:    Plan of Care Reviewed With: patient     Overall Patient Progress: improving    Outcome Evaluation: Labs improving, fever controlled, reports feeling better overall.       kg/m²    HEENT: No oral lesions.  NECK: Supple   LUNGS: Clear to auscultation bilaterally .  CARDIOVASCULAR: Regular rate and rhythm. No S3, S4 or any murmurs.  ABDOMEN: Soft, non tender, no hepatosplenomegaly. Bowel sounds are normal. No abnormal masses are palpable.  EXTREMITIES: no edema  NEUROLOGIC: No focal deficit.    Laboratory Results:    Recent Labs  Lab 04/06/18  0524 04/05/18  1404 04/05/18  0413 04/04/18  0425   WBC 3.9*  --  1.1* 0.3*   RBC 2.99*  --  2.98* 3.08*   HCT 25.6*  --  25.5* 26.2*   HGB 8.4*  --  8.4* 8.6*   PLT 14* 11* 11* 18*   MCV 85.6  --  85.6 85.1   MCH 28.1  --  28.2 27.9   RDWCV 15.3*  --  14.9 14.8   BAND 7  --  5  --    NEUT 57  --  23  --    LYMP 21  --  44  --    MON 15  --  20  --    EOS 0  --  2  --    BASO 0  --  0  --    ASEG 2.5  --  0.3*  --    ALYMP 0.8*  --  0.6*  --    AMONO 0.6  --  0.2*  --        Recent Labs  Lab 04/06/18  0524 04/05/18  0413 04/04/18  0425 04/03/18  0448 04/02/18  0500 04/01/18  0510  03/31/18  0518   SODIUM 145 140 142 141 141 140  < > 141   POTASSIUM 3.7 3.8 3.7 3.8 4.0 4.1  < > 4.1   CHLORIDE 112* 109* 110* 110* 109* 108*  < > 109*   CO2 25 25 25 26 26 26  < > 26   GLUCOSE 95 96 95 102* 107* 110*  --  110*   BUN 2* <2* 3* 5* 6 5*  --  6   CREATININE 0.77 0.70 0.71 0.72 0.65 0.57  --  0.60   CALCIUM 8.2* 7.5* 7.4* 7.8* 7.8* 7.8*  --  7.8*   ALBUMIN 2.8* 2.7* 2.9* 2.8* 2.8* 2.9*  --  3.0*   MG  --   --   --   --  1.9 2.0  --  2.2   BILIRUBIN 0.2 0.2 0.3 0.3 0.3 0.4  --  0.4   ALKPT 68 61 66 58 57 56  --  68   AST 36 36 79* 21 13 16  --  20   GPT 83* 87* 135* 30 22 20  --  24   PHOS  --  2.3*  --   --  2.9 2.8  --  2.6   INR  --   --   --  1.0  --   --   --   --    PTT  --   --   --  31*  --   --   --   --    < > = values in this interval not displayed.      Assessment/Plan:  1. Multiple myeloma - ASCT Day +10. WBC coming up nicely. Will continue Zarxio. Platelets 14,000 today. will transfuse < 10,000 or if bleeding.   2. Back pain- due to compression  fractures. Will monitor and manage.  3. Diarrhea- improving - C. difficile negative.  Imodium prn.  4. Pain- should improve soon if related to Zarxio/bone marrow. Continue Oxycodone and encourage ambulation.     Discussed with or notes reviewed:  RN and Patient    JOSE Hagen    As above.  She looks and feels pretty well and counts are up on schedule.  Still has bone pain and is insistent that this is d/t neupogen.  Will monitor for now.  Otherwise on schedule. I have examined the patient, addended the progress note and agree with the findings as outlined.  Chinmay Roldan MD

## 2022-03-18 ENCOUNTER — HOSPITAL ENCOUNTER (OUTPATIENT)
Dept: PET IMAGING | Facility: CLINIC | Age: 57
Discharge: HOME | End: 2022-03-18
Attending: INTERNAL MEDICINE | Admitting: INTERNAL MEDICINE
Payer: COMMERCIAL

## 2022-03-18 ENCOUNTER — TELEPHONE (OUTPATIENT)
Dept: ONCOLOGY | Facility: CLINIC | Age: 57
End: 2022-03-18
Payer: COMMERCIAL

## 2022-03-18 VITALS
RESPIRATION RATE: 18 BRPM | WEIGHT: 241.3 LBS | HEART RATE: 105 BPM | OXYGEN SATURATION: 97 % | SYSTOLIC BLOOD PRESSURE: 124 MMHG | BODY MASS INDEX: 37.87 KG/M2 | DIASTOLIC BLOOD PRESSURE: 74 MMHG | TEMPERATURE: 97.7 F | HEIGHT: 67 IN

## 2022-03-18 DIAGNOSIS — Z17.31 HER2-POSITIVE CARCINOMA OF RIGHT BREAST (H): ICD-10-CM

## 2022-03-18 DIAGNOSIS — C50.911 STAGE IV CARCINOMA OF BREAST, ER-, RIGHT (H): ICD-10-CM

## 2022-03-18 DIAGNOSIS — Z17.1 STAGE IV CARCINOMA OF BREAST, ER-, RIGHT (H): ICD-10-CM

## 2022-03-18 DIAGNOSIS — C50.911 HER2-POSITIVE CARCINOMA OF RIGHT BREAST (H): ICD-10-CM

## 2022-03-18 PROBLEM — A41.89 SEPSIS DUE TO OTHER ETIOLOGY (H): Status: ACTIVE | Noted: 2022-03-18

## 2022-03-18 LAB
ALBUMIN SERPL-MCNC: 1.8 G/DL (ref 3.4–5)
ALP SERPL-CCNC: 202 U/L (ref 40–150)
ALT SERPL W P-5'-P-CCNC: 64 U/L (ref 0–50)
ANION GAP SERPL CALCULATED.3IONS-SCNC: 5 MMOL/L (ref 3–14)
AST SERPL W P-5'-P-CCNC: 79 U/L (ref 0–45)
BASOPHILS # BLD MANUAL: 0.1 10E3/UL (ref 0–0.2)
BASOPHILS NFR BLD MANUAL: 1 %
BILIRUB SERPL-MCNC: 2.3 MG/DL (ref 0.2–1.3)
BUN SERPL-MCNC: 10 MG/DL (ref 7–30)
CALCIUM SERPL-MCNC: 7.6 MG/DL (ref 8.5–10.1)
CHLORIDE BLD-SCNC: 115 MMOL/L (ref 94–109)
CO2 SERPL-SCNC: 21 MMOL/L (ref 20–32)
CREAT SERPL-MCNC: 0.74 MG/DL (ref 0.52–1.04)
EOSINOPHIL # BLD MANUAL: 0 10E3/UL (ref 0–0.7)
EOSINOPHIL NFR BLD MANUAL: 0 %
ERYTHROCYTE [DISTWIDTH] IN BLOOD BY AUTOMATED COUNT: 16.1 % (ref 10–15)
GFR SERPL CREATININE-BSD FRML MDRD: >90 ML/MIN/1.73M2
GLUCOSE BLD-MCNC: 82 MG/DL (ref 70–99)
HCT VFR BLD AUTO: 32.1 % (ref 35–47)
HGB BLD-MCNC: 9.5 G/DL (ref 11.7–15.7)
LYMPHOCYTES # BLD MANUAL: 6.9 10E3/UL (ref 0.8–5.3)
LYMPHOCYTES NFR BLD MANUAL: 53 %
MCH RBC QN AUTO: 28.2 PG (ref 26.5–33)
MCHC RBC AUTO-ENTMCNC: 29.6 G/DL (ref 31.5–36.5)
MCV RBC AUTO: 95 FL (ref 78–100)
MONOCYTES # BLD MANUAL: 1 10E3/UL (ref 0–1.3)
MONOCYTES NFR BLD MANUAL: 8 %
MYELOCYTES # BLD MANUAL: 0.1 10E3/UL
MYELOCYTES NFR BLD MANUAL: 1 %
NEUTROPHILS # BLD MANUAL: 4.8 10E3/UL (ref 1.6–8.3)
NEUTROPHILS NFR BLD MANUAL: 37 %
PLAT MORPH BLD: ABNORMAL
PLATELET # BLD AUTO: 333 10E3/UL (ref 150–450)
POTASSIUM BLD-SCNC: 4.4 MMOL/L (ref 3.4–5.3)
PROT SERPL-MCNC: 4.6 G/DL (ref 6.8–8.8)
RBC # BLD AUTO: 3.37 10E6/UL (ref 3.8–5.2)
RBC MORPH BLD: ABNORMAL
SODIUM SERPL-SCNC: 141 MMOL/L (ref 133–144)
VANCOMYCIN SERPL-MCNC: 24.9 MG/L
WBC # BLD AUTO: 13 10E3/UL (ref 4–11)

## 2022-03-18 PROCEDURE — 258N000003 HC RX IP 258 OP 636: Performed by: HOSPITALIST

## 2022-03-18 PROCEDURE — 80053 COMPREHEN METABOLIC PANEL: CPT | Performed by: NURSE PRACTITIONER

## 2022-03-18 PROCEDURE — 250N000011 HC RX IP 250 OP 636: Performed by: HOSPITALIST

## 2022-03-18 PROCEDURE — 343N000001 HC RX 343: Performed by: INTERNAL MEDICINE

## 2022-03-18 PROCEDURE — 80202 ASSAY OF VANCOMYCIN: CPT | Performed by: HOSPITALIST

## 2022-03-18 PROCEDURE — 250N000011 HC RX IP 250 OP 636: Performed by: NURSE PRACTITIONER

## 2022-03-18 PROCEDURE — 99239 HOSP IP/OBS DSCHRG MGMT >30: CPT | Performed by: NURSE PRACTITIONER

## 2022-03-18 PROCEDURE — 74177 CT ABD & PELVIS W/CONTRAST: CPT | Mod: 26

## 2022-03-18 PROCEDURE — 250N000011 HC RX IP 250 OP 636: Performed by: INTERNAL MEDICINE

## 2022-03-18 PROCEDURE — 85027 COMPLETE CBC AUTOMATED: CPT | Performed by: NURSE PRACTITIONER

## 2022-03-18 PROCEDURE — C9113 INJ PANTOPRAZOLE SODIUM, VIA: HCPCS | Performed by: NURSE PRACTITIONER

## 2022-03-18 PROCEDURE — 36415 COLL VENOUS BLD VENIPUNCTURE: CPT | Performed by: NURSE PRACTITIONER

## 2022-03-18 PROCEDURE — 74177 CT ABD & PELVIS W/CONTRAST: CPT | Mod: 59,PS

## 2022-03-18 PROCEDURE — A9552 F18 FDG: HCPCS | Performed by: INTERNAL MEDICINE

## 2022-03-18 PROCEDURE — 71260 CT THORAX DX C+: CPT | Mod: 26

## 2022-03-18 PROCEDURE — 78816 PET IMAGE W/CT FULL BODY: CPT | Mod: 26

## 2022-03-18 RX ORDER — CEPHALEXIN 500 MG/1
500 CAPSULE ORAL 2 TIMES DAILY
Qty: 20 CAPSULE | Refills: 0 | Status: SHIPPED | OUTPATIENT
Start: 2022-03-18 | End: 2022-03-28

## 2022-03-18 RX ORDER — SULFAMETHOXAZOLE/TRIMETHOPRIM 800-160 MG
1 TABLET ORAL 2 TIMES DAILY
Qty: 20 TABLET | Refills: 0 | Status: SHIPPED | OUTPATIENT
Start: 2022-03-18 | End: 2022-03-28

## 2022-03-18 RX ORDER — IOPAMIDOL 755 MG/ML
10-135 INJECTION, SOLUTION INTRAVASCULAR ONCE
Status: COMPLETED | OUTPATIENT
Start: 2022-03-18 | End: 2022-03-18

## 2022-03-18 RX ADMIN — CEFEPIME HYDROCHLORIDE 2 G: 2 INJECTION, POWDER, FOR SOLUTION INTRAVENOUS at 08:50

## 2022-03-18 RX ADMIN — IOPAMIDOL 135 ML: 755 INJECTION, SOLUTION INTRAVENOUS at 15:12

## 2022-03-18 RX ADMIN — VANCOMYCIN HYDROCHLORIDE 1250 MG: 1 INJECTION, POWDER, LYOPHILIZED, FOR SOLUTION INTRAVENOUS at 00:02

## 2022-03-18 RX ADMIN — PANTOPRAZOLE SODIUM 40 MG: 40 INJECTION, POWDER, FOR SOLUTION INTRAVENOUS at 08:50

## 2022-03-18 RX ADMIN — FLUDEOXYGLUCOSE F-18 13.37 MCI.: 500 INJECTION, SOLUTION INTRAVENOUS at 15:12

## 2022-03-18 RX ADMIN — VANCOMYCIN HYDROCHLORIDE 1250 MG: 1 INJECTION, POWDER, LYOPHILIZED, FOR SOLUTION INTRAVENOUS at 12:55

## 2022-03-18 ASSESSMENT — ACTIVITIES OF DAILY LIVING (ADL)
ADLS_ACUITY_SCORE: 3

## 2022-03-18 NOTE — TELEPHONE ENCOUNTER
Writer spoke with patient. Patient has decided to proceed with PET scan today.    Amina Gardner RN on 3/18/2022 at 9:47 AM

## 2022-03-18 NOTE — TELEPHONE ENCOUNTER
Reason for Call:  Other call back    Detailed comments: pt was wondering what the plan is with the pet scan today? She is still inpatient and would like a call back as soon as possible or if someone is able to go up to her room and talk to her.     Phone Number Patient can be reached at: Cell number on file:    Telephone Information:   Mobile [phone]       Best Time: any    Can we leave a detailed message on this number? YES    Call taken on 3/18/2022 at 7:54 AM by Agnes Eric

## 2022-03-18 NOTE — TELEPHONE ENCOUNTER
Central Prior Authorization Team   Phone: [phone]      PA Initiation    Medication: lidocaine-prilocaine (EMLA) 2.5-2.5 % external cream-PA initiated  Insurance Company: [employer] - Phone [phone] Fax [phone]  Pharmacy Filling the Rx: [facility] [address]   Filling Pharmacy Phone: [phone]  Filling Pharmacy Fax:    Start Date: 3/18/2022

## 2022-03-18 NOTE — DISCHARGE SUMMARY
[facility]  Hospitalist Discharge Summary      Date of Admission:  3/15/2022  Date of Discharge:  3/18/2022  Discharging Provider: Prasad Javed NP  Discharge Service: Hospitalist Service    Discharge Diagnoses   Principal Problem:    Sepsis due to other etiology (H)  Active Problems:    Gastroesophageal reflux disease, esophagitis presence not specified    Breast cancer metastasized to liver, right (H)    Transaminitis    Mucositis due to chemotherapy    Neutropenic fever (H)      Follow-ups Needed After Discharge   Follow-up Appointments     Follow-up and recommended labs and tests       Follow up with oncology as previously scheduled on 3/21, for hospital   follow- up. No follow up labs or test are needed. CBC and CMP ordered to   be drawn 3/21           Unresulted Labs Ordered in the Past 30 Days of this Admission     Date and Time Order Name Status Description    3/15/2022  9:14 PM Blood Culture Hand, Left Preliminary     3/15/2022  9:14 PM Blood Culture Arm, Left Preliminary       These results will be followed up by oncology team    Discharge Disposition   Discharged to home  Condition at discharge: Stable    Hospital Course          Tiffanie Mcdermott is a 56-year-old female with past medical history as outlined below who presented to [facility] emergency department on 3/15/2022 for evaluation and management of tachycardia and temperature greater than 100.4.  She is currently undergoing chemotherapy for stage IV metastatic breast cancer.  Chemotherapy was deferred on 3/15/2022 due to neutropenia and she was given a dose of Neupogen with instruction to present to the emergency department for any fever greater than 100.4.  In the emergency department she was noted to be tachycardic, febrile to 101.1.  WBC 1.3 in the emergency department and she is without obvious source of sepsis.  She was deemed suitable for admission for ongoing management of presumed  neutropenic fever. Patient was started on IV cefepime and vancomycin for sepsis of unknown origin. She did have a urine culture from 3/14 which grew 10-50,000 E coli. Repeat UA done upon current admission reassuring and urine culture from that was negative. WBC and ANC improved throughout hospitalization. Fever resolved. On the day of discharge, WBC mildly elevated and ANC normal. She was afebrile for over 24 hours. Blood cultures remained negative. Her mucositis also improved and patient was tolerating oral intake. Denies shortness of breath and patient is maintaining oxygen saturations above 90% on room air. She was noted to have elevated LFT's thought to be secondary to known hepatic metastases. LFT's improving throughout hospitalization and at time of discharge. She is hemodynamically stable and medically stable for hospital discharge. At time of discharge, patient given prescription for Bactrim and Keflex to complete course of treatment for sepsis of unknown origin. Possibly secondary to E coli UTI. Patient to follow up with her oncology team on Monday, 3/21. Orders placed to have comprehensive metabolic panel and CBC with platelets and differential drawn on that day with follow up per oncology.     Consultations This Hospital Stay   PHARMACY TO DOSE VANCO    Code Status   Full Code    Time Spent on this Encounter   I, Prasad Javed NP, personally saw the patient today and spent greater than 30 minutes discharging this patient.       Prasad Javed NP  [address] SURGICAL  [address] [place] [address]  Phone: [phone]  ______________________________________________________________________    Physical Exam   Vital Signs: Temp: 97.7  F (36.5  C) Temp src: Oral BP: 124/74 Pulse: 105   Resp: 18 SpO2: 97 % O2 Device: None (Room air)    Weight: 241 lbs 4.8 oz  Constitutional: awake, alert, cooperative, no apparent distress, and appears stated age  Respiratory: No increased  work of breathing, good air exchange, clear to auscultation bilaterally, no crackles or wheezing  Cardiovascular: tachycardic with regular rhythm and normal S1 and S2  GI: normal bowel sounds, non-distended and non-tender  Skin: normal skin color, texture, turgor  Musculoskeletal: no lower extremity pitting edema present  Neurologic: Awake, alert, oriented to name, place and time; No focal neurologic deficits        Primary Care Physician   Physician No Ref-Primary    Discharge Orders      Comprehensive metabolic panel     Reason for your hospital stay    You were in the hospital for infection. Source is unclear but likely is a urinary tract infection. You have improved and are being discharged home on oral antibiotics.     Follow-up and recommended labs and tests     Follow up with oncology as previously scheduled on 3/21, for hospital follow- up. No follow up labs or test are needed. CBC and CMP ordered to be drawn 3/21     Activity    Your activity upon discharge: activity as tolerated     Diet    Follow this diet upon discharge: Orders Placed This Encounter        Regular Diet Adult     CBC with Platelets & Differential       Significant Results and Procedures   Most Recent 3 CBC's:Recent Labs   Lab Test 03/18/22  0546 03/17/22  0604 03/16/22  1207   WBC 13.0* 7.1 3.0*  3.1*   HGB 9.5* 10.6* 10.5*   MCV 95 94 91    280 223     Most Recent 3 BMP's:Recent Labs   Lab Test 03/18/22  0546 03/17/22  0604 03/16/22  1207    138 135   POTASSIUM 4.4 4.1 4.4   CHLORIDE 115* 112* 108   CO2 21 23 20   BUN 10 9 10   CR 0.74 0.75 0.76   ANIONGAP 5 3 7   SARAI 7.6* 8.2* 7.8*   GLC 82 130* 158*   ,   Results for orders placed or performed during the hospital encounter of 03/15/22   XR Chest Port 1 View    Narrative    EXAM: XR CHEST PORT 1 VIEW  LOCATION: [facility]  DATE/TIME: 3/15/2022 10:31 PM    INDICATION: fever, on chemo for breast ca  COMPARISON: 3/2/2022.    FINDINGS: Left  chest wall port. No pneumothorax. Small calcified granuloma in the right lung apex. The lungs are otherwise clear. The heart size is normal.      Impression    IMPRESSION: No acute abnormality.       Discharge Medications   Current Discharge Medication List      START taking these medications    Details   cephALEXin (KEFLEX) 500 MG capsule Take 1 capsule (500 mg) by mouth 2 times daily for 10 days  Qty: 20 capsule, Refills: 0    Associated Diagnoses: Sepsis due to other etiology (H)      sulfamethoxazole-trimethoprim (BACTRIM DS) 800-160 MG tablet Take 1 tablet by mouth 2 times daily for 10 days  Qty: 20 tablet, Refills: 0    Associated Diagnoses: Sepsis due to other etiology (H)         CONTINUE these medications which have NOT CHANGED    Details   Acetaminophen (TYLENOL PO)       ibuprofen (ADVIL,MOTRIN) 200 MG tablet Take 200 mg by mouth every 4 hours as needed       loratadine (CLARITIN) 10 MG tablet Take 10 mg by mouth daily      magic mouthwash (ENTER INGREDIENTS IN COMMENTS) suspension Swish and spit 5 mLs in mouth every 4 hours as needed (every four hours)  Qty: 60 mL, Refills: 3    Comments: Equal parts of lidocaine, diphenhydramine, maalox, and nystatin  Associated Diagnoses: Stage IV carcinoma of breast, ER-, right (H)      Multiple Vitamins-Minerals (MULTIPLE VITAMINS/WOMENS PO)       OLANZapine (ZYPREXA) 2.5 MG tablet Take 1 tablet (2.5 mg) by mouth At Bedtime  Qty: 30 tablet, Refills: 3    Associated Diagnoses: Stage IV carcinoma of breast, ER-, right (H)      omeprazole (PRILOSEC) 20 MG CR capsule 20 mg twice daily for 7 days then 20 mg at bedtime thereafter.  Qty: 45 capsule, Refills: 0    Associated Diagnoses: Gastroesophageal reflux disease, esophagitis presence not specified      ondansetron (ZOFRAN-ODT) 4 MG ODT tab Take 1 tablet (4 mg) by mouth every 8 hours as needed for nausea  Qty: 30 tablet, Refills: 0    Associated Diagnoses: Nausea      lidocaine, viscous, (XYLOCAINE) 2 % solution Apply 5  mLs topically every 3 hours as needed for other (use sponge stick to apply to painful mouth sores) ; Max 8 doses/24 hour period.  Qty: 100 mL, Refills: 1    Associated Diagnoses: Mucositis due to chemotherapy      lidocaine-prilocaine (EMLA) 2.5-2.5 % external cream Apply to port site 45 minutes to 1 hour prior to infusion. Cover site with plastic wrap to protect clothing and secure with skin tape.  Qty: 5 g, Refills: 3    Associated Diagnoses: Stage IV carcinoma of breast, ER-, right (H); HER2-positive carcinoma of right breast (H); Breast cancer metastasized to liver, right (H); Malignant neoplasm of lower-outer quadrant of right female breast, unspecified estrogen receptor status (H)           Allergies   Allergies   Allergen Reactions     Adhesive Tape      Penicillins

## 2022-03-18 NOTE — PLAN OF CARE
S-(situation): Patient discharged to home via private car with family    B-(background): Sepsis    A-(assessment): A&&Ox4. VSS on room air. Denying pain. LS clear. Ambulating independently. Appetite good. Voiding well. Port site no longer accessed- area is bruised and red- gauze applied. Right breast lesions are red and scabbed, gauze coverings placed.     R-(recommendations): Discharge instructions reviewed with patient. Listed belongings gathered and returned to patient.          Discharge Nursing Criteria:     Care Plan and Patient education resolved: Yes    New Medications- pt has been educated about purpose and side effects: Yes    MISC  Prescriptions if needed, hard copies sent with patient  NA  Home medications returned to patient: NA  Medication Bin checked and emptied on discharge Yes  Patient reports post-discharge pain management plan is effective: Yes    Patient discharged with PIV in left hand for PET scan at 1500.

## 2022-03-18 NOTE — PLAN OF CARE
Goal Outcome Evaluation:    Plan of Care Reviewed With: patient     Overall Patient Progress: improving    Outcome Evaluation: Afebrile. VSS. Pain and appetite improving.

## 2022-03-19 ENCOUNTER — PATIENT OUTREACH (OUTPATIENT)
Dept: CARE COORDINATION | Facility: CLINIC | Age: 57
End: 2022-03-19
Payer: COMMERCIAL

## 2022-03-19 DIAGNOSIS — Z71.89 OTHER SPECIFIED COUNSELING: ICD-10-CM

## 2022-03-19 NOTE — PROGRESS NOTES
Clinic Care Coordination Contact  [facility]: Post-Discharge Note  SITUATION                                                      Admission:    Admission Date: 03/15/22   Reason for Admission: fever  Discharge:   Discharge Date: 03/18/22  Discharge Diagnosis: Sepsis due to other etiology    BACKGROUND                                                      Per hospital discharge summary and inpatient provider notes:  Tiffanie Mcdermott is a 56-year-old female with past medical history as outlined below who presented to [facility] emergency department on 3/15/2022 for evaluation and management of tachycardia and temperature greater than 100.4.  She is currently undergoing chemotherapy for stage IV metastatic breast cancer.  Chemotherapy was deferred on 3/15/2022 due to neutropenia and she was given a dose of Neupogen with instruction to present to the emergency department for any fever greater than 100.4.  In the emergency department she was noted to be tachycardic, febrile to 101.1.  WBC 1.3 in the emergency department and she is without obvious source of sepsis.  She was deemed suitable for admission for ongoing management of presumed neutropenic fever. Patient was started on IV cefepime and vancomycin for sepsis of unknown origin. She did have a urine culture from 3/14 which grew 10-50,000 E coli. Repeat UA done upon current admission reassuring and urine culture from that was negative. WBC and ANC improved throughout hospitalization. Fever resolved. On the day of discharge, WBC mildly elevated and ANC normal. She was afebrile for over 24 hours. Blood cultures remained negative. Her mucositis also improved and patient was tolerating oral intake. Denies shortness of breath and patient is maintaining oxygen saturations above 90% on room air. She was noted to have elevated LFT's thought to be secondary to known hepatic metastases. LFT's improving throughout hospitalization and at time of  "discharge. She is hemodynamically stable and medically stable for hospital discharge. At time of discharge, patient given prescription for Bactrim and Keflex to complete course of treatment for sepsis of unknown origin. Possibly secondary to E coli UTI. Patient to follow up with her oncology team on Monday, 3/21. Orders placed to have comprehensive metabolic panel and CBC with platelets and differential drawn on that day with follow up per oncology.    ASSESSMENT           Discharge Assessment  How are you doing now that you are home?: \" I am doing fine \"  How are your symptoms? (Red Flag symptoms escalate to triage hotline per guidelines): Improved  Do you feel your condition is stable enough to be safe at home until your provider visit?: Yes  Does the patient have their discharge instructions? : Yes  Does the patient have questions regarding their discharge instructions? : No  Were you started on any new medications or were there changes to any of your previous medications? : Yes  Does the patient have all of their medications?: Yes  Do you have questions regarding any of your medications? : No  Do you have all of your needed medical supplies or equipment (DME)?  (i.e. oxygen tank, CPAP, cane, etc.): Yes  Discharge follow-up appointment scheduled within 14 calendar days? : Yes  Discharge Follow Up Appointment Date: 03/21/22  Discharge Follow Up Appointment Scheduled with?: Specialty Care Provider    Post-op ([facility] Only)  If the patient had a surgery or procedure, do they have any questions for a nurse?: No             PLAN                                                      Outpatient Plan: Follow up with oncology as previously scheduled on 3/21, for hospital   follow- up. No follow up labs or test are needed. CBC and CMP ordered to   be drawn 3/21       Future Appointments   Date Time Provider Department Center   3/21/2022  8:00 AM Gina Dudley APRN [facility]   3/21/2022 12:30 PM PH INFUSION " CHAIR 8 [facility]   3/21/2022  1:00 PM PH INFUSION NURSE [facility]   3/28/2022 10:00 AM PH INFUSION CHAIR 13 [facility]   3/28/2022 10:30 AM PH INFUSION CHAIR 13 [facility]   3/29/2022 10:30 AM Gina Dudley, APRN [facility]   4/4/2022 10:00 AM PH INFUSION CHAIR 8 [facility]   4/4/2022 10:30 AM PH INFUSION CHAIR 8 [facility]   4/6/2022  9:00 AM Gina Dudley, APRN [facility]   4/11/2022 12:30 PM PH INFUSION CHAIR 9 [facility]   4/11/2022  1:00 PM PH INFUSION NURSE [facility]   4/18/2022 10:00 AM PH INFUSION CHAIR 9 [facility]   4/18/2022 10:30 AM PH INFUSION NURSE [facility]   4/25/2022 10:30 AM PH INFUSION CHAIR 13 [facility]   4/25/2022 11:00 AM PH INFUSION NURSE [facility]   5/2/2022 12:30 PM PH INFUSION CHAIR 13 [facility]   5/2/2022  1:00 PM PH INFUSION CHAIR 14 [facility]   5/5/2022  9:00 AM Adiel Kim MD [facility]         For any urgent concerns, please contact our 24 hour nurse triage line: [phone] ([phone])         Helene Hall

## 2022-03-19 NOTE — TELEPHONE ENCOUNTER
Prior Authorization Approval    Authorization Effective Date: 2/16/2022  Authorization Expiration Date: 6/16/2022  Medication: lidocaine-prilocaine (EMLA) 2.5-2.5 % external cream-PA approved  Approved Dose/Quantity:   Reference #:     Insurance Company: [employer] [phone] Fax [phone]  Expected CoPay:       CoPay Card Available:      Foundation Assistance Needed:    Which Pharmacy is filling the prescription (Not needed for infusion/clinic administered): [facility] [address]   Pharmacy Notified: Yes  Patient Notified: No

## 2022-03-21 ENCOUNTER — INFUSION THERAPY VISIT (OUTPATIENT)
Dept: INFUSION THERAPY | Facility: CLINIC | Age: 57
End: 2022-03-21
Attending: INTERNAL MEDICINE
Payer: COMMERCIAL

## 2022-03-21 ENCOUNTER — PATIENT OUTREACH (OUTPATIENT)
Dept: CARE COORDINATION | Facility: CLINIC | Age: 57
End: 2022-03-21

## 2022-03-21 ENCOUNTER — VIRTUAL VISIT (OUTPATIENT)
Dept: ONCOLOGY | Facility: CLINIC | Age: 57
End: 2022-03-21
Payer: COMMERCIAL

## 2022-03-21 ENCOUNTER — LAB (OUTPATIENT)
Dept: LAB | Facility: CLINIC | Age: 57
End: 2022-03-21
Payer: COMMERCIAL

## 2022-03-21 VITALS
SYSTOLIC BLOOD PRESSURE: 131 MMHG | TEMPERATURE: 98.8 F | WEIGHT: 245.1 LBS | HEIGHT: 67 IN | DIASTOLIC BLOOD PRESSURE: 78 MMHG | RESPIRATION RATE: 16 BRPM | HEART RATE: 94 BPM | OXYGEN SATURATION: 97 % | BODY MASS INDEX: 38.47 KG/M2

## 2022-03-21 DIAGNOSIS — C50.911 STAGE IV CARCINOMA OF BREAST, ER-, RIGHT (H): ICD-10-CM

## 2022-03-21 DIAGNOSIS — Z17.31 HER2-POSITIVE CARCINOMA OF RIGHT BREAST (H): ICD-10-CM

## 2022-03-21 DIAGNOSIS — R50.81 NEUTROPENIC FEVER (H): ICD-10-CM

## 2022-03-21 DIAGNOSIS — C50.511 MALIGNANT NEOPLASM OF LOWER-OUTER QUADRANT OF RIGHT FEMALE BREAST, UNSPECIFIED ESTROGEN RECEPTOR STATUS (H): Primary | ICD-10-CM

## 2022-03-21 DIAGNOSIS — C50.911 HER2-POSITIVE CARCINOMA OF RIGHT BREAST (H): ICD-10-CM

## 2022-03-21 DIAGNOSIS — A41.89 SEPSIS DUE TO OTHER ETIOLOGY (H): ICD-10-CM

## 2022-03-21 DIAGNOSIS — C50.911 BREAST CANCER METASTASIZED TO LIVER, RIGHT (H): ICD-10-CM

## 2022-03-21 DIAGNOSIS — K12.31 MUCOSITIS DUE TO CHEMOTHERAPY: ICD-10-CM

## 2022-03-21 DIAGNOSIS — R74.01 TRANSAMINITIS: ICD-10-CM

## 2022-03-21 DIAGNOSIS — Z17.1 STAGE IV CARCINOMA OF BREAST, ER-, RIGHT (H): ICD-10-CM

## 2022-03-21 DIAGNOSIS — D70.9 NEUTROPENIC FEVER (H): ICD-10-CM

## 2022-03-21 DIAGNOSIS — C78.7 BREAST CANCER METASTASIZED TO LIVER, RIGHT (H): ICD-10-CM

## 2022-03-21 DIAGNOSIS — E80.6 HYPERBILIRUBINEMIA: ICD-10-CM

## 2022-03-21 LAB
ALBUMIN SERPL-MCNC: 2.5 G/DL (ref 3.4–5)
ALP SERPL-CCNC: 255 U/L (ref 40–150)
ALT SERPL W P-5'-P-CCNC: 96 U/L (ref 0–50)
ANION GAP SERPL CALCULATED.3IONS-SCNC: 6 MMOL/L (ref 3–14)
AST SERPL W P-5'-P-CCNC: 122 U/L (ref 0–45)
BACTERIA BLD CULT: NO GROWTH
BACTERIA BLD CULT: NO GROWTH
BASOPHILS # BLD MANUAL: 0.1 10E3/UL (ref 0–0.2)
BASOPHILS NFR BLD MANUAL: 1 %
BILIRUB SERPL-MCNC: 2 MG/DL (ref 0.2–1.3)
BUN SERPL-MCNC: 10 MG/DL (ref 7–30)
CALCIUM SERPL-MCNC: 9.1 MG/DL (ref 8.5–10.1)
CHLORIDE BLD-SCNC: 106 MMOL/L (ref 94–109)
CO2 SERPL-SCNC: 27 MMOL/L (ref 20–32)
CREAT SERPL-MCNC: 0.78 MG/DL (ref 0.52–1.04)
EOSINOPHIL # BLD MANUAL: 0 10E3/UL (ref 0–0.7)
EOSINOPHIL NFR BLD MANUAL: 0 %
ERYTHROCYTE [DISTWIDTH] IN BLOOD BY AUTOMATED COUNT: 15.5 % (ref 10–15)
GFR SERPL CREATININE-BSD FRML MDRD: 88 ML/MIN/1.73M2
GLUCOSE BLD-MCNC: 119 MG/DL (ref 70–99)
HCT VFR BLD AUTO: 33.5 % (ref 35–47)
HGB BLD-MCNC: 10.3 G/DL (ref 11.7–15.7)
LYMPHOCYTES # BLD MANUAL: 4.1 10E3/UL (ref 0.8–5.3)
LYMPHOCYTES NFR BLD MANUAL: 31 %
MCH RBC QN AUTO: 28.5 PG (ref 26.5–33)
MCHC RBC AUTO-ENTMCNC: 30.7 G/DL (ref 31.5–36.5)
MCV RBC AUTO: 93 FL (ref 78–100)
MONOCYTES # BLD MANUAL: 2 10E3/UL (ref 0–1.3)
MONOCYTES NFR BLD MANUAL: 15 %
NEUTROPHILS # BLD MANUAL: 7 10E3/UL (ref 1.6–8.3)
NEUTROPHILS NFR BLD MANUAL: 53 %
PLAT MORPH BLD: ABNORMAL
PLATELET # BLD AUTO: 407 10E3/UL (ref 150–450)
POTASSIUM BLD-SCNC: 4 MMOL/L (ref 3.4–5.3)
PROT SERPL-MCNC: 6.3 G/DL (ref 6.8–8.8)
RBC # BLD AUTO: 3.62 10E6/UL (ref 3.8–5.2)
RBC MORPH BLD: ABNORMAL
SODIUM SERPL-SCNC: 139 MMOL/L (ref 133–144)
WBC # BLD AUTO: 13.3 10E3/UL (ref 4–11)

## 2022-03-21 PROCEDURE — 99214 OFFICE O/P EST MOD 30 MIN: CPT | Mod: 95 | Performed by: NURSE PRACTITIONER

## 2022-03-21 PROCEDURE — 96375 TX/PRO/DX INJ NEW DRUG ADDON: CPT

## 2022-03-21 PROCEDURE — 250N000011 HC RX IP 250 OP 636: Performed by: INTERNAL MEDICINE

## 2022-03-21 PROCEDURE — 250N000009 HC RX 250: Performed by: INTERNAL MEDICINE

## 2022-03-21 PROCEDURE — 258N000003 HC RX IP 258 OP 636: Performed by: INTERNAL MEDICINE

## 2022-03-21 PROCEDURE — 36415 COLL VENOUS BLD VENIPUNCTURE: CPT

## 2022-03-21 PROCEDURE — 80053 COMPREHEN METABOLIC PANEL: CPT

## 2022-03-21 PROCEDURE — 96413 CHEMO IV INFUSION 1 HR: CPT

## 2022-03-21 PROCEDURE — 85027 COMPLETE CBC AUTOMATED: CPT

## 2022-03-21 PROCEDURE — 250N000013 HC RX MED GY IP 250 OP 250 PS 637: Performed by: INTERNAL MEDICINE

## 2022-03-21 RX ORDER — MEPERIDINE HYDROCHLORIDE 25 MG/ML
25 INJECTION INTRAMUSCULAR; INTRAVENOUS; SUBCUTANEOUS EVERY 30 MIN PRN
Status: CANCELLED | OUTPATIENT
Start: 2022-03-21

## 2022-03-21 RX ORDER — ALBUTEROL SULFATE 90 UG/1
1-2 AEROSOL, METERED RESPIRATORY (INHALATION)
Status: CANCELLED
Start: 2022-03-21

## 2022-03-21 RX ORDER — EPINEPHRINE 1 MG/ML
0.3 INJECTION, SOLUTION INTRAMUSCULAR; SUBCUTANEOUS EVERY 5 MIN PRN
Status: CANCELLED | OUTPATIENT
Start: 2022-03-21

## 2022-03-21 RX ORDER — HEPARIN SODIUM (PORCINE) LOCK FLUSH IV SOLN 100 UNIT/ML 100 UNIT/ML
5 SOLUTION INTRAVENOUS
Status: DISCONTINUED | OUTPATIENT
Start: 2022-03-21 | End: 2022-03-21 | Stop reason: HOSPADM

## 2022-03-21 RX ORDER — NALOXONE HYDROCHLORIDE 0.4 MG/ML
0.2 INJECTION, SOLUTION INTRAMUSCULAR; INTRAVENOUS; SUBCUTANEOUS
Status: CANCELLED | OUTPATIENT
Start: 2022-03-21

## 2022-03-21 RX ORDER — HEPARIN SODIUM (PORCINE) LOCK FLUSH IV SOLN 100 UNIT/ML 100 UNIT/ML
5 SOLUTION INTRAVENOUS
Status: CANCELLED | OUTPATIENT
Start: 2022-03-21

## 2022-03-21 RX ORDER — DIPHENHYDRAMINE HCL 25 MG
50 CAPSULE ORAL
Status: DISCONTINUED | OUTPATIENT
Start: 2022-03-21 | End: 2022-03-21 | Stop reason: HOSPADM

## 2022-03-21 RX ORDER — HEPARIN SODIUM,PORCINE 10 UNIT/ML
5 VIAL (ML) INTRAVENOUS
Status: CANCELLED | OUTPATIENT
Start: 2022-03-21

## 2022-03-21 RX ORDER — DIPHENHYDRAMINE HCL 25 MG
50 CAPSULE ORAL ONCE
Status: CANCELLED
Start: 2022-03-21

## 2022-03-21 RX ORDER — DIPHENHYDRAMINE HYDROCHLORIDE 50 MG/ML
50 INJECTION INTRAMUSCULAR; INTRAVENOUS
Status: CANCELLED
Start: 2022-03-21

## 2022-03-21 RX ORDER — ALBUTEROL SULFATE 0.83 MG/ML
2.5 SOLUTION RESPIRATORY (INHALATION)
Status: CANCELLED | OUTPATIENT
Start: 2022-03-21

## 2022-03-21 RX ORDER — DEXAMETHASONE SODIUM PHOSPHATE 10 MG/ML
20 INJECTION, SOLUTION INTRAMUSCULAR; INTRAVENOUS ONCE
Status: COMPLETED | OUTPATIENT
Start: 2022-03-21 | End: 2022-03-21

## 2022-03-21 RX ORDER — METHYLPREDNISOLONE SODIUM SUCCINATE 125 MG/2ML
125 INJECTION, POWDER, LYOPHILIZED, FOR SOLUTION INTRAMUSCULAR; INTRAVENOUS
Status: CANCELLED
Start: 2022-03-21

## 2022-03-21 RX ADMIN — DEXAMETHASONE SODIUM PHOSPHATE 20 MG: 10 INJECTION, SOLUTION INTRAMUSCULAR; INTRAVENOUS at 13:35

## 2022-03-21 RX ADMIN — SODIUM CHLORIDE 250 ML: 9 INJECTION, SOLUTION INTRAVENOUS at 13:32

## 2022-03-21 RX ADMIN — FAMOTIDINE 20 MG: 10 INJECTION, SOLUTION INTRAVENOUS at 13:32

## 2022-03-21 RX ADMIN — PACLITAXEL 187 MG: 6 INJECTION, SOLUTION INTRAVENOUS at 14:20

## 2022-03-21 RX ADMIN — Medication 5 ML: at 15:39

## 2022-03-21 RX ADMIN — DIPHENHYDRAMINE HYDROCHLORIDE 50 MG: 25 CAPSULE ORAL at 13:32

## 2022-03-21 NOTE — PROGRESS NOTES
Tiffanie is a 56 year old who is being evaluated via a billable video visit.    How would you like to obtain your AVS? MyChart  If the video visit is dropped, the invitation should be resent by: Send to e-mail at: [email or web address]  Will anyone else be joining your video visit? No  Type of service:  Video Visit  Video Start Time: 8:00am  Video End Time:8:35 AM  Originating Location (pt. Location): Home  Distant Location (provider location):  [facility]   Platform used for Video Visit: [employer]   --Bertrand Espinoza    Hematology/Oncology Video Visit  Care Team:  - Oncologist: Dr. Kim  - PCP: Physician No Ref-Primary    Reason for visit: visit prior to weekly paclitaxel    Diagnosis: stage IV breast cancer, HR-, HER2+    Cancer and Treatment Hx:  Feb 2022: presented with several weeks of abdominal pain and nausea, found to have elevated liver enzymes, abdominal US noted hepatomegaly with innumerable hepatic metastasis. 2/21 biopsy of R breast mass demonstrated invasive ductal carcinoma, [place] grade 3, HR-, HER2+. 2/23 punch biopsy of right breast skin nodule identified cutaneous involvement of breast cancer.  March 2022: weekly paclitaxel + trazimera and pertuzumab, complications include neutropenic fever    Interval History:  Tiffanie has been doing well since being discharged from the hospital for neutropenic fever. She continues on oral antibiotics for treatment of infection of unclear source (thought possibly to be UTI). She is eating meals throughout the day, slightly smaller than baseline but denies any nausea or abdominal pain. Mouth sores are nearly resolved, 1 remaining on her inner lip. She is using Biotene but not medicated mouth rinses anymore. Loose to soft BM daily. She typically gets seasonal allergies and started taking her claritin. She denies any pain. Notes that she felt better over the weekend than she has in > 6wks. Feels ready for chemotherapy today. Expressed some  frustrations about communication with hospital staff including whether or not her PET scan was going to be delayed and also dietary staff not knowing she needed to be NPO for that scan. Also had issues with port access and pain with infusion while hospitalized. No questions or concerns today.    Medications:  Discussed pertinent medications.    Physical Exam:  GEN: pleasantly conversant female no acute distress  SKIN: generally intact, no visible rash, bruising, or sores   ENT: eyes non-icteric, EOMI  RESP: breathing easily on room air, no cough  MSK: appears to have normal range of motion based on visualized movements  NEURO:  no notable tremors, facial movements symmetric, alert and oriented without obvious focal deficit  The rest of a comprehensive physical examination is deferred due to PHE (public health emergency) video restrictions    Wt Readings from Last 4 Encounters:   03/16/22 109.5 kg (241 lb 4.8 oz)   03/14/22 108 kg (238 lb 3.2 oz)   03/09/22 115.2 kg (254 lb)   03/07/22 111.4 kg (245 lb 8 oz)     Labs:  Will have labs drawn later today prior to chemo infusion appt.    Imaging:  Reviewed read of recent MRI brain, CT C/A/P, and mammogram. Read of PET currently pending.    Assessment and Plan:  Metastatic breast cancer, HR-, HER2+  Neutropenia secondary to chemotherapy  Thrombocytopenia secondary to chemotherapy  - Continues with paclitaxel, pertuzumab, and trazimera               - Received 1 dose of Neupogen on 3/15               - If labs appropriate, ok to receive day 8 paclitaxel (deferred 1wk d/t neutropenia)  - Baseline echo 3/8 demonstrated EF 60-65%, normal strain, repeat every 3 months  - Baseline PET completed 3/18, read pending  - Baseline CA 27-29 is 406  - Follow with Oncology weekly for monitoring while initiating treatment    **ADDENDUM: infusion nursing sent message that Dr. Kim wanted D8 cancelled (not deferred) per prior correspondence so treatment plan addended to delete day 8  and have today be day 15 paclitaxel.     Recent neutropenic fever  - Urine culture 3/14 positive for E.coli, repeat 3/15 negative  - No fevers since 3/16, blood cultures negative, CXR WNL  - Continues on course of Bactrim and keflex through 3/28  - Appears to be fully recovered, will not delay treatment any further given disease burden     Transaminitis, improved  Indirect Hyperbilirubinemia, improved  - Liver US 2/15 with innumerable hyperechoic lesions throughout liver concerning for metastasis  - Bilirubin (peak 13.6) and LFTs continue to improve after initiation of chemo  - Avoid hepatotoxic medications as able     Mucositis secondary to chemotherapy  - Nearly resolved, only requiring Biotene rinses current but has salt/soda rinses, viscous lidocaine, and MAGIC mouthwash available as needed  - Dietician following     Nausea  - Improved with use of zyprexa at bedtime, zofran PRN      The total time of this encounter amounted to 30 minutes today. This time includes video time spent with the patient, prep work, ordering tests, and performing post-visit documentation.  - Gina Dudley, CNP

## 2022-03-21 NOTE — PROGRESS NOTES
Social Work Note: Telephone Call  Oncology Clinic    Data/Intervention:  Patient Name:  Tiffanie Mcdermott  /Age:  1965 (56 year old)    Call From:  FRANDY  Reason for Call:  Psychosocial follow up    Assessment:  SW called and left message, reintroducing self and reason for call. SW acknoweldged Tiffanie's difficult last week and offered support. SW provided contact information and enouraged Tiffanie to call back when they are able to discuss support and resource needs.       Plan:  SW will remain available for ongoing support and resource needs.       HUGO Sawyer, [facility]  Clinical , Adult Oncology  Phone: [phone]

## 2022-03-21 NOTE — PROGRESS NOTES
Infusion Nursing Note:  Tiffanie Mcdermott presents today for C1D15 .Taxol  Patient seen by provider today:video visit -Gina Dudley   present during visit today: Not Applicable.    Note: N/A.      Intravenous Access:  Implanted Port.  Incision healing.  Instructed paitne to keep clean and dry. Leave open to air.   Treatment Conditions:  Lab Results   Component Value Date    HGB 10.3 (L) 03/21/2022    WBC 13.3 (H) 03/21/2022    ANEU 7.0 03/21/2022    ANEUTAUTO 0.2 (LL) 03/15/2022     03/21/2022      Lab Results   Component Value Date     03/21/2022    POTASSIUM 4.0 03/21/2022    CR 0.78 03/21/2022    SARAI 9.1 03/21/2022    BILITOTAL 2.0 (H) 03/21/2022    ALBUMIN 2.5 (L) 03/21/2022    ALT 96 (H) 03/21/2022     (H) 03/21/2022     Results reviewed, labs MET treatment parameters, ok to proceed with treatment.      Post Infusion Assessment:  Patient tolerated infusion without incident. Premeds given as it was 2nd Taxol infusion, D8 Cancelled.  Blood return noted pre and post infusion.  Site patent and intact, free from redness, edema or discomfort.  Access discontinued per protocol.       Discharge Plan:   Discharge instructions reviewed with: Patient.  Patient and/or family verbalized understanding of discharge instructions and all questions answered.  Copy of AVS reviewed with patient and/or family.  Patient will return 3/28/22 for next appointment.  Patient discharged in stable condition accompanied by: self.  Departure Mode: Ambulatory.      Alicia Mcgee RN

## 2022-03-24 NOTE — PROGRESS NOTES
Tiffanie is a 57 year old who is being evaluated via a billable video visit.    How would you like to obtain your AVS? MyChart  If the video visit is dropped, the invitation should be resent by: Text to cell phone: [phone]  Will anyone else be joining your video visit? No   - Sarah Chan VF    Video-Visit Details  Video Start Time: 7:57  Video End Time: 8:17 AM  Originating Location (pt. Location): Home  Distant Location (provider location):  [facility]   Platform used for Video Visit: [employer]    Hematology/Oncology Video Visit  Care Team:  - Oncologist: Dr. Kim  - PCP: Physician No Ref-Primary    Reason for visit: visit prior to C2D1 pertuzumab, trastuzumab, paclitaxel    Diagnosis: metastatic (bone, liver) HER2+ breast cancer    Cancer and Treatment Hx:  Feb 2022: presented with several weeks of abdominal pain and nausea, found to have elevated liver enzymes, abdominal US noted hepatomegaly with innumerable hepatic metastasis. 2/21 biopsy of R breast mass demonstrated invasive ductal carcinoma, [place] grade 3, HR-, HER2+. 2/23 punch biopsy of right breast skin nodule identified cutaneous involvement of breast cancer. Baseline CA 27-29 was 406. PET demonstrated inflammatory R breast cancer, with R axillary LAD, innumerable hepatic mets, lytic osseous mets, and FDG uptake to the left nasopharynx/tonsil and level 2 lymph nodes.  March 2022: trazimera and pertuzumab with weekly paclitaxel, complications include neutropenic fever    Interval History:  Tiffanie had a laid-back birthday, enjoyed some [employer]. Everything went well with last dose of paclitaxel. She is feeling a little more fatigued but no other current symptoms. She has been eating and drinking well. Mouth sores resolved. No nausea with ongoing use of olanzapine. She will complete the course of both bactrim and keflex tomorrow. No fevers since hospitalization. Had a little stomach upset and 2 loose stools  yesterday that she relates to antibiotic use. She has a little phlegm in her throat that she relates to seasonal allergies, reports not always remembering to take her Claritin. Port is healing well, not sore anymore. She needs [facility] paperwork filled out. Discussed start of cycle 3 today with 3 treatment drugs and plan for follow up prior to each cycle vs weekly at this point. No questions or concerns.     Medications:  Discussed pertinent medications.    Physical Exam:   GEN: pleasantly conversant female no acute distress  SKIN: generally intact, no visible rash, bruising, or sores   ENT: eyes non-icteric, EOMI  RESP: breathing easily on room air, no cough  MSK: appears to have normal range of motion based on visualized movements  NEURO:  no notable tremors, facial movements symmetric, alert and oriented without obvious focal deficit  The rest of a comprehensive physical examination is deferred due to PHE (public health emergency) video restrictions    Wt Readings from Last 4 Encounters:   03/21/22 111.2 kg (245 lb 1.6 oz)   03/16/22 109.5 kg (241 lb 4.8 oz)   03/14/22 108 kg (238 lb 3.2 oz)   03/09/22 115.2 kg (254 lb)     Labs:  No results found for any visits on 03/28/22.    Imaging:  Reviewed read of PET from 3/18/22:  IMPRESSION: In this patient with right breast cancer status post-chemo:  1. Multicentric inflammatory right breast cancer.   2. Metastatic right axillary lymphadenopathy.   3. Innumerable hepatic metastases.   4. Several lytic osseous metastasis.   5. Focal FDG uptake to the left hypopharyngeal mucosa, left palatine  tonsil and left nasopharynx, indeterminate but may represent primary  oropharyngeal malignancy. Recommend direct visualization.  6. Hypermetabolic left level 2 lymph nodes, may represent metastases.  Consider tissue sampling.    Assessment and Plan:  Metastatic breast cancer, HR-, HER2+  Neutropenia secondary to chemotherapy  Thrombocytopenia secondary to chemotherapy  - Continues  with paclitaxel, pertuzumab, and trazimera               - C1D8 paclitaxel cancelled d/t neutropenic fever               - If labs appropriate, ok to receive C2D1 infusions later today  - Baseline echo WNL, repeat every 3 months  - Follow with Oncology prior to each cycle     Transaminitis  Indirect Hyperbilirubinemia, improved  - Liver US 2/15 with innumerable hyperechoic lesions throughout liver concerning for metastasis  - Bilirubin (peak 13.6) continues to improve after initiation of chemo, liver enzymes fluctuating  - Avoid hepatotoxic medications as able     Mucositis secondary to chemotherapy  - Resolved, patient will monitor for recurrence with start of C2  - Has viscous lidocaine and MAGIC mouthwash available as needed  - Dietician following    PET findings  - FDG uptake L nasopharynx and tonsil with hypermetabolic L level 2 LN  - Will examine at next in-person appt on 4/19       The total time of this encounter amounted to 30 minutes today. This time includes video time spent with the patient, prep work, ordering tests, and performing post-visit documentation.  - Gina Dudley, CNP

## 2022-03-28 ENCOUNTER — INFUSION THERAPY VISIT (OUTPATIENT)
Dept: INFUSION THERAPY | Facility: CLINIC | Age: 57
End: 2022-03-28
Attending: INTERNAL MEDICINE
Payer: COMMERCIAL

## 2022-03-28 ENCOUNTER — VIRTUAL VISIT (OUTPATIENT)
Dept: ONCOLOGY | Facility: CLINIC | Age: 57
End: 2022-03-28
Payer: COMMERCIAL

## 2022-03-28 ENCOUNTER — TELEPHONE (OUTPATIENT)
Dept: ONCOLOGY | Facility: CLINIC | Age: 57
End: 2022-03-28

## 2022-03-28 ENCOUNTER — APPOINTMENT (OUTPATIENT)
Dept: LAB | Facility: CLINIC | Age: 57
End: 2022-03-28
Payer: COMMERCIAL

## 2022-03-28 VITALS
WEIGHT: 235.3 LBS | SYSTOLIC BLOOD PRESSURE: 129 MMHG | DIASTOLIC BLOOD PRESSURE: 71 MMHG | BODY MASS INDEX: 36.93 KG/M2 | HEART RATE: 94 BPM | HEIGHT: 67 IN | OXYGEN SATURATION: 98 % | TEMPERATURE: 97.8 F | RESPIRATION RATE: 18 BRPM

## 2022-03-28 DIAGNOSIS — C50.911 STAGE IV CARCINOMA OF BREAST, ER-, RIGHT (H): ICD-10-CM

## 2022-03-28 DIAGNOSIS — D70.9 NEUTROPENIC FEVER (H): ICD-10-CM

## 2022-03-28 DIAGNOSIS — R74.01 TRANSAMINITIS: ICD-10-CM

## 2022-03-28 DIAGNOSIS — K12.31 MUCOSITIS DUE TO CHEMOTHERAPY: ICD-10-CM

## 2022-03-28 DIAGNOSIS — R50.81 NEUTROPENIC FEVER (H): ICD-10-CM

## 2022-03-28 DIAGNOSIS — C50.511 MALIGNANT NEOPLASM OF LOWER-OUTER QUADRANT OF RIGHT FEMALE BREAST, UNSPECIFIED ESTROGEN RECEPTOR STATUS (H): Primary | ICD-10-CM

## 2022-03-28 DIAGNOSIS — E80.6 HYPERBILIRUBINEMIA: ICD-10-CM

## 2022-03-28 DIAGNOSIS — C50.911 BREAST CANCER METASTASIZED TO LIVER, RIGHT (H): ICD-10-CM

## 2022-03-28 DIAGNOSIS — Z17.1 STAGE IV CARCINOMA OF BREAST, ER-, RIGHT (H): ICD-10-CM

## 2022-03-28 DIAGNOSIS — C78.7 BREAST CANCER METASTASIZED TO LIVER, RIGHT (H): ICD-10-CM

## 2022-03-28 LAB
ALBUMIN SERPL-MCNC: 3.1 G/DL (ref 3.4–5)
ALP SERPL-CCNC: 161 U/L (ref 40–150)
ALT SERPL W P-5'-P-CCNC: 125 U/L (ref 0–50)
ANION GAP SERPL CALCULATED.3IONS-SCNC: 4 MMOL/L (ref 3–14)
AST SERPL W P-5'-P-CCNC: 90 U/L (ref 0–45)
BASOPHILS # BLD AUTO: 0.1 10E3/UL (ref 0–0.2)
BASOPHILS NFR BLD AUTO: 2 %
BILIRUB SERPL-MCNC: 1.4 MG/DL (ref 0.2–1.3)
BUN SERPL-MCNC: 14 MG/DL (ref 7–30)
CALCIUM SERPL-MCNC: 9.2 MG/DL (ref 8.5–10.1)
CANCER AG27-29 SERPL-ACNC: 114 U/ML (ref 0–39)
CHLORIDE BLD-SCNC: 105 MMOL/L (ref 94–109)
CO2 SERPL-SCNC: 27 MMOL/L (ref 20–32)
CREAT SERPL-MCNC: 1.03 MG/DL (ref 0.52–1.04)
EOSINOPHIL # BLD AUTO: 0 10E3/UL (ref 0–0.7)
EOSINOPHIL NFR BLD AUTO: 0 %
ERYTHROCYTE [DISTWIDTH] IN BLOOD BY AUTOMATED COUNT: 14.9 % (ref 10–15)
GFR SERPL CREATININE-BSD FRML MDRD: 62 ML/MIN/1.73M2
GLUCOSE BLD-MCNC: 115 MG/DL (ref 70–99)
HCT VFR BLD AUTO: 31.2 % (ref 35–47)
HGB BLD-MCNC: 10.1 G/DL (ref 11.7–15.7)
IMM GRANULOCYTES # BLD: 0.1 10E3/UL
IMM GRANULOCYTES NFR BLD: 1 %
LYMPHOCYTES # BLD AUTO: 1.7 10E3/UL (ref 0.8–5.3)
LYMPHOCYTES NFR BLD AUTO: 29 %
MCH RBC QN AUTO: 28.9 PG (ref 26.5–33)
MCHC RBC AUTO-ENTMCNC: 32.4 G/DL (ref 31.5–36.5)
MCV RBC AUTO: 89 FL (ref 78–100)
MONOCYTES # BLD AUTO: 0.3 10E3/UL (ref 0–1.3)
MONOCYTES NFR BLD AUTO: 5 %
NEUTROPHILS # BLD AUTO: 3.8 10E3/UL (ref 1.6–8.3)
NEUTROPHILS NFR BLD AUTO: 63 %
NRBC # BLD AUTO: 0 10E3/UL
NRBC BLD AUTO-RTO: 0 /100
PLATELET # BLD AUTO: 437 10E3/UL (ref 150–450)
POTASSIUM BLD-SCNC: 3.8 MMOL/L (ref 3.4–5.3)
PROT SERPL-MCNC: 6.9 G/DL (ref 6.8–8.8)
RBC # BLD AUTO: 3.49 10E6/UL (ref 3.8–5.2)
SODIUM SERPL-SCNC: 136 MMOL/L (ref 133–144)
WBC # BLD AUTO: 6 10E3/UL (ref 4–11)

## 2022-03-28 PROCEDURE — 86300 IMMUNOASSAY TUMOR CA 15-3: CPT | Performed by: INTERNAL MEDICINE

## 2022-03-28 PROCEDURE — 85025 COMPLETE CBC W/AUTO DIFF WBC: CPT | Performed by: INTERNAL MEDICINE

## 2022-03-28 PROCEDURE — 96417 CHEMO IV INFUS EACH ADDL SEQ: CPT

## 2022-03-28 PROCEDURE — 250N000011 HC RX IP 250 OP 636: Performed by: NURSE PRACTITIONER

## 2022-03-28 PROCEDURE — 250N000013 HC RX MED GY IP 250 OP 250 PS 637: Performed by: INTERNAL MEDICINE

## 2022-03-28 PROCEDURE — 258N000003 HC RX IP 258 OP 636: Performed by: NURSE PRACTITIONER

## 2022-03-28 PROCEDURE — 250N000011 HC RX IP 250 OP 636: Performed by: INTERNAL MEDICINE

## 2022-03-28 PROCEDURE — 258N000003 HC RX IP 258 OP 636: Performed by: INTERNAL MEDICINE

## 2022-03-28 PROCEDURE — 99214 OFFICE O/P EST MOD 30 MIN: CPT | Mod: 95 | Performed by: NURSE PRACTITIONER

## 2022-03-28 PROCEDURE — 96413 CHEMO IV INFUSION 1 HR: CPT

## 2022-03-28 PROCEDURE — 80053 COMPREHEN METABOLIC PANEL: CPT | Performed by: INTERNAL MEDICINE

## 2022-03-28 RX ORDER — HEPARIN SODIUM (PORCINE) LOCK FLUSH IV SOLN 100 UNIT/ML 100 UNIT/ML
5 SOLUTION INTRAVENOUS
Status: DISCONTINUED | OUTPATIENT
Start: 2022-03-28 | End: 2022-03-28 | Stop reason: HOSPADM

## 2022-03-28 RX ORDER — DIPHENHYDRAMINE HCL 25 MG
50 CAPSULE ORAL
Status: DISCONTINUED | OUTPATIENT
Start: 2022-03-28 | End: 2022-03-28 | Stop reason: HOSPADM

## 2022-03-28 RX ORDER — ACETAMINOPHEN 325 MG/1
650 TABLET ORAL
Status: DISCONTINUED | OUTPATIENT
Start: 2022-03-28 | End: 2022-03-28 | Stop reason: HOSPADM

## 2022-03-28 RX ADMIN — DIPHENHYDRAMINE HYDROCHLORIDE 50 MG: 25 CAPSULE ORAL at 10:37

## 2022-03-28 RX ADMIN — SODIUM CHLORIDE 250 ML: 9 INJECTION, SOLUTION INTRAVENOUS at 10:49

## 2022-03-28 RX ADMIN — ACETAMINOPHEN 650 MG: 325 TABLET, FILM COATED ORAL at 10:37

## 2022-03-28 RX ADMIN — PERTUZUMAB 420 MG: 30 INJECTION, SOLUTION, CONCENTRATE INTRAVENOUS at 11:09

## 2022-03-28 RX ADMIN — SODIUM CHLORIDE 720 MG: 9 INJECTION, SOLUTION INTRAVENOUS at 12:26

## 2022-03-28 RX ADMIN — PACLITAXEL 187 MG: 6 INJECTION, SOLUTION INTRAVENOUS at 13:14

## 2022-03-28 RX ADMIN — Medication 5 ML: at 14:28

## 2022-03-28 ASSESSMENT — PAIN SCALES - GENERAL: PAINLEVEL: NO PAIN (0)

## 2022-03-28 NOTE — PROGRESS NOTES
Infusion Nursing Note:  Tiffanie Mcdermott presents today for C2D1 Pertuzumab/Trastuzumab/Weekly PACLitaxel.    Patient seen by provider today: Yes: Gina Duldey, [place]   present during visit today: Not Applicable.    Note: Patient feeling pretty good today. Rested over weekend. Needs FML paperwork filled out, contacted Amina Gardner in Speciality. She visited w/Patient and will have DR. Kim sign when here on 3/31.      Intravenous Access:  Implanted Port.    Treatment Conditions:  Lab Results   Component Value Date    HGB 10.1 (L) 03/28/2022    WBC 6.0 03/28/2022    ANEU 7.0 03/21/2022    ANEUTAUTO 3.8 03/28/2022     03/28/2022      Lab Results   Component Value Date     03/28/2022    POTASSIUM 3.8 03/28/2022    CR 1.03 03/28/2022    SARAI 9.2 03/28/2022    BILITOTAL 1.4 (H) 03/28/2022    ALBUMIN 3.1 (L) 03/28/2022     (H) 03/28/2022    AST 90 (H) 03/28/2022     Results reviewed, labs MET treatment parameters, ok to proceed with treatment.      Post Infusion Assessment:  Patient tolerated infusion without incident.  Blood return noted pre and post infusion.  No evidence of extravasations.  Access discontinued per protocol.       Discharge Plan:   Discharge instructions reviewed with: Patient.  Patient and/or family verbalized understanding of discharge instructions and all questions answered.  Copy of AVS reviewed with patient and/or family.  Patient will return 3 weeks for next appointment.  Patient discharged in stable condition accompanied by: self.  Departure Mode: Ambulatory.  picking her up at front entrance.       Alicia Mcgee RN

## 2022-03-28 NOTE — TELEPHONE ENCOUNTER
Reason for Call:  Form, our goal is to have forms completed with 72 hours, however, some forms may require a visit or additional information.    Type of letter, form or note:  FMLA    Who is the form from?: Patient's employer  (if other please explain)    Where did the form come from: Patient or family brought in       What clinic location was the form placed at?: [facility]    Where the form was placed: Given to MA/RN    What number is listed as a contact on the form?: Yue Godinez- Fax number [phone]       Additional comments: Office number for above contact is [phone]    Call taken on 3/28/2022 at 1:37 PM by Amina Gardner RN

## 2022-04-01 ENCOUNTER — TELEPHONE (OUTPATIENT)
Dept: SPIRITUAL SERVICES | Facility: CLINIC | Age: 57
End: 2022-04-01
Payer: COMMERCIAL

## 2022-04-01 NOTE — TELEPHONE ENCOUNTER
Forms filled out to the best of my knowledge and faxed to number below. Patient notified. Copy sent to scanning, original in oncology file drawer.    Amina Gardner RN on 4/1/2022 at 11:24 AM

## 2022-04-04 ENCOUNTER — INFUSION THERAPY VISIT (OUTPATIENT)
Dept: INFUSION THERAPY | Facility: CLINIC | Age: 57
End: 2022-04-04
Attending: INTERNAL MEDICINE
Payer: COMMERCIAL

## 2022-04-04 ENCOUNTER — APPOINTMENT (OUTPATIENT)
Dept: LAB | Facility: CLINIC | Age: 57
End: 2022-04-04
Payer: COMMERCIAL

## 2022-04-04 VITALS
WEIGHT: 240.2 LBS | DIASTOLIC BLOOD PRESSURE: 80 MMHG | RESPIRATION RATE: 18 BRPM | HEIGHT: 67 IN | HEART RATE: 92 BPM | BODY MASS INDEX: 37.7 KG/M2 | SYSTOLIC BLOOD PRESSURE: 139 MMHG | TEMPERATURE: 98.1 F | OXYGEN SATURATION: 100 %

## 2022-04-04 DIAGNOSIS — C50.511 MALIGNANT NEOPLASM OF LOWER-OUTER QUADRANT OF RIGHT FEMALE BREAST, UNSPECIFIED ESTROGEN RECEPTOR STATUS (H): Primary | ICD-10-CM

## 2022-04-04 LAB
BASOPHILS # BLD AUTO: 0.1 10E3/UL (ref 0–0.2)
BASOPHILS NFR BLD AUTO: 2 %
EOSINOPHIL # BLD AUTO: 0 10E3/UL (ref 0–0.7)
EOSINOPHIL NFR BLD AUTO: 1 %
ERYTHROCYTE [DISTWIDTH] IN BLOOD BY AUTOMATED COUNT: 16.2 % (ref 10–15)
HCT VFR BLD AUTO: 27.7 % (ref 35–47)
HGB BLD-MCNC: 9.1 G/DL (ref 11.7–15.7)
IMM GRANULOCYTES # BLD: 0 10E3/UL
IMM GRANULOCYTES NFR BLD: 1 %
LYMPHOCYTES # BLD AUTO: 1.5 10E3/UL (ref 0.8–5.3)
LYMPHOCYTES NFR BLD AUTO: 45 %
MCH RBC QN AUTO: 29.4 PG (ref 26.5–33)
MCHC RBC AUTO-ENTMCNC: 32.9 G/DL (ref 31.5–36.5)
MCV RBC AUTO: 89 FL (ref 78–100)
MONOCYTES # BLD AUTO: 0.2 10E3/UL (ref 0–1.3)
MONOCYTES NFR BLD AUTO: 5 %
NEUTROPHILS # BLD AUTO: 1.6 10E3/UL (ref 1.6–8.3)
NEUTROPHILS NFR BLD AUTO: 46 %
NRBC # BLD AUTO: 0 10E3/UL
NRBC BLD AUTO-RTO: 0 /100
PLATELET # BLD AUTO: 276 10E3/UL (ref 150–450)
RBC # BLD AUTO: 3.1 10E6/UL (ref 3.8–5.2)
WBC # BLD AUTO: 3.4 10E3/UL (ref 4–11)

## 2022-04-04 PROCEDURE — 258N000003 HC RX IP 258 OP 636: Performed by: INTERNAL MEDICINE

## 2022-04-04 PROCEDURE — 96413 CHEMO IV INFUSION 1 HR: CPT

## 2022-04-04 PROCEDURE — 250N000011 HC RX IP 250 OP 636: Performed by: INTERNAL MEDICINE

## 2022-04-04 PROCEDURE — 85025 COMPLETE CBC W/AUTO DIFF WBC: CPT | Performed by: INTERNAL MEDICINE

## 2022-04-04 RX ORDER — HEPARIN SODIUM (PORCINE) LOCK FLUSH IV SOLN 100 UNIT/ML 100 UNIT/ML
5 SOLUTION INTRAVENOUS
Status: DISCONTINUED | OUTPATIENT
Start: 2022-04-04 | End: 2022-04-04 | Stop reason: HOSPADM

## 2022-04-04 RX ADMIN — Medication 5 ML: at 14:58

## 2022-04-04 RX ADMIN — SODIUM CHLORIDE 250 ML: 9 INJECTION, SOLUTION INTRAVENOUS at 13:30

## 2022-04-04 RX ADMIN — PACLITAXEL 187 MG: 6 INJECTION, SOLUTION INTRAVENOUS at 13:34

## 2022-04-04 NOTE — PROGRESS NOTES
Infusion Nursing Note:  Tiffanie STEWART Sharron presents today for C2D8 Taxol.    Patient seen by provider today: No   present during visit today: Not Applicable.    Note: Patient felt tired last Thursday after c2d1 tx on 3/28. Denies any increase SOA or bleeding.      Intravenous Access:  Implanted Port.    Treatment Conditions:  Lab Results   Component Value Date    HGB 9.1 (L) 04/04/2022    WBC 3.4 (L) 04/04/2022    ANEU 7.0 03/21/2022    ANEUTAUTO 1.6 04/04/2022     04/04/2022      Results reviewed, labs MET treatment parameters, ok to proceed with treatment.      Post Infusion Assessment:  Patient tolerated infusion without incident.  Patient observed for 15 minutes post taxol per protocol.  Blood return noted pre and post infusion.  Site patent and intact, free from redness, edema or discomfort.  Access discontinued per protocol.       Discharge Plan:   Discharge instructions reviewed with: Patient.  Patient and/or family verbalized understanding of discharge instructions and all questions answered.  Patient discharged in stable condition accompanied by: self.  Departure Mode: Ambulatory.      Alicia Mcgee RN

## 2022-04-07 ENCOUNTER — DOCUMENTATION ONLY (OUTPATIENT)
Dept: LAB | Facility: CLINIC | Age: 57
End: 2022-04-07
Payer: COMMERCIAL

## 2022-04-07 NOTE — PROGRESS NOTES
Tiffanie PAT Sharron  Gender: female  : 1965  [address] [facility] [phone] [phone] (home)     Medical Record: [id number]  Pharmacy:    [facility] [place], [place] - [address] [place] [address] PHARMACY - [facility] [place] [place] [facility] [place]   Primary Care Provider: No Ref-Primary, Physician    Parent's names are: Data Unavailable (mother) and Data Unavailable (father).      [facility]  2022     Discharge Phone Call:  Key Words/Key Times    1st Attempt, No Answer.        4/10/22 0922 2nd call back attempted.  No answer.  Message left.  Jaime Pipkin RN

## 2022-04-11 ENCOUNTER — APPOINTMENT (OUTPATIENT)
Dept: LAB | Facility: CLINIC | Age: 57
End: 2022-04-11
Payer: COMMERCIAL

## 2022-04-11 ENCOUNTER — INFUSION THERAPY VISIT (OUTPATIENT)
Dept: INFUSION THERAPY | Facility: CLINIC | Age: 57
End: 2022-04-11
Attending: INTERNAL MEDICINE
Payer: COMMERCIAL

## 2022-04-11 VITALS
WEIGHT: 242.1 LBS | TEMPERATURE: 98.8 F | BODY MASS INDEX: 37.92 KG/M2 | DIASTOLIC BLOOD PRESSURE: 66 MMHG | RESPIRATION RATE: 18 BRPM | HEART RATE: 96 BPM | SYSTOLIC BLOOD PRESSURE: 133 MMHG | OXYGEN SATURATION: 98 %

## 2022-04-11 DIAGNOSIS — C50.511 MALIGNANT NEOPLASM OF LOWER-OUTER QUADRANT OF RIGHT FEMALE BREAST, UNSPECIFIED ESTROGEN RECEPTOR STATUS (H): Primary | ICD-10-CM

## 2022-04-11 LAB
ALBUMIN SERPL-MCNC: 3.1 G/DL (ref 3.4–5)
ALP SERPL-CCNC: 100 U/L (ref 40–150)
ALT SERPL W P-5'-P-CCNC: 34 U/L (ref 0–50)
ANION GAP SERPL CALCULATED.3IONS-SCNC: 4 MMOL/L (ref 3–14)
AST SERPL W P-5'-P-CCNC: 29 U/L (ref 0–45)
BASOPHILS # BLD AUTO: 0 10E3/UL (ref 0–0.2)
BASOPHILS NFR BLD AUTO: 1 %
BILIRUB SERPL-MCNC: 1 MG/DL (ref 0.2–1.3)
BUN SERPL-MCNC: 5 MG/DL (ref 7–30)
CALCIUM SERPL-MCNC: 8.8 MG/DL (ref 8.5–10.1)
CHLORIDE BLD-SCNC: 108 MMOL/L (ref 94–109)
CO2 SERPL-SCNC: 29 MMOL/L (ref 20–32)
CREAT SERPL-MCNC: 0.77 MG/DL (ref 0.52–1.04)
EOSINOPHIL # BLD AUTO: 0.1 10E3/UL (ref 0–0.7)
EOSINOPHIL NFR BLD AUTO: 2 %
ERYTHROCYTE [DISTWIDTH] IN BLOOD BY AUTOMATED COUNT: 19 % (ref 10–15)
GFR SERPL CREATININE-BSD FRML MDRD: 88 ML/MIN/1.73M2
GLUCOSE BLD-MCNC: 160 MG/DL (ref 70–99)
HCT VFR BLD AUTO: 28.3 % (ref 35–47)
HGB BLD-MCNC: 9.2 G/DL (ref 11.7–15.7)
IMM GRANULOCYTES # BLD: 0.1 10E3/UL
IMM GRANULOCYTES NFR BLD: 2 %
LYMPHOCYTES # BLD AUTO: 1.5 10E3/UL (ref 0.8–5.3)
LYMPHOCYTES NFR BLD AUTO: 33 %
MCH RBC QN AUTO: 29.6 PG (ref 26.5–33)
MCHC RBC AUTO-ENTMCNC: 32.5 G/DL (ref 31.5–36.5)
MCV RBC AUTO: 91 FL (ref 78–100)
MONOCYTES # BLD AUTO: 0.3 10E3/UL (ref 0–1.3)
MONOCYTES NFR BLD AUTO: 7 %
NEUTROPHILS # BLD AUTO: 2.6 10E3/UL (ref 1.6–8.3)
NEUTROPHILS NFR BLD AUTO: 55 %
NRBC # BLD AUTO: 0 10E3/UL
NRBC BLD AUTO-RTO: 0 /100
PLATELET # BLD AUTO: 244 10E3/UL (ref 150–450)
POTASSIUM BLD-SCNC: 2.9 MMOL/L (ref 3.4–5.3)
PROT SERPL-MCNC: 6.5 G/DL (ref 6.8–8.8)
RBC # BLD AUTO: 3.11 10E6/UL (ref 3.8–5.2)
SODIUM SERPL-SCNC: 141 MMOL/L (ref 133–144)
WBC # BLD AUTO: 4.7 10E3/UL (ref 4–11)

## 2022-04-11 PROCEDURE — 96367 TX/PROPH/DG ADDL SEQ IV INF: CPT

## 2022-04-11 PROCEDURE — 250N000011 HC RX IP 250 OP 636: Performed by: INTERNAL MEDICINE

## 2022-04-11 PROCEDURE — 250N000013 HC RX MED GY IP 250 OP 250 PS 637: Performed by: NURSE PRACTITIONER

## 2022-04-11 PROCEDURE — 96413 CHEMO IV INFUSION 1 HR: CPT

## 2022-04-11 PROCEDURE — 250N000011 HC RX IP 250 OP 636: Performed by: NURSE PRACTITIONER

## 2022-04-11 PROCEDURE — 80053 COMPREHEN METABOLIC PANEL: CPT | Performed by: NURSE PRACTITIONER

## 2022-04-11 PROCEDURE — 258N000003 HC RX IP 258 OP 636: Performed by: INTERNAL MEDICINE

## 2022-04-11 PROCEDURE — 85025 COMPLETE CBC W/AUTO DIFF WBC: CPT | Performed by: INTERNAL MEDICINE

## 2022-04-11 PROCEDURE — 36415 COLL VENOUS BLD VENIPUNCTURE: CPT

## 2022-04-11 PROCEDURE — 96366 THER/PROPH/DIAG IV INF ADDON: CPT

## 2022-04-11 RX ORDER — POTASSIUM CHLORIDE 1500 MG/1
20 TABLET, EXTENDED RELEASE ORAL ONCE
Status: COMPLETED | OUTPATIENT
Start: 2022-04-11 | End: 2022-04-11

## 2022-04-11 RX ORDER — HEPARIN SODIUM (PORCINE) LOCK FLUSH IV SOLN 100 UNIT/ML 100 UNIT/ML
5 SOLUTION INTRAVENOUS
Status: DISCONTINUED | OUTPATIENT
Start: 2022-04-11 | End: 2022-04-11 | Stop reason: HOSPADM

## 2022-04-11 RX ORDER — POTASSIUM CHLORIDE 29.8 MG/ML
20 INJECTION INTRAVENOUS
Status: COMPLETED | OUTPATIENT
Start: 2022-04-11 | End: 2022-04-11

## 2022-04-11 RX ADMIN — POTASSIUM CHLORIDE 20 MEQ: 29.8 INJECTION, SOLUTION INTRAVENOUS at 14:25

## 2022-04-11 RX ADMIN — PACLITAXEL 187 MG: 6 INJECTION, SOLUTION INTRAVENOUS at 14:13

## 2022-04-11 RX ADMIN — POTASSIUM CHLORIDE 20 MEQ: 29.8 INJECTION, SOLUTION INTRAVENOUS at 15:21

## 2022-04-11 RX ADMIN — POTASSIUM CHLORIDE 20 MEQ: 1500 TABLET, EXTENDED RELEASE ORAL at 14:20

## 2022-04-11 RX ADMIN — Medication 5 ML: at 16:22

## 2022-04-11 RX ADMIN — SODIUM CHLORIDE 250 ML: 9 INJECTION, SOLUTION INTRAVENOUS at 14:09

## 2022-04-11 ASSESSMENT — PAIN SCALES - GENERAL: PAINLEVEL: NO PAIN (0)

## 2022-04-11 NOTE — PROGRESS NOTES
Infusion Nursing Note:  Tiffanie Mcdermott presents today for C2D15 Taxol.    Patient seen by provider today: No   present during visit today: Not Applicable.    Note: Pt denies new or increasing symptoms. She says she is feeling a little better than previous. Tired a couple days after last treatment but that has improved. Denies diarrhea or vomiting in the last week. Her K+ level is low today, see below. Replacement ordered by HENRIETTA Smith CNP. VSS, afebrile.   She gets easily anxious regarding her port. Today had difficulty with access. Took multiple attempts. Is positional. Able to achieve good blood return and no symptoms of edema or redness.     Intravenous Access:  Implanted Port.    Treatment Conditions:  Lab Results   Component Value Date    HGB 9.2 (L) 04/11/2022    WBC 4.7 04/11/2022    ANEU 7.0 03/21/2022    ANEUTAUTO 2.6 04/11/2022     04/11/2022      Lab Results   Component Value Date     04/11/2022    POTASSIUM 2.9 (L) 04/11/2022    CR 0.77 04/11/2022    SARAI 8.8 04/11/2022    BILITOTAL 1.0 04/11/2022    ALBUMIN 3.1 (L) 04/11/2022    ALT 34 04/11/2022    AST 29 04/11/2022     Results reviewed, labs MET treatment parameters, ok to proceed with treatment.  K+ level 2.9 today, Pt received 20 mEq oral Potassium and 40 mEq IV Potassium.      Post Infusion Assessment:  Patient tolerated infusion without incident.  Blood return noted pre and post infusion.  Site patent and intact, free from redness, edema or discomfort.  No evidence of extravasations.  Access discontinued per protocol.       Discharge Plan:   Copy of AVS reviewed with patient. Patient will return 4/19 for next appointment. Hypokalemia discharge instructions reviewed with patient.   Patient discharged in stable condition accompanied by: son.  Departure Mode: Ambulatory.      Constance Maynard RN

## 2022-04-18 ENCOUNTER — TELEPHONE (OUTPATIENT)
Dept: ONCOLOGY | Facility: CLINIC | Age: 57
End: 2022-04-18
Payer: COMMERCIAL

## 2022-04-18 NOTE — PROGRESS NOTES
Hematology/Oncology Visit  Care Team:  - Oncologist: Dr. Kim  - PCP: Physician No Ref-Primary    Reason for visit: exam prior to C3 pertuzumab, trastuzumab, paclitaxel    Diagnosis: metastatic (bone, liver) HER2+ breast cancer    Cancer and Treatment Hx:  Feb 2022: presented with several weeks of abdominal pain and nausea, found to have elevated liver enzymes, abdominal US noted hepatomegaly with innumerable hepatic metastasis. 2/21 biopsy of R breast mass demonstrated invasive ductal carcinoma, Anahi grade 3, HR-, HER2+. 2/23 punch biopsy of right breast skin nodule identified cutaneous involvement of breast cancer. Baseline CA 27-29 was 406. PET demonstrated inflammatory R breast cancer, with R axillary LAD, innumerable hepatic mets, lytic osseous mets, and FDG uptake to the left nasopharynx/tonsil and level 2 lymph nodes.  March 2022: trazimera and pertuzumab with weekly paclitaxel x12, complications include neutropenic fever    Interval History:  Tiffanie presents prior to cycle 3 treatment. She has generally been tolerating treatment well aside from fatigue but feels that is improved also. She reports anxiety about port being accessed since she had a bad experience with last infusion. She has tried increasing her intake of high-potassium foods as she had GI upset after taking oral potassium at last infusion appt. We discussed other options for K replacement including powder form. She vomited the day after last infusion/potassium repletion and had 2 episodes of diarrhea that both resolved that day. She is wondering about long term plan for treatment, she has her son's wedding this summer and a conference this fall that she needs to plan. We discussed long-term treatment with HER2 directed therapy and interval imaging. She is surprised to hear that she will ikely be on treatment indefinitely. Discussed baseline PET scan and future plan for Xgeva. She has no questions about treatment today.    Medications:  "  Discussed pertinent medications, no refills needed today.    Physical Exam:   GEN: pleasantly conversant female no acute distress  SKIN: un-accessed IVAD L chest, generally intact, no visible rash, bruising, or sores   ENT: eyes non-icteric, no notable oral sores, erythema, or swollen tonsils  LYMPH: no notable cervical, supraclavicular, or axillary lymphadenopathy  RESP: clear to auscultation bilaterally, on room air  CARDS: regular rate and rhythm, no notable murmurs   GI: abd soft without notable hepatosplenomegaly, non-tender to palpation  MSK: no notable lower extremity edema  NEURO: alert and oriented without obvious focal deficit, stable gait  /76 (BP Location: Right arm, Patient Position: Sitting, Cuff Size: Adult Large)   Pulse 100   Temp 97  F (36.1  C) (Temporal)   Resp 18   Ht 1.702 m (5' 7\")   Wt 110.8 kg (244 lb 3 oz)   LMP 08/06/2008   SpO2 99%   BMI 38.25 kg/m       Wt Readings from Last 4 Encounters:   04/19/22 110.8 kg (244 lb 3 oz)   04/11/22 109.8 kg (242 lb 1.6 oz)   04/04/22 109 kg (240 lb 3.2 oz)   03/28/22 106.7 kg (235 lb 4.8 oz)     Labs:  Result Value    Potassium 3.0 (L)    Creatinine 0.80    Alkaline Phosphatase 99    AST 27    ALT 30    Protein Total 6.7 (L)    Albumin 3.2 (L)    Bilirubin Total 0.9    GFR Estimate 84    WBC Count 4.8    Hemoglobin 9.4 (L)    Platelet Count 294    Absolute Neutrophils 2.7     Imaging:  Reviewed read of PET from 3/18/22:  IMPRESSION: In this patient with right breast cancer status post-chemo:  1. Multicentric inflammatory right breast cancer.   2. Metastatic right axillary lymphadenopathy.   3. Innumerable hepatic metastases.   4. Several lytic osseous metastasis.   5. Focal FDG uptake to the left hypopharyngeal mucosa, left palatine  tonsil and left nasopharynx, indeterminate but may represent primary  oropharyngeal malignancy. Recommend direct visualization.  6. Hypermetabolic left level 2 lymph nodes, may represent " metastases.  Consider tissue sampling.    Assessment and Plan:  Metastatic breast cancer, HER2+  Anemia secondary to chemotherapy  - Continues with weekly paclitaxel and Q3week pertuzumab and trazimera  - Meeting goals and labs appropriate for C3D1 later today  - Appears to be responding well to treatment with normalization of liver enzymes/bilirubin, down-trending tumor marker, and improved symptoms  - Baseline echo WNL on 3/8, repeat every 3 months  - Follow up with Oncology prior to each cycle  - Patient would like to keep currently schedule appt with Dr. Kim for next week as patient would like further discussion regarding long-term plan     Bone metastasis  - Discussed Xgeva and getting dental appt soon for clearance  - Plan for Q3 month administration given no symptoms    PET findings  - FDG uptake L nasopharynx and tonsil with hypermetabolic L level 2 LN  - Patient reports had L throat sore/mucositis at time of PET  - Exam currently benign and patient has no symptoms aside from hoarse voice  - Continue to monitor, even if oropharyngeal malignancy, metastatic breast cancer would take priority    Hypokalemia  Hypomagnesemia  - K 3.0, replete 20mEq IV at infusion appt and start 20mEq oral KCl daily at home  - Mg 1.1, replete 4g IV at infusion appt  - Patient had GI symptoms from pill form of K, will try power form, Rx sent      The total time of this encounter amounted to 30 minutes today. This time includes face-to-face time spent with the patient, prep work, ordering tests, and performing post-visit documentation.  - Gina Dudley, CNP

## 2022-04-19 ENCOUNTER — APPOINTMENT (OUTPATIENT)
Dept: LAB | Facility: CLINIC | Age: 57
End: 2022-04-19
Payer: COMMERCIAL

## 2022-04-19 ENCOUNTER — ONCOLOGY VISIT (OUTPATIENT)
Dept: ONCOLOGY | Facility: CLINIC | Age: 57
End: 2022-04-19
Payer: COMMERCIAL

## 2022-04-19 ENCOUNTER — INFUSION THERAPY VISIT (OUTPATIENT)
Dept: INFUSION THERAPY | Facility: CLINIC | Age: 57
End: 2022-04-19
Attending: INTERNAL MEDICINE
Payer: COMMERCIAL

## 2022-04-19 VITALS
BODY MASS INDEX: 38.33 KG/M2 | HEIGHT: 67 IN | DIASTOLIC BLOOD PRESSURE: 76 MMHG | TEMPERATURE: 97 F | HEART RATE: 100 BPM | WEIGHT: 244.19 LBS | SYSTOLIC BLOOD PRESSURE: 130 MMHG | RESPIRATION RATE: 18 BRPM | OXYGEN SATURATION: 99 %

## 2022-04-19 VITALS
OXYGEN SATURATION: 100 % | TEMPERATURE: 99 F | SYSTOLIC BLOOD PRESSURE: 140 MMHG | HEART RATE: 85 BPM | WEIGHT: 244.27 LBS | BODY MASS INDEX: 38.34 KG/M2 | RESPIRATION RATE: 18 BRPM | DIASTOLIC BLOOD PRESSURE: 77 MMHG | HEIGHT: 67 IN

## 2022-04-19 DIAGNOSIS — C50.911 BREAST CANCER METASTASIZED TO LIVER, RIGHT (H): ICD-10-CM

## 2022-04-19 DIAGNOSIS — E87.6 HYPOKALEMIA: ICD-10-CM

## 2022-04-19 DIAGNOSIS — E80.6 HYPERBILIRUBINEMIA: ICD-10-CM

## 2022-04-19 DIAGNOSIS — R50.81 NEUTROPENIC FEVER (H): ICD-10-CM

## 2022-04-19 DIAGNOSIS — K12.31 MUCOSITIS DUE TO CHEMOTHERAPY: ICD-10-CM

## 2022-04-19 DIAGNOSIS — C50.511 MALIGNANT NEOPLASM OF LOWER-OUTER QUADRANT OF RIGHT FEMALE BREAST, UNSPECIFIED ESTROGEN RECEPTOR STATUS (H): Primary | ICD-10-CM

## 2022-04-19 DIAGNOSIS — C79.51 BONE METASTASIS: ICD-10-CM

## 2022-04-19 DIAGNOSIS — C50.511 MALIGNANT NEOPLASM OF LOWER-OUTER QUADRANT OF RIGHT FEMALE BREAST, UNSPECIFIED ESTROGEN RECEPTOR STATUS (H): ICD-10-CM

## 2022-04-19 DIAGNOSIS — Z17.1 STAGE IV CARCINOMA OF BREAST, ER-, RIGHT (H): Primary | ICD-10-CM

## 2022-04-19 DIAGNOSIS — C78.7 BREAST CANCER METASTASIZED TO LIVER, RIGHT (H): ICD-10-CM

## 2022-04-19 DIAGNOSIS — E83.42 HYPOMAGNESEMIA: ICD-10-CM

## 2022-04-19 DIAGNOSIS — C50.911 STAGE IV CARCINOMA OF BREAST, ER-, RIGHT (H): Primary | ICD-10-CM

## 2022-04-19 DIAGNOSIS — R74.01 TRANSAMINITIS: ICD-10-CM

## 2022-04-19 DIAGNOSIS — D70.9 NEUTROPENIC FEVER (H): ICD-10-CM

## 2022-04-19 LAB
ALBUMIN SERPL-MCNC: 3.2 G/DL (ref 3.4–5)
ALP SERPL-CCNC: 99 U/L (ref 40–150)
ALT SERPL W P-5'-P-CCNC: 30 U/L (ref 0–50)
ANION GAP SERPL CALCULATED.3IONS-SCNC: 4 MMOL/L (ref 3–14)
AST SERPL W P-5'-P-CCNC: 27 U/L (ref 0–45)
BASOPHILS # BLD AUTO: 0 10E3/UL (ref 0–0.2)
BASOPHILS NFR BLD AUTO: 1 %
BILIRUB SERPL-MCNC: 0.9 MG/DL (ref 0.2–1.3)
BUN SERPL-MCNC: 3 MG/DL (ref 7–30)
CALCIUM SERPL-MCNC: 8.2 MG/DL (ref 8.5–10.1)
CANCER AG27-29 SERPL-ACNC: 40 U/ML (ref 0–39)
CHLORIDE BLD-SCNC: 110 MMOL/L (ref 94–109)
CO2 SERPL-SCNC: 27 MMOL/L (ref 20–32)
CREAT SERPL-MCNC: 0.8 MG/DL (ref 0.52–1.04)
EOSINOPHIL # BLD AUTO: 0.1 10E3/UL (ref 0–0.7)
EOSINOPHIL NFR BLD AUTO: 2 %
ERYTHROCYTE [DISTWIDTH] IN BLOOD BY AUTOMATED COUNT: 19.8 % (ref 10–15)
GFR SERPL CREATININE-BSD FRML MDRD: 84 ML/MIN/1.73M2
GLUCOSE BLD-MCNC: 152 MG/DL (ref 70–99)
HCT VFR BLD AUTO: 29.2 % (ref 35–47)
HGB BLD-MCNC: 9.4 G/DL (ref 11.7–15.7)
IMM GRANULOCYTES # BLD: 0.1 10E3/UL
IMM GRANULOCYTES NFR BLD: 2 %
LYMPHOCYTES # BLD AUTO: 1.6 10E3/UL (ref 0.8–5.3)
LYMPHOCYTES NFR BLD AUTO: 33 %
MAGNESIUM SERPL-MCNC: 1.1 MG/DL (ref 1.6–2.3)
MCH RBC QN AUTO: 29.9 PG (ref 26.5–33)
MCHC RBC AUTO-ENTMCNC: 32.2 G/DL (ref 31.5–36.5)
MCV RBC AUTO: 93 FL (ref 78–100)
MONOCYTES # BLD AUTO: 0.3 10E3/UL (ref 0–1.3)
MONOCYTES NFR BLD AUTO: 7 %
NEUTROPHILS # BLD AUTO: 2.7 10E3/UL (ref 1.6–8.3)
NEUTROPHILS NFR BLD AUTO: 55 %
NRBC # BLD AUTO: 0 10E3/UL
NRBC BLD AUTO-RTO: 0 /100
PLATELET # BLD AUTO: 294 10E3/UL (ref 150–450)
POTASSIUM BLD-SCNC: 3 MMOL/L (ref 3.4–5.3)
PROT SERPL-MCNC: 6.7 G/DL (ref 6.8–8.8)
RBC # BLD AUTO: 3.14 10E6/UL (ref 3.8–5.2)
SODIUM SERPL-SCNC: 141 MMOL/L (ref 133–144)
WBC # BLD AUTO: 4.8 10E3/UL (ref 4–11)

## 2022-04-19 PROCEDURE — 99214 OFFICE O/P EST MOD 30 MIN: CPT | Performed by: NURSE PRACTITIONER

## 2022-04-19 PROCEDURE — 96367 TX/PROPH/DG ADDL SEQ IV INF: CPT

## 2022-04-19 PROCEDURE — 96375 TX/PRO/DX INJ NEW DRUG ADDON: CPT

## 2022-04-19 PROCEDURE — 250N000009 HC RX 250: Performed by: INTERNAL MEDICINE

## 2022-04-19 PROCEDURE — 82040 ASSAY OF SERUM ALBUMIN: CPT | Performed by: INTERNAL MEDICINE

## 2022-04-19 PROCEDURE — 96413 CHEMO IV INFUSION 1 HR: CPT

## 2022-04-19 PROCEDURE — 96368 THER/DIAG CONCURRENT INF: CPT

## 2022-04-19 PROCEDURE — 85025 COMPLETE CBC W/AUTO DIFF WBC: CPT | Performed by: INTERNAL MEDICINE

## 2022-04-19 PROCEDURE — 83735 ASSAY OF MAGNESIUM: CPT

## 2022-04-19 PROCEDURE — 86300 IMMUNOASSAY TUMOR CA 15-3: CPT | Performed by: INTERNAL MEDICINE

## 2022-04-19 PROCEDURE — 250N000011 HC RX IP 250 OP 636: Performed by: NURSE PRACTITIONER

## 2022-04-19 PROCEDURE — 250N000011 HC RX IP 250 OP 636: Performed by: INTERNAL MEDICINE

## 2022-04-19 PROCEDURE — 258N000003 HC RX IP 258 OP 636: Performed by: INTERNAL MEDICINE

## 2022-04-19 PROCEDURE — 36591 DRAW BLOOD OFF VENOUS DEVICE: CPT | Performed by: INTERNAL MEDICINE

## 2022-04-19 PROCEDURE — 96417 CHEMO IV INFUS EACH ADDL SEQ: CPT

## 2022-04-19 PROCEDURE — 80053 COMPREHEN METABOLIC PANEL: CPT | Performed by: INTERNAL MEDICINE

## 2022-04-19 RX ORDER — MAGNESIUM SULFATE HEPTAHYDRATE 40 MG/ML
4 INJECTION, SOLUTION INTRAVENOUS ONCE
Status: COMPLETED | OUTPATIENT
Start: 2022-04-19 | End: 2022-04-19

## 2022-04-19 RX ORDER — HEPARIN SODIUM (PORCINE) LOCK FLUSH IV SOLN 100 UNIT/ML 100 UNIT/ML
5 SOLUTION INTRAVENOUS
Status: DISCONTINUED | OUTPATIENT
Start: 2022-04-19 | End: 2022-04-19 | Stop reason: HOSPADM

## 2022-04-19 RX ORDER — POTASSIUM CHLORIDE 1500 MG/1
40 TABLET, EXTENDED RELEASE ORAL ONCE
Status: DISCONTINUED | OUTPATIENT
Start: 2022-04-19 | End: 2022-04-19 | Stop reason: HOSPADM

## 2022-04-19 RX ORDER — POTASSIUM CHLORIDE 1.5 G/1.58G
20 POWDER, FOR SOLUTION ORAL DAILY
Qty: 10 PACKET | Refills: 2 | Status: SHIPPED | OUTPATIENT
Start: 2022-04-19 | End: 2022-04-25

## 2022-04-19 RX ORDER — POTASSIUM CHLORIDE 7.45 MG/ML
10 INJECTION INTRAVENOUS
Status: COMPLETED | OUTPATIENT
Start: 2022-04-19 | End: 2022-04-19

## 2022-04-19 RX ADMIN — PACLITAXEL 187 MG: 6 INJECTION, SOLUTION INTRAVENOUS at 13:02

## 2022-04-19 RX ADMIN — SODIUM CHLORIDE 720 MG: 9 INJECTION, SOLUTION INTRAVENOUS at 11:58

## 2022-04-19 RX ADMIN — SODIUM CHLORIDE 250 ML: 9 INJECTION, SOLUTION INTRAVENOUS at 10:43

## 2022-04-19 RX ADMIN — FAMOTIDINE 20 MG: 10 INJECTION, SOLUTION INTRAVENOUS at 10:50

## 2022-04-19 RX ADMIN — POTASSIUM CHLORIDE 10 MEQ: 7.46 INJECTION, SOLUTION INTRAVENOUS at 12:55

## 2022-04-19 RX ADMIN — Medication 5 ML: at 14:56

## 2022-04-19 RX ADMIN — PERTUZUMAB 420 MG: 30 INJECTION, SOLUTION, CONCENTRATE INTRAVENOUS at 11:09

## 2022-04-19 RX ADMIN — POTASSIUM CHLORIDE 10 MEQ: 7.46 INJECTION, SOLUTION INTRAVENOUS at 13:53

## 2022-04-19 RX ADMIN — MAGNESIUM SULFATE HEPTAHYDRATE 4 G: 40 INJECTION, SOLUTION INTRAVENOUS at 10:43

## 2022-04-19 ASSESSMENT — PAIN SCALES - GENERAL
PAINLEVEL: NO PAIN (0)
PAINLEVEL: NO PAIN (1)

## 2022-04-19 NOTE — NURSING NOTE
DISCHARGE PLAN:  Next appointments: See patient instruction section  Departure Mode: Ambulatory  Accompanied by: self   minutes for nursing discharge (face to face time)     Tiffanie STEWART Sharron is here today for oncology follow up.  Patient was not seen by writing nurse at time of appointment. .  Patient to infusion. Port labs (if needed), follow up, and infusion are scheduled.  Imaging scheduled if ordered. Printed AVS and calendar and sent by tube up to infusion for nurse to give to her.  See patient instructions and Oncologist's Progress note for further details. Questions and concerns addressed to patient's satisfaction. Patient verbalized and demonstrated understanding of plan.  Contact information provided and patient is encouraged to call with any that arise in the interim of care.    Alyssia Luu  [facility]  [phone]  4/19/2022, 10:52 AM

## 2022-04-19 NOTE — PROGRESS NOTES
Infusion Nursing Note:  Tiffanie Mcdermott presents today for C3 D1 Perjeta/Trazimera/Taxol/K+/Mag.    Patient seen by provider today: Yes: Gina SHRESTHA CNP   present during visit today: Not Applicable.    Note: Patient very apprehensive regarding port access, it was a difficult stick last week. The needle hit the side of port but was gavi to access right after that, patient tolerated well. Potassium and magnesium low, electrolyte replacement given per Gina, able to infuse around incompatible meds. Plan of care explained to patient, agreed and verbalized understanding.      Intravenous Access:  Implanted Port.    Treatment Conditions:  Lab Results   Component Value Date    HGB 9.4 (L) 04/19/2022    WBC 4.8 04/19/2022    ANEU 7.0 03/21/2022    ANEUTAUTO 2.7 04/19/2022     04/19/2022      Lab Results   Component Value Date     04/19/2022    POTASSIUM 3.0 (L) 04/19/2022    MAG 1.1 (L) 04/19/2022    CR 0.80 04/19/2022    SARAI 8.2 (L) 04/19/2022    BILITOTAL 0.9 04/19/2022    ALBUMIN 3.2 (L) 04/19/2022    ALT 30 04/19/2022    AST 27 04/19/2022     Results reviewed, labs MET treatment parameters, ok to proceed with treatment.      Post Infusion Assessment:  Patient tolerated infusion without incident.  Patient observed for 15 minutes post infusion per protocol.  Blood return noted pre and post infusion.  Site patent and intact, free from redness, edema or discomfort.  No evidence of extravasations.  Access discontinued per protocol.       Discharge Plan:   Discharge instructions reviewed with: Patient.  Patient and/or family verbalized understanding of discharge instructions and all questions answered.  Patient discharged in stable condition accompanied by: self.  Departure Mode: Ambulatory.      Sada Flores, RN, OCN

## 2022-04-19 NOTE — PATIENT INSTRUCTIONS
Today:  Please follow up as scheduled below:    Please follow up with Dr. Kim.      Lab Date/Time: 04/25/22 @ 12:30 pm in [place] and 05/02/22 @ 12:30 pm    Video visit follow up with Dr. Kim on  Date/Time: 04/25/22 @ 9:30 am  In person visit with Dr. Kim on Date/ Time: 05/05/22 @ 9:00 am    Infusion to follow at: 04/25/22 @ 1:00 pm and 05/02/22 @ 1:00 pm    If you have any questions or concerns please feel free to call.    If you need to reschedule please call:  Clinic or Lab Appointment - [phone]  Infusion - [phone]  Imaging - [phone]    Alyssia Luu [facility]   Oncology/Hematology  [facility]  [phone]    After Hours Oncology Nurse Line - [phone]

## 2022-04-25 ENCOUNTER — INFUSION THERAPY VISIT (OUTPATIENT)
Dept: INFUSION THERAPY | Facility: CLINIC | Age: 57
End: 2022-04-25
Attending: INTERNAL MEDICINE
Payer: COMMERCIAL

## 2022-04-25 ENCOUNTER — LAB (OUTPATIENT)
Dept: LAB | Facility: CLINIC | Age: 57
End: 2022-04-25
Payer: COMMERCIAL

## 2022-04-25 ENCOUNTER — VIRTUAL VISIT (OUTPATIENT)
Dept: ONCOLOGY | Facility: CLINIC | Age: 57
End: 2022-04-25
Payer: COMMERCIAL

## 2022-04-25 VITALS
HEIGHT: 67 IN | BODY MASS INDEX: 37.25 KG/M2 | RESPIRATION RATE: 14 BRPM | OXYGEN SATURATION: 98 % | WEIGHT: 237.3 LBS | TEMPERATURE: 98.6 F | DIASTOLIC BLOOD PRESSURE: 73 MMHG | SYSTOLIC BLOOD PRESSURE: 137 MMHG | HEART RATE: 92 BPM

## 2022-04-25 DIAGNOSIS — Z17.31 HER2-POSITIVE CARCINOMA OF RIGHT BREAST (H): Primary | ICD-10-CM

## 2022-04-25 DIAGNOSIS — R19.7 DIARRHEA, UNSPECIFIED TYPE: ICD-10-CM

## 2022-04-25 DIAGNOSIS — E86.0 DEHYDRATION: ICD-10-CM

## 2022-04-25 DIAGNOSIS — C50.911 STAGE IV CARCINOMA OF BREAST, ER-, RIGHT (H): ICD-10-CM

## 2022-04-25 DIAGNOSIS — K12.31 MUCOSITIS DUE TO CHEMOTHERAPY: ICD-10-CM

## 2022-04-25 DIAGNOSIS — R11.2 NON-INTRACTABLE VOMITING WITH NAUSEA, UNSPECIFIED VOMITING TYPE: ICD-10-CM

## 2022-04-25 DIAGNOSIS — C50.511 MALIGNANT NEOPLASM OF LOWER-OUTER QUADRANT OF RIGHT FEMALE BREAST, UNSPECIFIED ESTROGEN RECEPTOR STATUS (H): ICD-10-CM

## 2022-04-25 DIAGNOSIS — C79.51 BREAST CANCER METASTASIZED TO BONE, RIGHT (H): ICD-10-CM

## 2022-04-25 DIAGNOSIS — C50.511 MALIGNANT NEOPLASM OF LOWER-OUTER QUADRANT OF RIGHT FEMALE BREAST, UNSPECIFIED ESTROGEN RECEPTOR STATUS (H): Primary | ICD-10-CM

## 2022-04-25 DIAGNOSIS — C50.911 HER2-POSITIVE CARCINOMA OF RIGHT BREAST (H): ICD-10-CM

## 2022-04-25 DIAGNOSIS — Z17.31 HER2-POSITIVE CARCINOMA OF RIGHT BREAST (H): ICD-10-CM

## 2022-04-25 DIAGNOSIS — C50.911 BREAST CANCER METASTASIZED TO LIVER, RIGHT (H): ICD-10-CM

## 2022-04-25 DIAGNOSIS — E80.6 HYPERBILIRUBINEMIA: Primary | ICD-10-CM

## 2022-04-25 DIAGNOSIS — R21 RASH: ICD-10-CM

## 2022-04-25 DIAGNOSIS — E83.52 HYPERCALCEMIA OF MALIGNANCY: ICD-10-CM

## 2022-04-25 DIAGNOSIS — Z17.1 STAGE IV CARCINOMA OF BREAST, ER-, RIGHT (H): ICD-10-CM

## 2022-04-25 DIAGNOSIS — E87.6 HYPOKALEMIA: ICD-10-CM

## 2022-04-25 DIAGNOSIS — C50.911 BREAST CANCER METASTASIZED TO BONE, RIGHT (H): ICD-10-CM

## 2022-04-25 DIAGNOSIS — C78.7 BREAST CANCER METASTASIZED TO LIVER, RIGHT (H): ICD-10-CM

## 2022-04-25 DIAGNOSIS — R74.01 TRANSAMINITIS: ICD-10-CM

## 2022-04-25 DIAGNOSIS — C50.911 HER2-POSITIVE CARCINOMA OF RIGHT BREAST (H): Primary | ICD-10-CM

## 2022-04-25 LAB
ALBUMIN SERPL-MCNC: 3.5 G/DL (ref 3.4–5)
ALP SERPL-CCNC: 95 U/L (ref 40–150)
ALT SERPL W P-5'-P-CCNC: 28 U/L (ref 0–50)
ANION GAP SERPL CALCULATED.3IONS-SCNC: 5 MMOL/L (ref 3–14)
AST SERPL W P-5'-P-CCNC: 28 U/L (ref 0–45)
BASOPHILS # BLD AUTO: 0 10E3/UL (ref 0–0.2)
BASOPHILS NFR BLD AUTO: 1 %
BILIRUB SERPL-MCNC: 0.7 MG/DL (ref 0.2–1.3)
BUN SERPL-MCNC: 6 MG/DL (ref 7–30)
CALCIUM SERPL-MCNC: 8.7 MG/DL (ref 8.5–10.1)
CHLORIDE BLD-SCNC: 110 MMOL/L (ref 94–109)
CO2 SERPL-SCNC: 26 MMOL/L (ref 20–32)
CREAT SERPL-MCNC: 0.84 MG/DL (ref 0.52–1.04)
EOSINOPHIL # BLD AUTO: 0.1 10E3/UL (ref 0–0.7)
EOSINOPHIL NFR BLD AUTO: 2 %
ERYTHROCYTE [DISTWIDTH] IN BLOOD BY AUTOMATED COUNT: 18.6 % (ref 10–15)
GFR SERPL CREATININE-BSD FRML MDRD: 80 ML/MIN/1.73M2
GLUCOSE BLD-MCNC: 106 MG/DL (ref 70–99)
HCT VFR BLD AUTO: 31.4 % (ref 35–47)
HGB BLD-MCNC: 10 G/DL (ref 11.7–15.7)
HOLD SPECIMEN: NORMAL
IMM GRANULOCYTES # BLD: 0.1 10E3/UL
IMM GRANULOCYTES NFR BLD: 1 %
LYMPHOCYTES # BLD AUTO: 1.4 10E3/UL (ref 0.8–5.3)
LYMPHOCYTES NFR BLD AUTO: 35 %
MAGNESIUM SERPL-MCNC: 1.7 MG/DL (ref 1.6–2.3)
MCH RBC QN AUTO: 30.1 PG (ref 26.5–33)
MCHC RBC AUTO-ENTMCNC: 31.8 G/DL (ref 31.5–36.5)
MCV RBC AUTO: 95 FL (ref 78–100)
MONOCYTES # BLD AUTO: 0.2 10E3/UL (ref 0–1.3)
MONOCYTES NFR BLD AUTO: 6 %
NEUTROPHILS # BLD AUTO: 2.3 10E3/UL (ref 1.6–8.3)
NEUTROPHILS NFR BLD AUTO: 55 %
NRBC # BLD AUTO: 0 10E3/UL
NRBC BLD AUTO-RTO: 0 /100
PLATELET # BLD AUTO: 291 10E3/UL (ref 150–450)
POTASSIUM BLD-SCNC: 3.9 MMOL/L (ref 3.4–5.3)
PROT SERPL-MCNC: 7 G/DL (ref 6.8–8.8)
RBC # BLD AUTO: 3.32 10E6/UL (ref 3.8–5.2)
SODIUM SERPL-SCNC: 141 MMOL/L (ref 133–144)
WBC # BLD AUTO: 4.1 10E3/UL (ref 4–11)

## 2022-04-25 PROCEDURE — 258N000003 HC RX IP 258 OP 636: Performed by: INTERNAL MEDICINE

## 2022-04-25 PROCEDURE — 80053 COMPREHEN METABOLIC PANEL: CPT | Performed by: INTERNAL MEDICINE

## 2022-04-25 PROCEDURE — 83735 ASSAY OF MAGNESIUM: CPT | Performed by: INTERNAL MEDICINE

## 2022-04-25 PROCEDURE — 96413 CHEMO IV INFUSION 1 HR: CPT

## 2022-04-25 PROCEDURE — 99214 OFFICE O/P EST MOD 30 MIN: CPT | Mod: 95 | Performed by: INTERNAL MEDICINE

## 2022-04-25 PROCEDURE — 85025 COMPLETE CBC W/AUTO DIFF WBC: CPT | Performed by: INTERNAL MEDICINE

## 2022-04-25 PROCEDURE — 250N000011 HC RX IP 250 OP 636: Performed by: INTERNAL MEDICINE

## 2022-04-25 RX ORDER — NALOXONE HYDROCHLORIDE 0.4 MG/ML
0.2 INJECTION, SOLUTION INTRAMUSCULAR; INTRAVENOUS; SUBCUTANEOUS
Status: CANCELLED | OUTPATIENT
Start: 2022-08-11

## 2022-04-25 RX ORDER — ALBUTEROL SULFATE 90 UG/1
1-2 AEROSOL, METERED RESPIRATORY (INHALATION)
Status: CANCELLED
Start: 2022-08-11

## 2022-04-25 RX ORDER — HEPARIN SODIUM (PORCINE) LOCK FLUSH IV SOLN 100 UNIT/ML 100 UNIT/ML
5 SOLUTION INTRAVENOUS
Status: CANCELLED | OUTPATIENT
Start: 2022-08-11

## 2022-04-25 RX ORDER — ALBUTEROL SULFATE 0.83 MG/ML
2.5 SOLUTION RESPIRATORY (INHALATION)
Status: CANCELLED | OUTPATIENT
Start: 2022-06-09

## 2022-04-25 RX ORDER — HEPARIN SODIUM,PORCINE 10 UNIT/ML
5 VIAL (ML) INTRAVENOUS
Status: CANCELLED | OUTPATIENT
Start: 2022-05-19

## 2022-04-25 RX ORDER — HEPARIN SODIUM (PORCINE) LOCK FLUSH IV SOLN 100 UNIT/ML 100 UNIT/ML
5 SOLUTION INTRAVENOUS
Status: CANCELLED | OUTPATIENT
Start: 2022-05-19

## 2022-04-25 RX ORDER — DIPHENHYDRAMINE HCL 25 MG
50 CAPSULE ORAL
Status: CANCELLED | OUTPATIENT
Start: 2022-06-09

## 2022-04-25 RX ORDER — DIPHENHYDRAMINE HYDROCHLORIDE 50 MG/ML
50 INJECTION INTRAMUSCULAR; INTRAVENOUS
Status: CANCELLED
Start: 2022-05-19

## 2022-04-25 RX ORDER — METHYLPREDNISOLONE SODIUM SUCCINATE 125 MG/2ML
125 INJECTION, POWDER, LYOPHILIZED, FOR SOLUTION INTRAMUSCULAR; INTRAVENOUS
Status: CANCELLED
Start: 2022-06-09

## 2022-04-25 RX ORDER — DIPHENHYDRAMINE HCL 25 MG
50 CAPSULE ORAL
Status: CANCELLED | OUTPATIENT
Start: 2022-08-11

## 2022-04-25 RX ORDER — DIPHENHYDRAMINE HYDROCHLORIDE 50 MG/ML
50 INJECTION INTRAMUSCULAR; INTRAVENOUS
Status: CANCELLED
Start: 2022-06-09

## 2022-04-25 RX ORDER — MEPERIDINE HYDROCHLORIDE 25 MG/ML
25 INJECTION INTRAMUSCULAR; INTRAVENOUS; SUBCUTANEOUS EVERY 30 MIN PRN
Status: CANCELLED | OUTPATIENT
Start: 2022-05-19

## 2022-04-25 RX ORDER — EPINEPHRINE 1 MG/ML
0.3 INJECTION, SOLUTION INTRAMUSCULAR; SUBCUTANEOUS EVERY 5 MIN PRN
Status: CANCELLED | OUTPATIENT
Start: 2022-08-11

## 2022-04-25 RX ORDER — ALBUTEROL SULFATE 90 UG/1
1-2 AEROSOL, METERED RESPIRATORY (INHALATION)
Status: CANCELLED
Start: 2022-05-19

## 2022-04-25 RX ORDER — NALOXONE HYDROCHLORIDE 0.4 MG/ML
0.2 INJECTION, SOLUTION INTRAMUSCULAR; INTRAVENOUS; SUBCUTANEOUS
Status: CANCELLED | OUTPATIENT
Start: 2022-06-09

## 2022-04-25 RX ORDER — ACETAMINOPHEN 325 MG/1
650 TABLET ORAL
Status: CANCELLED | OUTPATIENT
Start: 2022-06-09

## 2022-04-25 RX ORDER — HEPARIN SODIUM,PORCINE 10 UNIT/ML
5 VIAL (ML) INTRAVENOUS
Status: CANCELLED | OUTPATIENT
Start: 2022-06-09

## 2022-04-25 RX ORDER — METHYLPREDNISOLONE SODIUM SUCCINATE 125 MG/2ML
125 INJECTION, POWDER, LYOPHILIZED, FOR SOLUTION INTRAMUSCULAR; INTRAVENOUS
Status: CANCELLED
Start: 2022-08-11

## 2022-04-25 RX ORDER — HEPARIN SODIUM (PORCINE) LOCK FLUSH IV SOLN 100 UNIT/ML 100 UNIT/ML
5 SOLUTION INTRAVENOUS
Status: CANCELLED | OUTPATIENT
Start: 2022-06-09

## 2022-04-25 RX ORDER — DIPHENHYDRAMINE HYDROCHLORIDE 50 MG/ML
50 INJECTION INTRAMUSCULAR; INTRAVENOUS
Status: CANCELLED
Start: 2022-08-11

## 2022-04-25 RX ORDER — EPINEPHRINE 1 MG/ML
0.3 INJECTION, SOLUTION INTRAMUSCULAR; SUBCUTANEOUS EVERY 5 MIN PRN
Status: CANCELLED | OUTPATIENT
Start: 2022-06-09

## 2022-04-25 RX ORDER — ACETAMINOPHEN 325 MG/1
650 TABLET ORAL
Status: CANCELLED | OUTPATIENT
Start: 2022-05-19

## 2022-04-25 RX ORDER — MEPERIDINE HYDROCHLORIDE 25 MG/ML
25 INJECTION INTRAMUSCULAR; INTRAVENOUS; SUBCUTANEOUS EVERY 30 MIN PRN
Status: CANCELLED | OUTPATIENT
Start: 2022-06-09

## 2022-04-25 RX ORDER — HEPARIN SODIUM (PORCINE) LOCK FLUSH IV SOLN 100 UNIT/ML 100 UNIT/ML
5 SOLUTION INTRAVENOUS
Status: DISCONTINUED | OUTPATIENT
Start: 2022-04-25 | End: 2022-04-25 | Stop reason: HOSPADM

## 2022-04-25 RX ORDER — DIPHENHYDRAMINE HCL 25 MG
50 CAPSULE ORAL
Status: CANCELLED | OUTPATIENT
Start: 2022-05-19

## 2022-04-25 RX ORDER — ACETAMINOPHEN 325 MG/1
650 TABLET ORAL
Status: CANCELLED | OUTPATIENT
Start: 2022-08-11

## 2022-04-25 RX ORDER — HEPARIN SODIUM,PORCINE 10 UNIT/ML
5 VIAL (ML) INTRAVENOUS
Status: CANCELLED | OUTPATIENT
Start: 2022-08-11

## 2022-04-25 RX ORDER — ALBUTEROL SULFATE 0.83 MG/ML
2.5 SOLUTION RESPIRATORY (INHALATION)
Status: CANCELLED | OUTPATIENT
Start: 2022-05-19

## 2022-04-25 RX ORDER — ALBUTEROL SULFATE 90 UG/1
1-2 AEROSOL, METERED RESPIRATORY (INHALATION)
Status: CANCELLED
Start: 2022-06-09

## 2022-04-25 RX ORDER — ALBUTEROL SULFATE 0.83 MG/ML
2.5 SOLUTION RESPIRATORY (INHALATION)
Status: CANCELLED | OUTPATIENT
Start: 2022-08-11

## 2022-04-25 RX ORDER — NALOXONE HYDROCHLORIDE 0.4 MG/ML
0.2 INJECTION, SOLUTION INTRAMUSCULAR; INTRAVENOUS; SUBCUTANEOUS
Status: CANCELLED | OUTPATIENT
Start: 2022-05-19

## 2022-04-25 RX ORDER — MEPERIDINE HYDROCHLORIDE 25 MG/ML
25 INJECTION INTRAMUSCULAR; INTRAVENOUS; SUBCUTANEOUS EVERY 30 MIN PRN
Status: CANCELLED | OUTPATIENT
Start: 2022-08-11

## 2022-04-25 RX ORDER — EPINEPHRINE 1 MG/ML
0.3 INJECTION, SOLUTION INTRAMUSCULAR; SUBCUTANEOUS EVERY 5 MIN PRN
Status: CANCELLED | OUTPATIENT
Start: 2022-05-19

## 2022-04-25 RX ORDER — METHYLPREDNISOLONE SODIUM SUCCINATE 125 MG/2ML
125 INJECTION, POWDER, LYOPHILIZED, FOR SOLUTION INTRAMUSCULAR; INTRAVENOUS
Status: CANCELLED
Start: 2022-05-19

## 2022-04-25 RX ADMIN — Medication 5 ML: at 15:20

## 2022-04-25 RX ADMIN — PACLITAXEL 187 MG: 6 INJECTION, SOLUTION INTRAVENOUS at 14:10

## 2022-04-25 RX ADMIN — SODIUM CHLORIDE 250 ML: 9 INJECTION, SOLUTION INTRAVENOUS at 13:30

## 2022-04-25 ASSESSMENT — PAIN SCALES - GENERAL: PAINLEVEL: NO PAIN (0)

## 2022-04-25 NOTE — LETTER
2022         RE: Tiffanie Mcdermott  [id number] [address] [place] [id number]        Dear Colleague,    Thank you for referring your patient, Tiffanie Mcdermott, to the [facility] [place]. Please see a copy of my visit note below.    [facility] Hematology / Oncology  Progress Note  Name: Tiffanie Mcdermott  :  1965    MRN:  [id number]    --------------------    Assessment / Plan:  Regarding her metastatic, hormone negative, HER2 positive breast cancer with dense liver involvement and scattered bone involvement, Tiffanie will finish out weeks 8 and 9 of Taxol and then planning a PET scan.  If her PET scan is as improved as we think it is going to be based upon her clinical response, normalization of liver function tests as well as marked reduction in her tumor marker, will forge ahead with plans for Herceptin and Perjeta indefinitely every 3 weeks.  We will need to grab an echo every 3 months or so initially but likely scaling back depending upon stability.  We reviewed the potential for fractures as a result of breast cancer in her bones.  We discussed adding in a bone strengthening agent such as denosumab every 6 weeks to sync with her Herceptin and Perjeta.  After discussion of risks and benefits, Citlaly is in agreement to proceed.  Lastly I gave her the okay to discontinue potassium supplements and we will see what her levels are doing today.  We are hopeful that with combination of stopping the Taxol and improving diet will improve her potassium.    Adiel Kim MD    --------------------    Interval History:  Tiffanie returns for follow-up of metastatic breast cancer with bone and liver involvement.  Since her last visit, she has been doing markedly better.  She continues to deal with a lot of side effects from the potassium chloride supplementation particularly with stomach ache after taking her supplement.  This caused a lot of nausea.  She deals with reflux  chronically and uses her Prilosec.  She describes a sensitive stomach.  Alopecia setting but otherwise fortunately not a lot of neuropathy at this point.    --------------------    Physical Exam:  Video visit.    Labs / Imaging / Path:  We reviewed labs, personally reviewed imaging and reviewed pathology reports     Video Visit:  Tiffanie is a 57 year old female who is being evaluated via a billable video visit.  }    Video start time: 9:32 AM  Video end time: 9:58 AM    [facility]  Patient location: Home    Mode of transmission:  [employer] / [employer].        Again, thank you for allowing me to participate in the care of your patient.        Sincerely,        Adiel Kim MD

## 2022-04-25 NOTE — PATIENT INSTRUCTIONS
1) PET scan after last treatment 5/2 (discuss PET location w/ Tiffanie)  2) Move 5/5 BJT visit to after PET scan w/ Pertuzumab / Trastuzumab / Xgeva.  3) Pertuzumab and Trastuzumab q3 weeks 3 more cycles after that w/ labs and BJT/APC.  4) ECHO end of May.  5) Xgeva 6 weeks (every other treatment).    Adiel Kim MD.

## 2022-04-25 NOTE — PROGRESS NOTES
Infusion Nursing Note:  Tiffanie Mcdermott presents today for C3D8 Paclitaxel infusion.    Patient seen by provider today: Yes: Dr Kim   present during visit today: Not Applicable.    Note: Pt very apprehensive regarding port access. Accessed with x2 attempts.  Blood drawn in lab prior to infusion.  Pt had an appointment Dr. Kim in which she talked to him regarding K replacement. She states it made her nauseated and decreased her appetite. Potassium drawn today and within normal limits.  Other labs met criteria for Taxol infusion.  Vitally stable, denies any new questions or concerns at this time.        Intravenous Access:  Implanted Port.      Treatment Conditions:  Lab Results   Component Value Date    HGB 10.0 (L) 04/25/2022    WBC 4.1 04/25/2022    ANEU 7.0 03/21/2022    ANEUTAUTO 2.3 04/25/2022     04/25/2022      Lab Results   Component Value Date     04/25/2022    POTASSIUM 3.9 04/25/2022    MAG 1.7 04/25/2022    CR 0.84 04/25/2022    SARAI 8.7 04/25/2022    BILITOTAL 0.7 04/25/2022    ALBUMIN 3.5 04/25/2022    ALT 28 04/25/2022    AST 28 04/25/2022         Post Infusion Assessment:  Patient tolerated infusion without incident.  Blood return noted pre and post infusion.  Site patent and intact, free from redness, edema or discomfort.  No evidence of extravasations.  Access discontinued per protocol.       Discharge Plan:   Discharge instructions reviewed with: Patient.  Patient and/or family verbalized understanding of discharge instructions and all questions answered.  AVS to patient via [employer].  Patient will return 5/2/22 for next appointment.   Patient discharged in stable condition accompanied by: self.  Departure Mode: Ambulatory.      Lucinda Rodríguez, RN

## 2022-04-25 NOTE — PROGRESS NOTES
[facility] Hematology / Oncology  Progress Note  Name: Tiffanie Mcdermott  :  1965    MRN:  [id number]    --------------------    Assessment / Plan:  Regarding her metastatic, hormone negative, HER2 positive breast cancer with dense liver involvement and scattered bone involvement, Tiffanie will finish out weeks 8 and 9 of Taxol and then planning a PET scan.  If her PET scan is as improved as we think it is going to be based upon her clinical response, normalization of liver function tests as well as marked reduction in her tumor marker, will forge ahead with plans for Herceptin and Perjeta indefinitely every 3 weeks.  We will need to grab an echo every 3 months or so initially but likely scaling back depending upon stability.  We reviewed the potential for fractures as a result of breast cancer in her bones.  We discussed adding in a bone strengthening agent such as denosumab every 6 weeks to sync with her Herceptin and Perjeta.  After discussion of risks and benefits, Citlaly is in agreement to proceed.  Lastly I gave her the okay to discontinue potassium supplements and we will see what her levels are doing today.  We are hopeful that with combination of stopping the Taxol and improving diet will improve her potassium.    Adiel Kim MD    --------------------    Interval History:  Tiffanie returns for follow-up of metastatic breast cancer with bone and liver involvement.  Since her last visit, she has been doing markedly better.  She continues to deal with a lot of side effects from the potassium chloride supplementation particularly with stomach ache after taking her supplement.  This caused a lot of nausea.  She deals with reflux chronically and uses her Prilosec.  She describes a sensitive stomach.  Alopecia setting but otherwise fortunately not a lot of neuropathy at this point.    --------------------    Physical Exam:  Video visit.    Labs / Imaging / Path:  We reviewed labs, personally  reviewed imaging and reviewed pathology reports     Video Visit:  Tiffanie is a 57 year old female who is being evaluated via a billable video visit.  }    Video start time: 9:32 AM  Video end time: 9:58 AM    Provider location: [facility]  Patient location: Home    Mode of transmission:  Portal / [employer].

## 2022-04-27 ENCOUNTER — TELEPHONE (OUTPATIENT)
Dept: ONCOLOGY | Facility: CLINIC | Age: 57
End: 2022-04-27
Payer: COMMERCIAL

## 2022-04-27 ENCOUNTER — PATIENT OUTREACH (OUTPATIENT)
Dept: ONCOLOGY | Facility: CLINIC | Age: 57
End: 2022-04-27
Payer: COMMERCIAL

## 2022-04-27 NOTE — TELEPHONE ENCOUNTER
Spoke with patient regarding PET scan and upcoming appointments. [facility] Patient asking for Dr. Kim recommendations on tooth extraction recommended from Dr. Bui at [facility] P#[phone]. Message sent to Dr. Kim to advise, will return call to patient. No further questions or concerns.  Radha Neal RNCC

## 2022-04-28 ENCOUNTER — TELEPHONE (OUTPATIENT)
Dept: ONCOLOGY | Facility: CLINIC | Age: 57
End: 2022-04-28
Payer: COMMERCIAL

## 2022-04-28 NOTE — TELEPHONE ENCOUNTER
Cipriano message sent to patient with recommendations from Dr. Kim.  Radha Neal [facility]      ----- Message from Adiel Kim MD sent at 4/27/2022  2:06 PM CDT -----  Regarding: RE: dental  Easy answer...    After we finish chemo (taxol), she can safely have her tooth removed several weeks later provided platelets and ANC recover.  Her antibody therapies have no effect on wound healing, infection, bleeding.    If she needs it emergently, I would recommend proceeding and holding remaining chemo (but not antibody therapies).    Adiel Kim MD.      ----- Message -----  From: Radha Neal RN  Sent: 4/27/2022   1:59 PM CDT  To: Adiel Kim MD  Subject: dental                                           Hello,    Patient called asking us to speak with her dentist from [facility]: Dr. Bui [phone]. Patient states she needs a tooth extracted and needs to know if when or if she can have this taken out. Please let me know.    Thank you,  Radha SENA

## 2022-05-02 ENCOUNTER — INFUSION THERAPY VISIT (OUTPATIENT)
Dept: INFUSION THERAPY | Facility: CLINIC | Age: 57
End: 2022-05-02
Attending: INTERNAL MEDICINE
Payer: COMMERCIAL

## 2022-05-02 ENCOUNTER — APPOINTMENT (OUTPATIENT)
Dept: LAB | Facility: CLINIC | Age: 57
End: 2022-05-02
Payer: COMMERCIAL

## 2022-05-02 VITALS
BODY MASS INDEX: 38.24 KG/M2 | OXYGEN SATURATION: 99 % | HEIGHT: 67 IN | WEIGHT: 243.6 LBS | DIASTOLIC BLOOD PRESSURE: 77 MMHG | TEMPERATURE: 98 F | HEART RATE: 82 BPM | SYSTOLIC BLOOD PRESSURE: 147 MMHG | RESPIRATION RATE: 18 BRPM

## 2022-05-02 DIAGNOSIS — C50.511 MALIGNANT NEOPLASM OF LOWER-OUTER QUADRANT OF RIGHT FEMALE BREAST, UNSPECIFIED ESTROGEN RECEPTOR STATUS (H): Primary | ICD-10-CM

## 2022-05-02 LAB
BASOPHILS # BLD AUTO: 0 10E3/UL (ref 0–0.2)
BASOPHILS NFR BLD AUTO: 1 %
EOSINOPHIL # BLD AUTO: 0.1 10E3/UL (ref 0–0.7)
EOSINOPHIL NFR BLD AUTO: 2 %
ERYTHROCYTE [DISTWIDTH] IN BLOOD BY AUTOMATED COUNT: 18.9 % (ref 10–15)
HCT VFR BLD AUTO: 29.4 % (ref 35–47)
HGB BLD-MCNC: 9.4 G/DL (ref 11.7–15.7)
IMM GRANULOCYTES # BLD: 0.1 10E3/UL
IMM GRANULOCYTES NFR BLD: 1 %
LYMPHOCYTES # BLD AUTO: 1.6 10E3/UL (ref 0.8–5.3)
LYMPHOCYTES NFR BLD AUTO: 29 %
MCH RBC QN AUTO: 30.3 PG (ref 26.5–33)
MCHC RBC AUTO-ENTMCNC: 32 G/DL (ref 31.5–36.5)
MCV RBC AUTO: 95 FL (ref 78–100)
MONOCYTES # BLD AUTO: 0.4 10E3/UL (ref 0–1.3)
MONOCYTES NFR BLD AUTO: 7 %
NEUTROPHILS # BLD AUTO: 3.3 10E3/UL (ref 1.6–8.3)
NEUTROPHILS NFR BLD AUTO: 60 %
NRBC # BLD AUTO: 0 10E3/UL
NRBC BLD AUTO-RTO: 0 /100
PLATELET # BLD AUTO: 272 10E3/UL (ref 150–450)
RBC # BLD AUTO: 3.1 10E6/UL (ref 3.8–5.2)
WBC # BLD AUTO: 5.5 10E3/UL (ref 4–11)

## 2022-05-02 PROCEDURE — 250N000011 HC RX IP 250 OP 636: Performed by: INTERNAL MEDICINE

## 2022-05-02 PROCEDURE — 85025 COMPLETE CBC W/AUTO DIFF WBC: CPT | Performed by: INTERNAL MEDICINE

## 2022-05-02 PROCEDURE — 96413 CHEMO IV INFUSION 1 HR: CPT

## 2022-05-02 PROCEDURE — 258N000003 HC RX IP 258 OP 636: Performed by: INTERNAL MEDICINE

## 2022-05-02 RX ORDER — HEPARIN SODIUM (PORCINE) LOCK FLUSH IV SOLN 100 UNIT/ML 100 UNIT/ML
5 SOLUTION INTRAVENOUS
Status: DISCONTINUED | OUTPATIENT
Start: 2022-05-02 | End: 2022-05-02 | Stop reason: HOSPADM

## 2022-05-02 RX ADMIN — PACLITAXEL 187 MG: 6 INJECTION, SOLUTION INTRAVENOUS at 13:51

## 2022-05-02 RX ADMIN — SODIUM CHLORIDE 250 ML: 9 INJECTION, SOLUTION INTRAVENOUS at 13:23

## 2022-05-02 RX ADMIN — Medication 5 ML: at 15:15

## 2022-05-02 ASSESSMENT — PAIN SCALES - GENERAL: PAINLEVEL: NO PAIN (0)

## 2022-05-02 NOTE — PROGRESS NOTES
Infusion Nursing Note:  Tiffanie Mcdermott presents today for C3D15  Taxol.    Patient seen by provider today: No   present during visit today: Not Applicable.    Note: Patient c/o occassional pain in port. Trying to notice if when using arms. Port site C, D and I.       Intravenous Access:  Implanted Port.    Treatment Conditions:  Lab Results   Component Value Date    HGB 9.4 (L) 05/02/2022    WBC 5.5 05/02/2022    ANEU 7.0 03/21/2022    ANEUTAUTO 3.3 05/02/2022     05/02/2022      Results reviewed, labs MET treatment parameters, ok to proceed with treatment.      Post Infusion Assessment:  Patient tolerated infusion without incident.  Patient observed for 15 minutes post TAXOL per protocol.  Blood return noted pre and post infusion.  Site patent and intact, free from redness, edema or discomfort.  Access discontinued per protocol.       Discharge Plan:   Discharge instructions reviewed with: Patient.  Patient and/or family verbalized understanding of discharge instructions and all questions answered.  Patient discharged in stable condition accompanied by: self.  Departure Mode: Ambulatory.  Patient to start C4D1 Perjeta/Trazimera/Xgeva on 5/19 after PET and DR. Kim appt. . Patient states she need to  See dentist and have dental work on tooth yet.       Alicia Mcgee RN

## 2022-05-13 ENCOUNTER — HOSPITAL ENCOUNTER (OUTPATIENT)
Dept: PET IMAGING | Facility: CLINIC | Age: 57
Discharge: HOME | End: 2022-05-13
Attending: INTERNAL MEDICINE | Admitting: INTERNAL MEDICINE
Payer: COMMERCIAL

## 2022-05-13 DIAGNOSIS — Z17.31 HER2-POSITIVE CARCINOMA OF RIGHT BREAST (H): ICD-10-CM

## 2022-05-13 DIAGNOSIS — Z17.1 STAGE IV CARCINOMA OF BREAST, ER-, RIGHT (H): ICD-10-CM

## 2022-05-13 DIAGNOSIS — C78.7 BREAST CANCER METASTASIZED TO LIVER, RIGHT (H): ICD-10-CM

## 2022-05-13 DIAGNOSIS — C50.911 BREAST CANCER METASTASIZED TO LIVER, RIGHT (H): ICD-10-CM

## 2022-05-13 DIAGNOSIS — C50.911 STAGE IV CARCINOMA OF BREAST, ER-, RIGHT (H): ICD-10-CM

## 2022-05-13 DIAGNOSIS — C50.911 HER2-POSITIVE CARCINOMA OF RIGHT BREAST (H): ICD-10-CM

## 2022-05-13 PROCEDURE — 343N000001 HC RX 343: Performed by: INTERNAL MEDICINE

## 2022-05-13 PROCEDURE — 78815 PET IMAGE W/CT SKULL-THIGH: CPT | Mod: PS

## 2022-05-13 PROCEDURE — A9552 F18 FDG: HCPCS | Performed by: INTERNAL MEDICINE

## 2022-05-13 RX ADMIN — FLUDEOXYGLUCOSE F-18 12.2 MCI.: 500 INJECTION, SOLUTION INTRAVENOUS at 14:24

## 2022-05-19 ENCOUNTER — APPOINTMENT (OUTPATIENT)
Dept: LAB | Facility: CLINIC | Age: 57
End: 2022-05-19
Payer: COMMERCIAL

## 2022-05-19 ENCOUNTER — ONCOLOGY VISIT (OUTPATIENT)
Dept: ONCOLOGY | Facility: CLINIC | Age: 57
End: 2022-05-19
Attending: INTERNAL MEDICINE
Payer: COMMERCIAL

## 2022-05-19 ENCOUNTER — INFUSION THERAPY VISIT (OUTPATIENT)
Dept: INFUSION THERAPY | Facility: CLINIC | Age: 57
End: 2022-05-19
Attending: INTERNAL MEDICINE
Payer: COMMERCIAL

## 2022-05-19 VITALS
TEMPERATURE: 98 F | DIASTOLIC BLOOD PRESSURE: 96 MMHG | OXYGEN SATURATION: 97 % | HEIGHT: 67 IN | BODY MASS INDEX: 38.05 KG/M2 | HEART RATE: 104 BPM | WEIGHT: 242.44 LBS | SYSTOLIC BLOOD PRESSURE: 142 MMHG | RESPIRATION RATE: 14 BRPM

## 2022-05-19 VITALS — HEART RATE: 77 BPM | DIASTOLIC BLOOD PRESSURE: 78 MMHG | SYSTOLIC BLOOD PRESSURE: 146 MMHG

## 2022-05-19 DIAGNOSIS — D61.810 ANTINEOPLASTIC CHEMOTHERAPY INDUCED PANCYTOPENIA (H): ICD-10-CM

## 2022-05-19 DIAGNOSIS — T45.1X5A ANTINEOPLASTIC CHEMOTHERAPY INDUCED PANCYTOPENIA (H): ICD-10-CM

## 2022-05-19 DIAGNOSIS — Z17.31 HER2-POSITIVE CARCINOMA OF RIGHT BREAST (H): ICD-10-CM

## 2022-05-19 DIAGNOSIS — C50.511 MALIGNANT NEOPLASM OF LOWER-OUTER QUADRANT OF RIGHT FEMALE BREAST, UNSPECIFIED ESTROGEN RECEPTOR STATUS (H): ICD-10-CM

## 2022-05-19 DIAGNOSIS — C50.511 MALIGNANT NEOPLASM OF LOWER-OUTER QUADRANT OF RIGHT BREAST OF FEMALE, ESTROGEN RECEPTOR NEGATIVE (H): Primary | ICD-10-CM
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
## 2022-05-19 DIAGNOSIS — S40.861A TICK BITE OF RIGHT UPPER ARM, INITIAL ENCOUNTER: ICD-10-CM

## 2022-05-19 DIAGNOSIS — Z17.1 STAGE IV CARCINOMA OF BREAST, ER-, RIGHT (H): ICD-10-CM

## 2022-05-19 DIAGNOSIS — C50.911 STAGE IV CARCINOMA OF BREAST, ER-, RIGHT (H): ICD-10-CM

## 2022-05-19 DIAGNOSIS — T80.212A PORT OR RESERVOIR INFECTION, INITIAL ENCOUNTER: ICD-10-CM

## 2022-05-19 DIAGNOSIS — Z17.1 MALIGNANT NEOPLASM OF LOWER-OUTER QUADRANT OF RIGHT BREAST OF FEMALE, ESTROGEN RECEPTOR NEGATIVE (H): Primary | ICD-10-CM

## 2022-05-19 DIAGNOSIS — C50.511 MALIGNANT NEOPLASM OF LOWER-OUTER QUADRANT OF RIGHT FEMALE BREAST, UNSPECIFIED ESTROGEN RECEPTOR STATUS (H): Primary | ICD-10-CM

## 2022-05-19 DIAGNOSIS — C50.911 HER2-POSITIVE CARCINOMA OF RIGHT BREAST (H): ICD-10-CM

## 2022-05-19 LAB
ALBUMIN SERPL-MCNC: 3.5 G/DL (ref 3.4–5)
ALP SERPL-CCNC: 96 U/L (ref 40–150)
ALT SERPL W P-5'-P-CCNC: 29 U/L (ref 0–50)
ANION GAP SERPL CALCULATED.3IONS-SCNC: 3 MMOL/L (ref 3–14)
AST SERPL W P-5'-P-CCNC: 26 U/L (ref 0–45)
BASOPHILS # BLD AUTO: 0.1 10E3/UL (ref 0–0.2)
BASOPHILS NFR BLD AUTO: 1 %
BILIRUB SERPL-MCNC: 0.8 MG/DL (ref 0.2–1.3)
BUN SERPL-MCNC: 7 MG/DL (ref 7–30)
CALCIUM SERPL-MCNC: 9 MG/DL (ref 8.5–10.1)
CANCER AG27-29 SERPL-ACNC: 36 U/ML (ref 0–39)
CHLORIDE BLD-SCNC: 107 MMOL/L (ref 94–109)
CO2 SERPL-SCNC: 30 MMOL/L (ref 20–32)
CREAT SERPL-MCNC: 0.88 MG/DL (ref 0.52–1.04)
EOSINOPHIL # BLD AUTO: 0.3 10E3/UL (ref 0–0.7)
EOSINOPHIL NFR BLD AUTO: 6 %
ERYTHROCYTE [DISTWIDTH] IN BLOOD BY AUTOMATED COUNT: 16.5 % (ref 10–15)
GFR SERPL CREATININE-BSD FRML MDRD: 75 ML/MIN/1.73M2
GLUCOSE BLD-MCNC: 120 MG/DL (ref 70–99)
HCT VFR BLD AUTO: 35.7 % (ref 35–47)
HGB BLD-MCNC: 11.1 G/DL (ref 11.7–15.7)
IMM GRANULOCYTES # BLD: 0 10E3/UL
IMM GRANULOCYTES NFR BLD: 1 %
LYMPHOCYTES # BLD AUTO: 1.3 10E3/UL (ref 0.8–5.3)
LYMPHOCYTES NFR BLD AUTO: 21 %
MCH RBC QN AUTO: 30.2 PG (ref 26.5–33)
MCHC RBC AUTO-ENTMCNC: 31.1 G/DL (ref 31.5–36.5)
MCV RBC AUTO: 97 FL (ref 78–100)
MONOCYTES # BLD AUTO: 0.6 10E3/UL (ref 0–1.3)
MONOCYTES NFR BLD AUTO: 10 %
NEUTROPHILS # BLD AUTO: 3.8 10E3/UL (ref 1.6–8.3)
NEUTROPHILS NFR BLD AUTO: 61 %
NRBC # BLD AUTO: 0 10E3/UL
NRBC BLD AUTO-RTO: 0 /100
PLATELET # BLD AUTO: 273 10E3/UL (ref 150–450)
POTASSIUM BLD-SCNC: 3.3 MMOL/L (ref 3.4–5.3)
PROT SERPL-MCNC: 6.6 G/DL (ref 6.8–8.8)
RBC # BLD AUTO: 3.67 10E6/UL (ref 3.8–5.2)
SODIUM SERPL-SCNC: 140 MMOL/L (ref 133–144)
WBC # BLD AUTO: 6 10E3/UL (ref 4–11)

## 2022-05-19 PROCEDURE — 96417 CHEMO IV INFUS EACH ADDL SEQ: CPT

## 2022-05-19 PROCEDURE — 99215 OFFICE O/P EST HI 40 MIN: CPT | Performed by: INTERNAL MEDICINE

## 2022-05-19 PROCEDURE — 80053 COMPREHEN METABOLIC PANEL: CPT | Performed by: INTERNAL MEDICINE

## 2022-05-19 PROCEDURE — 96413 CHEMO IV INFUSION 1 HR: CPT

## 2022-05-19 PROCEDURE — 82040 ASSAY OF SERUM ALBUMIN: CPT | Performed by: INTERNAL MEDICINE

## 2022-05-19 PROCEDURE — 258N000003 HC RX IP 258 OP 636: Performed by: INTERNAL MEDICINE

## 2022-05-19 PROCEDURE — 85025 COMPLETE CBC W/AUTO DIFF WBC: CPT | Performed by: INTERNAL MEDICINE

## 2022-05-19 PROCEDURE — 250N000011 HC RX IP 250 OP 636: Performed by: INTERNAL MEDICINE

## 2022-05-19 PROCEDURE — 86300 IMMUNOASSAY TUMOR CA 15-3: CPT | Performed by: INTERNAL MEDICINE

## 2022-05-19 RX ORDER — HEPARIN SODIUM,PORCINE 10 UNIT/ML
5 VIAL (ML) INTRAVENOUS
Status: CANCELLED | OUTPATIENT
Start: 2022-06-30

## 2022-05-19 RX ORDER — ALBUTEROL SULFATE 0.83 MG/ML
2.5 SOLUTION RESPIRATORY (INHALATION)
Status: CANCELLED | OUTPATIENT
Start: 2022-07-21

## 2022-05-19 RX ORDER — METHYLPREDNISOLONE SODIUM SUCCINATE 125 MG/2ML
125 INJECTION, POWDER, LYOPHILIZED, FOR SOLUTION INTRAMUSCULAR; INTRAVENOUS
Status: CANCELLED
Start: 2022-06-30

## 2022-05-19 RX ORDER — LORAZEPAM 2 MG/ML
0.5 INJECTION INTRAMUSCULAR EVERY 4 HOURS PRN
Status: CANCELLED | OUTPATIENT
Start: 2022-07-21

## 2022-05-19 RX ORDER — HEPARIN SODIUM (PORCINE) LOCK FLUSH IV SOLN 100 UNIT/ML 100 UNIT/ML
5 SOLUTION INTRAVENOUS
Status: DISCONTINUED | OUTPATIENT
Start: 2022-05-19 | End: 2022-05-19 | Stop reason: HOSPADM

## 2022-05-19 RX ORDER — LORAZEPAM 2 MG/ML
0.5 INJECTION INTRAMUSCULAR EVERY 4 HOURS PRN
Status: CANCELLED | OUTPATIENT
Start: 2022-06-30

## 2022-05-19 RX ORDER — MEPERIDINE HYDROCHLORIDE 25 MG/ML
25 INJECTION INTRAMUSCULAR; INTRAVENOUS; SUBCUTANEOUS EVERY 30 MIN PRN
Status: CANCELLED | OUTPATIENT
Start: 2022-07-21

## 2022-05-19 RX ORDER — METHYLPREDNISOLONE SODIUM SUCCINATE 125 MG/2ML
125 INJECTION, POWDER, LYOPHILIZED, FOR SOLUTION INTRAMUSCULAR; INTRAVENOUS
Status: CANCELLED
Start: 2022-07-21

## 2022-05-19 RX ORDER — MEPERIDINE HYDROCHLORIDE 25 MG/ML
25 INJECTION INTRAMUSCULAR; INTRAVENOUS; SUBCUTANEOUS EVERY 30 MIN PRN
Status: CANCELLED | OUTPATIENT
Start: 2022-06-30

## 2022-05-19 RX ORDER — DIPHENHYDRAMINE HCL 25 MG
50 CAPSULE ORAL
Status: CANCELLED | OUTPATIENT
Start: 2022-06-30

## 2022-05-19 RX ORDER — DOXYCYCLINE HYCLATE 100 MG
200 TABLET ORAL ONCE
Qty: 2 TABLET | Refills: 0 | Status: SHIPPED | OUTPATIENT
Start: 2022-05-19 | End: 2022-05-19

## 2022-05-19 RX ORDER — EPINEPHRINE 1 MG/ML
0.3 INJECTION, SOLUTION, CONCENTRATE INTRAVENOUS EVERY 5 MIN PRN
Status: CANCELLED | OUTPATIENT
Start: 2022-07-21

## 2022-05-19 RX ORDER — DIPHENHYDRAMINE HYDROCHLORIDE 50 MG/ML
50 INJECTION INTRAMUSCULAR; INTRAVENOUS
Status: CANCELLED
Start: 2022-06-30

## 2022-05-19 RX ORDER — HEPARIN SODIUM,PORCINE 10 UNIT/ML
5 VIAL (ML) INTRAVENOUS
Status: CANCELLED | OUTPATIENT
Start: 2022-07-21

## 2022-05-19 RX ORDER — ALBUTEROL SULFATE 90 UG/1
1-2 AEROSOL, METERED RESPIRATORY (INHALATION)
Status: CANCELLED
Start: 2022-06-30

## 2022-05-19 RX ORDER — HEPARIN SODIUM (PORCINE) LOCK FLUSH IV SOLN 100 UNIT/ML 100 UNIT/ML
5 SOLUTION INTRAVENOUS
Status: CANCELLED | OUTPATIENT
Start: 2022-07-21

## 2022-05-19 RX ORDER — ACETAMINOPHEN 325 MG/1
650 TABLET ORAL
Status: CANCELLED | OUTPATIENT
Start: 2022-07-21

## 2022-05-19 RX ORDER — ALBUTEROL SULFATE 0.83 MG/ML
2.5 SOLUTION RESPIRATORY (INHALATION)
Status: CANCELLED | OUTPATIENT
Start: 2022-06-30

## 2022-05-19 RX ORDER — DIPHENHYDRAMINE HYDROCHLORIDE 50 MG/ML
50 INJECTION INTRAMUSCULAR; INTRAVENOUS
Status: CANCELLED
Start: 2022-07-21

## 2022-05-19 RX ORDER — NALOXONE HYDROCHLORIDE 0.4 MG/ML
0.2 INJECTION, SOLUTION INTRAMUSCULAR; INTRAVENOUS; SUBCUTANEOUS
Status: CANCELLED | OUTPATIENT
Start: 2022-07-21

## 2022-05-19 RX ORDER — HEPARIN SODIUM (PORCINE) LOCK FLUSH IV SOLN 100 UNIT/ML 100 UNIT/ML
5 SOLUTION INTRAVENOUS
Status: CANCELLED | OUTPATIENT
Start: 2022-06-30

## 2022-05-19 RX ORDER — ALBUTEROL SULFATE 90 UG/1
1-2 AEROSOL, METERED RESPIRATORY (INHALATION)
Status: CANCELLED
Start: 2022-07-21

## 2022-05-19 RX ORDER — NALOXONE HYDROCHLORIDE 0.4 MG/ML
0.2 INJECTION, SOLUTION INTRAMUSCULAR; INTRAVENOUS; SUBCUTANEOUS
Status: CANCELLED | OUTPATIENT
Start: 2022-06-30

## 2022-05-19 RX ORDER — DIPHENHYDRAMINE HCL 25 MG
50 CAPSULE ORAL
Status: CANCELLED | OUTPATIENT
Start: 2022-07-21

## 2022-05-19 RX ORDER — CLINDAMYCIN HCL 300 MG
300 CAPSULE ORAL 4 TIMES DAILY
Qty: 20 CAPSULE | Refills: 0 | Status: SHIPPED | OUTPATIENT
Start: 2022-05-20 | End: 2022-05-25

## 2022-05-19 RX ORDER — EPINEPHRINE 1 MG/ML
0.3 INJECTION, SOLUTION, CONCENTRATE INTRAVENOUS EVERY 5 MIN PRN
Status: CANCELLED | OUTPATIENT
Start: 2022-06-30

## 2022-05-19 RX ORDER — ACETAMINOPHEN 325 MG/1
650 TABLET ORAL
Status: CANCELLED | OUTPATIENT
Start: 2022-06-30

## 2022-05-19 RX ADMIN — SODIUM CHLORIDE 720 MG: 9 INJECTION, SOLUTION INTRAVENOUS at 15:40

## 2022-05-19 RX ADMIN — PERTUZUMAB 420 MG: 30 INJECTION, SOLUTION, CONCENTRATE INTRAVENOUS at 14:26

## 2022-05-19 RX ADMIN — SODIUM CHLORIDE 250 ML: 9 INJECTION, SOLUTION INTRAVENOUS at 14:09

## 2022-05-19 ASSESSMENT — PAIN SCALES - GENERAL: PAINLEVEL: NO PAIN (0)

## 2022-05-19 NOTE — PATIENT INSTRUCTIONS
1) Herceptin / Perjeta q21 days x 4 more cycles after today.  2) LATESHA 6 weeks w/ treatment above.  3) BJT 12 weeks w/ treatment above w/ PET scan.  4) PT referral for cancer rehab.    Adiel Kim MD.

## 2022-05-19 NOTE — PROGRESS NOTES
Infusion Nursing Note:  Tiffanie Mcdermott presents today for C4D1 Perjeta/ Trazimera.    Patient seen by provider today: yes, Dr Kim   present during visit today: Not Applicable.    Note: Large amount of serosanguinous fluid with small amount of pus noted when palpating port for access. Drng noted to come from a small pimple type area to the left of the port incision.  Pt stated that it had been itching and swollen the last couple of days.  She said that she did push on it a little but did not get anything at that time.  Dr Kim notified of above. Pt reminded to  antibiotic at pharmacy [facility]. Doxycycline x1 dose then Clindamycin.  Pt acknowledged understanding.   Attempted to have pt see Dr Villanueva after infusion but infusion ran too late.   Pt to see Dr Villanueva on 5/25.       Intravenous Access:  Peripheral IV placed.    Treatment Conditions:  Lab Results   Component Value Date    HGB 11.1 (L) 05/19/2022    WBC 6.0 05/19/2022    ANEU 7.0 03/21/2022    ANEUTAUTO 3.8 05/19/2022     05/19/2022      Lab Results   Component Value Date     05/19/2022    POTASSIUM 3.3 (L) 05/19/2022    MAG 1.7 04/25/2022    CR 0.88 05/19/2022    SARAI 9.0 05/19/2022    BILITOTAL 0.8 05/19/2022    ALBUMIN 3.5 05/19/2022    ALT 29 05/19/2022    AST 26 05/19/2022     Results reviewed, labs MET treatment parameters, ok to proceed with treatment.      Post Infusion Assessment:  Patient tolerated infusion without incident.  Site patent and intact, free from redness, edema or discomfort.  No evidence of extravasations.  Access discontinued per protocol.       Discharge Plan:   Discharge instructions reviewed with: Patient.  Patient discharged in stable condition accompanied by: self.  Departure Mode: Ambulatory.      Maranda Cesar RN

## 2022-05-19 NOTE — PATIENT INSTRUCTIONS
Doxycycline and Clindamycin at pharmacy today.  Take Doxycycline tonight and take   Clindamycin Doxycycline complete.    See Dr Villanueva next week.

## 2022-05-19 NOTE — LETTER
2022         RE: Tiffanie Mcdermott  [address] [address] [place] [id number]        Dear Colleague,    Thank you for referring your patient, Tiffanie Mcdermott, to the [facility] [place]. Please see a copy of my visit note below.    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  # Mar 2022 - May 2022 Taxol / Herceptin / Perjeta; near-CR.  # May 2022 - ongoing Herceptin / Perjeta.    Tiffanie is markedly improved clinically.  We reviewed her imaging which shows a near complete remission following Taxol/Herceptin/Perjeta.  We have plans for maintenance Herceptin/Perjeta moving forward indefinitely.  Her liver function tests and cancer antigen 27.29 have normalized.  I suspect she will feel better as the Taxol gets out of her system.  We are waiting for a final dental visit before proceeding with bone strengthening agents for her known bony mets.  Given her tick exposure in the right upper extremity and the length of potential exposure and immunocompromised status, we will give her a single dose of doxycycline.  Regarding her port drainage, we will give her a 5-day course of clindamycin and she has plans to see Dr. Villanueva for evaluation; no signs of surrounding cellulitis at this time.  We will use a peripheral IV today.  Work paperwork was provided today.  I am okay with an echocardiogram every 6 months or so.  We will plan another 3 months of therapy with a PET scan.  If this PET scan is stable and her tumor marker remains controlled, we will likely transition to CT scans moving forward.    Adiel Kim MD    --------------------    Interval  "History:  Tiffanie returns for follow-up of metastatic breast cancer with bone and liver involvement.  All in all, she is markedly better.  No new symptoms to report.  Hair was thin.  Little bit of neuropathy.  All in all feeling much better.  Will be seeing the dentist soon.  Had a tick bite.  Could have been in there couple days.  Little bit of drainage from her port site.    --------------------    Physical Exam:  VS: BP (!) 142/96 (BP Location: Right arm, Patient Position: Sitting, Cuff Size: Adult Regular)   Pulse 104   Temp 98  F (36.7  C) (Temporal)   Resp 14   Ht 1.702 m (5' 7\")   Wt 110 kg (242 lb 7 oz)   LMP 08/06/2008   SpO2 97%   BMI 37.97 kg/m    Gen: Well-appearing.  Skin: Right upper extremity with erythema around tick bite.  PORT: Purulent material drained from the left edge of the incision; no surrounding infection symptoms such as erythema or warmth.    Labs / Imaging / Path:  We reviewed labs, personally reviewed imaging and reviewed pathology reports         Again, thank you for allowing me to participate in the care of your patient.        Sincerely,        Adiel Kim MD    "

## 2022-05-19 NOTE — PROGRESS NOTES
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  # Mar 2022 - May 2022 Taxol / Herceptin / Perjeta; near-CR.  # May 2022 - ongoing Herceptin / Perjeta.    Tiffanie is markedly improved clinically.  We reviewed her imaging which shows a near complete remission following Taxol/Herceptin/Perjeta.  We have plans for maintenance Herceptin/Perjeta moving forward indefinitely.  Her liver function tests and cancer antigen 27.29 have normalized.  I suspect she will feel better as the Taxol gets out of her system.  We are waiting for a final dental visit before proceeding with bone strengthening agents for her known bony mets.  Given her tick exposure in the right upper extremity and the length of potential exposure and immunocompromised status, we will give her a single dose of doxycycline.  Regarding her port drainage, we will give her a 5-day course of clindamycin and she has plans to see Dr. Villanueva for evaluation; no signs of surrounding cellulitis at this time.  We will use a peripheral IV today.  Work paperwork was provided today.  I am okay with an echocardiogram every 6 months or so.  We will plan another 3 months of therapy with a PET scan.  If this PET scan is stable and her tumor marker remains controlled, we will likely transition to CT scans moving forward.    Adiel Kim MD    --------------------    Interval History:  Tiffanie returns for follow-up of metastatic breast cancer with bone and liver involvement.  All in all, she is markedly better.  No new symptoms to report.  Hair was thin.  Little bit of neuropathy.  All in all feeling much better.  Will be seeing the dentist soon.  Had a  "tick bite.  Could have been in there couple days.  Little bit of drainage from her port site.    --------------------    Physical Exam:  VS: BP (!) 142/96 (BP Location: Right arm, Patient Position: Sitting, Cuff Size: Adult Regular)   Pulse 104   Temp 98  F (36.7  C) (Temporal)   Resp 14   Ht 1.702 m (5' 7\")   Wt 110 kg (242 lb 7 oz)   LMP 08/06/2008   SpO2 97%   BMI 37.97 kg/m    Gen: Well-appearing.  Skin: Right upper extremity with erythema around tick bite.  PORT: Purulent material drained from the left edge of the incision; no surrounding infection symptoms such as erythema or warmth.    Labs / Imaging / Path:  We reviewed labs, personally reviewed imaging and reviewed pathology reports     "

## 2022-05-23 ENCOUNTER — HOSPITAL ENCOUNTER (OUTPATIENT)
Dept: CARDIOLOGY | Facility: CLINIC | Age: 57
Discharge: HOME | End: 2022-05-23
Attending: INTERNAL MEDICINE | Admitting: INTERNAL MEDICINE
Payer: COMMERCIAL

## 2022-05-23 DIAGNOSIS — C50.911 HER2-POSITIVE CARCINOMA OF RIGHT BREAST (H): ICD-10-CM

## 2022-05-23 DIAGNOSIS — Z17.1 STAGE IV CARCINOMA OF BREAST, ER-, RIGHT (H): ICD-10-CM

## 2022-05-23 DIAGNOSIS — Z17.31 HER2-POSITIVE CARCINOMA OF RIGHT BREAST (H): ICD-10-CM

## 2022-05-23 DIAGNOSIS — C78.7 BREAST CANCER METASTASIZED TO LIVER, RIGHT (H): ICD-10-CM

## 2022-05-23 DIAGNOSIS — C50.911 BREAST CANCER METASTASIZED TO LIVER, RIGHT (H): ICD-10-CM

## 2022-05-23 DIAGNOSIS — C50.911 STAGE IV CARCINOMA OF BREAST, ER-, RIGHT (H): ICD-10-CM

## 2022-05-23 LAB — LVEF ECHO: NORMAL

## 2022-05-23 PROCEDURE — 93325 DOPPLER ECHO COLOR FLOW MAPG: CPT | Mod: 26 | Performed by: INTERNAL MEDICINE

## 2022-05-23 PROCEDURE — 93308 TTE F-UP OR LMTD: CPT | Mod: 26 | Performed by: INTERNAL MEDICINE

## 2022-05-23 PROCEDURE — 93321 DOPPLER ECHO F-UP/LMTD STD: CPT | Mod: 26 | Performed by: INTERNAL MEDICINE

## 2022-05-23 PROCEDURE — 93325 DOPPLER ECHO COLOR FLOW MAPG: CPT

## 2022-05-25 ENCOUNTER — OFFICE VISIT (OUTPATIENT)
Dept: SURGERY | Facility: OTHER | Age: 57
End: 2022-05-25
Payer: COMMERCIAL

## 2022-05-25 VITALS
WEIGHT: 242 LBS | TEMPERATURE: 98.3 F | DIASTOLIC BLOOD PRESSURE: 84 MMHG | SYSTOLIC BLOOD PRESSURE: 136 MMHG | HEIGHT: 67 IN | BODY MASS INDEX: 37.98 KG/M2

## 2022-05-25 DIAGNOSIS — Z45.2 ENCOUNTER FOR CARE RELATED TO VASCULAR ACCESS PORT: Primary | ICD-10-CM

## 2022-05-25 PROCEDURE — 99212 OFFICE O/P EST SF 10 MIN: CPT | Performed by: SURGERY

## 2022-05-25 NOTE — PROGRESS NOTES
Patient seen in consultation for complication of venous access port     HPI:  Patient is a 57 year old female status post port placement in March of this year.  She has had some firmness and bruising in the area of the port ever since the procedure.  She has had it accessed several times without issue however last week the infusion nurse noticed swelling and upon palpation some seropurulent drainage.  She was placed on antibiotics.  She does think that there has been some improvement with the appearance and the swelling since that time.  Next scheduled infusion is not for a couple of weeks.  She has right-sided metastatic breast cancer with favorable response on recent pet imaging.  No definitive plan for surgery or radiation at this point.    Review Of Systems    Skin: negative  Ears/Nose/Throat: negative  Respiratory: No shortness of breath, dyspnea on exertion, cough, or hemoptysis  Cardiovascular: negative  Gastrointestinal: negative  Genitourinary: negative  Musculoskeletal: negative  Neurologic: negative  Hematologic/Lymphatic/Immunologic: negative  Endocrine: negative      Past Medical History:   Diagnosis Date     Allergy, unspecified not elsewhere classified      Motion sickness      PONV (postoperative nausea and vomiting)     requests scopalomine patch       Past Surgical History:   Procedure Laterality Date      LAPAROSCOPY, SURGICAL; CHOLECYSTECTOMY  10/19/2005    Cholecystectomy, Laparoscopic     INSERT PORT VASCULAR ACCESS Left 3/2/2022    Procedure: Placement of power port, left side;  Surgeon: Sage Turner MD;  Location: [facility] OR       Family History   Problem Relation Age of Onset     Allergies Mother      Anesthesia Reaction Mother         vomiting     Lipids Mother      Gastrointestinal Disease Mother      Heart Disease Maternal Grandmother      Hypertension Maternal Grandmother      Gastrointestinal Disease Maternal Grandmother         cholesystectyomy     Alcohol/Drug Maternal Grandfather       Allergies Sister        Social History     Socioeconomic History     Marital status:      Spouse name: roxana     Number of children: 1     Years of education: 16     Highest education level: Not on file   Occupational History     Occupation:      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   Tobacco Use     Smoking status: Never Smoker     Smokeless tobacco: Never Used     Tobacco comment: no smoking in the home   Vaping Use     Vaping Use: Never used   Substance and Sexual Activity     Alcohol use: Yes     Alcohol/week: 1.7 standard drinks     Drug use: No     Sexual activity: Yes     Partners: Male     Comment: none   Other Topics Concern      Service No     Blood Transfusions No     Caffeine Concern No     Comment: 0     Occupational Exposure No     Hobby Hazards No     Sleep Concern Yes     Comment: with work     Stress Concern Yes     Comment: with work     Weight Concern No     Special Diet No     Back Care No     Exercise No     Bike Helmet Not Asked     Comment: na     Seat Belt Yes     Self-Exams No     Comment: bse     Parent/sibling w/ CABG, MI or angioplasty before 65F 55M? Not Asked   Social History Narrative     Not on file     Social Determinants of Health     Financial Resource Strain: Not on file   Food Insecurity: Not on file   Transportation Needs: Not on file   Physical Activity: Not on file   Stress: Not on file   Social Connections: Not on file   Intimate Partner Violence: Not on file   Housing Stability: Not on file       Current Outpatient Medications   Medication Sig Dispense Refill     Acetaminophen (TYLENOL PO) Take 650 mg by mouth every 6 hours as needed for mild pain or fever (Patient not taking: Reported on 5/19/2022)       clindamycin (CLEOCIN) 300 MG capsule Take 1 capsule (300 mg) by mouth 4 times daily for 5 days Start day after doxycycline 20 capsule 0     ibuprofen (ADVIL,MOTRIN) 200 MG tablet Take 200 mg by mouth every 4 hours as needed (Patient not  "taking: Reported on 5/19/2022)       lidocaine, viscous, (XYLOCAINE) 2 % solution Apply 5 mLs topically every 3 hours as needed for other (use sponge stick to apply to painful mouth sores) ; Max 8 doses/24 hour period. 100 mL 1     lidocaine-prilocaine (EMLA) 2.5-2.5 % external cream Apply to port site 45 minutes to 1 hour prior to infusion. Cover site with plastic wrap to protect clothing and secure with skin tape. 5 g 3     Loperamide HCl (IMODIUM OR)        loratadine (CLARITIN) 10 MG tablet Take 10 mg by mouth daily       Multiple Vitamins-Minerals (MULTIPLE VITAMINS/WOMENS PO) Take 1 tablet by mouth daily        omeprazole (PRILOSEC) 20 MG CR capsule 20 mg twice daily for 7 days then 20 mg at bedtime thereafter. 45 capsule 0     ondansetron (ZOFRAN-ODT) 4 MG ODT tab Take 1 tablet (4 mg) by mouth every 8 hours as needed for nausea (Patient not taking: Reported on 5/19/2022) 30 tablet 0       Medications and history reviewed    Physical exam:  Vitals: /84   Temp 98.3  F (36.8  C) (Temporal)   Ht 1.702 m (5' 7\")   Wt 109.8 kg (242 lb)   LMP 08/06/2008   BMI 37.90 kg/m    BMI= Body mass index is 37.9 kg/m .    Constitutional: Healthy, alert, non-distressed   Head: Normo-cephalic, atraumatic, no lesions, masses or tenderness   Cardiovascular: RRR, no new murmurs, +S1, +S2 heart sounds, no clicks, rubs or gallops   Respiratory: CTAB, no rales, rhonchi or wheezing, equal chest rise, good respiratory effort   Gastrointestinal: Soft, non-tender, non distended, no rebound rigidity or guarding, no masses or hernias palpated   : Deferred  Musculoskeletal: Moves all extremities, normal  strength, no deformities noted   Skin: Left chest wall with palpable port.  There is induration surrounding the port without evidence of overt infection such as an abscess or cellulitis.  There is some bruising here.  No active drainage.  With the level of induration it is difficult to palpate the center of the " port  Psychiatric: Mentation appears normal, affect appropriate   Hematologic/Lymphatic/Immunologic: Normal cervical and supraclavicular lymph nodes   Patient able to get up on table without difficulty.    Labs show:  No results found for this or any previous visit (from the past 24 hour(s)).    Imaging shows:  Recent Results (from the past 744 hour(s))   PET Oncology (Eyes to Thighs)    Narrative    EXAM: PET ONCOLOGY (EYES TO THIGHS)  LOCATION: [facility]  DATE/TIME: 5/13/2022 2:05 PM    INDICATION: Subsequent treatment strategy for restaging malignant neoplasm of lower, outer right female breast  COMPARISON: PET CT from 03/18/2022  CONTRAST: No IV contrast  TECHNIQUE: Serum glucose level 79 mg/dL. One hour post intravenous administration of 12.2 mCi F-18 FDG, PET imaging was performed from the vertex to the lower thighs, utilizing attenuation correction with concurrent axial CT and PET/CT image fusion. Dose   reduction techniques were used.    FINDINGS: Tumor in the right breast has decreased from 3.7 x 1.8 cm (SUVmax 10.3) to 2.6 x 1.3 cm (SUVmax 3.9), now barely exceeding background soft tissue levels. Diffuse mild FDG activity (SUVmax 3.3) in skin thickening in the right breast. All other   FDG activity is normal. Lymphadenopathy has resolved. Liver metastases have decreased in size and are no longer abnormally FDG avid.    Calcified granulomas right lung. Left IJ Port-A-Cath tip in the low SVC. Small nonobstructing right kidney stone. Non-FDG avid solid liver lesions. Cholecystectomy. Pelvic phleboliths. Mild degenerative change in the spine. Slight anterolisthesis of L3   on L4.      Impression    IMPRESSION:  Near complete response   Echocardiogram Limited   Result Value    LVEF  60%    Narrative    [id number]  [id number]  [id number]  [id number]^ABRAN^LATASHA^[facility]  Echocardiography Laboratory  [address] [place] [address]     Name:  AUDREY ALVAREZ  MRN: [id number]  : 1965  Study Date: 2022 09:18 AM  Age: 57 yrs  Gender: Female  Patient Location: [facility]  Reason For Study: Stage IV carcinoma of breast, ER-, right (H), Stage IV  carcinoma  History: Chemo, Breast Cancer  Ordering Physician: LATASHA OSULLIVAN  Referring Physician: Jeanette Cerrato  Performed By: Opal Jack     BSA: 2.2 m2  Height: 67 in  Weight: 242 lb  HR: 93  BP: 146/82 mmHg  ______________________________________________________________________________  Procedure  Limited Echo Adult. Myocardial Strain Imaging performed.  ______________________________________________________________________________  Interpretation Summary     The left ventricle is normal in size.  Left ventricular systolic function is normal.  The visual ejection fraction is estimated at 60%.  Normal left ventricular wall motion  Global peak LV longitudinal strain is averaged at -21.6%. This is within  reported normal limits (normal <-18%).  Compared to prior study, there is no significant change.  ______________________________________________________________________________  Left Ventricle  The left ventricle is normal in size. There is normal left ventricular wall  thickness. Left ventricular systolic function is normal. Global peak LV  longitudinal strain is averaged at -21.6%. This is within reported normal  limits (normal <-18%). The visual ejection fraction is estimated at 60%. Grade  I or early diastolic dysfunction. Diastolic Doppler findings (E/E' ratio  and/or other parameters) suggest left ventricular filling pressures are  indeterminate. Normal left ventricular wall motion.     Right Ventricle  The right ventricle is normal in structure, function and size.     Atria  Normal left atrial size. Right atrial size is normal. There is no atrial shunt  seen.     Mitral Valve  The mitral valve is normal in structure and function. There is trace mitral  regurgitation.     Tricuspid  Valve  The tricuspid valve is normal in structure and function. There is trace  tricuspid regurgitation. Right ventricular systolic pressure could not be  approximated due to inadequate tricuspid regurgitation. IVC diameter <2.1 cm  collapsing >50% with sniff suggests a normal RA pressure of 3 mmHg.     Aortic Valve  The aortic valve is normal in structure and function. No aortic regurgitation  is present. No aortic stenosis is present.     Pulmonic Valve  The pulmonic valve is not well seen, but is grossly normal. There is trace  pulmonic valvular regurgitation.     Vessels  Normal size aorta. The inferior vena cava was normal in size with preserved  respiratory variability.     Pericardium  There is no pericardial effusion.     Rhythm  Sinus rhythm was noted.  ______________________________________________________________________________  MMode/2D Measurements & Calculations  IVSd: 1.0 cm     LVIDd: 5.1 cm  LVIDs: 3.8 cm  LVPWd: 0.99 cm  FS: 26.0 %  LV mass(C)d: 191.6 grams  LV mass(C)dI: 87.4 grams/m2  LA dimension: 4.3 cm  LA Volume (BP): 62.6 ml  LA Volume Index (BP): 28.6 ml/m2  RWT: 0.39     Doppler Measurements & Calculations  MV E max edgar: 109.0 cm/sec  MV A max edgar: 143.4 cm/sec  MV E/A: 0.76     MV dec slope: 741.1 cm/sec2  MV dec time: 0.15 sec  E/E' av.8  Lateral E/e': 12.8  Medial E/e': 16.7     ______________________________________________________________________________  Report approved by: Elin May 2022 02:33 PM              Assessment:     ICD-10-CM    1. Encounter for care related to vascular access port  Z45.2      Plan: At this point with improving symptoms and appearance and without systemic signs of illness would recommend continued observation.  If there are any further issues or question of ongoing infection at the time of next infusion, unfortunately she will likely need the port removed.  This can be done in the office setting.  We could potentially place port on other  side at a later date.        Ryne Villanueva, DO

## 2022-05-25 NOTE — LETTER
5/25/2022         RE: Tiffanie Mcdermott  [address] [address] [place] [id number]        Dear Colleague,    Thank you for referring your patient, Tiffanie Mcdermott, to the [facility]. Please see a copy of my visit note below.    Patient seen in consultation for complication of venous access port     HPI:  Patient is a 57 year old female status post port placement in March of this year.  She has had some firmness and bruising in the area of the port ever since the procedure.  She has had it accessed several times without issue however last week the infusion nurse noticed swelling and upon palpation some seropurulent drainage.  She was placed on antibiotics.  She does think that there has been some improvement with the appearance and the swelling since that time.  Next scheduled infusion is not for a couple of weeks.  She has right-sided metastatic breast cancer with favorable response on recent pet imaging.  No definitive plan for surgery or radiation at this point.    Review Of Systems    Skin: negative  Ears/Nose/Throat: negative  Respiratory: No shortness of breath, dyspnea on exertion, cough, or hemoptysis  Cardiovascular: negative  Gastrointestinal: negative  Genitourinary: negative  Musculoskeletal: negative  Neurologic: negative  Hematologic/Lymphatic/Immunologic: negative  Endocrine: negative      Past Medical History:   Diagnosis Date     Allergy, unspecified not elsewhere classified      Motion sickness      PONV (postoperative nausea and vomiting)     requests scopalomine patch       Past Surgical History:   Procedure Laterality Date      LAPAROSCOPY, SURGICAL; CHOLECYSTECTOMY  10/19/2005    Cholecystectomy, Laparoscopic     INSERT PORT VASCULAR ACCESS Left 3/2/2022    Procedure: Placement of power port, left side;  Surgeon: Sage Turner MD;  Location:  OR       Family History   Problem Relation Age of Onset     Allergies Mother      Anesthesia Reaction Mother          vomiting     Lipids Mother      Gastrointestinal Disease Mother      Heart Disease Maternal Grandmother      Hypertension Maternal Grandmother      Gastrointestinal Disease Maternal Grandmother         cholesystectyomy     Alcohol/Drug Maternal Grandfather      Allergies Sister        Social History     Socioeconomic History     Marital status:      Spouse name: roxana     Number of children: 1     Years of education: 16     Highest education level: Not on file   Occupational History     Occupation:      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   Tobacco Use     Smoking status: Never Smoker     Smokeless tobacco: Never Used     Tobacco comment: no smoking in the home   Vaping Use     Vaping Use: Never used   Substance and Sexual Activity     Alcohol use: Yes     Alcohol/week: 1.7 standard drinks     Drug use: No     Sexual activity: Yes     Partners: Male     Comment: none   Other Topics Concern      Service No     Blood Transfusions No     Caffeine Concern No     Comment: 0     Occupational Exposure No     Hobby Hazards No     Sleep Concern Yes     Comment: with work     Stress Concern Yes     Comment: with work     Weight Concern No     Special Diet No     Back Care No     Exercise No     Bike Helmet Not Asked     Comment: na     Seat Belt Yes     Self-Exams No     Comment: bse     Parent/sibling w/ CABG, MI or angioplasty before 65F 55M? Not Asked   Social History Narrative     Not on file     Social Determinants of Health     Financial Resource Strain: Not on file   Food Insecurity: Not on file   Transportation Needs: Not on file   Physical Activity: Not on file   Stress: Not on file   Social Connections: Not on file   Intimate Partner Violence: Not on file   Housing Stability: Not on file       Current Outpatient Medications   Medication Sig Dispense Refill     Acetaminophen (TYLENOL PO) Take 650 mg by mouth every 6 hours as needed for mild pain or fever (Patient not taking:  "Reported on 5/19/2022)       clindamycin (CLEOCIN) 300 MG capsule Take 1 capsule (300 mg) by mouth 4 times daily for 5 days Start day after doxycycline 20 capsule 0     ibuprofen (ADVIL,MOTRIN) 200 MG tablet Take 200 mg by mouth every 4 hours as needed (Patient not taking: Reported on 5/19/2022)       lidocaine, viscous, (XYLOCAINE) 2 % solution Apply 5 mLs topically every 3 hours as needed for other (use sponge stick to apply to painful mouth sores) ; Max 8 doses/24 hour period. 100 mL 1     lidocaine-prilocaine (EMLA) 2.5-2.5 % external cream Apply to port site 45 minutes to 1 hour prior to infusion. Cover site with plastic wrap to protect clothing and secure with skin tape. 5 g 3     Loperamide HCl (IMODIUM OR)        loratadine (CLARITIN) 10 MG tablet Take 10 mg by mouth daily       Multiple Vitamins-Minerals (MULTIPLE VITAMINS/WOMENS PO) Take 1 tablet by mouth daily        omeprazole (PRILOSEC) 20 MG CR capsule 20 mg twice daily for 7 days then 20 mg at bedtime thereafter. 45 capsule 0     ondansetron (ZOFRAN-ODT) 4 MG ODT tab Take 1 tablet (4 mg) by mouth every 8 hours as needed for nausea (Patient not taking: Reported on 5/19/2022) 30 tablet 0       Medications and history reviewed    Physical exam:  Vitals: /84   Temp 98.3  F (36.8  C) (Temporal)   Ht 1.702 m (5' 7\")   Wt 109.8 kg (242 lb)   LMP 08/06/2008   BMI 37.90 kg/m    BMI= Body mass index is 37.9 kg/m .    Constitutional: Healthy, alert, non-distressed   Head: Normo-cephalic, atraumatic, no lesions, masses or tenderness   Cardiovascular: RRR, no new murmurs, +S1, +S2 heart sounds, no clicks, rubs or gallops   Respiratory: CTAB, no rales, rhonchi or wheezing, equal chest rise, good respiratory effort   Gastrointestinal: Soft, non-tender, non distended, no rebound rigidity or guarding, no masses or hernias palpated   : Deferred  Musculoskeletal: Moves all extremities, normal  strength, no deformities noted   Skin: Left chest wall with " palpable port.  There is induration surrounding the port without evidence of overt infection such as an abscess or cellulitis.  There is some bruising here.  No active drainage.  With the level of induration it is difficult to palpate the center of the port  Psychiatric: Mentation appears normal, affect appropriate   Hematologic/Lymphatic/Immunologic: Normal cervical and supraclavicular lymph nodes   Patient able to get up on table without difficulty.    Labs show:  No results found for this or any previous visit (from the past 24 hour(s)).    Imaging shows:  Recent Results (from the past 744 hour(s))   PET Oncology (Eyes to Thighs)    Narrative    EXAM: PET ONCOLOGY (EYES TO THIGHS)  LOCATION: [facility]  DATE/TIME: 5/13/2022 2:05 PM    INDICATION: Subsequent treatment strategy for restaging malignant neoplasm of lower, outer right female breast  COMPARISON: PET CT from 03/18/2022  CONTRAST: No IV contrast  TECHNIQUE: Serum glucose level 79 mg/dL. One hour post intravenous administration of 12.2 mCi F-18 FDG, PET imaging was performed from the vertex to the lower thighs, utilizing attenuation correction with concurrent axial CT and PET/CT image fusion. Dose   reduction techniques were used.    FINDINGS: Tumor in the right breast has decreased from 3.7 x 1.8 cm (SUVmax 10.3) to 2.6 x 1.3 cm (SUVmax 3.9), now barely exceeding background soft tissue levels. Diffuse mild FDG activity (SUVmax 3.3) in skin thickening in the right breast. All other   FDG activity is normal. Lymphadenopathy has resolved. Liver metastases have decreased in size and are no longer abnormally FDG avid.    Calcified granulomas right lung. Left IJ Port-A-Cath tip in the low SVC. Small nonobstructing right kidney stone. Non-FDG avid solid liver lesions. Cholecystectomy. Pelvic phleboliths. Mild degenerative change in the spine. Slight anterolisthesis of L3   on L4.      Impression    IMPRESSION:  Near complete  response   Echocardiogram Limited   Result Value    LVEF  60%    [facility]    [id number]  [id number]  [id number]  [id number]^ARBAN^LATASHA^SYDNIE     [facility]  Echocardiography Laboratory  [address] [facility] [place] [address]     Name: AUDREY ALVAREZ  MRN: [id number]  : 1965  Study Date: 2022 09:18 AM  Age: 57 yrs  Gender: Female  Patient Location: [facility]  Reason For Study: Stage IV carcinoma of breast, ER-, right (H), Stage IV  carcinoma  History: Chemo, Breast Cancer  Ordering Physician: LATASHA OSULLIVAN  Referring Physician: Jeanette Cerrato  Performed By: Opal Jack     BSA: 2.2 m2  Height: 67 in  Weight: 242 lb  HR: 93  BP: 146/82 mmHg  ______________________________________________________________________________  Procedure  Limited Echo Adult. Myocardial Strain Imaging performed.  ______________________________________________________________________________  Interpretation Summary     The left ventricle is normal in size.  Left ventricular systolic function is normal.  The visual ejection fraction is estimated at 60%.  Normal left ventricular wall motion  Global peak LV longitudinal strain is averaged at -21.6%. This is within  reported normal limits (normal <-18%).  Compared to prior study, there is no significant change.  ______________________________________________________________________________  Left Ventricle  The left ventricle is normal in size. There is normal left ventricular wall  thickness. Left ventricular systolic function is normal. Global peak LV  longitudinal strain is averaged at -21.6%. This is within reported normal  limits (normal <-18%). The visual ejection fraction is estimated at 60%. Grade  I or early diastolic dysfunction. Diastolic Doppler findings (E/E' ratio  and/or other parameters) suggest left ventricular filling pressures are  indeterminate. Normal left ventricular wall motion.     Right Ventricle  The right ventricle is normal in  structure, function and size.     Atria  Normal left atrial size. Right atrial size is normal. There is no atrial shunt  seen.     Mitral Valve  The mitral valve is normal in structure and function. There is trace mitral  regurgitation.     Tricuspid Valve  The tricuspid valve is normal in structure and function. There is trace  tricuspid regurgitation. Right ventricular systolic pressure could not be  approximated due to inadequate tricuspid regurgitation. IVC diameter <2.1 cm  collapsing >50% with sniff suggests a normal RA pressure of 3 mmHg.     Aortic Valve  The aortic valve is normal in structure and function. No aortic regurgitation  is present. No aortic stenosis is present.     Pulmonic Valve  The pulmonic valve is not well seen, but is grossly normal. There is trace  pulmonic valvular regurgitation.     Vessels  Normal size aorta. The inferior vena cava was normal in size with preserved  respiratory variability.     Pericardium  There is no pericardial effusion.     Rhythm  Sinus rhythm was noted.  ______________________________________________________________________________  MMode/2D Measurements & Calculations  IVSd: 1.0 cm     LVIDd: 5.1 cm  LVIDs: 3.8 cm  LVPWd: 0.99 cm  FS: 26.0 %  LV mass(C)d: 191.6 grams  LV mass(C)dI: 87.4 grams/m2  LA dimension: 4.3 cm  LA Volume (BP): 62.6 ml  LA Volume Index (BP): 28.6 ml/m2  RWT: 0.39     Doppler Measurements & Calculations  MV E max edgar: 109.0 cm/sec  MV A max edgar: 143.4 cm/sec  MV E/A: 0.76     MV dec slope: 741.1 cm/sec2  MV dec time: 0.15 sec  E/E' av.8  Lateral E/e': 12.8  Medial E/e': 16.7     ______________________________________________________________________________  Report approved by: Elin May 2022 02:33 PM              Assessment:     ICD-10-CM    1. Encounter for care related to vascular access port  Z45.2      Plan: At this point with improving symptoms and appearance and without systemic signs of illness would recommend  continued observation.  If there are any further issues or question of ongoing infection at the time of next infusion, unfortunately she will likely need the port removed.  This can be done in the office setting.  We could potentially place port on other side at a later date.        Ryne Villanueva, DO        Again, thank you for allowing me to participate in the care of your patient.        Sincerely,        Ryne Villanueva, DO

## 2022-05-26 ENCOUNTER — TELEPHONE (OUTPATIENT)
Dept: ONCOLOGY | Facility: CLINIC | Age: 57
End: 2022-05-26

## 2022-05-26 NOTE — TELEPHONE ENCOUNTER
Reason for Call:  Other appointment    Detailed comments:  Patient is calling to see if the form that Radha was working on is done yet? Her company hasn't received it yet?     Phone Number Patient can be reached at: Home number on file [phone] (home)    Best Time: any    Can we leave a detailed message on this number? YES    Call taken on 5/26/2022 at 2:33 PM by Lucinda Arevalo

## 2022-06-07 NOTE — TELEPHONE ENCOUNTER
Working remotely today and yesterday.    Will fax to [facility] anticipated Wednesday.    Adiel Kim MD.

## 2022-06-09 ENCOUNTER — INFUSION THERAPY VISIT (OUTPATIENT)
Dept: INFUSION THERAPY | Facility: CLINIC | Age: 57
End: 2022-06-09
Attending: INTERNAL MEDICINE
Payer: COMMERCIAL

## 2022-06-09 VITALS
TEMPERATURE: 97.9 F | WEIGHT: 239.5 LBS | RESPIRATION RATE: 18 BRPM | SYSTOLIC BLOOD PRESSURE: 131 MMHG | HEIGHT: 67 IN | HEART RATE: 67 BPM | DIASTOLIC BLOOD PRESSURE: 65 MMHG | OXYGEN SATURATION: 98 % | BODY MASS INDEX: 37.59 KG/M2

## 2022-06-09 DIAGNOSIS — C50.511 MALIGNANT NEOPLASM OF LOWER-OUTER QUADRANT OF RIGHT FEMALE BREAST, UNSPECIFIED ESTROGEN RECEPTOR STATUS (H): Primary | ICD-10-CM

## 2022-06-09 PROCEDURE — 96417 CHEMO IV INFUS EACH ADDL SEQ: CPT

## 2022-06-09 PROCEDURE — 250N000011 HC RX IP 250 OP 636: Performed by: INTERNAL MEDICINE

## 2022-06-09 PROCEDURE — 258N000003 HC RX IP 258 OP 636: Performed by: INTERNAL MEDICINE

## 2022-06-09 PROCEDURE — 96413 CHEMO IV INFUSION 1 HR: CPT

## 2022-06-09 RX ORDER — HEPARIN SODIUM (PORCINE) LOCK FLUSH IV SOLN 100 UNIT/ML 100 UNIT/ML
5 SOLUTION INTRAVENOUS
Status: DISCONTINUED | OUTPATIENT
Start: 2022-06-09 | End: 2022-06-09 | Stop reason: HOSPADM

## 2022-06-09 RX ADMIN — SODIUM CHLORIDE 250 ML: 9 INJECTION, SOLUTION INTRAVENOUS at 12:51

## 2022-06-09 RX ADMIN — SODIUM CHLORIDE 720 MG: 9 INJECTION, SOLUTION INTRAVENOUS at 13:43

## 2022-06-09 RX ADMIN — PERTUZUMAB 420 MG: 30 INJECTION, SOLUTION, CONCENTRATE INTRAVENOUS at 13:03

## 2022-06-09 RX ADMIN — Medication 5 ML: at 14:31

## 2022-06-09 ASSESSMENT — PAIN SCALES - GENERAL: PAINLEVEL: NO PAIN (0)

## 2022-06-09 NOTE — PROGRESS NOTES
Reason for Disposition   General information question, no triage required and triager able to answer question    Protocols used: ST INFORMATION ONLY CALL-A-AH       Infusion Nursing Note:  Tiffanie Mcdermott presents today for C5D1 Perjeta/Trazimera.    Patient seen by provider today: No   present during visit today: Not Applicable.    Note: Patients port accessed x 2 . No blood return on 1st attempt, saline  flushes easily. C/o discomfort.   Serous fluid oozed out with needle removal.   Re-accessed with 19 G X 3/4 inch with good blood return, No discomfort. After tx completed,  Port flushed with NS/heparin per protocol then removed and again serous tan fluid oozed out from insertion site of port. No odor.   Informed DR. Kim and will have patient follow up with DR. Villanueva.       Intravenous Access:  Implanted Port.    Treatment Conditions:  Not Applicable.      Post Infusion Assessment:  Patient tolerated infusion without incident.  Blood return noted pre and post infusion.  Access discontinued per protocol.       Discharge Plan:   Discharge instructions reviewed with: Patient.  Patient and/or family verbalized understanding of discharge instructions and all questions answered.  Patient discharged in stable condition accompanied by: self.  Departure Mode: Ambulatory.  Patient will follow up with DR. Villanueva.   Her concern is for labs to be drawn with IV insertion. Doesn't want multiple sticks.       Alicia Mcgee RN

## 2022-06-15 ENCOUNTER — OFFICE VISIT (OUTPATIENT)
Dept: SURGERY | Facility: CLINIC | Age: 57
End: 2022-06-15
Payer: COMMERCIAL

## 2022-06-15 VITALS
SYSTOLIC BLOOD PRESSURE: 130 MMHG | BODY MASS INDEX: 38.14 KG/M2 | TEMPERATURE: 98.7 F | WEIGHT: 243 LBS | DIASTOLIC BLOOD PRESSURE: 78 MMHG | HEIGHT: 67 IN

## 2022-06-15 DIAGNOSIS — Z45.2 ENCOUNTER FOR CARE RELATED TO VASCULAR ACCESS PORT: Primary | ICD-10-CM

## 2022-06-15 PROCEDURE — 99213 OFFICE O/P EST LOW 20 MIN: CPT | Performed by: SURGERY

## 2022-06-15 NOTE — LETTER
6/15/2022         RE: Tiffanie Mcdermott  [address] [address] [place] [phone]        Dear Colleague,    Thank you for referring your patient, Tiffanie Mcdermott, to the [facility]. Please see a copy of my visit note below.    General Surgery Follow Up    Pt returns for follow up visit for complications of port    HPI:  Tiffanie had an infusion last week after having some issues with swelling and possible infection of the port several weeks ago.  When I saw her last there was a moderate amount of fluid with some drainage.  Currently there is much less swelling.  Reportedly there was some serous tan fluid drainage after recent infusion.  It took 2 attempts but the port was successfully accessed with blood return and no issues with medication administration.  She denies any systemic signs of illness.  No spreading redness, warmth tenderness, or fever.  She gets chemotherapy every 3 weeks.    Review Of Systems    Skin: as above  Ears/Nose/Throat: negative  Respiratory: No shortness of breath, dyspnea on exertion, cough, or hemoptysis  Cardiovascular: negative  Gastrointestinal: negative  Genitourinary: negative  Musculoskeletal: negative  Neurologic: negative  Hematologic/Lymphatic/Immunologic: negative  Endocrine: negative      Past Medical History:   Diagnosis Date     Allergy, unspecified not elsewhere classified      Motion sickness      PONV (postoperative nausea and vomiting)     requests scopalomine patch       Past Surgical History:   Procedure Laterality Date     HC LAPAROSCOPY, SURGICAL; CHOLECYSTECTOMY  10/19/2005    Cholecystectomy, Laparoscopic     INSERT PORT VASCULAR ACCESS Left 3/2/2022    Procedure: Placement of power port, left side;  Surgeon: Sage Turner MD;  Location:  OR       Social History     Socioeconomic History     Marital status:      Spouse name: roxana     Number of children: 1     Years of education: 16     Highest education level: Not on  file   Occupational History     Occupation:      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   Tobacco Use     Smoking status: Never Smoker     Smokeless tobacco: Never Used     Tobacco comment: no smoking in the home   Vaping Use     Vaping Use: Never used   Substance and Sexual Activity     Alcohol use: Yes     Alcohol/week: 1.7 standard drinks     Drug use: No     Sexual activity: Yes     Partners: Male     Comment: none   Other Topics Concern      Service No     Blood Transfusions No     Caffeine Concern No     Comment: 0     Occupational Exposure No     Hobby Hazards No     Sleep Concern Yes     Comment: with work     Stress Concern Yes     Comment: with work     Weight Concern No     Special Diet No     Back Care No     Exercise No     Bike Helmet Not Asked     Comment: na     Seat Belt Yes     Self-Exams No     Comment: bse     Parent/sibling w/ CABG, MI or angioplasty before 65F 55M? Not Asked   Social History Narrative     Not on file     Social Determinants of Health     Financial Resource Strain: Not on file   Food Insecurity: Not on file   Transportation Needs: Not on file   Physical Activity: Not on file   Stress: Not on file   Social Connections: Not on file   Intimate Partner Violence: Not on file   Housing Stability: Not on file       Current Outpatient Medications   Medication Sig Dispense Refill     Acetaminophen (TYLENOL PO) Take 650 mg by mouth every 6 hours as needed for mild pain or fever (Patient not taking: Reported on 5/19/2022)       ibuprofen (ADVIL,MOTRIN) 200 MG tablet Take 200 mg by mouth every 4 hours as needed (Patient not taking: Reported on 5/19/2022)       lidocaine, viscous, (XYLOCAINE) 2 % solution Apply 5 mLs topically every 3 hours as needed for other (use sponge stick to apply to painful mouth sores) ; Max 8 doses/24 hour period. 100 mL 1     lidocaine-prilocaine (EMLA) 2.5-2.5 % external cream Apply to port site 45 minutes to 1 hour prior to infusion. Cover  "site with plastic wrap to protect clothing and secure with skin tape. 5 g 3     Loperamide HCl (IMODIUM OR)        loratadine (CLARITIN) 10 MG tablet Take 10 mg by mouth daily       Multiple Vitamins-Minerals (MULTIPLE VITAMINS/WOMENS PO) Take 1 tablet by mouth daily        omeprazole (PRILOSEC) 20 MG CR capsule 20 mg twice daily for 7 days then 20 mg at bedtime thereafter. 45 capsule 0     ondansetron (ZOFRAN-ODT) 4 MG ODT tab Take 1 tablet (4 mg) by mouth every 8 hours as needed for nausea (Patient not taking: Reported on 5/19/2022) 30 tablet 0       Medications and history reviewed    Physical exam:  Vitals: /78   Temp 98.7  F (37.1  C) (Temporal)   Ht 1.702 m (5' 7\")   Wt 110.2 kg (243 lb)   LMP 08/06/2008   BMI 38.06 kg/m    BMI= Body mass index is 38.06 kg/m .    Constitutional: alert, non-distressed   Head: Normo-cephalic, atraumatic, no lesions, masses or tenderness   Cardiovascular: RRR, no new murmurs, +S1, +S2 heart sounds, no clicks, rubs or gallops   Respiratory: CTAB, no rales, rhonchi or wheezing, equal chest rise, good respiratory effort   Gastrointestinal: Soft, non-tender, non distended, no rebound rigidity or guarding, no masses or hernias palpated   : Deferred  Musculoskeletal: Moves all extremities, normal  strength, no deformities noted   Skin: Left chest with port in the mid chest.  Mild swelling much improved from previous.  No erythema.  Mild bruising.  Port is easily palpable.   Psychiatric: Mentation appears normal, affect appropriate   Hematologic/Lymphatic/Immunologic: Normal cervical and supraclavicular lymph nodes   Patient able to get up on table without difficulty.    Assessment:     ICD-10-CM    1. Encounter for care related to vascular access port  Z45.2      Plan: At this time with no signs of worsening infection and as long as the port remains accessible and functional I recommend continuing to use as is.  Should she develop tenderness, worsening swelling, " purulent drainage or fevers then she would need port removed and replaced.  If it does need to be removed due to infection we would need to wait 2 to 3 weeks to replace port on either the same side or the contralateral side.  She is comfortable with this plan.    Ryne Villanueva, DO        Again, thank you for allowing me to participate in the care of your patient.        Sincerely,        Ryne Villanueva, DO

## 2022-06-15 NOTE — PROGRESS NOTES
General Surgery Follow Up    Pt returns for follow up visit for complications of port    HPI:  Tiffanie had an infusion last week after having some issues with swelling and possible infection of the port several weeks ago.  When I saw her last there was a moderate amount of fluid with some drainage.  Currently there is much less swelling.  Reportedly there was some serous tan fluid drainage after recent infusion.  It took 2 attempts but the port was successfully accessed with blood return and no issues with medication administration.  She denies any systemic signs of illness.  No spreading redness, warmth tenderness, or fever.  She gets chemotherapy every 3 weeks.    Review Of Systems    Skin: as above  Ears/Nose/Throat: negative  Respiratory: No shortness of breath, dyspnea on exertion, cough, or hemoptysis  Cardiovascular: negative  Gastrointestinal: negative  Genitourinary: negative  Musculoskeletal: negative  Neurologic: negative  Hematologic/Lymphatic/Immunologic: negative  Endocrine: negative      Past Medical History:   Diagnosis Date     Allergy, unspecified not elsewhere classified      Motion sickness      PONV (postoperative nausea and vomiting)     requests scopalomine patch       Past Surgical History:   Procedure Laterality Date     HC LAPAROSCOPY, SURGICAL; CHOLECYSTECTOMY  10/19/2005    Cholecystectomy, Laparoscopic     INSERT PORT VASCULAR ACCESS Left 3/2/2022    Procedure: Placement of power port, left side;  Surgeon: Yue, MD Sage;  Location:  OR       Social History     Socioeconomic History     Marital status:      Spouse name: roxana     Number of children: 1     Years of education: 16     Highest education level: Not on file   Occupational History     Occupation:      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   Tobacco Use     Smoking status: Never Smoker     Smokeless tobacco: Never Used     Tobacco comment: no smoking in the home   Vaping Use     Vaping Use: Never used    Substance and Sexual Activity     Alcohol use: Yes     Alcohol/week: 1.7 standard drinks     Drug use: No     Sexual activity: Yes     Partners: Male     Comment: none   Other Topics Concern      Service No     Blood Transfusions No     Caffeine Concern No     Comment: 0     Occupational Exposure No     Hobby Hazards No     Sleep Concern Yes     Comment: with work     Stress Concern Yes     Comment: with work     Weight Concern No     Special Diet No     Back Care No     Exercise No     Bike Helmet Not Asked     Comment: na     Seat Belt Yes     Self-Exams No     Comment: bse     Parent/sibling w/ CABG, MI or angioplasty before 65F 55M? Not Asked   Social History Narrative     Not on file     Social Determinants of Health     Financial Resource Strain: Not on file   Food Insecurity: Not on file   Transportation Needs: Not on file   Physical Activity: Not on file   Stress: Not on file   Social Connections: Not on file   Intimate Partner Violence: Not on file   Housing Stability: Not on file       Current Outpatient Medications   Medication Sig Dispense Refill     Acetaminophen (TYLENOL PO) Take 650 mg by mouth every 6 hours as needed for mild pain or fever (Patient not taking: Reported on 5/19/2022)       ibuprofen (ADVIL,MOTRIN) 200 MG tablet Take 200 mg by mouth every 4 hours as needed (Patient not taking: Reported on 5/19/2022)       lidocaine, viscous, (XYLOCAINE) 2 % solution Apply 5 mLs topically every 3 hours as needed for other (use sponge stick to apply to painful mouth sores) ; Max 8 doses/24 hour period. 100 mL 1     lidocaine-prilocaine (EMLA) 2.5-2.5 % external cream Apply to port site 45 minutes to 1 hour prior to infusion. Cover site with plastic wrap to protect clothing and secure with skin tape. 5 g 3     Loperamide HCl (IMODIUM OR)        loratadine (CLARITIN) 10 MG tablet Take 10 mg by mouth daily       Multiple Vitamins-Minerals (MULTIPLE VITAMINS/WOMENS PO) Take 1 tablet by mouth daily   "      omeprazole (PRILOSEC) 20 MG CR capsule 20 mg twice daily for 7 days then 20 mg at bedtime thereafter. 45 capsule 0     ondansetron (ZOFRAN-ODT) 4 MG ODT tab Take 1 tablet (4 mg) by mouth every 8 hours as needed for nausea (Patient not taking: Reported on 5/19/2022) 30 tablet 0       Medications and history reviewed    Physical exam:  Vitals: /78   Temp 98.7  F (37.1  C) (Temporal)   Ht 1.702 m (5' 7\")   Wt 110.2 kg (243 lb)   LMP 08/06/2008   BMI 38.06 kg/m    BMI= Body mass index is 38.06 kg/m .    Constitutional: alert, non-distressed   Head: Normo-cephalic, atraumatic, no lesions, masses or tenderness   Cardiovascular: RRR, no new murmurs, +S1, +S2 heart sounds, no clicks, rubs or gallops   Respiratory: CTAB, no rales, rhonchi or wheezing, equal chest rise, good respiratory effort   Gastrointestinal: Soft, non-tender, non distended, no rebound rigidity or guarding, no masses or hernias palpated   : Deferred  Musculoskeletal: Moves all extremities, normal  strength, no deformities noted   Skin: Left chest with port in the mid chest.  Mild swelling much improved from previous.  No erythema.  Mild bruising.  Port is easily palpable.   Psychiatric: Mentation appears normal, affect appropriate   Hematologic/Lymphatic/Immunologic: Normal cervical and supraclavicular lymph nodes   Patient able to get up on table without difficulty.    Assessment:     ICD-10-CM    1. Encounter for care related to vascular access port  Z45.2      Plan: At this time with no signs of worsening infection and as long as the port remains accessible and functional I recommend continuing to use as is.  Should she develop tenderness, worsening swelling, purulent drainage or fevers then she would need port removed and replaced.  If it does need to be removed due to infection we would need to wait 2 to 3 weeks to replace port on either the same side or the contralateral side.  She is comfortable with this plan.    Ryne TRIPLETT" Kay, DO

## 2022-06-28 NOTE — PROGRESS NOTES
Hematology/Oncology Visit    Oncologist: Dr. Kim  Diagnosis: metastatic (bone, liver) HER2+ breast cancer  Reason for visit: exam prior to     Cancer and Treatment Hx:  Feb 2022: presented with several weeks of abdominal pain and nausea, found to have elevated liver enzymes, abdominal US noted hepatomegaly with innumerable hepatic metastasis. 2/21 biopsy of R breast mass demonstrated invasive ductal carcinoma, Cogswell grade 3, HR-, HER2+. 2/23 punch biopsy of right breast skin nodule identified cutaneous involvement of breast cancer. Baseline CA 27-29 was 406. PET demonstrated inflammatory R breast cancer, with R axillary LAD, innumerable hepatic mets, lytic osseous mets, and FDG uptake to the left nasopharynx/tonsil and level 2 lymph nodes.  Mar 2022: trazimera and pertuzumab with weekly paclitaxel x12  May 2022: trazimera and pertuzumab ongoing    Interval History:  Tiffanie has been feeling well since her last oncology appt. Continues to tolerate trazimera and pertuzumab well. No side effects. Had issues with her port incision draining fluid but that has since resolved. No fevers or current tenderness to the area. She has a planned tooth extraction next week and was told that her insurance would not cover Xgeva. Eating and drinking well, regular BMs. Energy level good. Working full time recently. Her son's wedding is in August and she has a work conference coming up in Sept. No questions or concerns about treatment planned for today.    Medications:  Reviewed pertinent medications, no refills needed today.  Reviewed read of PET from 5/13/22: IMPRESSION: Near complete response  Reviewed labs from 6/29/22:   06/29/22   Creatinine 0.93   GFR Estimate 70   Alkaline Phosphatase 97   ALT 40   AST 24   Bilirubin Total 0.4   Glucose 114 (H)   WBC 5.7   Hemoglobin 13.2   Platelet Count 207   Absolute Neutrophils 3.4     Physical Exam:   GEN: pleasantly conversant female no acute distress  SKIN: port incision L chest  "is C/D/I, no opening or drainage. No surrounding erythema or tenderness to palpation. No fluid pockets palpated.  ENT: eyes non-icteric, no notable oral sores  LYMPH: no notable cervical or supraclavicular lymphadenopathy  RIGHT BREAST: hyperpigmentation of skin below nipple but no further mass palpated  RESP: clear to auscultation bilaterally, on room air  CARDS: regular rate and rhythm, no notable murmurs   GI: abd soft without notable hepatosplenomegaly, non-tender to palpation  MSK: no notable lower extremity edema  NEURO: alert and oriented without obvious focal deficit, gait stable  /88 (BP Location: Right arm, Patient Position: Sitting, Cuff Size: Adult Regular)   Pulse 100   Temp 97.6  F (36.4  C) (Temporal)   Resp 19   Ht 1.702 m (5' 7\")   Wt 110.3 kg (243 lb 4 oz)   LMP 08/06/2008   SpO2 98%   BMI 38.10 kg/m       Wt Readings from Last 4 Encounters:   06/29/22 110.3 kg (243 lb 4 oz)   06/15/22 110.2 kg (243 lb)   06/09/22 108.6 kg (239 lb 8 oz)   05/25/22 109.8 kg (242 lb)     Assessment and Plan:  Metastatic HER2+ breast cancer  - Continues on Q3week pertuzumab and trazimera  - Meeting goals and labs appropriate for cycle 6 later today  - Interval echo last 5/23 was normal, repeat every 6 months  - Interval PET planned end of July  - Follow up with Oncology provider with every other cycle    Bone metastasis  - No current bone pain  - Xgeva coverage denied by insurance, will work with financial office    Port incision drainage  - Reports tiny amount of pus with fair amount of serous drainage afterwards 2wks ago  - Resolved, incision is fully healed today without any fluid pocket or tenderness  - Plans to cancel appt with Dr. Villanueva tomorrow      The total time of this encounter amounted to 30 minutes today. This time includes face-to-face time spent with the patient, prep work, ordering tests, and performing post-visit documentation.  - Gina Dudley, CNP    "

## 2022-06-29 ENCOUNTER — INFUSION THERAPY VISIT (OUTPATIENT)
Dept: INFUSION THERAPY | Facility: CLINIC | Age: 57
End: 2022-06-29
Attending: INTERNAL MEDICINE
Payer: COMMERCIAL

## 2022-06-29 ENCOUNTER — ONCOLOGY VISIT (OUTPATIENT)
Dept: ONCOLOGY | Facility: CLINIC | Age: 57
End: 2022-06-29
Payer: COMMERCIAL

## 2022-06-29 VITALS
BODY MASS INDEX: 38.18 KG/M2 | HEIGHT: 67 IN | TEMPERATURE: 97.6 F | OXYGEN SATURATION: 98 % | DIASTOLIC BLOOD PRESSURE: 88 MMHG | RESPIRATION RATE: 19 BRPM | HEART RATE: 100 BPM | WEIGHT: 243.25 LBS | SYSTOLIC BLOOD PRESSURE: 128 MMHG

## 2022-06-29 VITALS — HEART RATE: 78 BPM | SYSTOLIC BLOOD PRESSURE: 135 MMHG | DIASTOLIC BLOOD PRESSURE: 75 MMHG

## 2022-06-29 DIAGNOSIS — C50.511 MALIGNANT NEOPLASM OF LOWER-OUTER QUADRANT OF RIGHT FEMALE BREAST, UNSPECIFIED ESTROGEN RECEPTOR STATUS (H): Primary | ICD-10-CM

## 2022-06-29 DIAGNOSIS — C50.911 STAGE IV CARCINOMA OF BREAST, ER-, RIGHT (H): Primary | ICD-10-CM

## 2022-06-29 DIAGNOSIS — T80.212D PORT OR RESERVOIR INFECTION, SUBSEQUENT ENCOUNTER: ICD-10-CM

## 2022-06-29 DIAGNOSIS — Z17.1 STAGE IV CARCINOMA OF BREAST, ER-, RIGHT (H): Primary | ICD-10-CM

## 2022-06-29 DIAGNOSIS — C79.51 BONE METASTASIS: ICD-10-CM

## 2022-06-29 DIAGNOSIS — Z17.31 HER2-POSITIVE CARCINOMA OF RIGHT BREAST (H): ICD-10-CM

## 2022-06-29 DIAGNOSIS — C50.911 HER2-POSITIVE CARCINOMA OF RIGHT BREAST (H): ICD-10-CM

## 2022-06-29 LAB
ALBUMIN SERPL-MCNC: 3.5 G/DL (ref 3.4–5)
ALP SERPL-CCNC: 97 U/L (ref 40–150)
ALT SERPL W P-5'-P-CCNC: 40 U/L (ref 0–50)
ANION GAP SERPL CALCULATED.3IONS-SCNC: 5 MMOL/L (ref 3–14)
AST SERPL W P-5'-P-CCNC: 24 U/L (ref 0–45)
BASOPHILS # BLD AUTO: 0 10E3/UL (ref 0–0.2)
BASOPHILS NFR BLD AUTO: 1 %
BILIRUB SERPL-MCNC: 0.4 MG/DL (ref 0.2–1.3)
BUN SERPL-MCNC: 18 MG/DL (ref 7–30)
CALCIUM SERPL-MCNC: 9.3 MG/DL (ref 8.5–10.1)
CHLORIDE BLD-SCNC: 107 MMOL/L (ref 94–109)
CO2 SERPL-SCNC: 28 MMOL/L (ref 20–32)
CREAT SERPL-MCNC: 0.93 MG/DL (ref 0.52–1.04)
EOSINOPHIL # BLD AUTO: 0.4 10E3/UL (ref 0–0.7)
EOSINOPHIL NFR BLD AUTO: 7 %
ERYTHROCYTE [DISTWIDTH] IN BLOOD BY AUTOMATED COUNT: 13.2 % (ref 10–15)
GFR SERPL CREATININE-BSD FRML MDRD: 70 ML/MIN/1.73M2
GLUCOSE BLD-MCNC: 114 MG/DL (ref 70–99)
HCT VFR BLD AUTO: 41.4 % (ref 35–47)
HGB BLD-MCNC: 13.2 G/DL (ref 11.7–15.7)
IMM GRANULOCYTES # BLD: 0 10E3/UL
IMM GRANULOCYTES NFR BLD: 0 %
LYMPHOCYTES # BLD AUTO: 1.4 10E3/UL (ref 0.8–5.3)
LYMPHOCYTES NFR BLD AUTO: 25 %
MCH RBC QN AUTO: 29.7 PG (ref 26.5–33)
MCHC RBC AUTO-ENTMCNC: 31.9 G/DL (ref 31.5–36.5)
MCV RBC AUTO: 93 FL (ref 78–100)
MONOCYTES # BLD AUTO: 0.5 10E3/UL (ref 0–1.3)
MONOCYTES NFR BLD AUTO: 8 %
NEUTROPHILS # BLD AUTO: 3.4 10E3/UL (ref 1.6–8.3)
NEUTROPHILS NFR BLD AUTO: 59 %
NRBC # BLD AUTO: 0 10E3/UL
NRBC BLD AUTO-RTO: 0 /100
PLATELET # BLD AUTO: 207 10E3/UL (ref 150–450)
POTASSIUM BLD-SCNC: 3.9 MMOL/L (ref 3.4–5.3)
PROT SERPL-MCNC: 7.1 G/DL (ref 6.8–8.8)
RBC # BLD AUTO: 4.44 10E6/UL (ref 3.8–5.2)
SODIUM SERPL-SCNC: 140 MMOL/L (ref 133–144)
WBC # BLD AUTO: 5.7 10E3/UL (ref 4–11)

## 2022-06-29 PROCEDURE — 250N000011 HC RX IP 250 OP 636: Performed by: INTERNAL MEDICINE

## 2022-06-29 PROCEDURE — 99214 OFFICE O/P EST MOD 30 MIN: CPT | Performed by: NURSE PRACTITIONER

## 2022-06-29 PROCEDURE — 84999 UNLISTED CHEMISTRY PROCEDURE: CPT

## 2022-06-29 PROCEDURE — 96417 CHEMO IV INFUS EACH ADDL SEQ: CPT

## 2022-06-29 PROCEDURE — 86300 IMMUNOASSAY TUMOR CA 15-3: CPT

## 2022-06-29 PROCEDURE — 96413 CHEMO IV INFUSION 1 HR: CPT

## 2022-06-29 PROCEDURE — 85014 HEMATOCRIT: CPT

## 2022-06-29 PROCEDURE — 36415 COLL VENOUS BLD VENIPUNCTURE: CPT

## 2022-06-29 PROCEDURE — 80053 COMPREHEN METABOLIC PANEL: CPT

## 2022-06-29 PROCEDURE — 258N000003 HC RX IP 258 OP 636: Performed by: INTERNAL MEDICINE

## 2022-06-29 RX ORDER — HEPARIN SODIUM (PORCINE) LOCK FLUSH IV SOLN 100 UNIT/ML 100 UNIT/ML
5 SOLUTION INTRAVENOUS
Status: DISCONTINUED | OUTPATIENT
Start: 2022-06-29 | End: 2022-06-29 | Stop reason: HOSPADM

## 2022-06-29 RX ADMIN — Medication 5 ML: at 15:53

## 2022-06-29 RX ADMIN — SODIUM CHLORIDE 720 MG: 9 INJECTION, SOLUTION INTRAVENOUS at 15:04

## 2022-06-29 RX ADMIN — SODIUM CHLORIDE 250 ML: 9 INJECTION, SOLUTION INTRAVENOUS at 13:52

## 2022-06-29 RX ADMIN — PERTUZUMAB 420 MG: 30 INJECTION, SOLUTION, CONCENTRATE INTRAVENOUS at 14:00

## 2022-06-29 ASSESSMENT — PAIN SCALES - GENERAL
PAINLEVEL: NO PAIN (0)
PAINLEVEL: NO PAIN (0)

## 2022-06-29 NOTE — PROGRESS NOTES
Infusion Nursing Note:  Tiffanie STEWART Sharron presents today for cycle 6 day   Trazimera and perjeta  Patient seen by provider today: Yes: Gina Dudley   present during visit today: Not Applicable.    Note: Pt here today for treatment following her lab and provider visit.  Pt looks good. Is chatty and appears to be in good spirits. Port accessed. Pt tolerated infusion.  With deaccess of port patient was noted to have some clear serous drainage from needles insertion site.  When tissue around insertion site was pressed more fluid came out. Went through about 3-4 2x2 gauze pads and then fluid stopped.  No redness or pain around site.      Intravenous Access:  Implanted Port.    Treatment Conditions:  Lab Results   Component Value Date    HGB 13.2 06/29/2022    WBC 5.7 06/29/2022    ANEU 7.0 03/21/2022    ANEUTAUTO 3.4 06/29/2022     06/29/2022      Lab Results   Component Value Date     06/29/2022    POTASSIUM 3.9 06/29/2022    MAG 1.7 04/25/2022    CR 0.93 06/29/2022    SARAI 9.3 06/29/2022    BILITOTAL 0.4 06/29/2022    ALBUMIN 3.5 06/29/2022    ALT 40 06/29/2022    AST 24 06/29/2022     Results reviewed, labs MET treatment parameters, ok to proceed with treatment.    Post Infusion Assessment:  Patient tolerated infusion without incident.  Blood return noted pre and post infusion.  Site patent and intact, free from redness, edema or discomfort.  No evidence of extravasations.  Access discontinued per protocol.     Discharge Plan:   Patient discharged in stable condition accompanied by: self.  Departure Mode: Ambulatory  Pt has appointment to return on 07/21.      Cindy Robbins RN

## 2022-06-30 ENCOUNTER — CARE COORDINATION (OUTPATIENT)
Dept: ONCOLOGY | Facility: CLINIC | Age: 57
End: 2022-06-30

## 2022-06-30 LAB
Lab: NORMAL
PERFORMING LABORATORY: NORMAL
SPECIMEN STATUS: NORMAL
TEST NAME: NORMAL

## 2022-06-30 NOTE — PROGRESS NOTES
Patient called today asking if appointment is needed with general surgery today. This RN reviewed Gina Dudley CNP office visit note on 06/29/22:    Port incision drainage  - Reports tiny amount of pus with fair amount of serous drainage afterwards 2wks ago  - Resolved, incision is fully healed today without any fluid pocket or tenderness  - Plans to cancel appt with Dr. Villanueva tomorrow    Informed specialty  OK to cancel appointment. Dr. Villanueva's team aware.  Radha Neal RNCC

## 2022-07-01 LAB — MAYO MISC RESULT: NORMAL

## 2022-07-12 DIAGNOSIS — C50.911 BREAST CANCER METASTASIZED TO BONE, RIGHT (H): Primary | ICD-10-CM

## 2022-07-12 DIAGNOSIS — C79.51 BREAST CANCER METASTASIZED TO BONE, RIGHT (H): Primary | ICD-10-CM

## 2022-07-12 RX ORDER — HEPARIN SODIUM (PORCINE) LOCK FLUSH IV SOLN 100 UNIT/ML 100 UNIT/ML
5 SOLUTION INTRAVENOUS
Status: CANCELLED | OUTPATIENT
Start: 2022-07-12

## 2022-07-12 RX ORDER — HEPARIN SODIUM,PORCINE 10 UNIT/ML
5 VIAL (ML) INTRAVENOUS
Status: CANCELLED | OUTPATIENT
Start: 2022-07-12

## 2022-07-12 RX ORDER — ZOLEDRONIC ACID 0.04 MG/ML
4 INJECTION, SOLUTION INTRAVENOUS ONCE
Status: CANCELLED | OUTPATIENT
Start: 2022-07-12 | End: 2022-07-12

## 2022-07-14 ENCOUNTER — TELEPHONE (OUTPATIENT)
Dept: ONCOLOGY | Facility: CLINIC | Age: 57
End: 2022-07-14

## 2022-07-14 NOTE — TELEPHONE ENCOUNTER
Forms/Letter Request    Name of form/letter: Short Term Disability Claim Form- [facility]    Have you been seen for this request: Yes 07/14/2022    Do we have the form/letter: Yes: 07/14/2022    When is form/letter needed by: 07/14/2022    How would you like the form/letter returned: Fax    Patient Notified form requests are processed in 3-5 business days:Yes    Okay to leave a detailed message? Yes Home number on file [phone] (home)          UPDATE: Form completed. Faxed to [facility] Fax#[phone] and copy emailed to patient per patient request via [employer]. [facility].  Radha Neal RNCC

## 2022-07-21 ENCOUNTER — INFUSION THERAPY VISIT (OUTPATIENT)
Dept: INFUSION THERAPY | Facility: CLINIC | Age: 57
End: 2022-07-21
Attending: INTERNAL MEDICINE
Payer: COMMERCIAL

## 2022-07-21 VITALS
RESPIRATION RATE: 16 BRPM | BODY MASS INDEX: 38.12 KG/M2 | WEIGHT: 243.4 LBS | SYSTOLIC BLOOD PRESSURE: 141 MMHG | HEART RATE: 70 BPM | TEMPERATURE: 96.9 F | DIASTOLIC BLOOD PRESSURE: 67 MMHG | OXYGEN SATURATION: 99 %

## 2022-07-21 DIAGNOSIS — C50.511 MALIGNANT NEOPLASM OF LOWER-OUTER QUADRANT OF RIGHT FEMALE BREAST, UNSPECIFIED ESTROGEN RECEPTOR STATUS (H): Primary | ICD-10-CM

## 2022-07-21 PROCEDURE — 258N000003 HC RX IP 258 OP 636: Performed by: INTERNAL MEDICINE

## 2022-07-21 PROCEDURE — 96413 CHEMO IV INFUSION 1 HR: CPT

## 2022-07-21 PROCEDURE — 96417 CHEMO IV INFUS EACH ADDL SEQ: CPT

## 2022-07-21 PROCEDURE — 250N000011 HC RX IP 250 OP 636: Performed by: INTERNAL MEDICINE

## 2022-07-21 RX ORDER — HEPARIN SODIUM (PORCINE) LOCK FLUSH IV SOLN 100 UNIT/ML 100 UNIT/ML
5 SOLUTION INTRAVENOUS
Status: DISCONTINUED | OUTPATIENT
Start: 2022-07-21 | End: 2022-07-21 | Stop reason: HOSPADM

## 2022-07-21 RX ADMIN — PERTUZUMAB 420 MG: 30 INJECTION, SOLUTION, CONCENTRATE INTRAVENOUS at 13:28

## 2022-07-21 RX ADMIN — SODIUM CHLORIDE 720 MG: 9 INJECTION, SOLUTION INTRAVENOUS at 14:18

## 2022-07-21 RX ADMIN — Medication 5 ML: at 15:00

## 2022-07-21 RX ADMIN — SODIUM CHLORIDE 250 ML: 9 INJECTION, SOLUTION INTRAVENOUS at 13:11

## 2022-07-21 ASSESSMENT — PAIN SCALES - GENERAL: PAINLEVEL: NO PAIN (0)

## 2022-07-21 NOTE — PROGRESS NOTES
Infusion Nursing Note:  Tiffanie Mcdermott presents today for C7  Perjeta and Trazimera.    Patient seen by provider today: No   present during visit today: Not Applicable.    Note: pt here today for infusions. Pt generally has been feeling well.  Hair is starting to grow in.     Intravenous Access:  Implanted Port.    Treatment Conditions:  Not Applicable.    Post Infusion Assessment:  Patient tolerated infusion without incident.  Site patent and intact, free from redness, edema or discomfort.  No evidence of extravasations.  Access discontinued per protocol.     Discharge Plan:   Patient discharged in stable condition accompanied by: self.  Departure Mode: Ambulatory.      Cindy Robbins RN

## 2022-07-22 ENCOUNTER — TELEPHONE (OUTPATIENT)
Dept: ONCOLOGY | Facility: CLINIC | Age: 57
End: 2022-07-22

## 2022-07-22 NOTE — TELEPHONE ENCOUNTER
Reason for Call:  Form, our goal is to have forms completed with 72 hours, however, some forms may require a visit or additional information.    Type of letter, form or note:  FMLA    Who is the form from?: Patient    Where did the form come from: Patient or family brought in       What clinic location was the form placed at?: [facility]    Where the form was placed: Dr Kim  Box/Folder    What number is listed as a contact on the form?: [phone]       Additional comments: none    Call taken on 7/22/2022 at 4:11 PM by Concepcion Smith

## 2022-07-27 ENCOUNTER — TELEPHONE (OUTPATIENT)
Dept: ONCOLOGY | Facility: CLINIC | Age: 57
End: 2022-07-27

## 2022-07-27 NOTE — LETTER
July 01, 2022      RE: Tiffanie Mcdermott  [address] [address] [place] [id number]               To whom it may concern:    Tiffanie Mcdermott is a patient of Dr. Kim at [facility]. She may return to work as of July 1, 2022 with the following: No restrictions as long as patient is feeling well.            Sincerely,          Adiel Kim MD

## 2022-07-27 NOTE — TELEPHONE ENCOUNTER
FMLA and letter completed, sent to patient for further completion. Sent copy to [employer] scanning. Patient verbalized understanding.  Radha GARCIA

## 2022-07-30 ENCOUNTER — HOSPITAL ENCOUNTER (OUTPATIENT)
Dept: PET IMAGING | Facility: CLINIC | Age: 57
Discharge: HOME | End: 2022-07-30
Attending: INTERNAL MEDICINE | Admitting: INTERNAL MEDICINE
Payer: COMMERCIAL

## 2022-07-30 DIAGNOSIS — C50.511 MALIGNANT NEOPLASM OF LOWER-OUTER QUADRANT OF RIGHT BREAST OF FEMALE, ESTROGEN RECEPTOR NEGATIVE (H): ICD-10-CM

## 2022-07-30 DIAGNOSIS — Z17.1 MALIGNANT NEOPLASM OF LOWER-OUTER QUADRANT OF RIGHT BREAST OF FEMALE, ESTROGEN RECEPTOR NEGATIVE (H): ICD-10-CM

## 2022-07-30 PROCEDURE — 343N000001 HC RX 343: Performed by: INTERNAL MEDICINE

## 2022-07-30 PROCEDURE — 78815 PET IMAGE W/CT SKULL-THIGH: CPT | Mod: PS

## 2022-07-30 PROCEDURE — A9552 F18 FDG: HCPCS | Performed by: INTERNAL MEDICINE

## 2022-07-30 RX ADMIN — FLUDEOXYGLUCOSE F-18 12.62 MCI.: 500 INJECTION, SOLUTION INTRAVENOUS at 09:13

## 2022-08-01 ENCOUNTER — VIRTUAL VISIT (OUTPATIENT)
Dept: FAMILY MEDICINE | Facility: CLINIC | Age: 57
End: 2022-08-01
Payer: COMMERCIAL

## 2022-08-01 DIAGNOSIS — R50.9 FEVER, UNSPECIFIED FEVER CAUSE: ICD-10-CM

## 2022-08-01 DIAGNOSIS — R05.9 COUGH: ICD-10-CM

## 2022-08-01 DIAGNOSIS — U07.1 INFECTION DUE TO 2019 NOVEL CORONAVIRUS: Primary | ICD-10-CM

## 2022-08-01 PROCEDURE — 99213 OFFICE O/P EST LOW 20 MIN: CPT | Mod: 95 | Performed by: INTERNAL MEDICINE

## 2022-08-01 NOTE — PATIENT INSTRUCTIONS
You are due for physical and preventive medicine work-up  It is very important you make an appointment with your primary care doctor to schedule follow-up and physical    Instructions for Patients      What are the symptoms of COVID-19?  Symptoms can include fever, cough, shortness of breath, chills, headache, muscle pain sore throat, fatigue, runny or stuffy nose, and loss of taste and smell. Other less common symptoms include nausea, vomiting, or diarrhea (watery stools).    Know when to call 911. Emergency warning signs include:  Trouble breathing or shortness of breath  Pain or pressure in the chest that doesn't go away  Feeling confused like you haven't felt before, or not being able to wake up  Bluish-colored lips or face    How can I take care of myself?  Get lots of rest. Drink extra fluids (unless a doctor has told you not to).  Take Tylenol (acetaminophen) for fever or pain. If you have liver or kidney problems, ask your family doctor if it's okay to take Tylenol   Adults:   650 mg (two 325 mg pills or tablets) every 4 to 6 hours, or...   1,000 mg (two 500 mg pills or tablets) every 8 hours as needed.  Note: Don't take more than 3,000 mg in one day. Acetaminophen is found in many medicines (both prescribed and over the counter). Read all labels to be sure you don't take too much.  For children, check the Tylenol bottle for the right dose. The dose is based on the child's age or weight.  Take over the counter medicines for your symptoms as needed. Talk to your pharmacist.  If you have other health problems (like cancer, heart failure, an organ transplant, or severe kidney disease): Call your specialty clinic if you don't feel better in the next 2 days.    These guidelines are for isolating and quarantining before returning to work, school or .   For employers, schools and day cares: This is an official notice for this person s medical guidelines for returning in-person.   For health care sites: The  [facility] gives different isolation and quarantine guidelines for healthcare sites, please check with these sites before arriving.     How do I self-isolate?  You isolate when you have symptoms of COVID or a test shows you have COVID, even if you don t have symptoms.   If you DO have symptoms:  Stay home and away from others  For at least 5 days after your symptoms started, AND   You are fever free for 24 hours (with no medicine that reduces fever), AND  Your other symptoms are better.  Wear a mask for 10 full days any time you are around others.  If you DON T have symptoms:  Stay at home and away from others for at least 5 days after your positive test.  Wear a mask for 10 full days any time you are around others.    How and when do I quarantine?  You quarantine when you may have been exposed to the virus and DON T have symptoms.   Stay home and away from others.   You must quarantine for 5 days after your last contact with a person who has COVID.  Note: If you are fully vaccinated, you don t need to quarantine. You should still follow the steps below.   Wear a mask for 10 full days any time you re around others.  Get tested at least 5 days after you were exposed, even if you don t have symptoms.   If you start to have symptoms, isolate right away and get tested.    Where can I get more information?  [facility] COVID-19 Resource Hub: [email or web address]   [facility] Quarantine & Isolation: https://www.cdc.gov/coronavirus/2019-ncov/your-health/quarantine-isolation.html   [facility] - What to Do If You're Sick: https://www.cdc.gov/coronavirus/2019-ncov/if-you-are-sick/index.html  [facility] clinical trials (COVID-19 research studies): clinicalaffairs.[facility]/umn-clinical-trials  [place] Department of Health COVID-19 Public Hotline: 1-162.348.3217  Instructions for Patients      What are the symptoms of COVID-19?  Symptoms can include fever, cough, shortness of breath, chills, headache, muscle pain sore  throat, fatigue, runny or stuffy nose, and loss of taste and smell. Other less common symptoms include nausea, vomiting, or diarrhea (watery stools).    Know when to call 911. Emergency warning signs include:  Trouble breathing or shortness of breath  Pain or pressure in the chest that doesn't go away  Feeling confused like you haven't felt before, or not being able to wake up  Bluish-colored lips or face    How can I take care of myself?  Get lots of rest. Drink extra fluids (unless a doctor has told you not to).  Take Tylenol (acetaminophen) for fever or pain. If you have liver or kidney problems, ask your family doctor if it's okay to take Tylenol   Adults:   650 mg (two 325 mg pills or tablets) every 4 to 6 hours, or...   1,000 mg (two 500 mg pills or tablets) every 8 hours as needed.  Note: Don't take more than 3,000 mg in one day. Acetaminophen is found in many medicines (both prescribed and over the counter). Read all labels to be sure you don't take too much.  For children, check the Tylenol bottle for the right dose. The dose is based on the child's age or weight.  Take over the counter medicines for your symptoms as needed. Talk to your pharmacist.  If you have other health problems (like cancer, heart failure, an organ transplant, or severe kidney disease): Call your specialty clinic if you don't feel better in the next 2 days.    These guidelines are for isolating and quarantining before returning to work, school or .   For employers, schools and day cares: This is an official notice for this person s medical guidelines for returning in-person.   For health care sites: The CDC gives different isolation and quarantine guidelines for healthcare sites, please check with these sites before arriving.     How do I self-isolate?  You isolate when you have symptoms of COVID or a test shows you have COVID, even if you don t have symptoms.   If you DO have symptoms:  Stay home and away from others  For at  least 5 days after your symptoms started, AND   You are fever free for 24 hours (with no medicine that reduces fever), AND  Your other symptoms are better.  Wear a mask for 10 full days any time you are around others.  If you DON T have symptoms:  Stay at home and away from others for at least 5 days after your positive test.  Wear a mask for 10 full days any time you are around others.    How and when do I quarantine?  You quarantine when you may have been exposed to the virus and DON T have symptoms.   Stay home and away from others.   You must quarantine for 5 days after your last contact with a person who has COVID.  Note: If you are fully vaccinated, you don t need to quarantine. You should still follow the steps below.   Wear a mask for 10 full days any time you re around others.  Get tested at least 5 days after you were exposed, even if you don t have symptoms.   If you start to have symptoms, isolate right away and get tested.    Where can I get more information?  [facility] COVID-19 Resource Hub: [email or web address]   CDC Quarantine & Isolation: https://www.cdc.gov/coronavirus/2019-ncov/your-health/quarantine-isolation.html   CDC - What to Do If You're Sick: https://www.cdc.gov/coronavirus/2019-ncov/if-you-are-sick/index.html  [facility] clinical trials (COVID-19 research studies): clinicalaffairs.[facility]/umn-clinical-trials  [place] Department of Health COVID-19 Public Hotline: 1-520.111.7167

## 2022-08-01 NOTE — PROGRESS NOTES
"Tiffanie is a 57 year old who is being evaluated via a billable video visit.      How would you like to obtain your AVS? MyChart  If the video visit is dropped, the invitation should be resent by: Text to cell phone: [phone]  Will anyone else be joining your video visit? No          Assessment & Plan     Tiffanie was seen today for uri.    Diagnoses and all orders for this visit:    Infection due to 2019 novel coronavirus  -     nirmatrelvir and ritonavir () therapy pack; Take 3 tablets by mouth 2 times daily for 5 days  Patient has history of breast cancer  Currently on Herceptin  She tested positive yesterday  Symptoms a started day before  She would like treatment  PAXLOVID is prescribed  Educated her about that  Checked the interaction  Per MTM team there is no interaction with Herceptin  Discussed side effects and precautions  Advised to stay well-hydrated  Monitor blood pressure pulse and oxygen level    Cough  Due to COVID    Fever, unspecified fever cause  Tylenol as needed for pain    Discussed the importance of preventive medicine work-up  Advised to make an appointment with primary care provider and is scheduled physical      BMI:   Estimated body mass index is 38.12 kg/m  as calculated from the following:    Height as of 6/29/22: 1.702 m (5' 7\").    Weight as of 7/21/22: 110.4 kg (243 lb 6.4 oz).       See Patient Instructions  Patient Instructions     You are due for physical and preventive medicine work-up  It is very important you make an appointment with your primary care doctor to schedule follow-up and physical    Instructions for Patients      What are the symptoms of COVID-19?  Symptoms can include fever, cough, shortness of breath, chills, headache, muscle pain sore throat, fatigue, runny or stuffy nose, and loss of taste and smell. Other less common symptoms include nausea, vomiting, or diarrhea (watery stools).    Know when to call 911. Emergency warning signs include:    Trouble breathing or " shortness of breath    Pain or pressure in the chest that doesn't go away    Feeling confused like you haven't felt before, or not being able to wake up    Bluish-colored lips or face    How can I take care of myself?  1. Get lots of rest. Drink extra fluids (unless a doctor has told you not to).  2. Take Tylenol (acetaminophen) for fever or pain. If you have liver or kidney problems, ask your family doctor if it's okay to take Tylenol   Adults:   650 mg (two 325 mg pills or tablets) every 4 to 6 hours, or...   1,000 mg (two 500 mg pills or tablets) every 8 hours as needed.  Note: Don't take more than 3,000 mg in one day. Acetaminophen is found in many medicines (both prescribed and over the counter). Read all labels to be sure you don't take too much.  For children, check the Tylenol bottle for the right dose. The dose is based on the child's age or weight.  3. Take over the counter medicines for your symptoms as needed. Talk to your pharmacist.  4. If you have other health problems (like cancer, heart failure, an organ transplant, or severe kidney disease): Call your specialty clinic if you don't feel better in the next 2 days.    These guidelines are for isolating and quarantining before returning to work, school or .     For employers, schools and day cares: This is an official notice for this person s medical guidelines for returning in-person.     For health care sites: The CDC gives different isolation and quarantine guidelines for healthcare sites, please check with these sites before arriving.     How do I self-isolate?  You isolate when you have symptoms of COVID or a test shows you have COVID, even if you don t have symptoms.     If you DO have symptoms:  o Stay home and away from others  - For at least 5 days after your symptoms started, AND   - You are fever free for 24 hours (with no medicine that reduces fever), AND  - Your other symptoms are better.  o Wear a mask for 10 full days any time you  are around others.    If you DON T have symptoms:  o Stay at home and away from others for at least 5 days after your positive test.  o Wear a mask for 10 full days any time you are around others.    How and when do I quarantine?  You quarantine when you may have been exposed to the virus and DON T have symptoms.     Stay home and away from others.     You must quarantine for 5 days after your last contact with a person who has COVID.  o Note: If you are fully vaccinated, you don t need to quarantine. You should still follow the steps below.     Wear a mask for 10 full days any time you re around others.    Get tested at least 5 days after you were exposed, even if you don t have symptoms.     If you start to have symptoms, isolate right away and get tested.    Where can I get more information?    [facility] COVID-19 Resource Hub: [email or web address]     CDC Quarantine & Isolation: https://www.cdc.gov/coronavirus/2019-ncov/your-health/quarantine-isolation.html     CDC - What to Do If You're Sick: https://www.cdc.gov/coronavirus/2019-ncov/if-you-are-sick/index.html    [facility] clinical trials (COVID-19 research studies): clinicalaffairs.[email or web address]/umn-clinical-trials    [place] Department of Health COVID-19 Public Hotline: 1-711.571.8341    Instructions for Patients            What are the symptoms of COVID-19?    Symptoms can include fever, cough, shortness of breath, chills, headache, muscle pain sore throat, fatigue, runny or stuffy nose, and loss of taste and smell. Other less common symptoms include nausea, vomiting, or diarrhea (watery stools).        Know when to call 911. Emergency warning signs include:    Trouble breathing or shortness of breath    Pain or pressure in the chest that doesn't go away    Feeling confused like you haven't felt before, or not being able to wake up    Bluish-colored lips or face        How can I take care of myself?    Get lots of rest. Drink  extra fluids (unless a doctor has told you not to).    Take Tylenol (acetaminophen) for fever or pain. If you have liver or kidney problems, ask your family doctor if it's okay to take Tylenol     Adults:     650 mg (two 325 mg pills or tablets) every 4 to 6 hours, or...     1,000 mg (two 500 mg pills or tablets) every 8 hours as needed.    Note: Don't take more than 3,000 mg in one day. Acetaminophen is found in many medicines (both prescribed and over the counter). Read all labels to be sure you don't take too much.    For children, check the Tylenol bottle for the right dose. The dose is based on the child's age or weight.    Take over the counter medicines for your symptoms as needed. Talk to your pharmacist.    If you have other health problems (like cancer, heart failure, an organ transplant, or severe kidney disease): Call your specialty clinic if you don't feel better in the next 2 days.        These guidelines are for isolating and quarantining before returning to work, school or .     For employers, schools and day cares: This is an official notice for this person s medical guidelines for returning in-person.     For health care sites: The CDC gives different isolation and quarantine guidelines for healthcare sites, please check with these sites before arriving.         How do I self-isolate?    You isolate when you have symptoms of COVID or a test shows you have COVID, even if you don t have symptoms.     If you DO have symptoms:    Stay home and away from others    For at least 5 days after your symptoms started, AND     You are fever free for 24 hours (with no medicine that reduces fever), AND    Your other symptoms are better.    Wear a mask for 10 full days any time you are around others.    If you DON T have symptoms:    Stay at home and away from others for at least 5 days after your positive test.    Wear a mask for 10 full days any time you are around others.        How and when do I  quarantine?    You quarantine when you may have been exposed to the virus and DON T have symptoms.     Stay home and away from others.     You must quarantine for 5 days after your last contact with a person who has COVID.    Note: If you are fully vaccinated, you don t need to quarantine. You should still follow the steps below.     Wear a mask for 10 full days any time you re around others.    Get tested at least 5 days after you were exposed, even if you don t have symptoms.     If you start to have symptoms, isolate right away and get tested.        Where can I get more information?    [facility] COVID-19 Resource Hub: [email or web address]     CDC Quarantine & Isolation: https://www.cdc.gov/coronavirus/2019-ncov/your-health/quarantine-isolation.html     [facility] [place] 2[place] - What to Do If You're Sick: https://www.cdc.gov/coronavirus/2019-ncov/if-you-are-sick/index.html    [facility] clinical trials (COVID-19 research studies): clinicalaffairs.[facility]/umn-clinical-trials    [place] Department of Health COVID-19 Public Hotline: 1-657.314.4876      Return in about 6 weeks (around 9/12/2022) for Physical Exam.    Randi Mosley MD  [facility] KB Moreno is a 57 year old, presenting for the following health issues:  URI      HPI       COVID-19 Symptom Review  How many days ago did these symptoms start? 2 days  Saturday morning   covid test was positive yesterday 7/31    Are any of the following symptoms significant for you?    New or worsening difficulty breathing? No    Worsening cough? Yes, I am coughing up mucus.    Fever or chills? No    Headache: YES- slight headache on saturday    Sore throat: No    Chest pain: No    Diarrhea: No, but had some GI upset on Saturday     Body aches? YES    What treatments has patient tried? Decongestant - oral   Does patient live in a nursing home, group home, or shelter? No  Does patient have a way to get food/medications during  quarantined? Yes, I have a friend or family member who can help me.        Symptoms a started on Saturday  Tested positive yesterday            Review of Systems   Constitutional, HEENT, cardiovascular, pulmonary, GI, , musculoskeletal, neuro, skin, endocrine and psych systems are negative, except as otherwise noted.      Objective           Vitals:  No vitals were obtained today due to virtual visit.    Physical Exam   GENERAL: Healthy, alert and no distress  EYES: Eyes grossly normal to inspection.  No discharge or erythema, or obvious scleral/conjunctival abnormalities.  RESP: No audible wheeze, cough, or visible cyanosis.  No visible retractions or increased work of breathing.    SKIN: Visible skin clear. No significant rash, abnormal pigmentation or lesions.  NEURO: Cranial nerves grossly intact.  Mentation and speech appropriate for age.  PSYCH: Mentation appears normal, affect normal/bright, judgement and insight intact, normal speech and appearance well-groomed.                Video-Visit Details    Video Start Time: 9:48 AM    Type of service:  Video Visit    Video End Time:10:08 AM    Originating Location (pt. Location): Home    Distant Location (provider location):  [facility]     Platform used for Video Visit: [employer]    Roosevelt Murphy

## 2022-08-03 ENCOUNTER — MYC MEDICAL ADVICE (OUTPATIENT)
Dept: ONCOLOGY | Facility: CLINIC | Age: 57
End: 2022-08-03

## 2022-08-11 ENCOUNTER — VIRTUAL VISIT (OUTPATIENT)
Dept: ONCOLOGY | Facility: CLINIC | Age: 57
End: 2022-08-11
Payer: COMMERCIAL

## 2022-08-11 ENCOUNTER — INFUSION THERAPY VISIT (OUTPATIENT)
Dept: INFUSION THERAPY | Facility: CLINIC | Age: 57
End: 2022-08-11
Attending: INTERNAL MEDICINE
Payer: COMMERCIAL

## 2022-08-11 ENCOUNTER — APPOINTMENT (OUTPATIENT)
Dept: LAB | Facility: CLINIC | Age: 57
End: 2022-08-11
Payer: COMMERCIAL

## 2022-08-11 VITALS
RESPIRATION RATE: 20 BRPM | DIASTOLIC BLOOD PRESSURE: 72 MMHG | OXYGEN SATURATION: 98 % | HEART RATE: 75 BPM | SYSTOLIC BLOOD PRESSURE: 145 MMHG | TEMPERATURE: 97.8 F

## 2022-08-11 VITALS
SYSTOLIC BLOOD PRESSURE: 128 MMHG | OXYGEN SATURATION: 99 % | HEART RATE: 101 BPM | BODY MASS INDEX: 38.14 KG/M2 | WEIGHT: 243.5 LBS | DIASTOLIC BLOOD PRESSURE: 70 MMHG | RESPIRATION RATE: 16 BRPM

## 2022-08-11 DIAGNOSIS — I42.7 DRUG-INDUCED CARDIOMYOPATHY (H): ICD-10-CM

## 2022-08-11 DIAGNOSIS — C79.51 BREAST CANCER METASTASIZED TO BONE, RIGHT (H): ICD-10-CM

## 2022-08-11 DIAGNOSIS — C50.911: ICD-10-CM

## 2022-08-11 DIAGNOSIS — C50.511 MALIGNANT NEOPLASM OF LOWER-OUTER QUADRANT OF RIGHT FEMALE BREAST, UNSPECIFIED ESTROGEN RECEPTOR STATUS (H): Primary | ICD-10-CM

## 2022-08-11 DIAGNOSIS — C50.911 BREAST CANCER METASTASIZED TO BONE, RIGHT (H): ICD-10-CM

## 2022-08-11 LAB
ALBUMIN SERPL-MCNC: 3.3 G/DL (ref 3.4–5)
ALP SERPL-CCNC: 94 U/L (ref 40–150)
ALT SERPL W P-5'-P-CCNC: 43 U/L (ref 0–50)
ANION GAP SERPL CALCULATED.3IONS-SCNC: 5 MMOL/L (ref 3–14)
AST SERPL W P-5'-P-CCNC: 27 U/L (ref 0–45)
BASOPHILS # BLD AUTO: 0 10E3/UL (ref 0–0.2)
BASOPHILS NFR BLD AUTO: 0 %
BILIRUB SERPL-MCNC: 0.4 MG/DL (ref 0.2–1.3)
BUN SERPL-MCNC: 17 MG/DL (ref 7–30)
CALCIUM SERPL-MCNC: 9 MG/DL (ref 8.5–10.1)
CANCER AG15-3 SERPL-ACNC: 15 U/ML
CHLORIDE BLD-SCNC: 106 MMOL/L (ref 94–109)
CO2 SERPL-SCNC: 27 MMOL/L (ref 20–32)
CREAT SERPL-MCNC: 0.94 MG/DL (ref 0.52–1.04)
EOSINOPHIL # BLD AUTO: 0.2 10E3/UL (ref 0–0.7)
EOSINOPHIL NFR BLD AUTO: 4 %
ERYTHROCYTE [DISTWIDTH] IN BLOOD BY AUTOMATED COUNT: 13.1 % (ref 10–15)
GFR SERPL CREATININE-BSD FRML MDRD: 70 ML/MIN/1.73M2
GLUCOSE BLD-MCNC: 205 MG/DL (ref 70–99)
HCT VFR BLD AUTO: 39.3 % (ref 35–47)
HGB BLD-MCNC: 13 G/DL (ref 11.7–15.7)
IMM GRANULOCYTES # BLD: 0 10E3/UL
IMM GRANULOCYTES NFR BLD: 1 %
LYMPHOCYTES # BLD AUTO: 1.5 10E3/UL (ref 0.8–5.3)
LYMPHOCYTES NFR BLD AUTO: 26 %
MCH RBC QN AUTO: 29.2 PG (ref 26.5–33)
MCHC RBC AUTO-ENTMCNC: 33.1 G/DL (ref 31.5–36.5)
MCV RBC AUTO: 88 FL (ref 78–100)
MONOCYTES # BLD AUTO: 0.4 10E3/UL (ref 0–1.3)
MONOCYTES NFR BLD AUTO: 7 %
NEUTROPHILS # BLD AUTO: 3.7 10E3/UL (ref 1.6–8.3)
NEUTROPHILS NFR BLD AUTO: 62 %
NRBC # BLD AUTO: 0 10E3/UL
NRBC BLD AUTO-RTO: 0 /100
PLATELET # BLD AUTO: 228 10E3/UL (ref 150–450)
POTASSIUM BLD-SCNC: 3.6 MMOL/L (ref 3.4–5.3)
PROT SERPL-MCNC: 6.8 G/DL (ref 6.8–8.8)
RBC # BLD AUTO: 4.45 10E6/UL (ref 3.8–5.2)
SODIUM SERPL-SCNC: 138 MMOL/L (ref 133–144)
WBC # BLD AUTO: 5.8 10E3/UL (ref 4–11)

## 2022-08-11 PROCEDURE — 96413 CHEMO IV INFUSION 1 HR: CPT

## 2022-08-11 PROCEDURE — 96417 CHEMO IV INFUS EACH ADDL SEQ: CPT

## 2022-08-11 PROCEDURE — 36593 DECLOT VASCULAR DEVICE: CPT

## 2022-08-11 PROCEDURE — 258N000003 HC RX IP 258 OP 636: Performed by: INTERNAL MEDICINE

## 2022-08-11 PROCEDURE — 36416 COLLJ CAPILLARY BLOOD SPEC: CPT

## 2022-08-11 PROCEDURE — 99215 OFFICE O/P EST HI 40 MIN: CPT | Mod: 95 | Performed by: INTERNAL MEDICINE

## 2022-08-11 PROCEDURE — 85025 COMPLETE CBC W/AUTO DIFF WBC: CPT

## 2022-08-11 PROCEDURE — 36415 COLL VENOUS BLD VENIPUNCTURE: CPT

## 2022-08-11 PROCEDURE — 82040 ASSAY OF SERUM ALBUMIN: CPT

## 2022-08-11 PROCEDURE — 86300 IMMUNOASSAY TUMOR CA 15-3: CPT | Performed by: INTERNAL MEDICINE

## 2022-08-11 PROCEDURE — 250N000011 HC RX IP 250 OP 636: Performed by: INTERNAL MEDICINE

## 2022-08-11 PROCEDURE — 80053 COMPREHEN METABOLIC PANEL: CPT

## 2022-08-11 RX ORDER — ACETAMINOPHEN 325 MG/1
650 TABLET ORAL
Status: CANCELLED | OUTPATIENT
Start: 2022-09-01

## 2022-08-11 RX ORDER — ACETAMINOPHEN 325 MG/1
650 TABLET ORAL
Status: CANCELLED | OUTPATIENT
Start: 2022-11-03

## 2022-08-11 RX ORDER — ALBUTEROL SULFATE 0.83 MG/ML
2.5 SOLUTION RESPIRATORY (INHALATION)
Status: CANCELLED | OUTPATIENT
Start: 2023-01-05

## 2022-08-11 RX ORDER — DIPHENHYDRAMINE HCL 25 MG
50 CAPSULE ORAL
Status: CANCELLED | OUTPATIENT
Start: 2022-11-03

## 2022-08-11 RX ORDER — ALBUTEROL SULFATE 0.83 MG/ML
2.5 SOLUTION RESPIRATORY (INHALATION)
Status: CANCELLED | OUTPATIENT
Start: 2022-12-15

## 2022-08-11 RX ORDER — MEPERIDINE HYDROCHLORIDE 25 MG/ML
25 INJECTION INTRAMUSCULAR; INTRAVENOUS; SUBCUTANEOUS EVERY 30 MIN PRN
Status: CANCELLED | OUTPATIENT
Start: 2022-11-24

## 2022-08-11 RX ORDER — DIPHENHYDRAMINE HYDROCHLORIDE 50 MG/ML
50 INJECTION INTRAMUSCULAR; INTRAVENOUS
Status: CANCELLED
Start: 2022-11-03

## 2022-08-11 RX ORDER — METHYLPREDNISOLONE SODIUM SUCCINATE 125 MG/2ML
125 INJECTION, POWDER, LYOPHILIZED, FOR SOLUTION INTRAMUSCULAR; INTRAVENOUS
Status: CANCELLED
Start: 2023-01-05

## 2022-08-11 RX ORDER — DIPHENHYDRAMINE HCL 25 MG
50 CAPSULE ORAL
Status: CANCELLED | OUTPATIENT
Start: 2022-09-22

## 2022-08-11 RX ORDER — ALBUTEROL SULFATE 90 UG/1
1-2 AEROSOL, METERED RESPIRATORY (INHALATION)
Status: CANCELLED
Start: 2022-10-13

## 2022-08-11 RX ORDER — DIPHENHYDRAMINE HYDROCHLORIDE 50 MG/ML
50 INJECTION INTRAMUSCULAR; INTRAVENOUS
Status: CANCELLED
Start: 2022-09-22

## 2022-08-11 RX ORDER — DIPHENHYDRAMINE HYDROCHLORIDE 50 MG/ML
50 INJECTION INTRAMUSCULAR; INTRAVENOUS
Status: CANCELLED
Start: 2022-10-13

## 2022-08-11 RX ORDER — NALOXONE HYDROCHLORIDE 0.4 MG/ML
0.2 INJECTION, SOLUTION INTRAMUSCULAR; INTRAVENOUS; SUBCUTANEOUS
Status: CANCELLED | OUTPATIENT
Start: 2022-11-03

## 2022-08-11 RX ORDER — DIPHENHYDRAMINE HYDROCHLORIDE 50 MG/ML
50 INJECTION INTRAMUSCULAR; INTRAVENOUS
Status: CANCELLED
Start: 2023-01-05

## 2022-08-11 RX ORDER — DIPHENHYDRAMINE HCL 25 MG
50 CAPSULE ORAL
Status: CANCELLED | OUTPATIENT
Start: 2022-09-01

## 2022-08-11 RX ORDER — LORAZEPAM 2 MG/ML
0.5 INJECTION INTRAMUSCULAR EVERY 4 HOURS PRN
Status: CANCELLED | OUTPATIENT
Start: 2022-11-24

## 2022-08-11 RX ORDER — HEPARIN SODIUM (PORCINE) LOCK FLUSH IV SOLN 100 UNIT/ML 100 UNIT/ML
5 SOLUTION INTRAVENOUS
Status: CANCELLED | OUTPATIENT
Start: 2022-09-01

## 2022-08-11 RX ORDER — ALBUTEROL SULFATE 90 UG/1
1-2 AEROSOL, METERED RESPIRATORY (INHALATION)
Status: CANCELLED
Start: 2022-11-03

## 2022-08-11 RX ORDER — HEPARIN SODIUM (PORCINE) LOCK FLUSH IV SOLN 100 UNIT/ML 100 UNIT/ML
5 SOLUTION INTRAVENOUS
Status: CANCELLED | OUTPATIENT
Start: 2022-09-22

## 2022-08-11 RX ORDER — MEPERIDINE HYDROCHLORIDE 25 MG/ML
25 INJECTION INTRAMUSCULAR; INTRAVENOUS; SUBCUTANEOUS EVERY 30 MIN PRN
Status: CANCELLED | OUTPATIENT
Start: 2023-01-05

## 2022-08-11 RX ORDER — DIPHENHYDRAMINE HCL 25 MG
50 CAPSULE ORAL
Status: CANCELLED | OUTPATIENT
Start: 2022-12-15

## 2022-08-11 RX ORDER — NALOXONE HYDROCHLORIDE 0.4 MG/ML
0.2 INJECTION, SOLUTION INTRAMUSCULAR; INTRAVENOUS; SUBCUTANEOUS
Status: CANCELLED | OUTPATIENT
Start: 2022-12-15

## 2022-08-11 RX ORDER — EPINEPHRINE 1 MG/ML
0.3 INJECTION, SOLUTION, CONCENTRATE INTRAVENOUS EVERY 5 MIN PRN
Status: CANCELLED | OUTPATIENT
Start: 2022-12-15

## 2022-08-11 RX ORDER — MEPERIDINE HYDROCHLORIDE 25 MG/ML
25 INJECTION INTRAMUSCULAR; INTRAVENOUS; SUBCUTANEOUS EVERY 30 MIN PRN
Status: CANCELLED | OUTPATIENT
Start: 2022-09-22

## 2022-08-11 RX ORDER — EPINEPHRINE 1 MG/ML
0.3 INJECTION, SOLUTION, CONCENTRATE INTRAVENOUS EVERY 5 MIN PRN
Status: CANCELLED | OUTPATIENT
Start: 2022-11-24

## 2022-08-11 RX ORDER — NALOXONE HYDROCHLORIDE 0.4 MG/ML
0.2 INJECTION, SOLUTION INTRAMUSCULAR; INTRAVENOUS; SUBCUTANEOUS
Status: CANCELLED | OUTPATIENT
Start: 2022-09-22

## 2022-08-11 RX ORDER — ALBUTEROL SULFATE 0.83 MG/ML
2.5 SOLUTION RESPIRATORY (INHALATION)
Status: CANCELLED | OUTPATIENT
Start: 2022-10-13

## 2022-08-11 RX ORDER — ALBUTEROL SULFATE 90 UG/1
1-2 AEROSOL, METERED RESPIRATORY (INHALATION)
Status: CANCELLED
Start: 2022-09-22

## 2022-08-11 RX ORDER — ACETAMINOPHEN 325 MG/1
650 TABLET ORAL
Status: CANCELLED | OUTPATIENT
Start: 2023-01-05

## 2022-08-11 RX ORDER — ALBUTEROL SULFATE 0.83 MG/ML
2.5 SOLUTION RESPIRATORY (INHALATION)
Status: CANCELLED | OUTPATIENT
Start: 2022-11-24

## 2022-08-11 RX ORDER — MEPERIDINE HYDROCHLORIDE 25 MG/ML
25 INJECTION INTRAMUSCULAR; INTRAVENOUS; SUBCUTANEOUS EVERY 30 MIN PRN
Status: CANCELLED | OUTPATIENT
Start: 2022-12-15

## 2022-08-11 RX ORDER — HEPARIN SODIUM (PORCINE) LOCK FLUSH IV SOLN 100 UNIT/ML 100 UNIT/ML
5 SOLUTION INTRAVENOUS
Status: CANCELLED | OUTPATIENT
Start: 2023-01-05

## 2022-08-11 RX ORDER — METHYLPREDNISOLONE SODIUM SUCCINATE 125 MG/2ML
125 INJECTION, POWDER, LYOPHILIZED, FOR SOLUTION INTRAMUSCULAR; INTRAVENOUS
Status: CANCELLED
Start: 2022-11-24

## 2022-08-11 RX ORDER — HEPARIN SODIUM (PORCINE) LOCK FLUSH IV SOLN 100 UNIT/ML 100 UNIT/ML
5 SOLUTION INTRAVENOUS
Status: DISCONTINUED | OUTPATIENT
Start: 2022-08-11 | End: 2022-08-11 | Stop reason: HOSPADM

## 2022-08-11 RX ORDER — LORAZEPAM 2 MG/ML
0.5 INJECTION INTRAMUSCULAR EVERY 4 HOURS PRN
Status: CANCELLED | OUTPATIENT
Start: 2022-10-13

## 2022-08-11 RX ORDER — HEPARIN SODIUM,PORCINE 10 UNIT/ML
5 VIAL (ML) INTRAVENOUS
Status: CANCELLED | OUTPATIENT
Start: 2022-11-03

## 2022-08-11 RX ORDER — HEPARIN SODIUM (PORCINE) LOCK FLUSH IV SOLN 100 UNIT/ML 100 UNIT/ML
5 SOLUTION INTRAVENOUS
Status: CANCELLED | OUTPATIENT
Start: 2022-11-03

## 2022-08-11 RX ORDER — EPINEPHRINE 1 MG/ML
0.3 INJECTION, SOLUTION, CONCENTRATE INTRAVENOUS EVERY 5 MIN PRN
Status: CANCELLED | OUTPATIENT
Start: 2023-01-05

## 2022-08-11 RX ORDER — NALOXONE HYDROCHLORIDE 0.4 MG/ML
0.2 INJECTION, SOLUTION INTRAMUSCULAR; INTRAVENOUS; SUBCUTANEOUS
Status: CANCELLED | OUTPATIENT
Start: 2022-11-24

## 2022-08-11 RX ORDER — MEPERIDINE HYDROCHLORIDE 25 MG/ML
25 INJECTION INTRAMUSCULAR; INTRAVENOUS; SUBCUTANEOUS EVERY 30 MIN PRN
Status: CANCELLED | OUTPATIENT
Start: 2022-09-01

## 2022-08-11 RX ORDER — NALOXONE HYDROCHLORIDE 0.4 MG/ML
0.2 INJECTION, SOLUTION INTRAMUSCULAR; INTRAVENOUS; SUBCUTANEOUS
Status: CANCELLED | OUTPATIENT
Start: 2023-01-05

## 2022-08-11 RX ORDER — HEPARIN SODIUM (PORCINE) LOCK FLUSH IV SOLN 100 UNIT/ML 100 UNIT/ML
5 SOLUTION INTRAVENOUS
Status: CANCELLED | OUTPATIENT
Start: 2022-12-15

## 2022-08-11 RX ORDER — METHYLPREDNISOLONE SODIUM SUCCINATE 125 MG/2ML
125 INJECTION, POWDER, LYOPHILIZED, FOR SOLUTION INTRAMUSCULAR; INTRAVENOUS
Status: CANCELLED
Start: 2022-10-13

## 2022-08-11 RX ORDER — HEPARIN SODIUM,PORCINE 10 UNIT/ML
5 VIAL (ML) INTRAVENOUS
Status: CANCELLED | OUTPATIENT
Start: 2023-01-05

## 2022-08-11 RX ORDER — ALBUTEROL SULFATE 90 UG/1
1-2 AEROSOL, METERED RESPIRATORY (INHALATION)
Status: CANCELLED
Start: 2022-12-15

## 2022-08-11 RX ORDER — EPINEPHRINE 1 MG/ML
0.3 INJECTION, SOLUTION, CONCENTRATE INTRAVENOUS EVERY 5 MIN PRN
Status: CANCELLED | OUTPATIENT
Start: 2022-10-13

## 2022-08-11 RX ORDER — METHYLPREDNISOLONE SODIUM SUCCINATE 125 MG/2ML
125 INJECTION, POWDER, LYOPHILIZED, FOR SOLUTION INTRAMUSCULAR; INTRAVENOUS
Status: CANCELLED
Start: 2022-12-15

## 2022-08-11 RX ORDER — HEPARIN SODIUM,PORCINE 10 UNIT/ML
5 VIAL (ML) INTRAVENOUS
Status: CANCELLED | OUTPATIENT
Start: 2022-12-15

## 2022-08-11 RX ORDER — MEPERIDINE HYDROCHLORIDE 25 MG/ML
25 INJECTION INTRAMUSCULAR; INTRAVENOUS; SUBCUTANEOUS EVERY 30 MIN PRN
Status: CANCELLED | OUTPATIENT
Start: 2022-10-13

## 2022-08-11 RX ORDER — ALBUTEROL SULFATE 0.83 MG/ML
2.5 SOLUTION RESPIRATORY (INHALATION)
Status: CANCELLED | OUTPATIENT
Start: 2022-09-22

## 2022-08-11 RX ORDER — HEPARIN SODIUM,PORCINE 10 UNIT/ML
5 VIAL (ML) INTRAVENOUS
Status: CANCELLED | OUTPATIENT
Start: 2022-10-13

## 2022-08-11 RX ORDER — LORAZEPAM 2 MG/ML
0.5 INJECTION INTRAMUSCULAR EVERY 4 HOURS PRN
Status: CANCELLED | OUTPATIENT
Start: 2022-09-22

## 2022-08-11 RX ORDER — LORAZEPAM 2 MG/ML
0.5 INJECTION INTRAMUSCULAR EVERY 4 HOURS PRN
Status: CANCELLED | OUTPATIENT
Start: 2022-12-15

## 2022-08-11 RX ORDER — EPINEPHRINE 1 MG/ML
0.3 INJECTION, SOLUTION, CONCENTRATE INTRAVENOUS EVERY 5 MIN PRN
Status: CANCELLED | OUTPATIENT
Start: 2022-11-03

## 2022-08-11 RX ORDER — METHYLPREDNISOLONE SODIUM SUCCINATE 125 MG/2ML
125 INJECTION, POWDER, LYOPHILIZED, FOR SOLUTION INTRAMUSCULAR; INTRAVENOUS
Status: CANCELLED
Start: 2022-09-22

## 2022-08-11 RX ORDER — DIPHENHYDRAMINE HCL 25 MG
50 CAPSULE ORAL
Status: CANCELLED | OUTPATIENT
Start: 2022-11-24

## 2022-08-11 RX ORDER — ALBUTEROL SULFATE 90 UG/1
1-2 AEROSOL, METERED RESPIRATORY (INHALATION)
Status: CANCELLED
Start: 2022-09-01

## 2022-08-11 RX ORDER — ACETAMINOPHEN 325 MG/1
650 TABLET ORAL
Status: CANCELLED | OUTPATIENT
Start: 2022-12-15

## 2022-08-11 RX ORDER — ALBUTEROL SULFATE 90 UG/1
1-2 AEROSOL, METERED RESPIRATORY (INHALATION)
Status: CANCELLED
Start: 2023-01-05

## 2022-08-11 RX ORDER — EPINEPHRINE 1 MG/ML
0.3 INJECTION, SOLUTION, CONCENTRATE INTRAVENOUS EVERY 5 MIN PRN
Status: CANCELLED | OUTPATIENT
Start: 2022-09-01

## 2022-08-11 RX ORDER — DIPHENHYDRAMINE HYDROCHLORIDE 50 MG/ML
50 INJECTION INTRAMUSCULAR; INTRAVENOUS
Status: CANCELLED
Start: 2022-09-01

## 2022-08-11 RX ORDER — ALBUTEROL SULFATE 0.83 MG/ML
2.5 SOLUTION RESPIRATORY (INHALATION)
Status: CANCELLED | OUTPATIENT
Start: 2022-09-01

## 2022-08-11 RX ORDER — MEPERIDINE HYDROCHLORIDE 25 MG/ML
25 INJECTION INTRAMUSCULAR; INTRAVENOUS; SUBCUTANEOUS EVERY 30 MIN PRN
Status: CANCELLED | OUTPATIENT
Start: 2022-11-03

## 2022-08-11 RX ORDER — DIPHENHYDRAMINE HYDROCHLORIDE 50 MG/ML
50 INJECTION INTRAMUSCULAR; INTRAVENOUS
Status: CANCELLED
Start: 2022-12-15

## 2022-08-11 RX ORDER — HEPARIN SODIUM (PORCINE) LOCK FLUSH IV SOLN 100 UNIT/ML 100 UNIT/ML
5 SOLUTION INTRAVENOUS
Status: CANCELLED | OUTPATIENT
Start: 2022-10-13

## 2022-08-11 RX ORDER — HEPARIN SODIUM,PORCINE 10 UNIT/ML
5 VIAL (ML) INTRAVENOUS
Status: CANCELLED | OUTPATIENT
Start: 2022-11-24

## 2022-08-11 RX ORDER — DIPHENHYDRAMINE HCL 25 MG
50 CAPSULE ORAL
Status: CANCELLED | OUTPATIENT
Start: 2023-01-05

## 2022-08-11 RX ORDER — HEPARIN SODIUM,PORCINE 10 UNIT/ML
5 VIAL (ML) INTRAVENOUS
Status: CANCELLED | OUTPATIENT
Start: 2022-09-01

## 2022-08-11 RX ORDER — HEPARIN SODIUM (PORCINE) LOCK FLUSH IV SOLN 100 UNIT/ML 100 UNIT/ML
5 SOLUTION INTRAVENOUS
Status: CANCELLED | OUTPATIENT
Start: 2022-11-24

## 2022-08-11 RX ORDER — ALBUTEROL SULFATE 0.83 MG/ML
2.5 SOLUTION RESPIRATORY (INHALATION)
Status: CANCELLED | OUTPATIENT
Start: 2022-11-03

## 2022-08-11 RX ORDER — LORAZEPAM 2 MG/ML
0.5 INJECTION INTRAMUSCULAR EVERY 4 HOURS PRN
Status: CANCELLED | OUTPATIENT
Start: 2022-09-01

## 2022-08-11 RX ORDER — NALOXONE HYDROCHLORIDE 0.4 MG/ML
0.2 INJECTION, SOLUTION INTRAMUSCULAR; INTRAVENOUS; SUBCUTANEOUS
Status: CANCELLED | OUTPATIENT
Start: 2022-10-13

## 2022-08-11 RX ORDER — DIPHENHYDRAMINE HYDROCHLORIDE 50 MG/ML
50 INJECTION INTRAMUSCULAR; INTRAVENOUS
Status: CANCELLED
Start: 2022-11-24

## 2022-08-11 RX ORDER — LORAZEPAM 2 MG/ML
0.5 INJECTION INTRAMUSCULAR EVERY 4 HOURS PRN
Status: CANCELLED | OUTPATIENT
Start: 2023-01-05

## 2022-08-11 RX ORDER — HEPARIN SODIUM,PORCINE 10 UNIT/ML
5 VIAL (ML) INTRAVENOUS
Status: CANCELLED | OUTPATIENT
Start: 2022-09-22

## 2022-08-11 RX ORDER — DIPHENHYDRAMINE HCL 25 MG
50 CAPSULE ORAL
Status: CANCELLED | OUTPATIENT
Start: 2022-10-13

## 2022-08-11 RX ORDER — ACETAMINOPHEN 325 MG/1
650 TABLET ORAL
Status: CANCELLED | OUTPATIENT
Start: 2022-09-22

## 2022-08-11 RX ORDER — EPINEPHRINE 1 MG/ML
0.3 INJECTION, SOLUTION, CONCENTRATE INTRAVENOUS EVERY 5 MIN PRN
Status: CANCELLED | OUTPATIENT
Start: 2022-09-22

## 2022-08-11 RX ORDER — METHYLPREDNISOLONE SODIUM SUCCINATE 125 MG/2ML
125 INJECTION, POWDER, LYOPHILIZED, FOR SOLUTION INTRAMUSCULAR; INTRAVENOUS
Status: CANCELLED
Start: 2022-09-01

## 2022-08-11 RX ORDER — ACETAMINOPHEN 325 MG/1
650 TABLET ORAL
Status: CANCELLED | OUTPATIENT
Start: 2022-10-13

## 2022-08-11 RX ORDER — METHYLPREDNISOLONE SODIUM SUCCINATE 125 MG/2ML
125 INJECTION, POWDER, LYOPHILIZED, FOR SOLUTION INTRAMUSCULAR; INTRAVENOUS
Status: CANCELLED
Start: 2022-11-03

## 2022-08-11 RX ORDER — LORAZEPAM 2 MG/ML
0.5 INJECTION INTRAMUSCULAR EVERY 4 HOURS PRN
Status: CANCELLED | OUTPATIENT
Start: 2022-11-03

## 2022-08-11 RX ORDER — NALOXONE HYDROCHLORIDE 0.4 MG/ML
0.2 INJECTION, SOLUTION INTRAMUSCULAR; INTRAVENOUS; SUBCUTANEOUS
Status: CANCELLED | OUTPATIENT
Start: 2022-09-01

## 2022-08-11 RX ORDER — ACETAMINOPHEN 325 MG/1
650 TABLET ORAL
Status: CANCELLED | OUTPATIENT
Start: 2022-11-24

## 2022-08-11 RX ORDER — ALBUTEROL SULFATE 90 UG/1
1-2 AEROSOL, METERED RESPIRATORY (INHALATION)
Status: CANCELLED
Start: 2022-11-24

## 2022-08-11 RX ADMIN — PERTUZUMAB 420 MG: 30 INJECTION, SOLUTION, CONCENTRATE INTRAVENOUS at 12:14

## 2022-08-11 RX ADMIN — SODIUM CHLORIDE 250 ML: 9 INJECTION, SOLUTION INTRAVENOUS at 12:13

## 2022-08-11 RX ADMIN — ALTEPLASE 2 MG: 2.2 INJECTION, POWDER, LYOPHILIZED, FOR SOLUTION INTRAVENOUS at 10:30

## 2022-08-11 RX ADMIN — SODIUM CHLORIDE 720 MG: 9 INJECTION, SOLUTION INTRAVENOUS at 12:54

## 2022-08-11 ASSESSMENT — PAIN SCALES - GENERAL
PAINLEVEL: NO PAIN (0)
PAINLEVEL: NO PAIN (0)

## 2022-08-11 NOTE — TELEPHONE ENCOUNTER
Patient called regarding port removal date/time. [employer] message sent with this information. Encouraged call back to the clinic with questions or concerns.  Radha Neal RNCC

## 2022-08-11 NOTE — PATIENT INSTRUCTIONS
1) Herceptin / Perjeta q21 days x 4 more cycles after today.  2) LATESHA 6 weeks w/ treatment above.  3) BJT 12 weeks w/ treatment above w/ PET scan.  4) [facility] visit ASAP for PORT removal.

## 2022-08-11 NOTE — PROGRESS NOTES
Infusion Nursing Note:  Tiffanie Mcdermott presents today for C8 D1 Perjeta/Trazimera/Alteplase.    Patient seen by provider today: Yes: Dr. Kim   present during visit today: Not Applicable.    Note: No blood return from port, 2nd nurse verified placement and alteplase given, after 1 hour no blood return. Was going to re-access with power port but when removed needle, port started draining yellowish drainage. Dr. Kim informed and wants port removed and peripherally give iv med. Patient upset and crying during PIV start. Tolerated meds well and will follow up with Radha in specialty clinic regarding port removal.    Intravenous Access:  Implanted Port.  No blood return.    Treatment Conditions:  Lab Results   Component Value Date    HGB 13.0 08/11/2022    WBC 5.8 08/11/2022    ANEU 7.0 03/21/2022    ANEUTAUTO 3.7 08/11/2022     08/11/2022      Lab Results   Component Value Date     08/11/2022    POTASSIUM 3.6 08/11/2022    MAG 1.7 04/25/2022    CR 0.94 08/11/2022    SARAI 9.0 08/11/2022    BILITOTAL 0.4 08/11/2022    ALBUMIN 3.3 (L) 08/11/2022    ALT 43 08/11/2022    AST 27 08/11/2022       Post Infusion Assessment:  Patient tolerated infusion without incident.  Patient observed for 15 minutes post infusion per protocol.  Blood return noted pre and post infusion.  Site patent and intact, free from redness, edema or discomfort.  No evidence of extravasations.  Access discontinued per protocol.     Discharge Plan:   Discharge instructions reviewed with: Patient.  Patient and/or family verbalized understanding of discharge instructions and all questions answered.  Patient discharged in stable condition accompanied by: self.  Departure Mode: Ambulatory.      Sada Flores RN ONC

## 2022-08-11 NOTE — PROGRESS NOTES
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  # Mar 2022 - May 2022 Taxol / Herceptin / Perjeta; near-CR.  # May 2022 - ongoing Herceptin / Perjeta; near-CR.    Remains in near CR; reviewed PET scan together.  Tolerating treatment well and without major toxicity.  Recovering from Taxol and will maintain on indefinite Herceptin/Perjeta.  Diarrhea is manageable.  Planning echocardiograms every 6 months; next due November planning 3 more months of therapy with a repeat PET.  Reviewed transition from CA 27.29 to CA 15.3.  Awaiting dental approval before embarking on bone strengthening agents with Zometa.  Recovering from COVID.    ADDENDUM: Port drainage continues; will request Dr. Ocampo a well visit for port removal.    Adiel Kim MD    --------------------    Interval History:  Tiffanie returns for follow-up of metastatic breast cancer with bone and liver involvement.  All in all, she is doing quite well.  No new major symptoms to report.  She unfortunately has contracted COVID and dealing with some fatigue and respiratory illness associated with the COVID, but from a cancer standpoint is doing quite well.  She is stable new stools without worsening; history of cholecystectomy.  No cardiac complaints.  No bone complaints.  Neuropathy and hair are improving with time.  Dental visit upcoming.    --------------------    Physical Exam:  VS: /70 (BP Location: Right arm, Patient Position: Sitting)   Pulse 101   Resp 16   Wt 110.5 kg (243 lb 8 oz)   LMP 2008   SpO2 99%   BMI 38.14 kg/m    Gen: Well-appearing.  Skin: Right upper extremity with erythema around tick bite.  PORT:  Purulent material drained from the left edge of the incision; no surrounding infection symptoms such as erythema or warmth.    Labs / Imaging / Path:  We reviewed CBC, CMP.  We personally reveiwed her PET scan.

## 2022-08-11 NOTE — LETTER
2022         RE: Tiffanie Mcdermott  [address] [address] [place] [phone]        Dear Colleague,    Thank you for referring your patient, Tiffanie Mcdermott, to the [facility] [place]. Please see a copy of my visit note below.    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  # Mar 2022 - May 2022 Taxol / Herceptin / Perjeta; near-CR.  # May 2022 - ongoing Herceptin / Perjeta; near-CR.    Remains in near CR; reviewed PET scan together.  Tolerating treatment well and without major toxicity.  Recovering from Taxol and will maintain on indefinite Herceptin/Perjeta.  Diarrhea is manageable.  Planning echocardiograms every 6 months; next due November planning 3 more months of therapy with a repeat PET.  Reviewed transition from CA 27.29 to CA 15.3.  Awaiting dental approval before embarking on bone strengthening agents with Zometa.  Recovering from COVID.    ADDENDUM: Port drainage continues; will request Dr. Ocampo a well visit for port removal.    Adiel Kim MD    --------------------    Interval History:  Tiffanie returns for follow-up of metastatic breast cancer with bone and liver involvement.  All in all, she is doing quite well.  No new major symptoms to report.  She unfortunately has contracted COVID and dealing with some fatigue and respiratory illness associated with the COVID, but from a cancer standpoint is doing quite well.  She is stable new stools without worsening; history of cholecystectomy.  No cardiac complaints.  No bone complaints.  Neuropathy and hair are improving with time.  Dental visit  upcoming.    --------------------    Physical Exam:  VS: /70 (BP Location: Right arm, Patient Position: Sitting)   Pulse 101   Resp 16   Wt 110.5 kg (243 lb 8 oz)   LMP 08/06/2008   SpO2 99%   BMI 38.14 kg/m    Gen: Well-appearing.  Skin: Right upper extremity with erythema around tick bite.  PORT: Purulent material drained from the left edge of the incision; no surrounding infection symptoms such as erythema or warmth.    Labs / Imaging / Path:  We reviewed CBC, CMP.  We personally reveiwed her PET scan.       Again, thank you for allowing me to participate in the care of your patient.        Sincerely,        Adiel Kim MD

## 2022-08-11 NOTE — LETTER
2022         RE: Tiffanie Mcdermott  [address] [address] [place] [phone]        Dear Colleague,    Thank you for referring your patient, Tiffanie Mcdermott, to the [facility] [place]. Please see a copy of my visit note below.    [facility] Hematology / Oncology  Progress Note  Name: Tiffanie Mcdermott  :  1965    MRN:  [id number]    --------------------    Assessment / Plan:  Stage IV, hormone negative, HER2 positive breast cancer with dense liver involvement and scattered bone involvement:  # 2022 Presented liver failure and related symptoms secondary to dense tumor burden.  # Mar 2022 - May 2022 Taxol / Herceptin / Perjeta; near-CR.  # May 2022 - ongoing Herceptin / Perjeta; near-CR.    Remains in near CR; reviewed PET scan together.  Tolerating treatment well and without major toxicity.  Recovering from Taxol and will maintain on indefinite Herceptin/Perjeta.  Diarrhea is manageable.  Planning echocardiograms every 6 months; next due November planning 3 more months of therapy with a repeat PET.  Reviewed transition from CA 27.29 to CA 15.3.  Awaiting dental approval before embarking on bone strengthening agents with Zometa.  Recovering from COVID.    ADDENDUM: Port drainage continues; will request Dr. Ocampo a well visit for port removal.    Adiel Kim MD    --------------------    Interval History:  Tiffanie returns for follow-up of metastatic breast cancer with bone and liver involvement.  All in all, she is doing quite well.  No new major symptoms to report.  She unfortunately has contracted COVID and dealing with some fatigue and respiratory illness associated with the COVID, but from a cancer standpoint is doing quite well.  She is stable new stools without worsening; history of cholecystectomy.  No cardiac complaints.  No bone complaints.  Neuropathy and hair are improving with time.  Dental visit  upcoming.    --------------------    Physical Exam:  VS: /70 (BP Location: Right arm, Patient Position: Sitting)   Pulse 101   Resp 16   Wt 110.5 kg (243 lb 8 oz)   LMP 08/06/2008   SpO2 99%   BMI 38.14 kg/m    Gen: Well-appearing.  Skin: Right upper extremity with erythema around tick bite.  PORT: Purulent material drained from the left edge of the incision; no surrounding infection symptoms such as erythema or warmth.    Labs / Imaging / Path:  We reviewed CBC, CMP.  We personally reveiwed her PET scan.       Again, thank you for allowing me to participate in the care of your patient.        Sincerely,        Adiel Kim MD

## 2022-08-16 ENCOUNTER — TELEPHONE (OUTPATIENT)
Dept: ONCOLOGY | Facility: CLINIC | Age: 57
End: 2022-08-16

## 2022-08-16 NOTE — TELEPHONE ENCOUNTER
Reason for Call:  Other call back    Detailed comments Patient calling concerned about having multiple appointments for 11/03 and 11/10 wanting to talk to Radha about it. Patient stating that she has these appointments all on the same day. Thinks its not necessary to have them 2 weeks apart. Please call her cell at [phone]      Phone Number Patient can be reached at: Home number on file [phone] (home)    Best Time: any    Can we leave a detailed message on this number? Yes     Call taken on 8/16/2022 at 1:26 PM by Irma Mohan

## 2022-08-17 ENCOUNTER — OFFICE VISIT (OUTPATIENT)
Dept: SURGERY | Facility: CLINIC | Age: 57
End: 2022-08-17
Payer: COMMERCIAL

## 2022-08-17 VITALS
TEMPERATURE: 96.9 F | WEIGHT: 243 LBS | BODY MASS INDEX: 38.06 KG/M2 | SYSTOLIC BLOOD PRESSURE: 122 MMHG | DIASTOLIC BLOOD PRESSURE: 82 MMHG

## 2022-08-17 DIAGNOSIS — Z45.2 ENCOUNTER FOR CARE RELATED TO VASCULAR ACCESS PORT: Primary | ICD-10-CM

## 2022-08-17 PROCEDURE — 36590 REMOVAL TUNNELED CV CATH: CPT | Performed by: SURGERY

## 2022-08-17 ASSESSMENT — PAIN SCALES - GENERAL: PAINLEVEL: NO PAIN (0)

## 2022-08-17 NOTE — PROGRESS NOTES
PROCEDURE: port removal   Written consent was obtained    The left chest area was prepped and appropriately anesthetized with 1% lidocaine with epinephrine. Using the usual technique, port excision with linear incision was performed. There was necrotic, infected material that was irrigated and debrided after port was removed. Port had two permanent sutures which were removed as well. Wound was copiously irrigated and minimal sharp debridement was done to remove all necrotic tissue. Incision loosely approximated with a single 4-0 prolene vertical mattress suture to allow drainage, if necessary. Appropriate dressing applied. The procedure was well tolerated.

## 2022-08-17 NOTE — LETTER
8/17/2022         RE: Tiffanie Mcdermott  [address] [address] [place] [phone]        Dear Colleague,    Thank you for referring your patient, Tiffanie Mcdermott, to the [facility]. Please see a copy of my visit note below.    PROCEDURE: port removal   Written consent was obtained    The left chest area was prepped and appropriately anesthetized with 1% lidocaine with epinephrine. Using the usual technique, port excision with linear incision was performed. There was necrotic, infected material that was irrigated and debrided after port was removed. Port had two permanent sutures which were removed as well. Wound was copiously irrigated and minimal sharp debridement was done to remove all necrotic tissue. Incision loosely approximated with a single 4-0 prolene vertical mattress suture to allow drainage, if necessary. Appropriate dressing applied. The procedure was well tolerated.      Again, thank you for allowing me to participate in the care of your patient.        Sincerely,        Ryne Villanueva, DO

## 2022-08-25 ENCOUNTER — OFFICE VISIT (OUTPATIENT)
Dept: FAMILY MEDICINE | Facility: CLINIC | Age: 57
End: 2022-08-25
Payer: COMMERCIAL

## 2022-08-25 DIAGNOSIS — Z51.89 ENCOUNTER FOR WOUND CARE: Primary | ICD-10-CM

## 2022-08-25 PROCEDURE — 99207 PR NO CHARGE NURSE ONLY: CPT

## 2022-08-25 NOTE — PROGRESS NOTES
Wound Care    Patient seen per the order of Dr. Villanueva for a port access removal which began last week.     Wound 1:  Previous dressing removed noting minimal drainage on gauze (previous dressing). Wound(s) cleansed with chlorhexidine soap.  Location: left upper chest  Measures: not measured  Wound Base: Granular with some slough superior to incision  Surrounding Tissue: intact, with some redness (irritation from tape)  Odor: None  Pain: None reported  Action & Treatment order per doctor: Wound cleaned with sterile gauze and Q-tip, barrier spray used on surrounding tissue to prevent further irritation. Patient was educated on signs and symptoms when it is appropriate to contact clinic or ED.  Suture left in place to facilitate further wound healing per Dr. Rodriguez. To follow up in one week, but patient refused stating she was leaving the state and could not see provider until the week of September 8th.     Follow up: With Dr. Vilalnueva and RN on 9/8/22     Stephany Banegas, RN  [facility]  [phone]  8/25/2022 10:47 AM

## 2022-09-08 ENCOUNTER — OFFICE VISIT (OUTPATIENT)
Dept: SURGERY | Facility: CLINIC | Age: 57
End: 2022-09-08
Payer: COMMERCIAL

## 2022-09-08 ENCOUNTER — INFUSION THERAPY VISIT (OUTPATIENT)
Dept: INFUSION THERAPY | Facility: CLINIC | Age: 57
End: 2022-09-08
Attending: INTERNAL MEDICINE
Payer: COMMERCIAL

## 2022-09-08 ENCOUNTER — APPOINTMENT (OUTPATIENT)
Dept: LAB | Facility: CLINIC | Age: 57
End: 2022-09-08
Payer: COMMERCIAL

## 2022-09-08 VITALS
BODY MASS INDEX: 38.7 KG/M2 | SYSTOLIC BLOOD PRESSURE: 124 MMHG | HEART RATE: 77 BPM | TEMPERATURE: 98.3 F | DIASTOLIC BLOOD PRESSURE: 75 MMHG | WEIGHT: 247.1 LBS | OXYGEN SATURATION: 96 % | RESPIRATION RATE: 16 BRPM

## 2022-09-08 VITALS
DIASTOLIC BLOOD PRESSURE: 88 MMHG | WEIGHT: 248 LBS | TEMPERATURE: 97.1 F | BODY MASS INDEX: 38.84 KG/M2 | SYSTOLIC BLOOD PRESSURE: 130 MMHG

## 2022-09-08 DIAGNOSIS — Z45.2 ENCOUNTER FOR CARE RELATED TO VASCULAR ACCESS PORT: Primary | ICD-10-CM

## 2022-09-08 DIAGNOSIS — C50.511 MALIGNANT NEOPLASM OF LOWER-OUTER QUADRANT OF RIGHT FEMALE BREAST, UNSPECIFIED ESTROGEN RECEPTOR STATUS (H): Primary | ICD-10-CM

## 2022-09-08 PROCEDURE — 250N000011 HC RX IP 250 OP 636: Performed by: INTERNAL MEDICINE

## 2022-09-08 PROCEDURE — 96413 CHEMO IV INFUSION 1 HR: CPT

## 2022-09-08 PROCEDURE — 99024 POSTOP FOLLOW-UP VISIT: CPT | Performed by: SURGERY

## 2022-09-08 PROCEDURE — 96417 CHEMO IV INFUS EACH ADDL SEQ: CPT

## 2022-09-08 PROCEDURE — 258N000003 HC RX IP 258 OP 636: Performed by: INTERNAL MEDICINE

## 2022-09-08 RX ADMIN — SODIUM CHLORIDE 720 MG: 9 INJECTION, SOLUTION INTRAVENOUS at 15:30

## 2022-09-08 RX ADMIN — SODIUM CHLORIDE 250 ML: 9 INJECTION, SOLUTION INTRAVENOUS at 14:37

## 2022-09-08 RX ADMIN — PERTUZUMAB 420 MG: 30 INJECTION, SOLUTION, CONCENTRATE INTRAVENOUS at 14:44

## 2022-09-08 ASSESSMENT — PAIN SCALES - GENERAL
PAINLEVEL: NO PAIN (0)
PAINLEVEL: NO PAIN (0)

## 2022-09-08 NOTE — PROGRESS NOTES
General Surgery Follow Up    Pt returns for follow up visit s/p port removal    HPI:  Pt seen as suture left in at previous check up to allow further healing. No drainage for several days. No fevers or outward signs of infection.      Past Medical History:   Diagnosis Date     Allergy, unspecified not elsewhere classified      Motion sickness      PONV (postoperative nausea and vomiting)     requests scopalomine patch       Past Surgical History:   Procedure Laterality Date     HC LAPAROSCOPY, SURGICAL; CHOLECYSTECTOMY  10/19/2005    Cholecystectomy, Laparoscopic     INSERT PORT VASCULAR ACCESS Left 3/2/2022    Procedure: Placement of power port, left side;  Surgeon: Sage Turner MD;  Location:  OR       Social History     Socioeconomic History     Marital status:      Spouse name: roxana     Number of children: 1     Years of education: 16     Highest education level: Not on file   Occupational History     Occupation:      Comment: [employer]   Tobacco Use     Smoking status: Never Smoker     Smokeless tobacco: Never Used     Tobacco comment: no smoking in the home   Vaping Use     Vaping Use: Never used   Substance and Sexual Activity     Alcohol use: Yes     Alcohol/week: 1.7 standard drinks     Drug use: No     Sexual activity: Yes     Partners: Male     Comment: none   Other Topics Concern      Service No     Blood Transfusions No     Caffeine Concern No     Comment: 0     Occupational Exposure No     Hobby Hazards No     Sleep Concern Yes     Comment: with work     Stress Concern Yes     Comment: with work     Weight Concern No     Special Diet No     Back Care No     Exercise No     Bike Helmet Not Asked     Comment: na     Seat Belt Yes     Self-Exams No     Comment: bse     Parent/sibling w/ CABG, MI or angioplasty before 65F 55M? Not Asked   Social History Narrative     Not on file     Social Determinants of Health     Financial Resource Strain: Not on  file   Food Insecurity: Not on file   Transportation Needs: Not on file   Physical Activity: Not on file   Stress: Not on file   Social Connections: Not on file   Intimate Partner Violence: Not on file   Housing Stability: Not on file       Current Outpatient Medications   Medication Sig Dispense Refill     Acetaminophen (TYLENOL PO) Take 650 mg by mouth every 6 hours as needed for mild pain or fever       ibuprofen (ADVIL,MOTRIN) 200 MG tablet Take 200 mg by mouth every 4 hours as needed       lidocaine-prilocaine (EMLA) 2.5-2.5 % external cream Apply to port site 45 minutes to 1 hour prior to infusion. Cover site with plastic wrap to protect clothing and secure with skin tape. 5 g 3     loratadine (CLARITIN) 10 MG tablet Take 10 mg by mouth daily       Multiple Vitamins-Minerals (MULTIPLE VITAMINS/WOMENS PO) Take 1 tablet by mouth daily        omeprazole (PRILOSEC) 20 MG CR capsule 20 mg twice daily for 7 days then 20 mg at bedtime thereafter. 45 capsule 0       Medications and history reviewed    Physical exam:  Vitals: /88   Temp 97.1  F (36.2  C) (Temporal)   Wt 112.5 kg (248 lb)   LMP 08/06/2008   BMI 38.84 kg/m    BMI= Body mass index is 38.84 kg/m .    HEART: RRR, no new murmurs  LUNGS: CTAB, equal chest rise, good effort  ABD: soft, non tender, non distended  INCISIONS: small dimple, some induration remains but no fluctuance or erythema  EXT: JC, no deformities    PATHOLOGY:  none    Assessment:     ICD-10-CM    1. Encounter for care related to vascular access port  Z45.2      Plan: Suture removed. Wound looks good. Can return to discuss port placement if she and her oncologist determine she needs another one.    Ryne Villanueva, DO

## 2022-09-08 NOTE — LETTER
9/8/2022         RE: Tiffanie Mcdermott  [address] [address] [place] [id number]        Dear Colleague,    Thank you for referring your patient, Tiffanie Mcdermott, to the [facility]. Please see a copy of my visit note below.    General Surgery Follow Up    Pt returns for follow up visit s/p port removal    HPI:  Pt seen as suture left in at previous check up to allow further healing. No drainage for several days. No fevers or outward signs of infection.      Past Medical History:   Diagnosis Date     Allergy, unspecified not elsewhere classified      Motion sickness      PONV (postoperative nausea and vomiting)     requests scopalomine patch       Past Surgical History:   Procedure Laterality Date     HC LAPAROSCOPY, SURGICAL; CHOLECYSTECTOMY  10/19/2005    Cholecystectomy, Laparoscopic     INSERT PORT VASCULAR ACCESS Left 3/2/2022    Procedure: Placement of power port, left side;  Surgeon: Sage Turner MD;  Location:  OR       Social History     Socioeconomic History     Marital status:      Spouse name: roxana     Number of children: 1     Years of education: 16     Highest education level: Not on file   Occupational History     Occupation:      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   Tobacco Use     Smoking status: Never Smoker     Smokeless tobacco: Never Used     Tobacco comment: no smoking in the home   Vaping Use     Vaping Use: Never used   Substance and Sexual Activity     Alcohol use: Yes     Alcohol/week: 1.7 standard drinks     Drug use: No     Sexual activity: Yes     Partners: Male     Comment: none   Other Topics Concern      Service No     Blood Transfusions No     Caffeine Concern No     Comment: 0     Occupational Exposure No     Hobby Hazards No     Sleep Concern Yes     Comment: with work     Stress Concern Yes     Comment: with work     Weight Concern No     Special Diet No     Back Care No     Exercise No     Bike Helmet Not  Asked     Comment: na     Seat Belt Yes     Self-Exams No     Comment: bse     Parent/sibling w/ CABG, MI or angioplasty before 65F 55M? Not Asked   Social History Narrative     Not on file     Social Determinants of Health     Financial Resource Strain: Not on file   Food Insecurity: Not on file   Transportation Needs: Not on file   Physical Activity: Not on file   Stress: Not on file   Social Connections: Not on file   Intimate Partner Violence: Not on file   Housing Stability: Not on file       Current Outpatient Medications   Medication Sig Dispense Refill     Acetaminophen (TYLENOL PO) Take 650 mg by mouth every 6 hours as needed for mild pain or fever       ibuprofen (ADVIL,MOTRIN) 200 MG tablet Take 200 mg by mouth every 4 hours as needed       lidocaine-prilocaine (EMLA) 2.5-2.5 % external cream Apply to port site 45 minutes to 1 hour prior to infusion. Cover site with plastic wrap to protect clothing and secure with skin tape. 5 g 3     loratadine (CLARITIN) 10 MG tablet Take 10 mg by mouth daily       Multiple Vitamins-Minerals (MULTIPLE VITAMINS/WOMENS PO) Take 1 tablet by mouth daily        omeprazole (PRILOSEC) 20 MG CR capsule 20 mg twice daily for 7 days then 20 mg at bedtime thereafter. 45 capsule 0       Medications and history reviewed    Physical exam:  Vitals: /88   Temp 97.1  F (36.2  C) (Temporal)   Wt 112.5 kg (248 lb)   LMP 08/06/2008   BMI 38.84 kg/m    BMI= Body mass index is 38.84 kg/m .    HEART: RRR, no new murmurs  LUNGS: CTAB, equal chest rise, good effort  ABD: soft, non tender, non distended  INCISIONS: small dimple, some induration remains but no fluctuance or erythema  EXT: JC, no deformities    PATHOLOGY:  none    Assessment:     ICD-10-CM    1. Encounter for care related to vascular access port  Z45.2      Plan: Suture removed. Wound looks good. Can return to discuss port placement if she and her oncologist determine she needs another one.    Ryne Villanueva,  DO        Again, thank you for allowing me to participate in the care of your patient.        Sincerely,        yRne Villanueva, DO

## 2022-09-08 NOTE — PROGRESS NOTES
Infusion Nursing Note:  Tiffanie Mcdermott presents today for C8D22 Pertuzumab/Trastuzumab every 21 days.  Patient seen by provider today: No   present during visit today: Not Applicable.    Note: Patient had port removed last month. Incision healing well. Anxious over PIV insertion. Tried deep breathing. Plan to try music on phone for distraction next time.     Intravenous Access:  Peripheral IV placed.    Treatment Conditions:  Not Applicable.    Post Infusion Assessment:  Patient tolerated infusion without incident.  Blood return noted pre and post infusion.  Site patent and intact, free from redness, edema or discomfort.  No evidence of extravasations.  Access discontinued per protocol.     Discharge Plan:   Discharge instructions reviewed with: Patient.  Copy of AVS reviewed with patient and/or family.  Patient will return 3 Weeks for next appointment.  Patient discharged in stable condition accompanied by: self.  Departure Mode: Ambulatory.      Alicia Mcgee RN

## 2022-09-29 ENCOUNTER — ONCOLOGY VISIT (OUTPATIENT)
Dept: ONCOLOGY | Facility: CLINIC | Age: 57
End: 2022-09-29
Payer: COMMERCIAL

## 2022-09-29 ENCOUNTER — INFUSION THERAPY VISIT (OUTPATIENT)
Dept: INFUSION THERAPY | Facility: CLINIC | Age: 57
End: 2022-09-29
Attending: INTERNAL MEDICINE
Payer: COMMERCIAL

## 2022-09-29 VITALS — SYSTOLIC BLOOD PRESSURE: 125 MMHG | DIASTOLIC BLOOD PRESSURE: 58 MMHG | HEART RATE: 75 BPM

## 2022-09-29 VITALS
HEART RATE: 117 BPM | TEMPERATURE: 98.4 F | OXYGEN SATURATION: 98 % | SYSTOLIC BLOOD PRESSURE: 118 MMHG | WEIGHT: 246.6 LBS | BODY MASS INDEX: 38.62 KG/M2 | DIASTOLIC BLOOD PRESSURE: 70 MMHG

## 2022-09-29 DIAGNOSIS — C79.51 BONE METASTASIS: ICD-10-CM

## 2022-09-29 DIAGNOSIS — T80.212D PORT OR RESERVOIR INFECTION, SUBSEQUENT ENCOUNTER: ICD-10-CM

## 2022-09-29 DIAGNOSIS — C50.911 HER2-POSITIVE CARCINOMA OF RIGHT BREAST (H): ICD-10-CM

## 2022-09-29 DIAGNOSIS — C50.911 STAGE IV CARCINOMA OF BREAST, ER-, RIGHT (H): ICD-10-CM

## 2022-09-29 DIAGNOSIS — Z17.1 STAGE IV CARCINOMA OF BREAST, ER-, RIGHT (H): ICD-10-CM

## 2022-09-29 DIAGNOSIS — Z17.31 HER2-POSITIVE CARCINOMA OF RIGHT BREAST (H): ICD-10-CM

## 2022-09-29 DIAGNOSIS — C50.511 MALIGNANT NEOPLASM OF LOWER-OUTER QUADRANT OF RIGHT FEMALE BREAST, UNSPECIFIED ESTROGEN RECEPTOR STATUS (H): Primary | ICD-10-CM

## 2022-09-29 PROCEDURE — 99213 OFFICE O/P EST LOW 20 MIN: CPT | Performed by: INTERNAL MEDICINE

## 2022-09-29 PROCEDURE — 258N000003 HC RX IP 258 OP 636: Performed by: INTERNAL MEDICINE

## 2022-09-29 PROCEDURE — 96413 CHEMO IV INFUSION 1 HR: CPT

## 2022-09-29 PROCEDURE — 250N000011 HC RX IP 250 OP 636: Performed by: INTERNAL MEDICINE

## 2022-09-29 PROCEDURE — 96417 CHEMO IV INFUS EACH ADDL SEQ: CPT

## 2022-09-29 RX ORDER — MEPERIDINE HYDROCHLORIDE 25 MG/ML
25 INJECTION INTRAMUSCULAR; INTRAVENOUS; SUBCUTANEOUS EVERY 30 MIN PRN
Status: CANCELLED | OUTPATIENT
Start: 2023-02-23

## 2022-09-29 RX ORDER — ACETAMINOPHEN 325 MG/1
650 TABLET ORAL
Status: CANCELLED | OUTPATIENT
Start: 2023-02-02

## 2022-09-29 RX ORDER — ALBUTEROL SULFATE 0.83 MG/ML
2.5 SOLUTION RESPIRATORY (INHALATION)
Status: CANCELLED | OUTPATIENT
Start: 2023-02-02

## 2022-09-29 RX ORDER — MEPERIDINE HYDROCHLORIDE 25 MG/ML
25 INJECTION INTRAMUSCULAR; INTRAVENOUS; SUBCUTANEOUS EVERY 30 MIN PRN
Status: CANCELLED | OUTPATIENT
Start: 2023-04-06

## 2022-09-29 RX ORDER — ALBUTEROL SULFATE 0.83 MG/ML
2.5 SOLUTION RESPIRATORY (INHALATION)
Status: CANCELLED | OUTPATIENT
Start: 2023-04-06

## 2022-09-29 RX ORDER — METHYLPREDNISOLONE SODIUM SUCCINATE 125 MG/2ML
125 INJECTION, POWDER, LYOPHILIZED, FOR SOLUTION INTRAMUSCULAR; INTRAVENOUS
Status: CANCELLED
Start: 2023-04-06

## 2022-09-29 RX ORDER — ACETAMINOPHEN 325 MG/1
650 TABLET ORAL
Status: CANCELLED | OUTPATIENT
Start: 2023-03-16

## 2022-09-29 RX ORDER — METHYLPREDNISOLONE SODIUM SUCCINATE 125 MG/2ML
125 INJECTION, POWDER, LYOPHILIZED, FOR SOLUTION INTRAMUSCULAR; INTRAVENOUS
Status: CANCELLED
Start: 2023-03-16

## 2022-09-29 RX ORDER — HEPARIN SODIUM,PORCINE 10 UNIT/ML
5 VIAL (ML) INTRAVENOUS
Status: CANCELLED | OUTPATIENT
Start: 2023-04-06

## 2022-09-29 RX ORDER — HEPARIN SODIUM,PORCINE 10 UNIT/ML
5 VIAL (ML) INTRAVENOUS
Status: CANCELLED | OUTPATIENT
Start: 2023-02-23

## 2022-09-29 RX ORDER — HEPARIN SODIUM (PORCINE) LOCK FLUSH IV SOLN 100 UNIT/ML 100 UNIT/ML
5 SOLUTION INTRAVENOUS
Status: CANCELLED | OUTPATIENT
Start: 2023-03-16

## 2022-09-29 RX ORDER — METHYLPREDNISOLONE SODIUM SUCCINATE 125 MG/2ML
125 INJECTION, POWDER, LYOPHILIZED, FOR SOLUTION INTRAMUSCULAR; INTRAVENOUS
Status: CANCELLED
Start: 2023-02-23

## 2022-09-29 RX ORDER — DIPHENHYDRAMINE HCL 25 MG
50 CAPSULE ORAL
Status: CANCELLED | OUTPATIENT
Start: 2023-02-23

## 2022-09-29 RX ORDER — EPINEPHRINE 1 MG/ML
0.3 INJECTION, SOLUTION, CONCENTRATE INTRAVENOUS EVERY 5 MIN PRN
Status: CANCELLED | OUTPATIENT
Start: 2023-03-16

## 2022-09-29 RX ORDER — NALOXONE HYDROCHLORIDE 0.4 MG/ML
0.2 INJECTION, SOLUTION INTRAMUSCULAR; INTRAVENOUS; SUBCUTANEOUS
Status: CANCELLED | OUTPATIENT
Start: 2023-04-06

## 2022-09-29 RX ORDER — LORAZEPAM 2 MG/ML
0.5 INJECTION INTRAMUSCULAR EVERY 4 HOURS PRN
Status: CANCELLED | OUTPATIENT
Start: 2023-03-16

## 2022-09-29 RX ORDER — EPINEPHRINE 1 MG/ML
0.3 INJECTION, SOLUTION, CONCENTRATE INTRAVENOUS EVERY 5 MIN PRN
Status: CANCELLED | OUTPATIENT
Start: 2023-04-06

## 2022-09-29 RX ORDER — METHYLPREDNISOLONE SODIUM SUCCINATE 125 MG/2ML
125 INJECTION, POWDER, LYOPHILIZED, FOR SOLUTION INTRAMUSCULAR; INTRAVENOUS
Status: CANCELLED
Start: 2023-02-02

## 2022-09-29 RX ORDER — DIPHENHYDRAMINE HCL 25 MG
50 CAPSULE ORAL
Status: CANCELLED | OUTPATIENT
Start: 2023-03-16

## 2022-09-29 RX ORDER — DIPHENHYDRAMINE HYDROCHLORIDE 50 MG/ML
50 INJECTION INTRAMUSCULAR; INTRAVENOUS
Status: CANCELLED
Start: 2023-02-02

## 2022-09-29 RX ORDER — EPINEPHRINE 1 MG/ML
0.3 INJECTION, SOLUTION, CONCENTRATE INTRAVENOUS EVERY 5 MIN PRN
Status: CANCELLED | OUTPATIENT
Start: 2023-02-23

## 2022-09-29 RX ORDER — HEPARIN SODIUM,PORCINE 10 UNIT/ML
5 VIAL (ML) INTRAVENOUS
Status: CANCELLED | OUTPATIENT
Start: 2023-02-02

## 2022-09-29 RX ORDER — HEPARIN SODIUM (PORCINE) LOCK FLUSH IV SOLN 100 UNIT/ML 100 UNIT/ML
5 SOLUTION INTRAVENOUS
Status: CANCELLED | OUTPATIENT
Start: 2023-02-23

## 2022-09-29 RX ORDER — HEPARIN SODIUM (PORCINE) LOCK FLUSH IV SOLN 100 UNIT/ML 100 UNIT/ML
5 SOLUTION INTRAVENOUS
Status: CANCELLED | OUTPATIENT
Start: 2023-02-02

## 2022-09-29 RX ORDER — HEPARIN SODIUM (PORCINE) LOCK FLUSH IV SOLN 100 UNIT/ML 100 UNIT/ML
5 SOLUTION INTRAVENOUS
Status: CANCELLED | OUTPATIENT
Start: 2023-04-06

## 2022-09-29 RX ORDER — NALOXONE HYDROCHLORIDE 0.4 MG/ML
0.2 INJECTION, SOLUTION INTRAMUSCULAR; INTRAVENOUS; SUBCUTANEOUS
Status: CANCELLED | OUTPATIENT
Start: 2023-02-23

## 2022-09-29 RX ORDER — HEPARIN SODIUM,PORCINE 10 UNIT/ML
5 VIAL (ML) INTRAVENOUS
Status: CANCELLED | OUTPATIENT
Start: 2023-03-16

## 2022-09-29 RX ORDER — DIPHENHYDRAMINE HCL 25 MG
50 CAPSULE ORAL
Status: CANCELLED | OUTPATIENT
Start: 2023-02-02

## 2022-09-29 RX ORDER — DIPHENHYDRAMINE HYDROCHLORIDE 50 MG/ML
50 INJECTION INTRAMUSCULAR; INTRAVENOUS
Status: CANCELLED
Start: 2023-04-06

## 2022-09-29 RX ORDER — EPINEPHRINE 1 MG/ML
0.3 INJECTION, SOLUTION, CONCENTRATE INTRAVENOUS EVERY 5 MIN PRN
Status: CANCELLED | OUTPATIENT
Start: 2023-02-02

## 2022-09-29 RX ORDER — LORAZEPAM 2 MG/ML
0.5 INJECTION INTRAMUSCULAR EVERY 4 HOURS PRN
Status: CANCELLED | OUTPATIENT
Start: 2023-04-06

## 2022-09-29 RX ORDER — MEPERIDINE HYDROCHLORIDE 25 MG/ML
25 INJECTION INTRAMUSCULAR; INTRAVENOUS; SUBCUTANEOUS EVERY 30 MIN PRN
Status: CANCELLED | OUTPATIENT
Start: 2023-02-02

## 2022-09-29 RX ORDER — ALBUTEROL SULFATE 0.83 MG/ML
2.5 SOLUTION RESPIRATORY (INHALATION)
Status: CANCELLED | OUTPATIENT
Start: 2023-03-16

## 2022-09-29 RX ORDER — ALBUTEROL SULFATE 90 UG/1
1-2 AEROSOL, METERED RESPIRATORY (INHALATION)
Status: CANCELLED
Start: 2023-02-23

## 2022-09-29 RX ORDER — DIPHENHYDRAMINE HYDROCHLORIDE 50 MG/ML
50 INJECTION INTRAMUSCULAR; INTRAVENOUS
Status: CANCELLED
Start: 2023-03-16

## 2022-09-29 RX ORDER — ALBUTEROL SULFATE 0.83 MG/ML
2.5 SOLUTION RESPIRATORY (INHALATION)
Status: CANCELLED | OUTPATIENT
Start: 2023-02-23

## 2022-09-29 RX ORDER — ACETAMINOPHEN 325 MG/1
650 TABLET ORAL
Status: CANCELLED | OUTPATIENT
Start: 2023-02-23

## 2022-09-29 RX ORDER — NALOXONE HYDROCHLORIDE 0.4 MG/ML
0.2 INJECTION, SOLUTION INTRAMUSCULAR; INTRAVENOUS; SUBCUTANEOUS
Status: CANCELLED | OUTPATIENT
Start: 2023-03-16

## 2022-09-29 RX ORDER — ACETAMINOPHEN 325 MG/1
650 TABLET ORAL
Status: CANCELLED | OUTPATIENT
Start: 2023-04-06

## 2022-09-29 RX ORDER — ALBUTEROL SULFATE 90 UG/1
1-2 AEROSOL, METERED RESPIRATORY (INHALATION)
Status: CANCELLED
Start: 2023-02-02

## 2022-09-29 RX ORDER — DIPHENHYDRAMINE HYDROCHLORIDE 50 MG/ML
50 INJECTION INTRAMUSCULAR; INTRAVENOUS
Status: CANCELLED
Start: 2023-02-23

## 2022-09-29 RX ORDER — FLUTICASONE PROPIONATE 50 MCG
1 SPRAY, SUSPENSION (ML) NASAL DAILY
COMMUNITY
End: 2024-03-04

## 2022-09-29 RX ORDER — NALOXONE HYDROCHLORIDE 0.4 MG/ML
0.2 INJECTION, SOLUTION INTRAMUSCULAR; INTRAVENOUS; SUBCUTANEOUS
Status: CANCELLED | OUTPATIENT
Start: 2023-02-02

## 2022-09-29 RX ORDER — MEPERIDINE HYDROCHLORIDE 25 MG/ML
25 INJECTION INTRAMUSCULAR; INTRAVENOUS; SUBCUTANEOUS EVERY 30 MIN PRN
Status: CANCELLED | OUTPATIENT
Start: 2023-03-16

## 2022-09-29 RX ORDER — LORAZEPAM 2 MG/ML
0.5 INJECTION INTRAMUSCULAR EVERY 4 HOURS PRN
Status: CANCELLED | OUTPATIENT
Start: 2023-02-23

## 2022-09-29 RX ORDER — LORAZEPAM 2 MG/ML
0.5 INJECTION INTRAMUSCULAR EVERY 4 HOURS PRN
Status: CANCELLED | OUTPATIENT
Start: 2023-02-02

## 2022-09-29 RX ORDER — ALBUTEROL SULFATE 90 UG/1
1-2 AEROSOL, METERED RESPIRATORY (INHALATION)
Status: CANCELLED
Start: 2023-04-06

## 2022-09-29 RX ORDER — DIPHENHYDRAMINE HCL 25 MG
50 CAPSULE ORAL
Status: CANCELLED | OUTPATIENT
Start: 2023-04-06

## 2022-09-29 RX ORDER — ALBUTEROL SULFATE 90 UG/1
1-2 AEROSOL, METERED RESPIRATORY (INHALATION)
Status: CANCELLED
Start: 2023-03-16

## 2022-09-29 RX ADMIN — SODIUM CHLORIDE 720 MG: 9 INJECTION, SOLUTION INTRAVENOUS at 12:25

## 2022-09-29 RX ADMIN — PERTUZUMAB 420 MG: 30 INJECTION, SOLUTION, CONCENTRATE INTRAVENOUS at 11:41

## 2022-09-29 RX ADMIN — SODIUM CHLORIDE, PRESERVATIVE FREE 250 ML: 5 INJECTION INTRAVENOUS at 11:25

## 2022-09-29 NOTE — PATIENT INSTRUCTIONS
1) Add BJT to 11/10 visit if possible (okay virtually).  2) ECHO November 2022.    Adiel Kim MD.    Today:  Schedule ECHO in November.    ECHO Date/Time: 11/1/22 @11:00am    See [facility] for all other appointments that were previously made. Dr. Kim follow up was already scheduled prior to infusion on 11/10/22.    If you have any questions or concerns please feel free to call.    If you need to reschedule please call:  Clinic or Lab Appointment - [phone]  Infusion - [phone]  Imaging - [phone]    Carolina OCAMPO CMA  [facility]  [phone]

## 2022-09-29 NOTE — PROGRESS NOTES
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  # Mar 2022 - May 2022 Taxol / Herceptin / Perjeta; near-CR.  # May 2022 - ongoing Herceptin / Perjeta; near-CR.    Tiffanie remains well clinically.  Tolerating treatment well and without major toxicity.  Recovered from Taxol and will maintain on indefinite Herceptin/Perjeta.  Diarrhea is manageable.  Planning echocardiograms every 6 months; next due November.  Planning 3 more months of therapy with a repeat PET.    Adiel Kim MD    --------------------    Interval History:  Tiffanie returns for follow-up of metastatic breast cancer with bone and liver involvement.  All in all, she is doing quite well.  No new major symptoms to report.  She is stable from a bowel standpoint; history of cholecystectomy.  No cardiac complaints.  No bone complaints.  Neuropathy and hair are improving with time.    --------------------    Physical Exam:  VS: /70   Pulse 117   Temp 98.4  F (36.9  C) (Temporal)   Wt 111.9 kg (246 lb 9.6 oz)   LMP 2008   SpO2 98%   BMI 38.62 kg/m    Gen: Well-appearing.    Labs / Imaging / Path:  We reviewed CBC, CMP, tumor marker.

## 2022-09-29 NOTE — LETTER
2022         RE: Tiffanie Mcdermott  [address] [address] [place] [phone]        Dear Colleague,    Thank you for referring your patient, Tiffanie Mcdermott, to the [facility] [place]. Please see a copy of my visit note below.    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  # Mar 2022 - May 2022 Taxol / Herceptin / Perjeta; near-CR.  # May 2022 - ongoing Herceptin / Perjeta; near-CR.    Tiffanie remains well clinically.  Tolerating treatment well and without major toxicity.  Recovered from Taxol and will maintain on indefinite Herceptin/Perjeta.  Diarrhea is manageable.  Planning echocardiograms every 6 months; next due November.  Planning 3 more months of therapy with a repeat PET.    Adiel Kim MD    --------------------    Interval History:  Tiffanie returns for follow-up of metastatic breast cancer with bone and liver involvement.  All in all, she is doing quite well.  No new major symptoms to report.  She is stable from a bowel standpoint; history of cholecystectomy.  No cardiac complaints.  No bone complaints.  Neuropathy and hair are improving with time.    --------------------    Physical Exam:  VS: /70   Pulse 117   Temp 98.4  F (36.9  C) (Temporal)   Wt 111.9 kg (246 lb 9.6 oz)   LMP 2008   SpO2 98%   BMI 38.62 kg/m    Gen: Well-appearing.    Labs / Imaging / Path:  We reviewed CBC, CMP, tumor marker.       Again, thank you for allowing me to participate in the care of your patient.        Sincerely,        Adiel Kim MD

## 2022-09-29 NOTE — PROGRESS NOTES
Infusion Nursing Note:  Tiffanie Mcdermott presents today for chemotherapy.    Patient seen by provider today: Yes: Dr Kim   present during visit today: Not Applicable.    Note: Tolerated chemo well. Pt in good humor today.  Visits easily and joking throughout treatment.     Intravenous Access:  Peripheral IV placed.    Treatment Conditions:  Not Applicable.    Post Infusion Assessment:  Patient tolerated infusion without incident.  Patient observed for 15 minutes post chemo.  Site patent and intact, free from redness, edema or discomfort.  No evidence of extravasations.  Access discontinued per protocol.     Discharge Plan:   Discharge instructions reviewed with: Patient.  Copy of AVS reviewed with patient and/or family.  Patient will return 10/19 for next appointment.  Patient discharged in stable condition accompanied by: self.  Departure Mode: Ambulatory.      Maranda Cesar RN

## 2022-10-09 ENCOUNTER — HEALTH MAINTENANCE LETTER (OUTPATIENT)
Age: 57
End: 2022-10-09

## 2022-10-19 ENCOUNTER — INFUSION THERAPY VISIT (OUTPATIENT)
Dept: INFUSION THERAPY | Facility: CLINIC | Age: 57
End: 2022-10-19
Attending: INTERNAL MEDICINE
Payer: COMMERCIAL

## 2022-10-19 VITALS
TEMPERATURE: 98 F | WEIGHT: 248 LBS | BODY MASS INDEX: 38.84 KG/M2 | DIASTOLIC BLOOD PRESSURE: 62 MMHG | HEART RATE: 71 BPM | OXYGEN SATURATION: 96 % | RESPIRATION RATE: 14 BRPM | SYSTOLIC BLOOD PRESSURE: 120 MMHG

## 2022-10-19 DIAGNOSIS — C50.511 MALIGNANT NEOPLASM OF LOWER-OUTER QUADRANT OF RIGHT FEMALE BREAST, UNSPECIFIED ESTROGEN RECEPTOR STATUS (H): Primary | ICD-10-CM

## 2022-10-19 PROCEDURE — 96413 CHEMO IV INFUSION 1 HR: CPT

## 2022-10-19 PROCEDURE — 96417 CHEMO IV INFUS EACH ADDL SEQ: CPT

## 2022-10-19 PROCEDURE — 250N000011 HC RX IP 250 OP 636: Performed by: INTERNAL MEDICINE

## 2022-10-19 PROCEDURE — 258N000003 HC RX IP 258 OP 636: Performed by: INTERNAL MEDICINE

## 2022-10-19 RX ADMIN — PERTUZUMAB 420 MG: 30 INJECTION, SOLUTION, CONCENTRATE INTRAVENOUS at 10:14

## 2022-10-19 RX ADMIN — SODIUM CHLORIDE 250 ML: 9 INJECTION, SOLUTION INTRAVENOUS at 09:37

## 2022-10-19 RX ADMIN — SODIUM CHLORIDE 720 MG: 9 INJECTION, SOLUTION INTRAVENOUS at 10:56

## 2022-10-19 NOTE — PROGRESS NOTES
"Infusion Nursing Note:  Tiffanie Mcdermott presents today for Fernando / Elizabeth.    Patient seen by provider today: No   present during visit today: Not Applicable.    Note: N/A.    Intravenous Access:  Peripheral IV placed. Pt asks that IV be started in hand as \"it just works better\".    Treatment Conditions:  Not Applicable.    Post Infusion Assessment:  Patient tolerated infusion without incident.  Patient observed for 15 minutes post chemotherapy.  Site patent and intact, free from redness, edema or discomfort.  No evidence of extravasations.  Access discontinued per protocol.     Discharge Plan:   Discharge instructions reviewed with: Patient.  Copy of AVS reviewed with patient and/or family.  Patient will return 11/10 for next appointment.  Patient discharged in stable condition accompanied by: self.  Departure Mode: Ambulatory.      Maranda Cesar RN                    "

## 2022-11-02 ENCOUNTER — HOSPITAL ENCOUNTER (OUTPATIENT)
Dept: CARDIOLOGY | Facility: CLINIC | Age: 57
Discharge: HOME | End: 2022-11-02
Attending: INTERNAL MEDICINE | Admitting: INTERNAL MEDICINE
Payer: COMMERCIAL

## 2022-11-02 DIAGNOSIS — C50.911 HER2-POSITIVE CARCINOMA OF RIGHT BREAST (H): ICD-10-CM

## 2022-11-02 DIAGNOSIS — C50.911 STAGE IV CARCINOMA OF BREAST, ER-, RIGHT (H): ICD-10-CM

## 2022-11-02 DIAGNOSIS — C50.511 MALIGNANT NEOPLASM OF LOWER-OUTER QUADRANT OF RIGHT FEMALE BREAST, UNSPECIFIED ESTROGEN RECEPTOR STATUS (H): ICD-10-CM

## 2022-11-02 DIAGNOSIS — Z17.1 STAGE IV CARCINOMA OF BREAST, ER-, RIGHT (H): ICD-10-CM

## 2022-11-02 DIAGNOSIS — Z17.31 HER2-POSITIVE CARCINOMA OF RIGHT BREAST (H): ICD-10-CM

## 2022-11-02 LAB
BI-PLANE LVEF ECHO: NORMAL
LVEF ECHO: NORMAL

## 2022-11-02 PROCEDURE — 93325 DOPPLER ECHO COLOR FLOW MAPG: CPT | Mod: 26 | Performed by: INTERNAL MEDICINE

## 2022-11-02 PROCEDURE — 93321 DOPPLER ECHO F-UP/LMTD STD: CPT | Mod: 26 | Performed by: INTERNAL MEDICINE

## 2022-11-02 PROCEDURE — 93308 TTE F-UP OR LMTD: CPT

## 2022-11-02 PROCEDURE — 93308 TTE F-UP OR LMTD: CPT | Mod: 26 | Performed by: INTERNAL MEDICINE

## 2022-11-02 PROCEDURE — 93325 DOPPLER ECHO COLOR FLOW MAPG: CPT

## 2022-11-05 ENCOUNTER — HOSPITAL ENCOUNTER (OUTPATIENT)
Dept: PET IMAGING | Facility: CLINIC | Age: 57
Setting detail: NUCLEAR MEDICINE
Discharge: HOME | End: 2022-11-05
Attending: INTERNAL MEDICINE | Admitting: INTERNAL MEDICINE
Payer: COMMERCIAL

## 2022-11-05 DIAGNOSIS — C50.511 MALIGNANT NEOPLASM OF LOWER-OUTER QUADRANT OF RIGHT FEMALE BREAST, UNSPECIFIED ESTROGEN RECEPTOR STATUS (H): ICD-10-CM

## 2022-11-05 PROCEDURE — A9552 F18 FDG: HCPCS | Performed by: INTERNAL MEDICINE

## 2022-11-05 PROCEDURE — 78815 PET IMAGE W/CT SKULL-THIGH: CPT | Mod: PS

## 2022-11-05 PROCEDURE — 343N000001 HC RX 343: Performed by: INTERNAL MEDICINE

## 2022-11-05 RX ADMIN — FLUDEOXYGLUCOSE F-18 12.7 MCI.: 500 INJECTION, SOLUTION INTRAVENOUS at 08:24

## 2022-11-10 ENCOUNTER — VIRTUAL VISIT (OUTPATIENT)
Dept: ONCOLOGY | Facility: CLINIC | Age: 57
End: 2022-11-10
Payer: COMMERCIAL

## 2022-11-10 ENCOUNTER — INFUSION THERAPY VISIT (OUTPATIENT)
Dept: INFUSION THERAPY | Facility: CLINIC | Age: 57
End: 2022-11-10
Attending: INTERNAL MEDICINE
Payer: COMMERCIAL

## 2022-11-10 ENCOUNTER — LAB (OUTPATIENT)
Dept: LAB | Facility: CLINIC | Age: 57
End: 2022-11-10
Payer: COMMERCIAL

## 2022-11-10 ENCOUNTER — MYC MEDICAL ADVICE (OUTPATIENT)
Dept: ONCOLOGY | Facility: CLINIC | Age: 57
End: 2022-11-10

## 2022-11-10 VITALS
HEART RATE: 110 BPM | HEIGHT: 67 IN | OXYGEN SATURATION: 100 % | WEIGHT: 254.6 LBS | BODY MASS INDEX: 39.96 KG/M2 | SYSTOLIC BLOOD PRESSURE: 122 MMHG | DIASTOLIC BLOOD PRESSURE: 72 MMHG

## 2022-11-10 VITALS
DIASTOLIC BLOOD PRESSURE: 74 MMHG | HEART RATE: 74 BPM | HEIGHT: 67 IN | WEIGHT: 254 LBS | BODY MASS INDEX: 39.87 KG/M2 | SYSTOLIC BLOOD PRESSURE: 129 MMHG | RESPIRATION RATE: 16 BRPM | TEMPERATURE: 98 F | OXYGEN SATURATION: 98 %

## 2022-11-10 DIAGNOSIS — Z17.1 STAGE IV CARCINOMA OF BREAST, ER-, RIGHT (H): Primary | ICD-10-CM

## 2022-11-10 DIAGNOSIS — C50.511 MALIGNANT NEOPLASM OF LOWER-OUTER QUADRANT OF RIGHT FEMALE BREAST, UNSPECIFIED ESTROGEN RECEPTOR STATUS (H): ICD-10-CM

## 2022-11-10 DIAGNOSIS — C50.511 MALIGNANT NEOPLASM OF LOWER-OUTER QUADRANT OF RIGHT FEMALE BREAST, UNSPECIFIED ESTROGEN RECEPTOR STATUS (H): Primary | ICD-10-CM

## 2022-11-10 DIAGNOSIS — Z17.31 HER2-POSITIVE CARCINOMA OF RIGHT BREAST (H): ICD-10-CM

## 2022-11-10 DIAGNOSIS — C50.911 STAGE IV CARCINOMA OF BREAST, ER-, RIGHT (H): Primary | ICD-10-CM

## 2022-11-10 DIAGNOSIS — Z23 NEED FOR PROPHYLACTIC VACCINATION AND INOCULATION AGAINST INFLUENZA: ICD-10-CM

## 2022-11-10 DIAGNOSIS — C50.911 HER2-POSITIVE CARCINOMA OF RIGHT BREAST (H): ICD-10-CM

## 2022-11-10 LAB
ALBUMIN SERPL-MCNC: 3.7 G/DL (ref 3.4–5)
ALP SERPL-CCNC: 116 U/L (ref 40–150)
ALT SERPL W P-5'-P-CCNC: 62 U/L (ref 0–50)
ANION GAP SERPL CALCULATED.3IONS-SCNC: 5 MMOL/L (ref 3–14)
AST SERPL W P-5'-P-CCNC: 28 U/L (ref 0–45)
BASOPHILS # BLD AUTO: 0 10E3/UL (ref 0–0.2)
BASOPHILS NFR BLD AUTO: 1 %
BILIRUB SERPL-MCNC: 0.5 MG/DL (ref 0.2–1.3)
BUN SERPL-MCNC: 19 MG/DL (ref 7–30)
CALCIUM SERPL-MCNC: 8.6 MG/DL (ref 8.5–10.1)
CANCER AG15-3 SERPL-ACNC: 18 U/ML
CHLORIDE BLD-SCNC: 107 MMOL/L (ref 94–109)
CO2 SERPL-SCNC: 27 MMOL/L (ref 20–32)
CREAT SERPL-MCNC: 0.87 MG/DL (ref 0.52–1.04)
EOSINOPHIL # BLD AUTO: 0.2 10E3/UL (ref 0–0.7)
EOSINOPHIL NFR BLD AUTO: 3 %
ERYTHROCYTE [DISTWIDTH] IN BLOOD BY AUTOMATED COUNT: 13.2 % (ref 10–15)
GFR SERPL CREATININE-BSD FRML MDRD: 76 ML/MIN/1.73M2
GLUCOSE BLD-MCNC: 188 MG/DL (ref 70–99)
HCT VFR BLD AUTO: 43.8 % (ref 35–47)
HGB BLD-MCNC: 14.1 G/DL (ref 11.7–15.7)
IMM GRANULOCYTES # BLD: 0 10E3/UL
IMM GRANULOCYTES NFR BLD: 0 %
LYMPHOCYTES # BLD AUTO: 1.2 10E3/UL (ref 0.8–5.3)
LYMPHOCYTES NFR BLD AUTO: 25 %
MCH RBC QN AUTO: 29.1 PG (ref 26.5–33)
MCHC RBC AUTO-ENTMCNC: 32.2 G/DL (ref 31.5–36.5)
MCV RBC AUTO: 90 FL (ref 78–100)
MONOCYTES # BLD AUTO: 0.3 10E3/UL (ref 0–1.3)
MONOCYTES NFR BLD AUTO: 6 %
NEUTROPHILS # BLD AUTO: 3.2 10E3/UL (ref 1.6–8.3)
NEUTROPHILS NFR BLD AUTO: 65 %
NRBC # BLD AUTO: 0 10E3/UL
NRBC BLD AUTO-RTO: 0 /100
PLATELET # BLD AUTO: 177 10E3/UL (ref 150–450)
POTASSIUM BLD-SCNC: 4 MMOL/L (ref 3.4–5.3)
PROT SERPL-MCNC: 7.1 G/DL (ref 6.8–8.8)
RBC # BLD AUTO: 4.85 10E6/UL (ref 3.8–5.2)
SODIUM SERPL-SCNC: 139 MMOL/L (ref 133–144)
WBC # BLD AUTO: 5 10E3/UL (ref 4–11)

## 2022-11-10 PROCEDURE — 99213 OFFICE O/P EST LOW 20 MIN: CPT | Mod: 95 | Performed by: INTERNAL MEDICINE

## 2022-11-10 PROCEDURE — 85025 COMPLETE CBC W/AUTO DIFF WBC: CPT | Performed by: INTERNAL MEDICINE

## 2022-11-10 PROCEDURE — 80053 COMPREHEN METABOLIC PANEL: CPT | Performed by: INTERNAL MEDICINE

## 2022-11-10 PROCEDURE — 90682 RIV4 VACC RECOMBINANT DNA IM: CPT | Performed by: INTERNAL MEDICINE

## 2022-11-10 PROCEDURE — 90471 IMMUNIZATION ADMIN: CPT | Performed by: INTERNAL MEDICINE

## 2022-11-10 PROCEDURE — 250N000011 HC RX IP 250 OP 636: Performed by: INTERNAL MEDICINE

## 2022-11-10 PROCEDURE — 36415 COLL VENOUS BLD VENIPUNCTURE: CPT

## 2022-11-10 PROCEDURE — 86300 IMMUNOASSAY TUMOR CA 15-3: CPT | Performed by: INTERNAL MEDICINE

## 2022-11-10 PROCEDURE — 96549 UNLISTED CHEMOTHERAPY PX: CPT

## 2022-11-10 PROCEDURE — 96413 CHEMO IV INFUSION 1 HR: CPT

## 2022-11-10 PROCEDURE — 258N000003 HC RX IP 258 OP 636: Performed by: INTERNAL MEDICINE

## 2022-11-10 RX ADMIN — SODIUM CHLORIDE 720 MG: 9 INJECTION, SOLUTION INTRAVENOUS at 12:38

## 2022-11-10 RX ADMIN — SODIUM CHLORIDE 250 ML: 9 INJECTION, SOLUTION INTRAVENOUS at 11:02

## 2022-11-10 RX ADMIN — PERTUZUMAB 420 MG: 30 INJECTION, SOLUTION, CONCENTRATE INTRAVENOUS at 11:26

## 2022-11-10 NOTE — LETTER
"    11/10/2022         RE: Tiffanie Mcdermott  [address] [place] [phone]        Dear Colleague,    Thank you for referring your patient, Tiffanie Mcdermott, to the [facility]. Please see a copy of my visit note below.    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  # Mar 2022 - May 2022 Taxol / Herceptin / Perjeta; near-CR.  # May 2022 - ongoing Herceptin / Perjeta; near-CR.    Tiffanie remains well clinically.  Tolerating treatment well and without major toxicity.  Recovered from Taxol and will maintain on indefinite Herceptin/Perjeta.  Diarrhea is manageable.  Planning echocardiograms 6 months next due May 2023.  Planning 3 more months of therapy with a repeat PET.    Adiel Kim MD    --------------------    Interval History:  Tiffanie returns for follow-up of metastatic breast cancer.  All in all, she is doing quite well.  No new major symptoms to report.  She is stable from a bowel standpoint; history of cholecystectomy.  No cardiac complaints.  No bone complaints.  Neuropathy and hair are improving with time.    --------------------    Physical Exam:  VS: /72   Pulse 110   Ht 1.702 m (5' 7\")   Wt 115.5 kg (254 lb 9.6 oz)   LMP 2008   SpO2 100%   BMI 39.88 kg/m    Gen: Well-appearing.    Labs / Imaging / Path:  We reviewed CBC, CMP, tumor marker.  We personally reviewed her PET scan.    Video Visit:  Tiffanie is a 57 year old female who is being evaluated via a billable video visit.  }    Video start time: 10:04 AM  Video end time: 10:20 AM    Provider location: Off-site  Patient location: [facility] of " transmission: [employer] / [employer].        Again, thank you for allowing me to participate in the care of your patient.        Sincerely,        Adiel Kim MD

## 2022-11-10 NOTE — LETTER
"    11/10/2022         RE: Tiffanie Mcdermott  [address] [address] [place] [phone]        Dear Colleague,    Thank you for referring your patient, Tiffanie Mcdermott, to the [facility]. Please see a copy of my visit note below.    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  # Mar 2022 - May 2022 Taxol / Herceptin / Perjeta; near-CR.  # May 2022 - ongoing Herceptin / Perjeta; near-CR.    Tiffanie remains well clinically.  Tolerating treatment well and without major toxicity.  Recovered from Taxol and will maintain on indefinite Herceptin/Perjeta.  Diarrhea is manageable.  Planning echocardiograms 6 months next due May 2023.  Planning 3 more months of therapy with a repeat PET.    Adiel Kim MD    --------------------    Interval History:  Tiffanie returns for follow-up of metastatic breast cancer.  All in all, she is doing quite well.  No new major symptoms to report.  She is stable from a bowel standpoint; history of cholecystectomy.  No cardiac complaints.  No bone complaints.  Neuropathy and hair are improving with time.    --------------------    Physical Exam:  VS: /72   Pulse 110   Ht 1.702 m (5' 7\")   Wt 115.5 kg (254 lb 9.6 oz)   LMP 2008   SpO2 100%   BMI 39.88 kg/m    Gen: Well-appearing.    Labs / Imaging / Path:  We reviewed CBC, CMP, tumor marker.  We personally reviewed her PET scan.    Video Visit:  Tiffanie is a 57 year old female who is being evaluated via a billable video visit.  }    Video start time: 10:04 AM  Video end time: 10:20 AM    Provider location: Off-site  Patient location: [facility] of " transmission: [facility] [employer] / [employer].        Again, thank you for allowing me to participate in the care of your patient.        Sincerely,        Adiel Kim MD

## 2022-11-10 NOTE — PROGRESS NOTES
Infusion Nursing Note:  Tiffanie Mcdermott presents today for C9D1 Perjeta and Trazimera.   Patient seen by provider today: Yes: Julio   present during visit today: Not Applicable.    Note: Left arm warmed and vein finder used per patients specifications. Peripheral Iv easily placed.    Intravenous Access:  Peripheral IV placed.    Treatment Conditions:  Lab Results   Component Value Date    HGB 14.1 11/10/2022    WBC 5.0 11/10/2022    ANEU 7.0 03/21/2022    ANEUTAUTO 3.2 11/10/2022     11/10/2022      Lab Results   Component Value Date     11/10/2022    POTASSIUM 4.0 11/10/2022    MAG 1.7 04/25/2022    CR 0.87 11/10/2022    SARAI 8.6 11/10/2022    BILITOTAL 0.5 11/10/2022    ALBUMIN 3.7 11/10/2022    ALT 62 (H) 11/10/2022    AST 28 11/10/2022     Results reviewed, labs MET treatment parameters, ok to proceed with treatment.    Post Infusion Assessment:  Patient tolerated infusion without incident.  Patient observed for 15 minutes post infusion per protocol.  Blood return noted pre and post infusion.  Site patent and intact, free from redness, edema or discomfort.  No evidence of extravasations.  Access discontinued per protocol.     Discharge Plan:   Discharge instructions reviewed with: Patient.  Patient and/or family verbalized understanding of discharge instructions and all questions answered.  Copy of AVS reviewed with patient and/or family.  Patient will return 12-1-22 for next appointment.  Patient discharged in stable condition accompanied by: self.  Departure Mode: Ambulatory.      Wendy Clemens RN

## 2022-11-10 NOTE — PATIENT INSTRUCTIONS
Pt to return on 12-1-22 for Perjeta and Trazimera. Copies of medication list and upcoming appointments given prior to discharge.

## 2022-11-10 NOTE — PATIENT INSTRUCTIONS
1) Flu shot today please.  2) Herceptin / Perjeta q21 days x 4 more cycles after today.  3) LATESHA 6 weeks w/ treatment above.  4) BJT 12 weeks w/ treatment above, PET scan and labs (CBC, CMP, CA 15.3).    Adiel Kim MD.

## 2022-11-10 NOTE — PROGRESS NOTES
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  # Mar 2022 - May 2022 Taxol / Herceptin / Perjeta; near-CR.  # May 2022 - ongoing Herceptin / Perjeta; near-CR.    Tiffanie remains well clinically.  Tolerating treatment well and without major toxicity.  Recovered from Taxol and will maintain on indefinite Herceptin/Perjeta.  Diarrhea is manageable.  Planning echocardiograms 6 months next due May 2023.  Planning 3 more months of therapy with a repeat PET.    Adiel Kim MD    --------------------    Interval History:  Tiffanie returns for follow-up of metastatic breast cancer.  All in all, she is doing quite well.  No new major symptoms to report.  She is stable from a bowel standpoint; history of cholecystectomy.  No cardiac complaints.  No bone complaints.  Neuropathy and hair are improving with time.    --------------------    Physical Exam:  VS: /72   Pulse 110   Ht 1.702 m (5' 7\")   Wt 115.5 kg (254 lb 9.6 oz)   LMP 2008   SpO2 100%   BMI 39.88 kg/m    Gen: Well-appearing.    Labs / Imaging / Path:  We reviewed CBC, CMP, tumor marker.  We personally reviewed her PET scan.    Video Visit:  Tiffanie is a 57 year old female who is being evaluated via a billable video visit.  }    Video start time: 10:04 AM  Video end time: 10:20 AM    Provider location: Off-site  Patient location: [facility]    Mode of transmission:  [employer] / [employer].    "

## 2022-11-12 LAB — CANCER AG27-29 SERPL-ACNC: 20.1 U/ML

## 2022-12-01 ENCOUNTER — INFUSION THERAPY VISIT (OUTPATIENT)
Dept: INFUSION THERAPY | Facility: CLINIC | Age: 57
End: 2022-12-01
Attending: INTERNAL MEDICINE
Payer: COMMERCIAL

## 2022-12-01 VITALS
BODY MASS INDEX: 40.09 KG/M2 | RESPIRATION RATE: 14 BRPM | SYSTOLIC BLOOD PRESSURE: 136 MMHG | WEIGHT: 255.4 LBS | OXYGEN SATURATION: 95 % | HEIGHT: 67 IN | TEMPERATURE: 98 F | DIASTOLIC BLOOD PRESSURE: 76 MMHG | HEART RATE: 71 BPM

## 2022-12-01 DIAGNOSIS — C50.511 MALIGNANT NEOPLASM OF LOWER-OUTER QUADRANT OF RIGHT FEMALE BREAST, UNSPECIFIED ESTROGEN RECEPTOR STATUS (H): Primary | ICD-10-CM

## 2022-12-01 PROCEDURE — 96413 CHEMO IV INFUSION 1 HR: CPT

## 2022-12-01 PROCEDURE — 96417 CHEMO IV INFUS EACH ADDL SEQ: CPT

## 2022-12-01 PROCEDURE — 258N000003 HC RX IP 258 OP 636: Performed by: INTERNAL MEDICINE

## 2022-12-01 PROCEDURE — 250N000011 HC RX IP 250 OP 636: Performed by: INTERNAL MEDICINE

## 2022-12-01 RX ADMIN — PERTUZUMAB 420 MG: 30 INJECTION, SOLUTION, CONCENTRATE INTRAVENOUS at 11:01

## 2022-12-01 RX ADMIN — SODIUM CHLORIDE 250 ML: 9 INJECTION, SOLUTION INTRAVENOUS at 10:30

## 2022-12-01 RX ADMIN — SODIUM CHLORIDE 720 MG: 9 INJECTION, SOLUTION INTRAVENOUS at 11:47

## 2022-12-01 ASSESSMENT — PAIN SCALES - GENERAL: PAINLEVEL: NO PAIN (0)

## 2022-12-01 NOTE — PROGRESS NOTES
Infusion Nursing Note:  Tiffanie Mcdermott presents today for D22C9 Perjeta/Trazimera.    Patient seen by provider today: No   present during visit today: Not Applicable.    Note: Pt states she has been doing well.  Denies any new questions or concerns.  Vitally stable, afebrile.  Tolerated infusion well without rxn.    Intravenous Access:  Peripheral IV placed.    Treatment Conditions:  Not Applicable.    Post Infusion Assessment:  Patient tolerated infusion without incident.  Blood return noted pre and post infusion.  Site patent and intact, free from redness, edema or discomfort.  No evidence of extravasations.  Access discontinued per protocol.     Discharge Plan:   Discharge instructions reviewed with: Patient.  Patient and/or family verbalized understanding of discharge instructions and all questions answered.  AVS to patient via [employer].  Patient will return 12/21/22 for next appointment.   Patient discharged in stable condition accompanied by: self.  Departure Mode: Ambulatory.      Lucinda Rodríguez RN

## 2022-12-13 ENCOUNTER — PATIENT OUTREACH (OUTPATIENT)
Dept: ONCOLOGY | Facility: CLINIC | Age: 57
End: 2022-12-13

## 2022-12-21 ENCOUNTER — INFUSION THERAPY VISIT (OUTPATIENT)
Dept: INFUSION THERAPY | Facility: CLINIC | Age: 57
End: 2022-12-21
Attending: INTERNAL MEDICINE
Payer: COMMERCIAL

## 2022-12-21 ENCOUNTER — ALLIED HEALTH/NURSE VISIT (OUTPATIENT)
Dept: FAMILY MEDICINE | Facility: CLINIC | Age: 57
End: 2022-12-21
Payer: COMMERCIAL

## 2022-12-21 ENCOUNTER — VIRTUAL VISIT (OUTPATIENT)
Dept: ONCOLOGY | Facility: CLINIC | Age: 57
End: 2022-12-21
Payer: COMMERCIAL

## 2022-12-21 VITALS
RESPIRATION RATE: 18 BRPM | OXYGEN SATURATION: 92 % | TEMPERATURE: 98.5 F | BODY MASS INDEX: 40.22 KG/M2 | SYSTOLIC BLOOD PRESSURE: 145 MMHG | DIASTOLIC BLOOD PRESSURE: 86 MMHG | HEART RATE: 94 BPM | WEIGHT: 256.8 LBS

## 2022-12-21 VITALS — HEART RATE: 76 BPM | SYSTOLIC BLOOD PRESSURE: 131 MMHG | DIASTOLIC BLOOD PRESSURE: 72 MMHG

## 2022-12-21 DIAGNOSIS — C50.911 STAGE IV CARCINOMA OF BREAST, ER-, RIGHT (H): Primary | ICD-10-CM

## 2022-12-21 DIAGNOSIS — Z17.1 STAGE IV CARCINOMA OF BREAST, ER-, RIGHT (H): Primary | ICD-10-CM

## 2022-12-21 DIAGNOSIS — C50.511 MALIGNANT NEOPLASM OF LOWER-OUTER QUADRANT OF RIGHT FEMALE BREAST, UNSPECIFIED ESTROGEN RECEPTOR STATUS (H): Primary | ICD-10-CM

## 2022-12-21 PROCEDURE — 96417 CHEMO IV INFUS EACH ADDL SEQ: CPT

## 2022-12-21 PROCEDURE — 258N000003 HC RX IP 258 OP 636: Performed by: INTERNAL MEDICINE

## 2022-12-21 PROCEDURE — 96413 CHEMO IV INFUSION 1 HR: CPT

## 2022-12-21 PROCEDURE — 250N000011 HC RX IP 250 OP 636: Performed by: INTERNAL MEDICINE

## 2022-12-21 PROCEDURE — 99207 PR NO CHARGE NURSE ONLY: CPT

## 2022-12-21 PROCEDURE — 99214 OFFICE O/P EST MOD 30 MIN: CPT | Mod: 95 | Performed by: INTERNAL MEDICINE

## 2022-12-21 RX ADMIN — SODIUM CHLORIDE 250 ML: 9 INJECTION, SOLUTION INTRAVENOUS at 10:16

## 2022-12-21 RX ADMIN — SODIUM CHLORIDE 720 MG: 9 INJECTION, SOLUTION INTRAVENOUS at 11:24

## 2022-12-21 RX ADMIN — PERTUZUMAB 420 MG: 30 INJECTION, SOLUTION, CONCENTRATE INTRAVENOUS at 10:36

## 2022-12-21 ASSESSMENT — PAIN SCALES - GENERAL: PAINLEVEL: NO PAIN (0)

## 2022-12-21 NOTE — LETTER
"    2022         RE: Tiffanie Mcdermott  [address] [address] [place] [phone]        Dear Colleague,    Thank you for referring your patient, Tiffanie Mcdermott, to the [facility] CANCER [facility]. Please see a copy of my visit note below.    Video-Visit Details  Tiffanie is a 57 year old female who is being evaluated via a billable video visit.  }    Video Start Time:  9:19 AM     Type of service:  Video Visit     Video End Time:  9:38 AM    Originating Location (pt. Location):  [facility] oncology [facility]     Distant Location (provider location):   Off-site     Platform used for Video Visit: [facility] Hematology / Oncology  Progress Note  Name: Tiffanie Mcdermott  :  1965    MRN:  [id number]  Patient's Medical Oncologist: Dr. 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  # Mar 2022 - May 2022 Taxol / Herceptin / Perjeta; near-CR.  # May 2022 - ongoing Herceptin / Perjeta; near-CR.    PLAN:  - Proceed with therapy today  - Next echo due 2023, ordered today  - Next PET ordered previously, scheduled for 2023  - Next labs including tumor markers ordered previously, due prior to next visit  - Next follow-up with Dr. Kim in 6 weeks for treatment and to review the above    --------------------    Interval History:  Tiffanie returns for follow-up of metastatic breast cancer.    Last seen by Dr. Kim 11/10/22    Pt did have port initially that was removed and now pt has treatment in left arm via IV (due to right hand dominant). She reports she has intermittent \"random\" itching on her left forearm for about 3 days total, does not correlate with time or site of infusion.  She does not have associated dry skin or bug bites.  Patient " has tried using lotion and hydrocortisone cream without much improvement.  The itching typically resolves spontaneously.  Overall the symptom seems mild in nature.    Pt has not had any diarrhea with infusions recently. Pt overall avoids high fat food and garlic due to cholecystectomy. Pt denies CP, dyspnea, orthopnea, LE edema. Pt denies bone pain. 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 Most noticeable at night, no numbness. No symptoms of restless leg syndrome.      labs reviewed with patient     -Last imagin2022 PET:   A) Minimal FDG avid thickening in the upper outer right breast (max SUV 1.5, previously 2.2) and involving the skin/subcutaneous tissues of the right breast representing posttreatment change without convincing evidence of active neoplasm.  B) FDG avid soft tissue thickening in the region of the previously seen left chest port, likely representing inflammatory change from recent removal (max SUV 6.3)    2022 Limited echo:  A) LVEF 57%  B) technically difficult to determine however global peak LV longitudinal strain average -21.8%    -Last labs:  11/10/2022 CA 15-3= 18 (stable), CA 27.29= 20.1 (stable, max 406 3/22)    --------------------    Physical Exam:  VS: LMP 2008    /86   Pulse 94   Resp 18   Temp 98.5  F (36.9  C)   SpO2 92 %   Weight 116.5 kg (256 lb 12.8 oz)   Pain Score No Pain (0)       ECO  GENERAL/CONSTITUTIONAL: No acute distress.  EYES:  Extraocular movements intact without nystagmus.  No scleral icterus.  RESPIRATORY: Equal chest rise.   MUSCULOSKELETAL: Warm and well-perfused, no cyanosis   NEUROLOGIC: Cranial nerves are grossly intact. Alert, oriented to person, place and time, answers questions appropriately.  INTEGUMENTARY: No rashes or jaundice.  No visible dryness or rash appreciated at left forearm      Labs / Imaging / Path:  As above              Again, thank you for  allowing me to participate in the care of your patient.        Sincerely,        NOEMY BUSH, DO

## 2022-12-21 NOTE — PROGRESS NOTES
"Video-Visit Details  Tiffanei is a 57 year old female who is being evaluated via a billable video visit.  }    Video Start Time:  9:19 AM     Type of service:  Video Visit     Video End Time:  9:38 AM    Originating Location (pt. Location):  [facility] oncology [facility]     Distant Location (provider location):   Off-site     Platform used for Video Visit: [facility] Hematology / Oncology  Progress Note  Name: Tiffanie Mcdermott  :  1965    MRN:  [id number]  Patient's Medical Oncologist: Dr. 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  # Mar 2022 - May 2022 Taxol / Herceptin / Perjeta; near-CR.  # May 2022 - ongoing Herceptin / Perjeta; near-CR.    PLAN:  - Proceed with therapy today  - Next echo due 2023, ordered today  - Next PET ordered previously, scheduled for 2023  - Next labs including tumor markers ordered previously, due prior to next visit  - Next follow-up with Dr. Kim in 6 weeks for treatment and to review the above    --------------------    Interval History:  Tiffanie returns for follow-up of metastatic breast cancer.    Last seen by Dr. Kim 11/10/22    Pt did have port initially that was removed and now pt has treatment in left arm via IV (due to right hand dominant). She reports she has intermittent \"random\" itching on her left forearm for about 3 days total, does not correlate with time or site of infusion.  She does not have associated dry skin or bug bites.  Patient has tried using lotion and hydrocortisone cream without much improvement.  The itching typically resolves spontaneously.  Overall the symptom seems mild in nature.    Pt has not had any diarrhea with infusions recently. Pt overall avoids high fat food and garlic due to " cholecystectomy. Pt denies CP, dyspnea, orthopnea, LE edema. Pt denies bone pain. 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 Most noticeable at night, no numbness. No symptoms of restless leg syndrome.      labs reviewed with patient     -Last imagin2022 PET:   A) Minimal FDG avid thickening in the upper outer right breast (max SUV 1.5, previously 2.2) and involving the skin/subcutaneous tissues of the right breast representing posttreatment change without convincing evidence of active neoplasm.  B) FDG avid soft tissue thickening in the region of the previously seen left chest port, likely representing inflammatory change from recent removal (max SUV 6.3)    2022 Limited echo:  A) LVEF 57%  B) technically difficult to determine however global peak LV longitudinal strain average -21.8%    -Last labs:  11/10/2022 CA 15-3= 18 (stable), CA 27.29= 20.1 (stable, max 406 3/22)    --------------------    Physical Exam:  VS: LMP 2008    /86   Pulse 94   Resp 18   Temp 98.5  F (36.9  C)   SpO2 92 %   Weight 116.5 kg (256 lb 12.8 oz)   Pain Score No Pain (0)       ECO  GENERAL/CONSTITUTIONAL: No acute distress.  EYES:  Extraocular movements intact without nystagmus.  No scleral icterus.  RESPIRATORY: Equal chest rise.   MUSCULOSKELETAL: Warm and well-perfused, no cyanosis   NEUROLOGIC: Cranial nerves are grossly intact. Alert, oriented to person, place and time, answers questions appropriately.  INTEGUMENTARY: No rashes or jaundice.  No visible dryness or rash appreciated at left forearm      Labs / Imaging / Path:  As above

## 2022-12-21 NOTE — PROGRESS NOTES
Infusion Nursing Note:  Tiffanie Mcdermott presents today for C9D43 Pertuzumab/Traztuzumab.    Patient seen by provider today: Yes: Dr. Carver.   present during visit today: Not Applicable.    Note: Patient says she is doing ok, no concerns stated. See oncology office visit note from today. B/P a little elevated, HR 94, afebrile, patient denies pain.     Intravenous Access:  Peripheral IV placed.    Treatment Conditions:  ECHO done on 11/2/22: Biplane LVEF: 57%, next ECHO scheduled for May 2023.    Post Infusion Assessment:  Patient tolerated infusion without incident.  Blood return noted pre and post infusion.  Site patent and intact, free from redness, edema or discomfort.  No evidence of extravasations.  PIV access discontinued per protocol.     Discharge Plan:   [facility]  Patient will return 1/12/2023 for next appointment.   Patient discharged in stable condition accompanied by: self.  Departure Mode: Ambulatory.      Constance Maynard RN

## 2023-01-12 ENCOUNTER — INFUSION THERAPY VISIT (OUTPATIENT)
Dept: INFUSION THERAPY | Facility: CLINIC | Age: 58
End: 2023-01-12
Attending: INTERNAL MEDICINE
Payer: COMMERCIAL

## 2023-01-12 VITALS
DIASTOLIC BLOOD PRESSURE: 73 MMHG | RESPIRATION RATE: 18 BRPM | SYSTOLIC BLOOD PRESSURE: 140 MMHG | WEIGHT: 258 LBS | HEIGHT: 67 IN | TEMPERATURE: 97.6 F | HEART RATE: 75 BPM | BODY MASS INDEX: 40.49 KG/M2 | OXYGEN SATURATION: 97 %

## 2023-01-12 DIAGNOSIS — C50.511 MALIGNANT NEOPLASM OF LOWER-OUTER QUADRANT OF RIGHT FEMALE BREAST, UNSPECIFIED ESTROGEN RECEPTOR STATUS (H): Primary | ICD-10-CM

## 2023-01-12 PROCEDURE — 96375 TX/PRO/DX INJ NEW DRUG ADDON: CPT

## 2023-01-12 PROCEDURE — 250N000011 HC RX IP 250 OP 636: Performed by: INTERNAL MEDICINE

## 2023-01-12 PROCEDURE — 258N000003 HC RX IP 258 OP 636: Performed by: INTERNAL MEDICINE

## 2023-01-12 PROCEDURE — 96413 CHEMO IV INFUSION 1 HR: CPT

## 2023-01-12 PROCEDURE — 96417 CHEMO IV INFUS EACH ADDL SEQ: CPT

## 2023-01-12 RX ADMIN — SODIUM CHLORIDE 720 MG: 9 INJECTION, SOLUTION INTRAVENOUS at 12:32

## 2023-01-12 RX ADMIN — FAMOTIDINE 20 MG: 10 INJECTION, SOLUTION INTRAVENOUS at 11:05

## 2023-01-12 RX ADMIN — PERTUZUMAB 420 MG: 30 INJECTION, SOLUTION, CONCENTRATE INTRAVENOUS at 11:43

## 2023-01-12 RX ADMIN — SODIUM CHLORIDE 250 ML: 9 INJECTION, SOLUTION INTRAVENOUS at 11:04

## 2023-01-28 ENCOUNTER — HOSPITAL ENCOUNTER (OUTPATIENT)
Dept: PET IMAGING | Facility: CLINIC | Age: 58
Discharge: HOME | End: 2023-01-28
Attending: INTERNAL MEDICINE | Admitting: INTERNAL MEDICINE
Payer: COMMERCIAL

## 2023-01-28 DIAGNOSIS — Z17.1 STAGE IV CARCINOMA OF BREAST, ER-, RIGHT (H): ICD-10-CM

## 2023-01-28 DIAGNOSIS — C50.911 STAGE IV CARCINOMA OF BREAST, ER-, RIGHT (H): ICD-10-CM

## 2023-01-28 PROCEDURE — A9552 F18 FDG: HCPCS | Performed by: INTERNAL MEDICINE

## 2023-01-28 PROCEDURE — 343N000001 HC RX 343: Performed by: INTERNAL MEDICINE

## 2023-01-28 PROCEDURE — 78815 PET IMAGE W/CT SKULL-THIGH: CPT | Mod: PS

## 2023-01-28 RX ADMIN — FLUDEOXYGLUCOSE F-18 11.45 MCI.: 500 INJECTION, SOLUTION INTRAVENOUS at 12:39

## 2023-02-02 ENCOUNTER — APPOINTMENT (OUTPATIENT)
Dept: LAB | Facility: CLINIC | Age: 58
End: 2023-02-02
Payer: COMMERCIAL

## 2023-02-02 ENCOUNTER — INFUSION THERAPY VISIT (OUTPATIENT)
Dept: INFUSION THERAPY | Facility: CLINIC | Age: 58
End: 2023-02-02
Attending: INTERNAL MEDICINE
Payer: COMMERCIAL

## 2023-02-02 ENCOUNTER — ONCOLOGY VISIT (OUTPATIENT)
Dept: ONCOLOGY | Facility: CLINIC | Age: 58
End: 2023-02-02
Payer: COMMERCIAL

## 2023-02-02 VITALS
HEIGHT: 67 IN | HEART RATE: 107 BPM | DIASTOLIC BLOOD PRESSURE: 74 MMHG | TEMPERATURE: 98.3 F | WEIGHT: 261.3 LBS | OXYGEN SATURATION: 95 % | SYSTOLIC BLOOD PRESSURE: 132 MMHG | BODY MASS INDEX: 41.01 KG/M2

## 2023-02-02 VITALS — SYSTOLIC BLOOD PRESSURE: 138 MMHG | HEART RATE: 72 BPM | DIASTOLIC BLOOD PRESSURE: 52 MMHG

## 2023-02-02 DIAGNOSIS — C50.911 STAGE IV CARCINOMA OF BREAST, ER-, RIGHT (H): ICD-10-CM

## 2023-02-02 DIAGNOSIS — C50.911 STAGE IV CARCINOMA OF BREAST, ER-, RIGHT (H): Primary | ICD-10-CM

## 2023-02-02 DIAGNOSIS — C50.511 MALIGNANT NEOPLASM OF LOWER-OUTER QUADRANT OF RIGHT FEMALE BREAST, UNSPECIFIED ESTROGEN RECEPTOR STATUS (H): Primary | ICD-10-CM

## 2023-02-02 DIAGNOSIS — Z17.1 STAGE IV CARCINOMA OF BREAST, ER-, RIGHT (H): ICD-10-CM

## 2023-02-02 DIAGNOSIS — C50.911 HER2-POSITIVE CARCINOMA OF RIGHT BREAST (H): ICD-10-CM

## 2023-02-02 DIAGNOSIS — C50.511 MALIGNANT NEOPLASM OF LOWER-OUTER QUADRANT OF RIGHT FEMALE BREAST, UNSPECIFIED ESTROGEN RECEPTOR STATUS (H): ICD-10-CM

## 2023-02-02 DIAGNOSIS — Z17.1 STAGE IV CARCINOMA OF BREAST, ER-, RIGHT (H): Primary | ICD-10-CM

## 2023-02-02 DIAGNOSIS — Z17.31 HER2-POSITIVE CARCINOMA OF RIGHT BREAST (H): ICD-10-CM

## 2023-02-02 DIAGNOSIS — R79.89 ABNORMAL LFTS: ICD-10-CM

## 2023-02-02 LAB
ALBUMIN SERPL BCG-MCNC: 4 G/DL (ref 3.5–5.2)
ALP SERPL-CCNC: 113 U/L (ref 35–104)
ALT SERPL W P-5'-P-CCNC: 67 U/L (ref 10–35)
ANION GAP SERPL CALCULATED.3IONS-SCNC: 9 MMOL/L (ref 7–15)
AST SERPL W P-5'-P-CCNC: 39 U/L (ref 10–35)
BASOPHILS # BLD AUTO: 0 10E3/UL (ref 0–0.2)
BASOPHILS NFR BLD AUTO: 1 %
BILIRUB SERPL-MCNC: 0.4 MG/DL
BUN SERPL-MCNC: 16.5 MG/DL (ref 8–23)
CALCIUM SERPL-MCNC: 9 MG/DL (ref 8.6–10)
CANCER AG15-3 SERPL-ACNC: 18 U/ML
CHLORIDE SERPL-SCNC: 103 MMOL/L (ref 98–107)
CREAT SERPL-MCNC: 0.94 MG/DL (ref 0.51–0.95)
DEPRECATED HCO3 PLAS-SCNC: 26 MMOL/L (ref 22–29)
EOSINOPHIL # BLD AUTO: 0.3 10E3/UL (ref 0–0.7)
EOSINOPHIL NFR BLD AUTO: 4 %
ERYTHROCYTE [DISTWIDTH] IN BLOOD BY AUTOMATED COUNT: 12.8 % (ref 10–15)
GFR SERPL CREATININE-BSD FRML MDRD: 70 ML/MIN/1.73M2
GLUCOSE SERPL-MCNC: 203 MG/DL (ref 70–99)
HCT VFR BLD AUTO: 41.8 % (ref 35–47)
HGB BLD-MCNC: 13.4 G/DL (ref 11.7–15.7)
IMM GRANULOCYTES # BLD: 0 10E3/UL
IMM GRANULOCYTES NFR BLD: 1 %
LYMPHOCYTES # BLD AUTO: 1.5 10E3/UL (ref 0.8–5.3)
LYMPHOCYTES NFR BLD AUTO: 25 %
MCH RBC QN AUTO: 28.5 PG (ref 26.5–33)
MCHC RBC AUTO-ENTMCNC: 32.1 G/DL (ref 31.5–36.5)
MCV RBC AUTO: 89 FL (ref 78–100)
MONOCYTES # BLD AUTO: 0.3 10E3/UL (ref 0–1.3)
MONOCYTES NFR BLD AUTO: 5 %
NEUTROPHILS # BLD AUTO: 4 10E3/UL (ref 1.6–8.3)
NEUTROPHILS NFR BLD AUTO: 64 %
NRBC # BLD AUTO: 0 10E3/UL
NRBC BLD AUTO-RTO: 0 /100
PLATELET # BLD AUTO: 200 10E3/UL (ref 150–450)
POTASSIUM SERPL-SCNC: 3.7 MMOL/L (ref 3.4–5.3)
PROT SERPL-MCNC: 6.6 G/DL (ref 6.4–8.3)
RBC # BLD AUTO: 4.71 10E6/UL (ref 3.8–5.2)
SODIUM SERPL-SCNC: 138 MMOL/L (ref 136–145)
WBC # BLD AUTO: 6.1 10E3/UL (ref 4–11)

## 2023-02-02 PROCEDURE — 36415 COLL VENOUS BLD VENIPUNCTURE: CPT

## 2023-02-02 PROCEDURE — 86300 IMMUNOASSAY TUMOR CA 15-3: CPT

## 2023-02-02 PROCEDURE — 99215 OFFICE O/P EST HI 40 MIN: CPT | Performed by: INTERNAL MEDICINE

## 2023-02-02 PROCEDURE — 96413 CHEMO IV INFUSION 1 HR: CPT

## 2023-02-02 PROCEDURE — 80053 COMPREHEN METABOLIC PANEL: CPT

## 2023-02-02 PROCEDURE — 258N000003 HC RX IP 258 OP 636: Performed by: INTERNAL MEDICINE

## 2023-02-02 PROCEDURE — 96417 CHEMO IV INFUS EACH ADDL SEQ: CPT

## 2023-02-02 PROCEDURE — 250N000011 HC RX IP 250 OP 636: Performed by: INTERNAL MEDICINE

## 2023-02-02 PROCEDURE — 85004 AUTOMATED DIFF WBC COUNT: CPT

## 2023-02-02 RX ORDER — ALBUTEROL SULFATE 0.83 MG/ML
2.5 SOLUTION RESPIRATORY (INHALATION)
Status: CANCELLED | OUTPATIENT
Start: 2023-05-18

## 2023-02-02 RX ORDER — DIPHENHYDRAMINE HCL 25 MG
50 CAPSULE ORAL
Status: CANCELLED | OUTPATIENT
Start: 2023-06-29

## 2023-02-02 RX ORDER — ALBUTEROL SULFATE 90 UG/1
1-2 AEROSOL, METERED RESPIRATORY (INHALATION)
Status: CANCELLED
Start: 2023-04-27

## 2023-02-02 RX ORDER — DIPHENHYDRAMINE HCL 25 MG
50 CAPSULE ORAL
Status: CANCELLED | OUTPATIENT
Start: 2023-05-18

## 2023-02-02 RX ORDER — ACETAMINOPHEN 325 MG/1
650 TABLET ORAL
Status: CANCELLED | OUTPATIENT
Start: 2023-04-27

## 2023-02-02 RX ORDER — DIPHENHYDRAMINE HYDROCHLORIDE 50 MG/ML
50 INJECTION INTRAMUSCULAR; INTRAVENOUS
Status: CANCELLED
Start: 2023-05-18

## 2023-02-02 RX ORDER — ALBUTEROL SULFATE 90 UG/1
1-2 AEROSOL, METERED RESPIRATORY (INHALATION)
Status: CANCELLED
Start: 2023-06-29

## 2023-02-02 RX ORDER — METHYLPREDNISOLONE SODIUM SUCCINATE 125 MG/2ML
125 INJECTION, POWDER, LYOPHILIZED, FOR SOLUTION INTRAMUSCULAR; INTRAVENOUS
Status: CANCELLED
Start: 2023-05-18

## 2023-02-02 RX ORDER — HEPARIN SODIUM (PORCINE) LOCK FLUSH IV SOLN 100 UNIT/ML 100 UNIT/ML
5 SOLUTION INTRAVENOUS
Status: CANCELLED | OUTPATIENT
Start: 2023-06-08

## 2023-02-02 RX ORDER — METHYLPREDNISOLONE SODIUM SUCCINATE 125 MG/2ML
125 INJECTION, POWDER, LYOPHILIZED, FOR SOLUTION INTRAMUSCULAR; INTRAVENOUS
Status: CANCELLED
Start: 2023-04-27

## 2023-02-02 RX ORDER — HEPARIN SODIUM,PORCINE 10 UNIT/ML
5 VIAL (ML) INTRAVENOUS
Status: CANCELLED | OUTPATIENT
Start: 2023-04-27

## 2023-02-02 RX ORDER — ACETAMINOPHEN 325 MG/1
650 TABLET ORAL
Status: CANCELLED | OUTPATIENT
Start: 2023-05-18

## 2023-02-02 RX ORDER — METHYLPREDNISOLONE SODIUM SUCCINATE 125 MG/2ML
125 INJECTION, POWDER, LYOPHILIZED, FOR SOLUTION INTRAMUSCULAR; INTRAVENOUS
Status: CANCELLED
Start: 2023-06-29

## 2023-02-02 RX ORDER — ACETAMINOPHEN 325 MG/1
650 TABLET ORAL
Status: CANCELLED | OUTPATIENT
Start: 2023-06-29

## 2023-02-02 RX ORDER — HEPARIN SODIUM (PORCINE) LOCK FLUSH IV SOLN 100 UNIT/ML 100 UNIT/ML
5 SOLUTION INTRAVENOUS
Status: CANCELLED | OUTPATIENT
Start: 2023-04-27

## 2023-02-02 RX ORDER — EPINEPHRINE 1 MG/ML
0.3 INJECTION, SOLUTION, CONCENTRATE INTRAVENOUS EVERY 5 MIN PRN
Status: CANCELLED | OUTPATIENT
Start: 2023-06-29

## 2023-02-02 RX ORDER — EPINEPHRINE 1 MG/ML
0.3 INJECTION, SOLUTION, CONCENTRATE INTRAVENOUS EVERY 5 MIN PRN
Status: CANCELLED | OUTPATIENT
Start: 2023-06-08

## 2023-02-02 RX ORDER — EPINEPHRINE 1 MG/ML
0.3 INJECTION, SOLUTION, CONCENTRATE INTRAVENOUS EVERY 5 MIN PRN
Status: CANCELLED | OUTPATIENT
Start: 2023-04-27

## 2023-02-02 RX ORDER — ALBUTEROL SULFATE 0.83 MG/ML
2.5 SOLUTION RESPIRATORY (INHALATION)
Status: CANCELLED | OUTPATIENT
Start: 2023-06-29

## 2023-02-02 RX ORDER — HEPARIN SODIUM,PORCINE 10 UNIT/ML
5 VIAL (ML) INTRAVENOUS
Status: CANCELLED | OUTPATIENT
Start: 2023-05-18

## 2023-02-02 RX ORDER — HEPARIN SODIUM (PORCINE) LOCK FLUSH IV SOLN 100 UNIT/ML 100 UNIT/ML
5 SOLUTION INTRAVENOUS
Status: CANCELLED | OUTPATIENT
Start: 2023-06-29

## 2023-02-02 RX ORDER — NALOXONE HYDROCHLORIDE 0.4 MG/ML
0.2 INJECTION, SOLUTION INTRAMUSCULAR; INTRAVENOUS; SUBCUTANEOUS
Status: CANCELLED | OUTPATIENT
Start: 2023-04-27

## 2023-02-02 RX ORDER — MEPERIDINE HYDROCHLORIDE 25 MG/ML
25 INJECTION INTRAMUSCULAR; INTRAVENOUS; SUBCUTANEOUS EVERY 30 MIN PRN
Status: CANCELLED | OUTPATIENT
Start: 2023-05-18

## 2023-02-02 RX ORDER — NALOXONE HYDROCHLORIDE 0.4 MG/ML
0.2 INJECTION, SOLUTION INTRAMUSCULAR; INTRAVENOUS; SUBCUTANEOUS
Status: CANCELLED | OUTPATIENT
Start: 2023-06-29

## 2023-02-02 RX ORDER — DIPHENHYDRAMINE HYDROCHLORIDE 50 MG/ML
50 INJECTION INTRAMUSCULAR; INTRAVENOUS
Status: CANCELLED
Start: 2023-06-08

## 2023-02-02 RX ORDER — LORAZEPAM 2 MG/ML
0.5 INJECTION INTRAMUSCULAR EVERY 4 HOURS PRN
Status: CANCELLED | OUTPATIENT
Start: 2023-06-08

## 2023-02-02 RX ORDER — HEPARIN SODIUM,PORCINE 10 UNIT/ML
5 VIAL (ML) INTRAVENOUS
Status: CANCELLED | OUTPATIENT
Start: 2023-06-08

## 2023-02-02 RX ORDER — DIPHENHYDRAMINE HYDROCHLORIDE 50 MG/ML
50 INJECTION INTRAMUSCULAR; INTRAVENOUS
Status: CANCELLED
Start: 2023-06-29

## 2023-02-02 RX ORDER — DIPHENHYDRAMINE HCL 25 MG
50 CAPSULE ORAL
Status: CANCELLED | OUTPATIENT
Start: 2023-06-08

## 2023-02-02 RX ORDER — DIPHENHYDRAMINE HCL 25 MG
50 CAPSULE ORAL
Status: CANCELLED | OUTPATIENT
Start: 2023-04-27

## 2023-02-02 RX ORDER — ACETAMINOPHEN 325 MG/1
650 TABLET ORAL
Status: CANCELLED | OUTPATIENT
Start: 2023-06-08

## 2023-02-02 RX ORDER — HEPARIN SODIUM (PORCINE) LOCK FLUSH IV SOLN 100 UNIT/ML 100 UNIT/ML
5 SOLUTION INTRAVENOUS
Status: CANCELLED | OUTPATIENT
Start: 2023-05-18

## 2023-02-02 RX ORDER — ALBUTEROL SULFATE 0.83 MG/ML
2.5 SOLUTION RESPIRATORY (INHALATION)
Status: CANCELLED | OUTPATIENT
Start: 2023-04-27

## 2023-02-02 RX ORDER — NALOXONE HYDROCHLORIDE 0.4 MG/ML
0.2 INJECTION, SOLUTION INTRAMUSCULAR; INTRAVENOUS; SUBCUTANEOUS
Status: CANCELLED | OUTPATIENT
Start: 2023-06-08

## 2023-02-02 RX ORDER — EPINEPHRINE 1 MG/ML
0.3 INJECTION, SOLUTION, CONCENTRATE INTRAVENOUS EVERY 5 MIN PRN
Status: CANCELLED | OUTPATIENT
Start: 2023-05-18

## 2023-02-02 RX ORDER — DIPHENHYDRAMINE HYDROCHLORIDE 50 MG/ML
50 INJECTION INTRAMUSCULAR; INTRAVENOUS
Status: CANCELLED
Start: 2023-04-27

## 2023-02-02 RX ORDER — METHYLPREDNISOLONE SODIUM SUCCINATE 125 MG/2ML
125 INJECTION, POWDER, LYOPHILIZED, FOR SOLUTION INTRAMUSCULAR; INTRAVENOUS
Status: CANCELLED
Start: 2023-06-08

## 2023-02-02 RX ORDER — MEPERIDINE HYDROCHLORIDE 25 MG/ML
25 INJECTION INTRAMUSCULAR; INTRAVENOUS; SUBCUTANEOUS EVERY 30 MIN PRN
Status: CANCELLED | OUTPATIENT
Start: 2023-04-27

## 2023-02-02 RX ORDER — MEPERIDINE HYDROCHLORIDE 25 MG/ML
25 INJECTION INTRAMUSCULAR; INTRAVENOUS; SUBCUTANEOUS EVERY 30 MIN PRN
Status: CANCELLED | OUTPATIENT
Start: 2023-06-08

## 2023-02-02 RX ORDER — HEPARIN SODIUM,PORCINE 10 UNIT/ML
5 VIAL (ML) INTRAVENOUS
Status: CANCELLED | OUTPATIENT
Start: 2023-06-29

## 2023-02-02 RX ORDER — NALOXONE HYDROCHLORIDE 0.4 MG/ML
0.2 INJECTION, SOLUTION INTRAMUSCULAR; INTRAVENOUS; SUBCUTANEOUS
Status: CANCELLED | OUTPATIENT
Start: 2023-05-18

## 2023-02-02 RX ORDER — LORAZEPAM 2 MG/ML
0.5 INJECTION INTRAMUSCULAR EVERY 4 HOURS PRN
Status: CANCELLED | OUTPATIENT
Start: 2023-06-29

## 2023-02-02 RX ORDER — ALBUTEROL SULFATE 0.83 MG/ML
2.5 SOLUTION RESPIRATORY (INHALATION)
Status: CANCELLED | OUTPATIENT
Start: 2023-06-08

## 2023-02-02 RX ORDER — LORAZEPAM 2 MG/ML
0.5 INJECTION INTRAMUSCULAR EVERY 4 HOURS PRN
Status: CANCELLED | OUTPATIENT
Start: 2023-05-18

## 2023-02-02 RX ORDER — MEPERIDINE HYDROCHLORIDE 25 MG/ML
25 INJECTION INTRAMUSCULAR; INTRAVENOUS; SUBCUTANEOUS EVERY 30 MIN PRN
Status: CANCELLED | OUTPATIENT
Start: 2023-06-29

## 2023-02-02 RX ORDER — LORAZEPAM 2 MG/ML
0.5 INJECTION INTRAMUSCULAR EVERY 4 HOURS PRN
Status: CANCELLED | OUTPATIENT
Start: 2023-04-27

## 2023-02-02 RX ORDER — ALBUTEROL SULFATE 90 UG/1
1-2 AEROSOL, METERED RESPIRATORY (INHALATION)
Status: CANCELLED
Start: 2023-06-08

## 2023-02-02 RX ORDER — ALBUTEROL SULFATE 90 UG/1
1-2 AEROSOL, METERED RESPIRATORY (INHALATION)
Status: CANCELLED
Start: 2023-05-18

## 2023-02-02 RX ADMIN — PERTUZUMAB 420 MG: 30 INJECTION, SOLUTION, CONCENTRATE INTRAVENOUS at 09:10

## 2023-02-02 RX ADMIN — SODIUM CHLORIDE 250 ML: 9 INJECTION, SOLUTION INTRAVENOUS at 08:48

## 2023-02-02 RX ADMIN — SODIUM CHLORIDE 720 MG: 9 INJECTION, SOLUTION INTRAVENOUS at 09:50

## 2023-02-02 NOTE — LETTER
"    2023         RE: Tiffanie Mcdermott  [address] [address] [place] [phone]        Dear Colleague,    Thank you for referring your patient, Tiffanie Mcdermott, to the [facility] CANCER [facility] [place]. Please see a copy of my visit note below.    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  # Mar 2022 - May 2022 Taxol / Herceptin / Perjeta; near-CR.  # May 2022 - ongoing Herceptin / Perjeta; near-CR.    Tiffanie remains well clinically.  Tolerating treatment well and without major toxicity.  Remains on indefinite Herceptin/Perjeta.  Diarrhea is manageable.  Planning surveillance ECHO in 3 months.  Radiographically in CR; monitoring some low level activity breast and PORT.  Planning 3 more months of therapy with a repeat PET.  Recheck LFTs with next treatment; no recent etoh or APAP or new meds.    Adiel Kim MD    --------------------    Interval History:  Tiffanie returns for follow-up of metastatic breast cancer.  All in all, she is doing quite well.  No new major symptoms to report.  Tolerating treatments well and without issues.  No cardiac complaints.  No bone complaints.    --------------------    Physical Exam:  VS: /74   Pulse 107   Temp 98.3  F (36.8  C) (Temporal)   Ht 1.702 m (5' 7\")   Wt 118.5 kg (261 lb 4.8 oz)   LMP 2008   SpO2 95%   BMI 40.93 kg/m    Gen: Well-appearing.    Labs / Imaging / Path:  We reviewed CBC, CMP, tumor marker.  We personally reviewed her PET scan.        Again, thank you for allowing me to participate in the care of your patient.        Sincerely,        Adiel Kim MD    "

## 2023-02-02 NOTE — PROGRESS NOTES
Infusion Nursing Note:  Tiffanie STEWART Sharron presents today for C10D1 Pertuzumab/Trastuzumab.    Patient seen by provider today: Yes: Dr. Kim.    present during visit today: Not Applicable.    Note: Patient arrives ambulatory, denies pain. VSS. See Oncology office visit note.     Intravenous Access:  Peripheral IV placed.    Treatment Conditions:  Lab Results   Component Value Date    HGB 13.4 02/02/2023    WBC 6.1 02/02/2023    ANEU 7.0 03/21/2022    ANEUTAUTO 4.0 02/02/2023     02/02/2023      Lab Results   Component Value Date     02/02/2023    POTASSIUM 3.7 02/02/2023    MAG 1.7 04/25/2022    CR 0.94 02/02/2023    SARAI 9.0 02/02/2023    BILITOTAL 0.4 02/02/2023    ALBUMIN 4.0 02/02/2023    ALT 67 (H) 02/02/2023    AST 39 (H) 02/02/2023     Results reviewed, labs MET treatment parameters, ok to proceed with treatment.  ECHO/MUGA completed 11/2/22 Biplane LVEF 57%.    Post Infusion Assessment:  Patient tolerated infusion without incident.  Blood return noted pre and post infusion.  Site patent and intact, free from redness, edema or discomfort.  No evidence of extravasations.  PIV access discontinued per protocol.     Discharge Plan:   AVS to patient via MYCHART.  Patient will return 2/23 for next appointment.   Patient discharged in stable condition accompanied by: self.  Departure Mode: Ambulatory.      Constance Maynard RN

## 2023-02-02 NOTE — PROGRESS NOTES
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  # Mar 2022 - May 2022 Taxol / Herceptin / Perjeta; near-CR.  # May 2022 - ongoing Herceptin / Perjeta; near-CR.    Tiffanie remains well clinically.  Tolerating treatment well and without major toxicity.  Remains on indefinite Herceptin/Perjeta.  Diarrhea is manageable.  Planning surveillance ECHO in 3 months.  Radiographically in CR; monitoring some low level activity breast and PORT.  Planning 3 more months of therapy with a repeat PET.  Recheck LFTs with next treatment; no recent etoh or APAP or new meds.    Adiel Kim MD    --------------------    Interval History:  Tiffanie returns for follow-up of metastatic breast cancer.  All in all, she is doing quite well.  No new major symptoms to report.  Tolerating treatments well and without issues.  No cardiac complaints.  No bone complaints.    --------------------    Physical Exam:  VS: /74   Pulse 107   Temp 98.3  F (36.8  C) (Temporal)   Ht 1.702 m (5' 7\")   Wt 118.5 kg (261 lb 4.8 oz)   LMP 2008   SpO2 95%   BMI 40.93 kg/m    Gen: Well-appearing.    Labs / Imaging / Path:  We reviewed CBC, CMP, tumor marker.  We personally reviewed her PET scan.    "

## 2023-03-02 ENCOUNTER — INFUSION THERAPY VISIT (OUTPATIENT)
Dept: INFUSION THERAPY | Facility: CLINIC | Age: 58
End: 2023-03-02
Attending: INTERNAL MEDICINE
Payer: COMMERCIAL

## 2023-03-02 VITALS
RESPIRATION RATE: 18 BRPM | HEART RATE: 81 BPM | WEIGHT: 263.9 LBS | TEMPERATURE: 98.5 F | BODY MASS INDEX: 41.33 KG/M2 | SYSTOLIC BLOOD PRESSURE: 133 MMHG | OXYGEN SATURATION: 97 % | DIASTOLIC BLOOD PRESSURE: 63 MMHG

## 2023-03-02 DIAGNOSIS — C50.511 MALIGNANT NEOPLASM OF LOWER-OUTER QUADRANT OF RIGHT FEMALE BREAST, UNSPECIFIED ESTROGEN RECEPTOR STATUS (H): Primary | ICD-10-CM

## 2023-03-02 LAB
ALBUMIN SERPL BCG-MCNC: 4 G/DL (ref 3.5–5.2)
ALP SERPL-CCNC: 118 U/L (ref 35–104)
ALT SERPL W P-5'-P-CCNC: 78 U/L (ref 10–35)
AST SERPL W P-5'-P-CCNC: 40 U/L (ref 10–35)
BILIRUB DIRECT SERPL-MCNC: <0.2 MG/DL (ref 0–0.3)
BILIRUB SERPL-MCNC: 0.6 MG/DL
PROT SERPL-MCNC: 6.7 G/DL (ref 6.4–8.3)

## 2023-03-02 PROCEDURE — 96413 CHEMO IV INFUSION 1 HR: CPT

## 2023-03-02 PROCEDURE — 250N000011 HC RX IP 250 OP 636: Performed by: INTERNAL MEDICINE

## 2023-03-02 PROCEDURE — 258N000003 HC RX IP 258 OP 636: Performed by: INTERNAL MEDICINE

## 2023-03-02 PROCEDURE — 96417 CHEMO IV INFUS EACH ADDL SEQ: CPT

## 2023-03-02 PROCEDURE — 36415 COLL VENOUS BLD VENIPUNCTURE: CPT

## 2023-03-02 PROCEDURE — 82040 ASSAY OF SERUM ALBUMIN: CPT

## 2023-03-02 RX ADMIN — SODIUM CHLORIDE 250 ML: 9 INJECTION, SOLUTION INTRAVENOUS at 14:06

## 2023-03-02 RX ADMIN — SODIUM CHLORIDE 720 MG: 9 INJECTION, SOLUTION INTRAVENOUS at 15:01

## 2023-03-02 RX ADMIN — PERTUZUMAB 420 MG: 30 INJECTION, SOLUTION, CONCENTRATE INTRAVENOUS at 14:21

## 2023-03-02 ASSESSMENT — PAIN SCALES - GENERAL: PAINLEVEL: NO PAIN (0)

## 2023-03-02 NOTE — PROGRESS NOTES
Infusion Nursing Note:  Tiffanie Mcdermott presents today for Perjeta, Trazimera infusions.    Patient seen by provider today: No   present during visit today: Not Applicable.    Note: N/A.    Intravenous Access:  Labs drawn without difficulty.  Peripheral IV placed.    Treatment Conditions:  Not Applicable.    Post Infusion Assessment:  Patient tolerated infusion without incident.  Patient observed for 15 minutes post infusion per protocol.     Discharge Plan:   Patient discharged in stable condition accompanied by: self.  Departure Mode: Ambulatory.    Will return on Tuesday, 3/21 for     Drea Haynes RN

## 2023-03-03 DIAGNOSIS — C50.511 MALIGNANT NEOPLASM OF LOWER-OUTER QUADRANT OF RIGHT FEMALE BREAST, UNSPECIFIED ESTROGEN RECEPTOR STATUS (H): Primary | ICD-10-CM

## 2023-03-20 ENCOUNTER — TELEPHONE (OUTPATIENT)
Dept: ONCOLOGY | Facility: CLINIC | Age: 58
End: 2023-03-20
Payer: COMMERCIAL

## 2023-03-21 ENCOUNTER — INFUSION THERAPY VISIT (OUTPATIENT)
Dept: INFUSION THERAPY | Facility: CLINIC | Age: 58
End: 2023-03-21
Attending: INTERNAL MEDICINE
Payer: COMMERCIAL

## 2023-03-21 ENCOUNTER — VIRTUAL VISIT (OUTPATIENT)
Dept: ONCOLOGY | Facility: CLINIC | Age: 58
End: 2023-03-21
Attending: PHYSICIAN ASSISTANT
Payer: COMMERCIAL

## 2023-03-21 VITALS
TEMPERATURE: 97.4 F | BODY MASS INDEX: 41.74 KG/M2 | WEIGHT: 266.5 LBS | SYSTOLIC BLOOD PRESSURE: 141 MMHG | HEART RATE: 61 BPM | DIASTOLIC BLOOD PRESSURE: 60 MMHG

## 2023-03-21 DIAGNOSIS — C50.511 MALIGNANT NEOPLASM OF LOWER-OUTER QUADRANT OF RIGHT FEMALE BREAST, UNSPECIFIED ESTROGEN RECEPTOR STATUS (H): Primary | ICD-10-CM

## 2023-03-21 PROCEDURE — 250N000011 HC RX IP 250 OP 636: Performed by: INTERNAL MEDICINE

## 2023-03-21 PROCEDURE — 96417 CHEMO IV INFUS EACH ADDL SEQ: CPT

## 2023-03-21 PROCEDURE — 96413 CHEMO IV INFUSION 1 HR: CPT

## 2023-03-21 PROCEDURE — 258N000003 HC RX IP 258 OP 636: Performed by: INTERNAL MEDICINE

## 2023-03-21 PROCEDURE — 99214 OFFICE O/P EST MOD 30 MIN: CPT | Mod: VID | Performed by: PHYSICIAN ASSISTANT

## 2023-03-21 PROCEDURE — G0463 HOSPITAL OUTPT CLINIC VISIT: HCPCS | Mod: PN,GT | Performed by: PHYSICIAN ASSISTANT

## 2023-03-21 RX ADMIN — PERTUZUMAB 420 MG: 30 INJECTION, SOLUTION, CONCENTRATE INTRAVENOUS at 09:17

## 2023-03-21 RX ADMIN — SODIUM CHLORIDE 720 MG: 9 INJECTION, SOLUTION INTRAVENOUS at 09:58

## 2023-03-21 RX ADMIN — SODIUM CHLORIDE 250 ML: 9 INJECTION, SOLUTION INTRAVENOUS at 09:15

## 2023-03-21 NOTE — NURSING NOTE
Is the patient currently in the state of [place]? YES    Visit mode:VIDEO    If the visit is dropped, the patient can be reconnected by: VIDEO VISIT: Send to e-mail at: [email or web address]    Will anyone else be joining the visit? NO      How would you like to obtain your AVS? MyChart    Are changes needed to the allergy or medication list? NO    Reason for visit: Tiffanie Mcdermott 57 year old female presents today for f/u on breast cancer.  Alison Loredo, Virtual Facilitator

## 2023-03-21 NOTE — PROGRESS NOTES
Infusion Nursing Note:  Tiffaniecornel Mcdermott presents today for C10 D43  Prejeta and Trazimera.    Patient seen by provider today: Yes: Pt has appointment this afternoon with provider.    present during visit today: Not Applicable.    Note: Pt here today for treatment..  Pt states that she has been feeling well.  Just got back from vacation yesterday.  No concerns today. Radha is working on getting a PET scan scheduled.  Pt has an ECHO scheduled in April.     Intravenous Access:  Peripheral IV placed.    Treatment Conditions:  Not Applicable.    Post Infusion Assessment:  Patient tolerated infusion without incident.  Site patent and intact, free from redness, edema or discomfort.  No evidence of extravasations.  Access discontinued per protocol.     Discharge Plan:   Patient discharged in stable condition accompanied by: self.  Departure Mode: Ambulatory.  PT has appointment to return on 04/12/2023    Cindy Robbins RN

## 2023-03-21 NOTE — LETTER
3/21/2023         RE: Tiffanie Mcdermott  [address] [address] [place] [phone]        Dear Colleague,    Thank you for referring your patient, Tiffanie Mcdermott, to the [facility]. Please see a copy of my visit note below.    Oncology/Hematology Visit Note  Mar 21, 2023    Reason for Visit: Follow up of metastatic breast cancer on maintenance Herceptin/Perjeta    Primary Oncologist: Dr. 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  # Mar 2022 - May 2022 Taxol / Herceptin / Perjeta; near-CR.  # May 2022 - ongoing Herceptin / Perjeta; near-CR.    Interval History:  Doing well. Manageable diarrhea. Prior Port site improving swelling, no infectious signs.     Review of Systems:  A complete review of systems was negative except as noted in the HPI.     Past medical, Surgical, and social history:  Reviewed    Physical Examination:  Video Visit - Exam Limited  General: Pleasant patient in no acute distress. Oriented. Normal work of breathing.     Laboratory Data:  CBC, CMP reviewed.       Assessment and Plan:  Tiffanie Mcdermott is a 58 year old female with stage IV HER-2+ breast cancer with liver and bone mets in near CR status post Taxol-HP now on HP maintenance.     # Breast Cancer  - Continue every 3 week Herceptin-Perjeta  - Due for repeat ECHO 5/2023  - Repeat PET 4/2023 - monitoring low level breast activity  - Port removed due to issues and avidity on PET, improving subjectively  - Transaminases stably elevated, known liver involvement   - Follow-up with Dr. Kim in 6 weeks    25 minutes spent on the date of the encounter doing chart review, review of test results, interpretation of tests, patient visit and documentation     Maryse Olivarez PA-C  [facility]-  [place]   [phone]    Chart documentation with Dragon Voice recognition Software. Although reviewed after completion, some words and grammatical errors may remain.      Video-Visit Details    Type of service:  Video Visit    Video Start Time (time video started): 100    Video End Time (time video stopped): 115    Originating Location (pt. Location): Home    Distant Location (provider location):  On-site    Mode of Communication:  Video Conference via [employer]            Again, thank you for allowing me to participate in the care of your patient.        Sincerely,        JENNIFER ROMERO PA-C

## 2023-03-21 NOTE — LETTER
3/21/2023         RE: Tiffanie Mcdermott  [address] [address] [place] [phone]        Dear Colleague,    Thank you for referring your patient, Tiffanie Mcdermott, to the [facility]. Please see a copy of my visit note below.    Oncology/Hematology Visit Note  Mar 21, 2023    Reason for Visit: Follow up of metastatic breast cancer on maintenance Herceptin/Perjeta    Primary Oncologist: Dr. 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  # Mar 2022 - May 2022 Taxol / Herceptin / Perjeta; near-CR.  # May 2022 - ongoing Herceptin / Perjeta; near-CR.    Interval History:  Doing well. Manageable diarrhea. Prior Port site improving swelling, no infectious signs.     Review of Systems:  A complete review of systems was negative except as noted in the HPI.     Past medical, Surgical, and social history:  Reviewed    Physical Examination:  Video Visit - Exam Limited  General: Pleasant patient in no acute distress. Oriented. Normal work of breathing.     Laboratory Data:  CBC, CMP reviewed.       Assessment and Plan:  Tiffanie Mcdermott is a 58 year old female with stage IV HER-2+ breast cancer with liver and bone mets in near CR status post Taxol-HP now on HP maintenance.     # Breast Cancer  - Continue every 3 week Herceptin-Perjeta  - Due for repeat ECHO 5/2023  - Repeat PET 4/2023 - monitoring low level breast activity  - Port removed due to issues and avidity on PET, improving subjectively  - Transaminases stably elevated, known liver involvement   - Follow-up with Dr. Kim in 6 weeks    25 minutes spent on the date of the encounter doing chart review, review of test results, interpretation of tests, patient visit and documentation     Maryse Olivarez PA-C  [facility]-  [place]   [phone]    Chart documentation with Dragon Voice recognition Software. Although reviewed after completion, some words and grammatical errors may remain.      Video-Visit Details    Type of service:  Video Visit    Video Start Time (time video started): 100    Video End Time (time video stopped): 115    Originating Location (pt. Location): Home    Distant Location (provider location):  On-site    Mode of Communication:  Video Conference via [employer]            Again, thank you for allowing me to participate in the care of your patient.        Sincerely,        JENNIFER ROMERO PA-C

## 2023-03-21 NOTE — PROGRESS NOTES
Oncology/Hematology Visit Note  Mar 21, 2023    Reason for Visit: Follow up of metastatic breast cancer on maintenance Herceptin/Perjeta    Primary Oncologist: Dr. 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  # Mar 2022 - May 2022 Taxol / Herceptin / Perjeta; near-CR.  # May 2022 - ongoing Herceptin / Perjeta; near-CR.    Interval History:  Doing well. Manageable diarrhea. Prior Port site improving swelling, no infectious signs.     Review of Systems:  A complete review of systems was negative except as noted in the HPI.     Past medical, Surgical, and social history:  Reviewed    Physical Examination:  Video Visit - Exam Limited  General: Pleasant patient in no acute distress. Oriented. Normal work of breathing.     Laboratory Data:  CBC, CMP reviewed.       Assessment and Plan:  Tiffanie Mcdermott is a 58 year old female with stage IV HER-2+ breast cancer with liver and bone mets in near CR status post Taxol-HP now on HP maintenance.     # Breast Cancer  - Continue every 3 week Herceptin-Perjeta  - Due for repeat ECHO 5/2023  - Repeat PET 4/2023 - monitoring low level breast activity  - Port removed due to issues and avidity on PET, improving subjectively  - Transaminases stably elevated, known liver involvement   - Follow-up with Dr. Kim in 6 weeks    25 minutes spent on the date of the encounter doing chart review, review of test results, interpretation of tests, patient visit and documentation     Maryse Olivarez PA-C  [facility]   [phone]    Chart documentation with Dragon Voice recognition Software. Although reviewed after completion, some words and grammatical errors may remain.      Video-Visit Details    Type of service:  Video Visit    Video Start Time (time video started):  100    Video End Time (time video stopped): 115    Originating Location (pt. Location): Home    Distant Location (provider location):  On-site    Mode of Communication:  Video Conference via [employer]

## 2023-03-25 ENCOUNTER — HEALTH MAINTENANCE LETTER (OUTPATIENT)
Age: 58
End: 2023-03-25

## 2023-04-03 PROBLEM — C79.51 MALIGNANT NEOPLASM METASTATIC TO BONE (H): Status: ACTIVE | Noted: 2022-06-29

## 2023-04-12 ENCOUNTER — INFUSION THERAPY VISIT (OUTPATIENT)
Dept: INFUSION THERAPY | Facility: CLINIC | Age: 58
End: 2023-04-12
Attending: INTERNAL MEDICINE
Payer: COMMERCIAL

## 2023-04-12 ENCOUNTER — HOSPITAL ENCOUNTER (OUTPATIENT)
Dept: CARDIOLOGY | Facility: CLINIC | Age: 58
Discharge: HOME | End: 2023-04-12
Attending: INTERNAL MEDICINE
Payer: COMMERCIAL

## 2023-04-12 ENCOUNTER — LAB (OUTPATIENT)
Dept: LAB | Facility: CLINIC | Age: 58
End: 2023-04-12
Payer: COMMERCIAL

## 2023-04-12 VITALS
SYSTOLIC BLOOD PRESSURE: 135 MMHG | WEIGHT: 267.4 LBS | RESPIRATION RATE: 16 BRPM | HEIGHT: 67 IN | OXYGEN SATURATION: 97 % | DIASTOLIC BLOOD PRESSURE: 70 MMHG | BODY MASS INDEX: 41.97 KG/M2 | HEART RATE: 79 BPM | TEMPERATURE: 98.8 F

## 2023-04-12 DIAGNOSIS — C50.911 STAGE IV CARCINOMA OF BREAST, ER-, RIGHT (H): ICD-10-CM

## 2023-04-12 DIAGNOSIS — C50.511 MALIGNANT NEOPLASM OF LOWER-OUTER QUADRANT OF RIGHT FEMALE BREAST, UNSPECIFIED ESTROGEN RECEPTOR STATUS (H): ICD-10-CM

## 2023-04-12 DIAGNOSIS — Z17.1 STAGE IV CARCINOMA OF BREAST, ER-, RIGHT (H): ICD-10-CM

## 2023-04-12 DIAGNOSIS — C50.511 MALIGNANT NEOPLASM OF LOWER-OUTER QUADRANT OF RIGHT FEMALE BREAST, UNSPECIFIED ESTROGEN RECEPTOR STATUS (H): Primary | ICD-10-CM

## 2023-04-12 LAB
ALBUMIN SERPL BCG-MCNC: 4 G/DL (ref 3.5–5.2)
ALP SERPL-CCNC: 120 U/L (ref 35–104)
ALT SERPL W P-5'-P-CCNC: 103 U/L (ref 10–35)
ANION GAP SERPL CALCULATED.3IONS-SCNC: 7 MMOL/L (ref 7–15)
AST SERPL W P-5'-P-CCNC: 56 U/L (ref 10–35)
BI-PLANE LVEF ECHO: NORMAL
BILIRUB SERPL-MCNC: 0.4 MG/DL
BUN SERPL-MCNC: 15.9 MG/DL (ref 8–23)
CALCIUM SERPL-MCNC: 9.5 MG/DL (ref 8.8–10.2)
CHLORIDE SERPL-SCNC: 102 MMOL/L (ref 98–107)
CREAT SERPL-MCNC: 0.97 MG/DL (ref 0.51–0.95)
DEPRECATED HCO3 PLAS-SCNC: 28 MMOL/L (ref 22–29)
GFR SERPL CREATININE-BSD FRML MDRD: 67 ML/MIN/1.73M2
GLUCOSE SERPL-MCNC: 146 MG/DL (ref 70–99)
POTASSIUM SERPL-SCNC: 4.1 MMOL/L (ref 3.4–5.3)
PROT SERPL-MCNC: 7.1 G/DL (ref 6.4–8.3)
SODIUM SERPL-SCNC: 137 MMOL/L (ref 136–145)

## 2023-04-12 PROCEDURE — 93308 TTE F-UP OR LMTD: CPT

## 2023-04-12 PROCEDURE — 80053 COMPREHEN METABOLIC PANEL: CPT

## 2023-04-12 PROCEDURE — 93321 DOPPLER ECHO F-UP/LMTD STD: CPT | Mod: 26 | Performed by: INTERNAL MEDICINE

## 2023-04-12 PROCEDURE — 258N000003 HC RX IP 258 OP 636: Performed by: INTERNAL MEDICINE

## 2023-04-12 PROCEDURE — 36415 COLL VENOUS BLD VENIPUNCTURE: CPT

## 2023-04-12 PROCEDURE — 250N000011 HC RX IP 250 OP 636: Performed by: INTERNAL MEDICINE

## 2023-04-12 PROCEDURE — 96417 CHEMO IV INFUS EACH ADDL SEQ: CPT

## 2023-04-12 PROCEDURE — 93325 DOPPLER ECHO COLOR FLOW MAPG: CPT

## 2023-04-12 PROCEDURE — 96413 CHEMO IV INFUSION 1 HR: CPT

## 2023-04-12 PROCEDURE — 93308 TTE F-UP OR LMTD: CPT | Mod: 26 | Performed by: INTERNAL MEDICINE

## 2023-04-12 PROCEDURE — 93325 DOPPLER ECHO COLOR FLOW MAPG: CPT | Mod: 26 | Performed by: INTERNAL MEDICINE

## 2023-04-12 RX ADMIN — SODIUM CHLORIDE 720 MG: 9 INJECTION, SOLUTION INTRAVENOUS at 11:38

## 2023-04-12 RX ADMIN — SODIUM CHLORIDE 250 ML: 9 INJECTION, SOLUTION INTRAVENOUS at 10:58

## 2023-04-12 RX ADMIN — PERTUZUMAB 420 MG: 30 INJECTION, SOLUTION, CONCENTRATE INTRAVENOUS at 11:01

## 2023-04-12 ASSESSMENT — PAIN SCALES - GENERAL: PAINLEVEL: NO PAIN (0)

## 2023-04-12 NOTE — PROGRESS NOTES
Infusion Nursing Note:  Tiffanie STEWART Sharron presents today for C10 Perjeta/Trazimera.    Patient seen by provider today: No   present during visit today: Not Applicable.    Note: Patient allergies acting up. Afebrile. Echo completed today-results pending. .    Intravenous Access:  Peripheral IV placed.    Treatment Conditions:  Lab Results   Component Value Date     04/12/2023    POTASSIUM 4.1 04/12/2023    MAG 1.7 04/25/2022    CR 0.97 (H) 04/12/2023    SARAI 9.5 04/12/2023    BILITOTAL 0.4 04/12/2023    ALBUMIN 4.0 04/12/2023     (H) 04/12/2023    AST 56 (H) 04/12/2023     No parameters.    Post Infusion Assessment:  Patient tolerated infusion without incident.  Blood return noted pre and post infusion.  Site patent and intact, free from redness, edema or discomfort.  Access discontinued per protocol.     Discharge Plan:   Patient discharged in stable condition accompanied by: self.  Departure Mode: Ambulatory.      Alicia Mcgee RN

## 2023-04-15 ENCOUNTER — ANCILLARY PROCEDURE (OUTPATIENT)
Dept: PET IMAGING | Facility: CLINIC | Age: 58
End: 2023-04-15
Attending: INTERNAL MEDICINE
Payer: COMMERCIAL

## 2023-04-15 DIAGNOSIS — Z17.1 STAGE IV CARCINOMA OF BREAST, ER-, RIGHT (H): ICD-10-CM

## 2023-04-15 DIAGNOSIS — C50.911 STAGE IV CARCINOMA OF BREAST, ER-, RIGHT (H): ICD-10-CM

## 2023-04-15 PROCEDURE — A9552 F18 FDG: HCPCS | Performed by: RADIOLOGY

## 2023-04-15 PROCEDURE — 78816 PET IMAGE W/CT FULL BODY: CPT | Mod: PS | Performed by: RADIOLOGY

## 2023-05-01 ENCOUNTER — APPOINTMENT (OUTPATIENT)
Dept: LAB | Facility: CLINIC | Age: 58
End: 2023-05-01
Payer: COMMERCIAL

## 2023-05-01 LAB
ALBUMIN SERPL BCG-MCNC: 3.8 G/DL (ref 3.5–5.2)
ALP SERPL-CCNC: 124 U/L (ref 35–104)
ALT SERPL W P-5'-P-CCNC: 126 U/L (ref 10–35)
ANION GAP SERPL CALCULATED.3IONS-SCNC: 10 MMOL/L (ref 7–15)
AST SERPL W P-5'-P-CCNC: 63 U/L (ref 10–35)
BASOPHILS # BLD AUTO: 0 10E3/UL (ref 0–0.2)
BASOPHILS NFR BLD AUTO: 0 %
BILIRUB SERPL-MCNC: 0.5 MG/DL
BUN SERPL-MCNC: 15.9 MG/DL (ref 8–23)
CALCIUM SERPL-MCNC: 8.9 MG/DL (ref 8.8–10.2)
CANCER AG15-3 SERPL-ACNC: 20 U/ML
CHLORIDE SERPL-SCNC: 103 MMOL/L (ref 98–107)
CREAT SERPL-MCNC: 0.92 MG/DL (ref 0.51–0.95)
DEPRECATED HCO3 PLAS-SCNC: 23 MMOL/L (ref 22–29)
EOSINOPHIL # BLD AUTO: 0.2 10E3/UL (ref 0–0.7)
EOSINOPHIL NFR BLD AUTO: 3 %
ERYTHROCYTE [DISTWIDTH] IN BLOOD BY AUTOMATED COUNT: 12.9 % (ref 10–15)
GFR SERPL CREATININE-BSD FRML MDRD: 71 ML/MIN/1.73M2
GLUCOSE SERPL-MCNC: 211 MG/DL (ref 70–99)
HCT VFR BLD AUTO: 43.5 % (ref 35–47)
HGB BLD-MCNC: 13.6 G/DL (ref 11.7–15.7)
IMM GRANULOCYTES # BLD: 0 10E3/UL
IMM GRANULOCYTES NFR BLD: 0 %
LYMPHOCYTES # BLD AUTO: 1.2 10E3/UL (ref 0.8–5.3)
LYMPHOCYTES NFR BLD AUTO: 24 %
MCH RBC QN AUTO: 28.2 PG (ref 26.5–33)
MCHC RBC AUTO-ENTMCNC: 31.3 G/DL (ref 31.5–36.5)
MCV RBC AUTO: 90 FL (ref 78–100)
MONOCYTES # BLD AUTO: 0.4 10E3/UL (ref 0–1.3)
MONOCYTES NFR BLD AUTO: 8 %
NEUTROPHILS # BLD AUTO: 3.3 10E3/UL (ref 1.6–8.3)
NEUTROPHILS NFR BLD AUTO: 65 %
NRBC # BLD AUTO: 0 10E3/UL
NRBC BLD AUTO-RTO: 0 /100
PLATELET # BLD AUTO: 181 10E3/UL (ref 150–450)
POTASSIUM SERPL-SCNC: 3.9 MMOL/L (ref 3.4–5.3)
PROT SERPL-MCNC: 6.8 G/DL (ref 6.4–8.3)
RBC # BLD AUTO: 4.82 10E6/UL (ref 3.8–5.2)
SODIUM SERPL-SCNC: 136 MMOL/L (ref 136–145)
WBC # BLD AUTO: 5.1 10E3/UL (ref 4–11)

## 2023-05-01 PROCEDURE — 80053 COMPREHEN METABOLIC PANEL: CPT | Performed by: INTERNAL MEDICINE

## 2023-05-01 PROCEDURE — 82435 ASSAY OF BLOOD CHLORIDE: CPT | Performed by: INTERNAL MEDICINE

## 2023-05-01 PROCEDURE — 85004 AUTOMATED DIFF WBC COUNT: CPT | Performed by: INTERNAL MEDICINE

## 2023-05-01 PROCEDURE — 86300 IMMUNOASSAY TUMOR CA 15-3: CPT | Performed by: INTERNAL MEDICINE

## 2023-05-01 PROCEDURE — 36415 COLL VENOUS BLD VENIPUNCTURE: CPT

## 2023-05-01 PROCEDURE — 83036 HEMOGLOBIN GLYCOSYLATED A1C: CPT | Performed by: INTERNAL MEDICINE

## 2023-05-02 ENCOUNTER — VIRTUAL VISIT (OUTPATIENT)
Dept: ONCOLOGY | Facility: CLINIC | Age: 58
End: 2023-05-02
Payer: COMMERCIAL

## 2023-05-02 ENCOUNTER — INFUSION THERAPY VISIT (OUTPATIENT)
Dept: INFUSION THERAPY | Facility: CLINIC | Age: 58
End: 2023-05-02
Attending: INTERNAL MEDICINE
Payer: COMMERCIAL

## 2023-05-02 VITALS
DIASTOLIC BLOOD PRESSURE: 57 MMHG | OXYGEN SATURATION: 95 % | HEART RATE: 61 BPM | WEIGHT: 268 LBS | TEMPERATURE: 97.6 F | BODY MASS INDEX: 41.97 KG/M2 | SYSTOLIC BLOOD PRESSURE: 127 MMHG

## 2023-05-02 DIAGNOSIS — G62.0 CHEMOTHERAPY-INDUCED NEUROPATHY (H): ICD-10-CM

## 2023-05-02 DIAGNOSIS — C50.511 MALIGNANT NEOPLASM OF LOWER-OUTER QUADRANT OF RIGHT FEMALE BREAST, UNSPECIFIED ESTROGEN RECEPTOR STATUS (H): Primary | ICD-10-CM

## 2023-05-02 DIAGNOSIS — T45.1X5A CHEMOTHERAPY-INDUCED NEUROPATHY (H): ICD-10-CM

## 2023-05-02 DIAGNOSIS — Z17.31 HER2-POSITIVE CARCINOMA OF RIGHT BREAST (H): ICD-10-CM

## 2023-05-02 DIAGNOSIS — Z17.1 STAGE IV CARCINOMA OF BREAST, ER-, RIGHT (H): ICD-10-CM

## 2023-05-02 DIAGNOSIS — C50.911 HER2-POSITIVE CARCINOMA OF RIGHT BREAST (H): ICD-10-CM

## 2023-05-02 DIAGNOSIS — C50.911 STAGE IV CARCINOMA OF BREAST, ER-, RIGHT (H): ICD-10-CM

## 2023-05-02 DIAGNOSIS — R79.89 ABNORMAL LFTS: ICD-10-CM

## 2023-05-02 PROCEDURE — 96417 CHEMO IV INFUS EACH ADDL SEQ: CPT

## 2023-05-02 PROCEDURE — 99215 OFFICE O/P EST HI 40 MIN: CPT | Mod: VID | Performed by: INTERNAL MEDICINE

## 2023-05-02 PROCEDURE — 250N000011 HC RX IP 250 OP 636: Performed by: INTERNAL MEDICINE

## 2023-05-02 PROCEDURE — 96413 CHEMO IV INFUSION 1 HR: CPT

## 2023-05-02 PROCEDURE — 258N000003 HC RX IP 258 OP 636: Performed by: INTERNAL MEDICINE

## 2023-05-02 RX ADMIN — SODIUM CHLORIDE 720 MG: 9 INJECTION, SOLUTION INTRAVENOUS at 11:28

## 2023-05-02 RX ADMIN — PERTUZUMAB 420 MG: 30 INJECTION, SOLUTION, CONCENTRATE INTRAVENOUS at 10:45

## 2023-05-02 RX ADMIN — SODIUM CHLORIDE 250 ML: 9 INJECTION, SOLUTION INTRAVENOUS at 10:30

## 2023-05-02 NOTE — LETTER
"    2023         RE: Tiffanie Mcdermott  [address] [address] [place] [id number]        Dear Colleague,    Thank you for referring your patient, Tiffanie Mcdermott, to the [facility] [place]. Please see a copy of my visit note below.    Virtual Visit Details    Type of service:  Video Visit     Originating Location (pt. Location): {video visit patient location:810244::\"Home\"}  {PROVIDER LOCATION On-site should be selected for visits conducted from your clinic location or adjoining [facility] hospital, academic office, or other nearby [facility] building. 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  # Mar 2022 - May 2022 Taxol / Herceptin / Perjeta; near-CR.  # May 2022 - ongoing Herceptin / Perjeta; CR.    Tiffanie remains well from a breast cancer standpoint.  We reviewed that her recent scan shows no signs of active disease.  This is great news given the dense liver involvement she had initially.  We are monitoring her transaminitis quite closely and this very well may be a side effect of her current therapies versus HOBSON versus other.  Obviously this is always concerning for potential microscopic disease that could be her breast cancer recurring but the PET scan has recently been reassuring.  We will plan to transition her to Herceptin and Perjeta subcutaneous to alleviate peripheral IV issues.  In the meantime I do suspect that her " neuropathy will improve over time as we get farther and farther away from Taxol.  She can trial a B complex multivitamin.  We reviewed other supportive care therapies for neuropathy many of which are everywhere but lack great medical evidence.  In the meantime, she will complete another 3-4 months of therapy, monitoring her liver test closely and planning on a repeat scan and ECHO in the future.    Adiel Kim MD    --------------------    Interval History:  Tiffanie returns for follow-up of breast cancer unaccompanied via video visit.  Since our last visit, she has been doing quite well.  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  She has been staying away from Tylenol and using NSAIDs given some hepatitis.  No recent alcohol use.  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    --------------------    Physical Exam:  Video visit.    Labs / Imaging / Path:  We reviewed CBC, CMP, tumor marker.  We personally reviewed her PET scan.    Video Visit:  Tiffanie is a 58 year old female who is being evaluated via a billable video visit.  }    Video start time: 9:15 AM  Video end time: 9:32 AM    Provider location: [facility]  Patient location: [facility]    Mode of transmission: iyzico / Benesight.          Again, thank you for allowing me to participate in the care of your patient.        Sincerely,        Adiel Kim MD

## 2023-05-02 NOTE — NURSING NOTE
Is the patient currently in the state of [place]? YES    Visit mode:TELEPHONE    If the visit is dropped, the patient can be reconnected by: VIDEO VISIT: Text to cell phone: [phone]    Will anyone else be joining the visit? NO      How would you like to obtain your AVS? MyChart    Are changes needed to the allergy or medication list? NO    Reason for visit: Video Visit      EL Sinha

## 2023-05-02 NOTE — PROGRESS NOTES
Infusion Nursing Note:  Tiffanie Mcdermott presents today for C11D1.    Patient seen by provider today: Yes: virtual visit with    present during visit today: Not Applicable.    Note: Pt here today following her virtual appointment with . Labs drawn yesterday.  Noted to have elevated liver labs. Per patient  addressed those at today's visit.  No changes made to today's treatment.  .      Intravenous Access:  Peripheral IV placed.    Treatment Conditions:  Lab Results   Component Value Date    HGB 13.6 05/01/2023    WBC 5.1 05/01/2023    ANEU 7.0 03/21/2022    ANEUTAUTO 3.3 05/01/2023     05/01/2023      Lab Results   Component Value Date     05/01/2023    POTASSIUM 3.9 05/01/2023    MAG 1.7 04/25/2022    CR 0.92 05/01/2023    SARAI 8.9 05/01/2023    BILITOTAL 0.5 05/01/2023    ALBUMIN 3.8 05/01/2023     (H) 05/01/2023    AST 63 (H) 05/01/2023     ECHO/MUGA completed 04/12/2023  EF 56%.      Post Infusion Assessment:  Patient tolerated infusion without incident.  Patient observed for 15 minutes post infusion.  Site patent and intact, free from redness, edema or discomfort.  Access discontinued per protocol.       Discharge Plan:   Patient discharged in stable condition accompanied by: self.  Departure Mode: Ambulatory.  Pt has appt to return to infusion on 05/24/2023    Cindy Robbins RN

## 2023-05-02 NOTE — PROGRESS NOTES
"Virtual Visit Details    Type of service:  Video Visit     Originating Location (pt. Location): {video visit patient location:777128::\"Home\"}  {PROVIDER LOCATION On-site should be selected for visits conducted from your clinic location or adjoining [facility] hospital, academic office, or other nearby [facility] building. Off-site should be selected for all other provider locations, including home:807444}  Distant Location (provider location):  {virtual location provider:864777}  Platform used for Video Visit: {Virtual Visit Platforms:778253::\"Gradible (formerly gradsavers)\"}  "

## 2023-05-03 LAB — HBA1C MFR BLD: 6 %

## 2023-05-03 NOTE — PROGRESS NOTES
[facility] Hematology / Oncology  Progress Note  Name: Tiffanie Mcdermott  :  1965    MRN:  [id number]    --------------------    Assessment / Plan:  Stage IV, hormone negative, HER2 positive breast cancer with dense liver involvement and scattered bone involvement:  # 2022 Presented liver failure and related symptoms secondary to dense tumor burden.  # Mar 2022 - May 2022 Taxol / Herceptin / Perjeta; near-CR.  # May 2022 - ongoing Herceptin / Perjeta; CR.    Tiffanie remains well from a breast cancer standpoint.  We reviewed that her recent scan shows no signs of active disease.  This is great news given the dense liver involvement she had initially.  We are monitoring her transaminitis quite closely and this very well may be a side effect of her current therapies versus HOBSON versus other.  Obviously this is always concerning for potential microscopic disease that could be her breast cancer recurring but the PET scan has recently been reassuring.  We will plan to transition her to Herceptin and Perjeta subcutaneous to alleviate peripheral IV issues.  In the meantime I do suspect that her neuropathy will improve over time as we get farther and farther away from Taxol.  She can trial a B complex multivitamin.  We reviewed other supportive care therapies for neuropathy many of which are everywhere but lack great medical evidence.  In the meantime, she will complete another 3-4 months of therapy, monitoring her liver test closely and planning on a repeat scan and ECHO in the future.    Adiel Kim MD    --------------------    Interval History:  Tiffanie returns for follow-up of breast cancer unaccompanied via video visit.  Since our last visit, she has been doing quite well.  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  She has been staying away from Tylenol and using NSAIDs given some hepatitis.  No recent alcohol use.  Chemo  related neuropathy persists but is stable overall.    --------------------    Physical Exam:  Video visit.    Labs / Imaging / Path:  We reviewed CBC, CMP, tumor marker.  We personally reviewed her PET scan.    Video Visit:  Tiffanie is a 58 year old female who is being evaluated via a billable video visit.  }    Video start time: 9:15 AM  Video end time: 9:32 AM    Provider [facility]  Patient location: [facility]    Mode of transmission:  Fiberstar / well.

## 2023-05-05 RX ORDER — DIPHENHYDRAMINE HCL 25 MG
50 CAPSULE ORAL
Status: CANCELLED | OUTPATIENT
Start: 2023-07-20

## 2023-05-05 RX ORDER — DIPHENHYDRAMINE HCL 25 MG
50 CAPSULE ORAL
Status: CANCELLED | OUTPATIENT
Start: 2023-08-10

## 2023-05-05 RX ORDER — ALBUTEROL SULFATE 90 UG/1
1-2 AEROSOL, METERED RESPIRATORY (INHALATION)
Status: CANCELLED
Start: 2023-09-21

## 2023-05-05 RX ORDER — ALBUTEROL SULFATE 0.83 MG/ML
2.5 SOLUTION RESPIRATORY (INHALATION)
Status: CANCELLED | OUTPATIENT
Start: 2023-08-31

## 2023-05-05 RX ORDER — ALBUTEROL SULFATE 90 UG/1
1-2 AEROSOL, METERED RESPIRATORY (INHALATION)
Status: CANCELLED
Start: 2023-08-31

## 2023-05-05 RX ORDER — METHYLPREDNISOLONE SODIUM SUCCINATE 125 MG/2ML
125 INJECTION, POWDER, LYOPHILIZED, FOR SOLUTION INTRAMUSCULAR; INTRAVENOUS
Status: CANCELLED
Start: 2023-08-31

## 2023-05-05 RX ORDER — HEPARIN SODIUM,PORCINE 10 UNIT/ML
5 VIAL (ML) INTRAVENOUS
Status: CANCELLED | OUTPATIENT
Start: 2023-08-31

## 2023-05-05 RX ORDER — MEPERIDINE HYDROCHLORIDE 25 MG/ML
25 INJECTION INTRAMUSCULAR; INTRAVENOUS; SUBCUTANEOUS EVERY 30 MIN PRN
Status: CANCELLED | OUTPATIENT
Start: 2023-09-21

## 2023-05-05 RX ORDER — HEPARIN SODIUM (PORCINE) LOCK FLUSH IV SOLN 100 UNIT/ML 100 UNIT/ML
5 SOLUTION INTRAVENOUS
Status: CANCELLED | OUTPATIENT
Start: 2023-08-10

## 2023-05-05 RX ORDER — DIPHENHYDRAMINE HYDROCHLORIDE 50 MG/ML
50 INJECTION INTRAMUSCULAR; INTRAVENOUS
Status: CANCELLED
Start: 2023-07-20

## 2023-05-05 RX ORDER — EPINEPHRINE 1 MG/ML
0.3 INJECTION, SOLUTION INTRAMUSCULAR; SUBCUTANEOUS EVERY 5 MIN PRN
Status: CANCELLED | OUTPATIENT
Start: 2023-07-20

## 2023-05-05 RX ORDER — METHYLPREDNISOLONE SODIUM SUCCINATE 125 MG/2ML
125 INJECTION, POWDER, LYOPHILIZED, FOR SOLUTION INTRAMUSCULAR; INTRAVENOUS
Status: CANCELLED
Start: 2023-08-10

## 2023-05-05 RX ORDER — HEPARIN SODIUM,PORCINE 10 UNIT/ML
5 VIAL (ML) INTRAVENOUS
Status: CANCELLED | OUTPATIENT
Start: 2023-09-21

## 2023-05-05 RX ORDER — HEPARIN SODIUM,PORCINE 10 UNIT/ML
5 VIAL (ML) INTRAVENOUS
Status: CANCELLED | OUTPATIENT
Start: 2023-08-10

## 2023-05-05 RX ORDER — EPINEPHRINE 1 MG/ML
0.3 INJECTION, SOLUTION INTRAMUSCULAR; SUBCUTANEOUS EVERY 5 MIN PRN
Status: CANCELLED | OUTPATIENT
Start: 2023-08-10

## 2023-05-05 RX ORDER — METHYLPREDNISOLONE SODIUM SUCCINATE 125 MG/2ML
125 INJECTION, POWDER, LYOPHILIZED, FOR SOLUTION INTRAMUSCULAR; INTRAVENOUS
Status: CANCELLED
Start: 2023-09-21

## 2023-05-05 RX ORDER — DIPHENHYDRAMINE HYDROCHLORIDE 50 MG/ML
50 INJECTION INTRAMUSCULAR; INTRAVENOUS
Status: CANCELLED
Start: 2023-09-21

## 2023-05-05 RX ORDER — DIPHENHYDRAMINE HCL 25 MG
50 CAPSULE ORAL
Status: CANCELLED | OUTPATIENT
Start: 2023-09-21

## 2023-05-05 RX ORDER — MEPERIDINE HYDROCHLORIDE 25 MG/ML
25 INJECTION INTRAMUSCULAR; INTRAVENOUS; SUBCUTANEOUS EVERY 30 MIN PRN
Status: CANCELLED | OUTPATIENT
Start: 2023-08-10

## 2023-05-05 RX ORDER — NALOXONE HYDROCHLORIDE 0.4 MG/ML
0.2 INJECTION, SOLUTION INTRAMUSCULAR; INTRAVENOUS; SUBCUTANEOUS
Status: CANCELLED | OUTPATIENT
Start: 2023-07-20

## 2023-05-05 RX ORDER — DIPHENHYDRAMINE HCL 25 MG
50 CAPSULE ORAL
Status: CANCELLED | OUTPATIENT
Start: 2023-08-31

## 2023-05-05 RX ORDER — HEPARIN SODIUM (PORCINE) LOCK FLUSH IV SOLN 100 UNIT/ML 100 UNIT/ML
5 SOLUTION INTRAVENOUS
Status: CANCELLED | OUTPATIENT
Start: 2023-08-31

## 2023-05-05 RX ORDER — ACETAMINOPHEN 325 MG/1
650 TABLET ORAL
Status: CANCELLED | OUTPATIENT
Start: 2023-07-20

## 2023-05-05 RX ORDER — NALOXONE HYDROCHLORIDE 0.4 MG/ML
0.2 INJECTION, SOLUTION INTRAMUSCULAR; INTRAVENOUS; SUBCUTANEOUS
Status: CANCELLED | OUTPATIENT
Start: 2023-08-10

## 2023-05-05 RX ORDER — NALOXONE HYDROCHLORIDE 0.4 MG/ML
0.2 INJECTION, SOLUTION INTRAMUSCULAR; INTRAVENOUS; SUBCUTANEOUS
Status: CANCELLED | OUTPATIENT
Start: 2023-08-31

## 2023-05-05 RX ORDER — NALOXONE HYDROCHLORIDE 0.4 MG/ML
0.2 INJECTION, SOLUTION INTRAMUSCULAR; INTRAVENOUS; SUBCUTANEOUS
Status: CANCELLED | OUTPATIENT
Start: 2023-09-21

## 2023-05-05 RX ORDER — METHYLPREDNISOLONE SODIUM SUCCINATE 125 MG/2ML
125 INJECTION, POWDER, LYOPHILIZED, FOR SOLUTION INTRAMUSCULAR; INTRAVENOUS
Status: CANCELLED
Start: 2023-07-20

## 2023-05-05 RX ORDER — HEPARIN SODIUM,PORCINE 10 UNIT/ML
5 VIAL (ML) INTRAVENOUS
Status: CANCELLED | OUTPATIENT
Start: 2023-07-20

## 2023-05-05 RX ORDER — EPINEPHRINE 1 MG/ML
0.3 INJECTION, SOLUTION INTRAMUSCULAR; SUBCUTANEOUS EVERY 5 MIN PRN
Status: CANCELLED | OUTPATIENT
Start: 2023-08-31

## 2023-05-05 RX ORDER — MEPERIDINE HYDROCHLORIDE 25 MG/ML
25 INJECTION INTRAMUSCULAR; INTRAVENOUS; SUBCUTANEOUS EVERY 30 MIN PRN
Status: CANCELLED | OUTPATIENT
Start: 2023-08-31

## 2023-05-05 RX ORDER — ALBUTEROL SULFATE 0.83 MG/ML
2.5 SOLUTION RESPIRATORY (INHALATION)
Status: CANCELLED | OUTPATIENT
Start: 2023-07-20

## 2023-05-05 RX ORDER — HEPARIN SODIUM (PORCINE) LOCK FLUSH IV SOLN 100 UNIT/ML 100 UNIT/ML
5 SOLUTION INTRAVENOUS
Status: CANCELLED | OUTPATIENT
Start: 2023-09-21

## 2023-05-05 RX ORDER — DIPHENHYDRAMINE HYDROCHLORIDE 50 MG/ML
50 INJECTION INTRAMUSCULAR; INTRAVENOUS
Status: CANCELLED
Start: 2023-08-10

## 2023-05-05 RX ORDER — ACETAMINOPHEN 325 MG/1
650 TABLET ORAL
Status: CANCELLED | OUTPATIENT
Start: 2023-08-31

## 2023-05-05 RX ORDER — ALBUTEROL SULFATE 90 UG/1
1-2 AEROSOL, METERED RESPIRATORY (INHALATION)
Status: CANCELLED
Start: 2023-07-20

## 2023-05-05 RX ORDER — HEPARIN SODIUM (PORCINE) LOCK FLUSH IV SOLN 100 UNIT/ML 100 UNIT/ML
5 SOLUTION INTRAVENOUS
Status: CANCELLED | OUTPATIENT
Start: 2023-07-20

## 2023-05-05 RX ORDER — ALBUTEROL SULFATE 0.83 MG/ML
2.5 SOLUTION RESPIRATORY (INHALATION)
Status: CANCELLED | OUTPATIENT
Start: 2023-08-10

## 2023-05-05 RX ORDER — ACETAMINOPHEN 325 MG/1
650 TABLET ORAL
Status: CANCELLED | OUTPATIENT
Start: 2023-09-21

## 2023-05-05 RX ORDER — DIPHENHYDRAMINE HYDROCHLORIDE 50 MG/ML
50 INJECTION INTRAMUSCULAR; INTRAVENOUS
Status: CANCELLED
Start: 2023-08-31

## 2023-05-05 RX ORDER — EPINEPHRINE 1 MG/ML
0.3 INJECTION, SOLUTION INTRAMUSCULAR; SUBCUTANEOUS EVERY 5 MIN PRN
Status: CANCELLED | OUTPATIENT
Start: 2023-09-21

## 2023-05-05 RX ORDER — ACETAMINOPHEN 325 MG/1
650 TABLET ORAL
Status: CANCELLED | OUTPATIENT
Start: 2023-08-10

## 2023-05-05 RX ORDER — ALBUTEROL SULFATE 90 UG/1
1-2 AEROSOL, METERED RESPIRATORY (INHALATION)
Status: CANCELLED
Start: 2023-08-10

## 2023-05-05 RX ORDER — ALBUTEROL SULFATE 0.83 MG/ML
2.5 SOLUTION RESPIRATORY (INHALATION)
Status: CANCELLED | OUTPATIENT
Start: 2023-09-21

## 2023-05-05 RX ORDER — MEPERIDINE HYDROCHLORIDE 25 MG/ML
25 INJECTION INTRAMUSCULAR; INTRAVENOUS; SUBCUTANEOUS EVERY 30 MIN PRN
Status: CANCELLED | OUTPATIENT
Start: 2023-07-20

## 2023-05-05 NOTE — PATIENT INSTRUCTIONS
1) Cycles 11-12 Completed.  2) BJT w/ C11D43, C12D1 and C12D43.  3) ECHO and PET prior to C12D43.      Adiel Kim MD.

## 2023-05-17 ENCOUNTER — PATIENT OUTREACH (OUTPATIENT)
Dept: ONCOLOGY | Facility: CLINIC | Age: 58
End: 2023-05-17
Payer: COMMERCIAL

## 2023-05-24 ENCOUNTER — INFUSION THERAPY VISIT (OUTPATIENT)
Dept: INFUSION THERAPY | Facility: CLINIC | Age: 58
End: 2023-05-24
Attending: INTERNAL MEDICINE
Payer: COMMERCIAL

## 2023-05-24 VITALS
RESPIRATION RATE: 16 BRPM | TEMPERATURE: 97 F | BODY MASS INDEX: 42.54 KG/M2 | OXYGEN SATURATION: 98 % | DIASTOLIC BLOOD PRESSURE: 75 MMHG | SYSTOLIC BLOOD PRESSURE: 126 MMHG | HEART RATE: 71 BPM | WEIGHT: 271.6 LBS

## 2023-05-24 DIAGNOSIS — C50.511 MALIGNANT NEOPLASM OF LOWER-OUTER QUADRANT OF RIGHT FEMALE BREAST, UNSPECIFIED ESTROGEN RECEPTOR STATUS (H): Primary | ICD-10-CM

## 2023-05-24 PROCEDURE — 250N000011 HC RX IP 250 OP 636: Performed by: INTERNAL MEDICINE

## 2023-05-24 PROCEDURE — 96401 CHEMO ANTI-NEOPL SQ/IM: CPT

## 2023-05-24 RX ADMIN — PERTUZUMAB, TRASTUZUMAB, AND HYALURONIDASE-ZZXF 600 MG: 600; 600; 2000 INJECTION, SOLUTION SUBCUTANEOUS at 10:52

## 2023-05-24 ASSESSMENT — PAIN SCALES - GENERAL: PAINLEVEL: NO PAIN (0)

## 2023-05-24 NOTE — PROGRESS NOTES
Infusion Nursing Note:  Tiffanie Mcdermott presents today for 1ST Dose PHESGO SQ.    Patient seen by provider today: No   present during visit today: Not Applicable.    Note: Patient has no complaints, except seasonal allergies. Denies pain. POC for today reviewed. Questions answered. Ice applied to right thigh x 10 minutes prior to injection. .      Intravenous Access:  No Intravenous access/labs at this visit.    Treatment Conditions:  Not Applicable.        Post Infusion Assessment:  Patient tolerated injection without incident.  Patient observed for 30 minutes post injection per protocol for 1st dose.      Discharge Plan:   Discharge instructions reviewed with: Patient.  Patient and/or family verbalized understanding of discharge instructions and all questions answered.  Patient discharged in stable condition accompanied by: self.  Departure Mode: Ambulatory.  Copy of upcoming appt given to patient. .      Alicia Mcgee RN

## 2023-06-14 ENCOUNTER — TELEPHONE (OUTPATIENT)
Dept: ONCOLOGY | Facility: CLINIC | Age: 58
End: 2023-06-14

## 2023-06-14 ENCOUNTER — ALLIED HEALTH/NURSE VISIT (OUTPATIENT)
Dept: FAMILY MEDICINE | Facility: CLINIC | Age: 58
End: 2023-06-14
Payer: COMMERCIAL

## 2023-06-14 ENCOUNTER — INFUSION THERAPY VISIT (OUTPATIENT)
Dept: INFUSION THERAPY | Facility: CLINIC | Age: 58
End: 2023-06-14
Attending: INTERNAL MEDICINE
Payer: COMMERCIAL

## 2023-06-14 ENCOUNTER — VIRTUAL VISIT (OUTPATIENT)
Dept: ONCOLOGY | Facility: CLINIC | Age: 58
End: 2023-06-14
Payer: COMMERCIAL

## 2023-06-14 ENCOUNTER — LAB (OUTPATIENT)
Dept: LAB | Facility: CLINIC | Age: 58
End: 2023-06-14
Payer: COMMERCIAL

## 2023-06-14 VITALS
WEIGHT: 271.3 LBS | SYSTOLIC BLOOD PRESSURE: 130 MMHG | RESPIRATION RATE: 16 BRPM | HEART RATE: 73 BPM | TEMPERATURE: 98 F | DIASTOLIC BLOOD PRESSURE: 65 MMHG | OXYGEN SATURATION: 95 % | BODY MASS INDEX: 42.49 KG/M2

## 2023-06-14 DIAGNOSIS — D34 BENIGN NEOPLASM OF THYROID GLAND: Primary | ICD-10-CM

## 2023-06-14 DIAGNOSIS — C50.511 MALIGNANT NEOPLASM OF LOWER-OUTER QUADRANT OF RIGHT FEMALE BREAST, UNSPECIFIED ESTROGEN RECEPTOR STATUS (H): Primary | ICD-10-CM

## 2023-06-14 DIAGNOSIS — C50.911 STAGE IV CARCINOMA OF BREAST, ER-, RIGHT (H): ICD-10-CM

## 2023-06-14 DIAGNOSIS — Z17.31 HER2-POSITIVE CARCINOMA OF RIGHT BREAST (H): ICD-10-CM

## 2023-06-14 DIAGNOSIS — C50.911 HER2-POSITIVE CARCINOMA OF RIGHT BREAST (H): ICD-10-CM

## 2023-06-14 DIAGNOSIS — R79.89 ABNORMAL LFTS: ICD-10-CM

## 2023-06-14 DIAGNOSIS — Z17.1 STAGE IV CARCINOMA OF BREAST, ER-, RIGHT (H): ICD-10-CM

## 2023-06-14 LAB
ALBUMIN SERPL BCG-MCNC: 3.9 G/DL (ref 3.5–5.2)
ALP SERPL-CCNC: 121 U/L (ref 35–104)
ALT SERPL W P-5'-P-CCNC: 137 U/L (ref 0–50)
ANION GAP SERPL CALCULATED.3IONS-SCNC: 12 MMOL/L (ref 7–15)
AST SERPL W P-5'-P-CCNC: 71 U/L (ref 0–45)
BILIRUB SERPL-MCNC: 0.5 MG/DL
BUN SERPL-MCNC: 14.7 MG/DL (ref 8–23)
CALCIUM SERPL-MCNC: 9 MG/DL (ref 8.8–10.2)
CANCER AG15-3 SERPL-ACNC: 18 U/ML
CHLORIDE SERPL-SCNC: 102 MMOL/L (ref 98–107)
CREAT SERPL-MCNC: 0.98 MG/DL (ref 0.51–0.95)
DEPRECATED HCO3 PLAS-SCNC: 23 MMOL/L (ref 22–29)
GFR SERPL CREATININE-BSD FRML MDRD: 66 ML/MIN/1.73M2
GLUCOSE SERPL-MCNC: 258 MG/DL (ref 70–99)
HOLD SPECIMEN: NORMAL
POTASSIUM SERPL-SCNC: 4.1 MMOL/L (ref 3.4–5.3)
PROT SERPL-MCNC: 6.7 G/DL (ref 6.4–8.3)
SODIUM SERPL-SCNC: 137 MMOL/L (ref 136–145)

## 2023-06-14 PROCEDURE — 96401 CHEMO ANTI-NEOPL SQ/IM: CPT

## 2023-06-14 PROCEDURE — 250N000011 HC RX IP 250 OP 636: Performed by: INTERNAL MEDICINE

## 2023-06-14 PROCEDURE — 36415 COLL VENOUS BLD VENIPUNCTURE: CPT

## 2023-06-14 PROCEDURE — 80053 COMPREHEN METABOLIC PANEL: CPT

## 2023-06-14 PROCEDURE — 99214 OFFICE O/P EST MOD 30 MIN: CPT | Mod: VID | Performed by: INTERNAL MEDICINE

## 2023-06-14 PROCEDURE — 99207 PR NO CHARGE NURSE ONLY: CPT

## 2023-06-14 PROCEDURE — 86300 IMMUNOASSAY TUMOR CA 15-3: CPT

## 2023-06-14 RX ADMIN — PERTUZUMAB, TRASTUZUMAB, AND HYALURONIDASE-ZZXF 600 MG: 600; 600; 2000 INJECTION, SOLUTION SUBCUTANEOUS at 10:54

## 2023-06-14 ASSESSMENT — PAIN SCALES - GENERAL: PAINLEVEL: NO PAIN (0)

## 2023-06-14 NOTE — Clinical Note
2023         RE: Tiffanie Mcdermott  [address] [address] [place] [phone]        Dear Colleague,    Thank you for referring your patient, Tiffanie Mcdermott, to the [facility] [place]. Please see a copy of my visit note below.    [facility] Hematology / Oncology  Progress Note  Name: Tiffanie Mcdermott  :  1965    MRN:  [id number]    --------------------    Assessment / Plan:  Stage IV, hormone negative, HER2 positive breast cancer with dense liver involvement and scattered bone involvement:  # 2022 Presented liver failure and related symptoms secondary to dense tumor burden.  # Mar 2022 - May 2022 Taxol / Herceptin / Perjeta; near-CR.  # May 2022 - ongoing Herceptin / Perjeta; CR.    Tiffanie remains well from a breast cancer standpoint.  We reviewed that her recent scan shows no signs of active disease.  This is great news given the dense liver involvement she had initially.  We are monitoring her transaminitis quite closely and this very well may be a side effect of her current therapies versus HOBSON versus other.  Obviously this is always concerning for potential microscopic disease that could be her breast cancer recurring but the PET scan has recently been reassuring.  We will plan to transition her to Herceptin and Perjeta subcutaneous to alleviate peripheral IV issues.  In the meantime I do suspect that her neuropathy will improve over time as we get farther and farther away from Taxol.  She can trial a B complex multivitamin.  We reviewed other supportive care therapies for neuropathy many of which are everywhere but lack great medical evidence.  In the meantime, she will complete another 3-4 months of therapy, monitoring her liver test closely and planning on a repeat scan and ECHO in the future.    Adiel Kim MD    --------------------    Interval History:  Tiffanie returns for follow-up of breast cancer unaccompanied via video visit.  Since  our last visit, she has been doing quite well.  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  She has been staying away from Tylenol and using NSAIDs given some hepatitis.  No recent alcohol use.  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    --------------------    Physical Exam:  Video visit.    Labs / Imaging / Path:  We reviewed CBC, CMP, tumor marker.  We personally reviewed her PET scan.    Video Visit:  Tiffanie is a 58 year old female who is being evaluated via a billable video visit.  }    Video start time: 9:15 AM  Video end time: 9:32 AM    Provider location: [facility]  Patient location: [facility]    Mode of transmission:  B[employer] / [employer].        Again, thank you for allowing me to participate in the care of your patient.        Sincerely,        Adiel Kim MD

## 2023-06-14 NOTE — TELEPHONE ENCOUNTER
Our goal is to have forms completed with 72 hours, however some forms may require a visit or additional information.    Who is the form from?: Patient  Where the form came from: Patient or family brought in     What clinic location was the form placed at?: [facility]  Where the form was placed: tequila's desk Box/Folder  What number is listed as a contact on the form?: [place] - [place]    Phone call message- patient request for a letter, form or note:    Date needed: within one week  Please email it to her when it is done. Please call her when it is done so she can watch her email for it.  Has the patient signed a consent form for release of information? YES    Additional comments:     Call taken on 6/14/2023 at 9:47 AM by Humaira Bryant    Type of letter, form or note: [place]

## 2023-06-14 NOTE — PROGRESS NOTES
Patient assisted with virtual visit using clinic I-Pad.  Stephany Banegas RN on 6/14/2023 at 12:05 PM

## 2023-06-14 NOTE — PROGRESS NOTES
[facility] Hematology / Oncology  Progress Note  Name: Tiffanie Mcdermott  :  1965    MRN:  [id number]    --------------------    Assessment / Plan:  Stage IV, hormone negative, HER2 positive breast cancer with dense liver involvement and scattered bone involvement:  # 2022 Presented liver failure and related symptoms secondary to dense tumor burden.  # Mar 2022 - May 2022 Taxol / Herceptin / Perjeta; near-CR.  # May 2022 - ongoing Herceptin / Perjeta; CR.    Continue maintenance Herceptin / Perjeta.  Monitoring for signs of cardiomyopathy; no signs to date.  Monitoring for diarrhea; tolerable at this time.  Encouraged to consider Phesgo; subcutaneous version of Herceptin / Perjeta moving forward.  Monitoring LFTs closely; known HOBSON; possible treatment effect on liver; no other causes.  We agreed to monitor for now.  Counts recovering from chemo; chemistries stable.    Adiel Kim MD    --------------------    Interval History:  Tiffanie returns for follow-up of breast cancer unaccompanied via video visit.  Since our last visit, she has been doing quite well.  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  She has been staying away from Tylenol and using NSAIDs given some hepatitis.  No recent alcohol use.  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    --------------------    Physical Exam:  Video visit.    Labs / Imaging / Path:  We reviewed CBC, CMP, tumor marker.    Video Visit:  Tiffanie is a 58 year old female who is being evaluated via a billable video visit.  }    Video start time:  9:36 AM  Video end time:  9:52 AM    Provider location: Off-site  Patient location: [facility]    Mode of transmission: [employer] / [employer].

## 2023-06-14 NOTE — PROGRESS NOTES
Infusion Nursing Note:  Tiffanie Mcdermott presents today for C11D43.    Patient seen by provider today: Yes. Pt had virtual appt with    present during visit today: Not Applicable.    Note: Pt here today for her phesgo. Pt states that she tolerated the previous one well. No noted concerns or side effects. Injection given subcutaneous in left thigh.  Ice applied prior to injection to help with discomfort. Injection given over 5 minutes.       Intravenous Access:  No Intravenous access/labs at this visit.    Treatment Conditions:  Not Applicable.      Post Infusion Assessment:  Patient tolerated injection without incident. Injection given in left thigh      Discharge Plan:   Patient discharged in stable condition accompanied by: self.  Departure Mode: Ambulatory.  Pt has appt to return on 07/06/2023    Cindy Robbins RN

## 2023-07-06 ENCOUNTER — INFUSION THERAPY VISIT (OUTPATIENT)
Dept: INFUSION THERAPY | Facility: CLINIC | Age: 58
End: 2023-07-06
Attending: INTERNAL MEDICINE
Payer: COMMERCIAL

## 2023-07-06 VITALS
DIASTOLIC BLOOD PRESSURE: 66 MMHG | OXYGEN SATURATION: 97 % | WEIGHT: 272.9 LBS | RESPIRATION RATE: 16 BRPM | TEMPERATURE: 97.6 F | BODY MASS INDEX: 42.74 KG/M2 | SYSTOLIC BLOOD PRESSURE: 143 MMHG | HEART RATE: 81 BPM

## 2023-07-06 DIAGNOSIS — C50.511 MALIGNANT NEOPLASM OF LOWER-OUTER QUADRANT OF RIGHT FEMALE BREAST, UNSPECIFIED ESTROGEN RECEPTOR STATUS (H): Primary | ICD-10-CM

## 2023-07-06 PROCEDURE — 250N000011 HC RX IP 250 OP 636: Mod: JZ | Performed by: INTERNAL MEDICINE

## 2023-07-06 PROCEDURE — 96401 CHEMO ANTI-NEOPL SQ/IM: CPT

## 2023-07-06 RX ADMIN — PERTUZUMAB, TRASTUZUMAB, AND HYALURONIDASE-ZZXF 600 MG: 600; 600; 2000 INJECTION, SOLUTION SUBCUTANEOUS at 10:43

## 2023-07-06 ASSESSMENT — PAIN SCALES - GENERAL: PAINLEVEL: NO PAIN (0)

## 2023-07-26 ENCOUNTER — LAB (OUTPATIENT)
Dept: LAB | Facility: CLINIC | Age: 58
End: 2023-07-26
Payer: COMMERCIAL

## 2023-07-26 ENCOUNTER — TELEPHONE (OUTPATIENT)
Dept: ONCOLOGY | Facility: CLINIC | Age: 58
End: 2023-07-26

## 2023-07-26 DIAGNOSIS — C50.511 MALIGNANT NEOPLASM OF LOWER-OUTER QUADRANT OF RIGHT FEMALE BREAST, UNSPECIFIED ESTROGEN RECEPTOR STATUS (H): ICD-10-CM

## 2023-07-26 LAB
ALBUMIN SERPL BCG-MCNC: 4.3 G/DL (ref 3.5–5.2)
ALP SERPL-CCNC: 116 U/L (ref 35–104)
ALT SERPL W P-5'-P-CCNC: 130 U/L (ref 0–50)
ANION GAP SERPL CALCULATED.3IONS-SCNC: 9 MMOL/L (ref 7–15)
AST SERPL W P-5'-P-CCNC: 66 U/L (ref 0–45)
BASOPHILS # BLD AUTO: 0.1 10E3/UL (ref 0–0.2)
BASOPHILS NFR BLD AUTO: 1 %
BILIRUB SERPL-MCNC: 0.6 MG/DL
BUN SERPL-MCNC: 17.7 MG/DL (ref 8–23)
CALCIUM SERPL-MCNC: 9.3 MG/DL (ref 8.8–10.2)
CHLORIDE SERPL-SCNC: 103 MMOL/L (ref 98–107)
CREAT SERPL-MCNC: 0.98 MG/DL (ref 0.51–0.95)
DEPRECATED HCO3 PLAS-SCNC: 27 MMOL/L (ref 22–29)
EOSINOPHIL # BLD AUTO: 0.2 10E3/UL (ref 0–0.7)
EOSINOPHIL NFR BLD AUTO: 3 %
ERYTHROCYTE [DISTWIDTH] IN BLOOD BY AUTOMATED COUNT: 13.1 % (ref 10–15)
GFR SERPL CREATININE-BSD FRML MDRD: 66 ML/MIN/1.73M2
GLUCOSE SERPL-MCNC: 82 MG/DL (ref 70–99)
HCT VFR BLD AUTO: 44.7 % (ref 35–47)
HGB BLD-MCNC: 14.2 G/DL (ref 11.7–15.7)
IMM GRANULOCYTES # BLD: 0 10E3/UL
IMM GRANULOCYTES NFR BLD: 0 %
LYMPHOCYTES # BLD AUTO: 1.5 10E3/UL (ref 0.8–5.3)
LYMPHOCYTES NFR BLD AUTO: 24 %
MCH RBC QN AUTO: 28.6 PG (ref 26.5–33)
MCHC RBC AUTO-ENTMCNC: 31.8 G/DL (ref 31.5–36.5)
MCV RBC AUTO: 90 FL (ref 78–100)
MONOCYTES # BLD AUTO: 0.7 10E3/UL (ref 0–1.3)
MONOCYTES NFR BLD AUTO: 11 %
NEUTROPHILS # BLD AUTO: 3.7 10E3/UL (ref 1.6–8.3)
NEUTROPHILS NFR BLD AUTO: 61 %
NRBC # BLD AUTO: 0 10E3/UL
NRBC BLD AUTO-RTO: 0 /100
PLATELET # BLD AUTO: 213 10E3/UL (ref 150–450)
POTASSIUM SERPL-SCNC: 4.4 MMOL/L (ref 3.4–5.3)
PROT SERPL-MCNC: 7.1 G/DL (ref 6.4–8.3)
RBC # BLD AUTO: 4.97 10E6/UL (ref 3.8–5.2)
SODIUM SERPL-SCNC: 139 MMOL/L (ref 136–145)
WBC # BLD AUTO: 6 10E3/UL (ref 4–11)

## 2023-07-26 PROCEDURE — 36415 COLL VENOUS BLD VENIPUNCTURE: CPT

## 2023-07-26 PROCEDURE — 80053 COMPREHEN METABOLIC PANEL: CPT | Performed by: INTERNAL MEDICINE

## 2023-07-26 PROCEDURE — 86300 IMMUNOASSAY TUMOR CA 15-3: CPT | Performed by: INTERNAL MEDICINE

## 2023-07-26 PROCEDURE — 85025 COMPLETE CBC W/AUTO DIFF WBC: CPT | Performed by: INTERNAL MEDICINE

## 2023-07-26 NOTE — TELEPHONE ENCOUNTER
Lm regarding tomorrow appt with Julio. Pt was scheduled by infusion for an in person visit with him. Julio is virtual tomorrow. I relayed message to do this on her smart phone or an ipad.    Alyssia STEWART [facility]  [phone]

## 2023-07-27 ENCOUNTER — VIRTUAL VISIT (OUTPATIENT)
Dept: ONCOLOGY | Facility: CLINIC | Age: 58
End: 2023-07-27
Payer: COMMERCIAL

## 2023-07-27 ENCOUNTER — INFUSION THERAPY VISIT (OUTPATIENT)
Dept: INFUSION THERAPY | Facility: CLINIC | Age: 58
End: 2023-07-27
Attending: INTERNAL MEDICINE
Payer: COMMERCIAL

## 2023-07-27 VITALS
SYSTOLIC BLOOD PRESSURE: 124 MMHG | WEIGHT: 272.9 LBS | HEART RATE: 77 BPM | OXYGEN SATURATION: 98 % | RESPIRATION RATE: 16 BRPM | BODY MASS INDEX: 42.74 KG/M2 | TEMPERATURE: 98.1 F | DIASTOLIC BLOOD PRESSURE: 56 MMHG

## 2023-07-27 VITALS — BODY MASS INDEX: 42.22 KG/M2 | WEIGHT: 269 LBS | HEIGHT: 67 IN

## 2023-07-27 DIAGNOSIS — R79.89 ABNORMAL LFTS: ICD-10-CM

## 2023-07-27 DIAGNOSIS — C50.911 HER2-POSITIVE CARCINOMA OF RIGHT BREAST (H): ICD-10-CM

## 2023-07-27 DIAGNOSIS — Z17.31 HER2-POSITIVE CARCINOMA OF RIGHT BREAST (H): ICD-10-CM

## 2023-07-27 DIAGNOSIS — C50.911 STAGE IV CARCINOMA OF BREAST, ER-, RIGHT (H): Primary | ICD-10-CM

## 2023-07-27 DIAGNOSIS — C50.511 MALIGNANT NEOPLASM OF LOWER-OUTER QUADRANT OF RIGHT FEMALE BREAST, UNSPECIFIED ESTROGEN RECEPTOR STATUS (H): Primary | ICD-10-CM

## 2023-07-27 DIAGNOSIS — Z17.1 STAGE IV CARCINOMA OF BREAST, ER-, RIGHT (H): Primary | ICD-10-CM

## 2023-07-27 LAB — CANCER AG15-3 SERPL-ACNC: 19 U/ML

## 2023-07-27 PROCEDURE — 99214 OFFICE O/P EST MOD 30 MIN: CPT | Mod: 95 | Performed by: INTERNAL MEDICINE

## 2023-07-27 PROCEDURE — 250N000011 HC RX IP 250 OP 636: Mod: JZ | Performed by: INTERNAL MEDICINE

## 2023-07-27 PROCEDURE — 96401 CHEMO ANTI-NEOPL SQ/IM: CPT

## 2023-07-27 RX ORDER — URSODIOL 500 MG/1
500 TABLET, FILM COATED ORAL 2 TIMES DAILY
Qty: 60 TABLET | Refills: 11 | Status: SHIPPED | OUTPATIENT
Start: 2023-07-27 | End: 2024-07-11

## 2023-07-27 RX ADMIN — PERTUZUMAB, TRASTUZUMAB, AND HYALURONIDASE-ZZXF 600 MG: 600; 600; 2000 INJECTION, SOLUTION SUBCUTANEOUS at 09:58

## 2023-07-27 ASSESSMENT — PAIN SCALES - GENERAL: PAINLEVEL: NO PAIN (0)

## 2023-07-27 NOTE — PROGRESS NOTES
Infusion Nursing Note:  Tiffanie Mcdermott presents today for C12d1 Phesgo.    Patient seen by provider today: Yes: grabiel Kim   present during visit today: Not Applicable.    Note: Tiffanie has a lot of personal stressors today.  Vehicle broke down yesterday so she is feeling overwhelmed.  Mild GI upset, not sure if it is medication related or from stress.  Otherwise feeling well.     Ice applied to left thigh prior to injection.       Intravenous Access:  No Intravenous access/labs at this visit.    Treatment Conditions:  Not Applicable.      Post Infusion Assessment:  Patient tolerated injection without incident.  Patient observed for 15 minutes post injection per protocol.       Discharge Plan:   Discharge instructions reviewed with: Patient.  Patient and/or family verbalized understanding of discharge instructions and all questions answered.  Patient discharged in stable condition accompanied by: self.  Departure Mode: Ambulatory.      Arline Sparks RN

## 2023-07-27 NOTE — LETTER
2023         RE: Tiffanie Mcdermott  [address] [address] [place] [phone]        Dear Colleague,    Thank you for referring your patient, Tiffanie Mcdermott, to the [facility] [place]. Please see a copy of my visit note below.    [facility] Hematology / Oncology  Progress Note 2023  Name: Tiffanie Mcdermott  :  1965    MRN:  [id number]    --------------------    Assessment / Plan:  Stage IV, hormone negative, HER2 positive breast cancer with dense liver involvement and scattered bone involvement:  # 2022 Presented liver failure and related symptoms secondary to dense tumor burden.  # Mar 2022 - May 2022 Taxol / Herceptin / Perjeta; near-CR.  # May 2022 - ongoing Herceptin / Perjeta; CR.    Proceed with Herceptin and Perjeta subcutaneous.  Planning indefinite use pending continued response to therapy.  Transaminitis of unclear etiology; HOBSON versus treatment related versus cancer related.  Prior scans of been reassuring; bears close monitoring; trial of ursodiol.  Okay to push cardiac echo to October.  Blood counts reassuring; chemistries otherwise stable; tumor marker normal.    Follow-up / Orders:  Provider visits w/ every other Herceptin / Perjeta.    Adiel Kim MD    --------------------    Interval History:  Tiffanie returns for follow up of breast cancer unaccompanied via video visit.  All in all, she is enjoyed good health.  No major health concerns at this time.  Loose stools are mild and tolerable.    Social History:  Social History     Tobacco Use     Smoking status: Never     Smokeless tobacco: Never     Tobacco comments:     no smoking in the home   Vaping Use     Vaping Use: Never used   Substance Use Topics     Alcohol use: Yes     Alcohol/week: 1.7 standard drinks of alcohol     Drug use: No       --------------------    Physical Exam:  Virtual visit.    Labs / Imaging:  Reviewed CBC, CMP.    Video Visit:  Tiffanie is a 58 year old  female who is being evaluated via a billable video visit.  }    Video start time: 8:37 AM  Video end time: 8:52 AM    Provider location: Off-site  Patient location: Home    Mode of transmission: FamilySkyline / MetaNotes.      Again, thank you for allowing me to participate in the care of your patient.        Sincerely,        Adiel Kim MD

## 2023-07-27 NOTE — PROGRESS NOTES
"Virtual Visit Details    Type of service:  Video Visit     Originating Location (pt. Location): {video visit patient location:214061::\"Home\"}  {PROVIDER LOCATION On-site should be selected for visits conducted from your clinic location or adjoining [facility] hospital, academic office, or other nearby [facility] building. Off-site should be selected for all other provider locations, including home:148405}  Distant Location (provider location):  {virtual location provider:173444}  Platform used for Video Visit: {Virtual Visit Platforms:583876::\"Double Doods\"}  "

## 2023-07-27 NOTE — LETTER
2023         RE: Tiffanie Mcdermott  [address] [address] [place] [phone]        Dear Colleague,    Thank you for referring your patient, Tiffanie Mcdermott, to the [facility] [place]. Please see a copy of my visit note below.    [facility] Hematology / Oncology  Progress Note 2023  Name: Tiffanie Mcdermott  :  1965    MRN:  [id number]    --------------------    Assessment / Plan:  Stage IV, hormone negative, HER2 positive breast cancer with dense liver involvement and scattered bone involvement:  # 2022 Presented liver failure and related symptoms secondary to dense tumor burden.  # Mar 2022 - May 2022 Taxol / Herceptin / Perjeta; near-CR.  # May 2022 - ongoing Herceptin / Perjeta; CR.    Proceed with Herceptin and Perjeta subcutaneous.  Planning indefinite use pending continued response to therapy.  Transaminitis of unclear etiology; HOBSON versus treatment related versus cancer related.  Prior scans of been reassuring; bears close monitoring; trial of ursodiol.  Okay to push cardiac echo to October.  Blood counts reassuring; chemistries otherwise stable; tumor marker normal.    Follow-up / Orders:  Provider visits w/ every other Herceptin / Perjeta.    Adiel Kim MD    --------------------    Interval History:  Tiffanie returns for follow up of breast cancer unaccompanied via video visit.  All in all, she is enjoyed good health.  No major health concerns at this time.  Loose stools are mild and tolerable.    Social History:  Social History     Tobacco Use     Smoking status: Never     Smokeless tobacco: Never     Tobacco comments:     no smoking in the home   Vaping Use     Vaping Use: Never used   Substance Use Topics     Alcohol use: Yes     Alcohol/week: 1.7 standard drinks of alcohol     Drug use: No       --------------------    Physical Exam:  Virtual visit.    Labs / Imaging:  Reviewed CBC, CMP.    Video Visit:  Tiffanie is a 58 year old  female who is being evaluated via a billable video visit.  }    Video start time: 8:37 AM  Video end time: 8:52 AM    Provider location: Off-site  Patient location: Home    Mode of transmission: [employer] / [employer].      Again, thank you for allowing me to participate in the care of your patient.        Sincerely,        Adiel Kim MD

## 2023-07-27 NOTE — NURSING NOTE
Patient confirms medications and allergies are accurate via patients echeck in completion, and or denies any changes since last reviewed/verified.       Jeanette Monroe, Virtual Facilitator  Is the patient currently in the state of [place]? YES    Visit mode:VIDEO    If the visit is dropped, the patient can be reconnected by: VIDEO VISIT: Text to cell phone: [phone]    Will anyone else be joining the visit? NO      How would you like to obtain your AVS? MyChart    Are changes needed to the allergy or medication list? NO    Reason for visit: Oncology Clinic Visit (Breast ca), Results (Labs 07/26), Infusion (07/27- [id number] / Trastuzumab / Weekly PACLitaxel), and Video Visit

## 2023-07-27 NOTE — PROGRESS NOTES
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  # Mar 2022 - May 2022 Taxol / Herceptin / Perjeta; near-CR.  # May 2022 - ongoing Herceptin / Perjeta; CR.    Proceed with Herceptin and Perjeta subcutaneous.  Planning indefinite use pending continued response to therapy.  Transaminitis of unclear etiology; HOBSON versus treatment related versus cancer related.  Prior scans of been reassuring; bears close monitoring; trial of ursodiol.  Okay to push cardiac echo to October.  Blood counts reassuring; chemistries otherwise stable; tumor marker normal.    Follow-up / Orders:  Provider visits w/ every other Herceptin / Perjeta.    Adiel Kim MD    --------------------    Interval History:  Tiffanie returns for follow up of breast cancer unaccompanied via video visit.  All in all, she is enjoyed good health.  No major health concerns at this time.  Loose stools are mild and tolerable.    Social History:  Social History     Tobacco Use    Smoking status: Never    Smokeless tobacco: Never    Tobacco comments:     no smoking in the home   Vaping Use    Vaping Use: Never used   Substance Use Topics    Alcohol use: Yes     Alcohol/week: 1.7 standard drinks of alcohol    Drug use: No       --------------------    Physical Exam:  Virtual visit.    Labs / Imaging:  Reviewed CBC, CMP.    Video Visit:  Tiffanie is a 58 year old female who is being evaluated via a billable video visit.  }    Video start time: 8:37 AM  Video end time: 8:52 AM    Provider location: Off-site  Patient location: Home    Mode of transmission: [employer] / [employer].

## 2023-08-09 NOTE — NURSING NOTE
DISCHARGE PLAN:  Next appointments: See patient instruction section  Departure Mode:   Accompanied by:    minutes for nursing discharge (face to face time)     Tiffanie Mcdermott is here today for onc follow up.  Patient was not seen by writing nurse at time of appointment.   Appointment for echo switched. I called pt for her availability and scheduled accordingly. She looks at [facility] for her appts.  See patient instructions and Oncologist's Progress note for further details. Questions and concerns addressed to patient's satisfaction. Patient verbalized and demonstrated understanding of plan.  Contact information provided and patient is encouraged to call with any that arise in the interim of care.    Alyssia STEWART [facility]  [phone]  8/9/2023, 11:29 AM

## 2023-08-18 ENCOUNTER — INFUSION THERAPY VISIT (OUTPATIENT)
Dept: INFUSION THERAPY | Facility: CLINIC | Age: 58
End: 2023-08-18
Attending: INTERNAL MEDICINE
Payer: COMMERCIAL

## 2023-08-18 VITALS
HEART RATE: 77 BPM | SYSTOLIC BLOOD PRESSURE: 123 MMHG | DIASTOLIC BLOOD PRESSURE: 57 MMHG | RESPIRATION RATE: 20 BRPM | OXYGEN SATURATION: 98 % | BODY MASS INDEX: 42.7 KG/M2 | TEMPERATURE: 97.8 F | WEIGHT: 272.6 LBS

## 2023-08-18 DIAGNOSIS — C50.511 MALIGNANT NEOPLASM OF LOWER-OUTER QUADRANT OF RIGHT FEMALE BREAST, UNSPECIFIED ESTROGEN RECEPTOR STATUS (H): Primary | ICD-10-CM

## 2023-08-18 PROCEDURE — 250N000011 HC RX IP 250 OP 636: Mod: JZ | Performed by: INTERNAL MEDICINE

## 2023-08-18 PROCEDURE — 96401 CHEMO ANTI-NEOPL SQ/IM: CPT

## 2023-08-18 RX ADMIN — PERTUZUMAB, TRASTUZUMAB, AND HYALURONIDASE-ZZXF 600 MG: 600; 600; 2000 INJECTION, SOLUTION SUBCUTANEOUS at 11:29

## 2023-08-18 ASSESSMENT — PAIN SCALES - GENERAL: PAINLEVEL: NO PAIN (0)

## 2023-08-18 NOTE — PROGRESS NOTES
Infusion Nursing Note:  Tiffanie Mcdermott presents today for C12D22 Phesgo.    Patient seen by provider today: No   present during visit today: Not Applicable.    Note: Feeling well. Has had some minor bloating/diarrhea issues. Denies pain; denies n/v. Used ice pack to left thigh prior to shot.      Intravenous Access:  No Intravenous access/labs at this visit.    Treatment Conditions:  Not Applicable.      Post Infusion Assessment:  Patient tolerated injection without incident.  Patient observed for 15 minutes post injection per protocol.       Discharge Plan:   Patient discharged in stable condition accompanied by: self.  Departure Mode: Ambulatory.      Drea Haynes RN

## 2023-08-26 ENCOUNTER — HOSPITAL ENCOUNTER (OUTPATIENT)
Dept: PET IMAGING | Facility: CLINIC | Age: 58
Discharge: HOME | End: 2023-08-26
Attending: INTERNAL MEDICINE | Admitting: INTERNAL MEDICINE
Payer: COMMERCIAL

## 2023-08-26 DIAGNOSIS — C50.511 MALIGNANT NEOPLASM OF LOWER-OUTER QUADRANT OF RIGHT FEMALE BREAST, UNSPECIFIED ESTROGEN RECEPTOR STATUS (H): ICD-10-CM

## 2023-08-26 PROCEDURE — 343N000001 HC RX 343: Performed by: INTERNAL MEDICINE

## 2023-08-26 PROCEDURE — A9552 F18 FDG: HCPCS | Performed by: INTERNAL MEDICINE

## 2023-08-26 PROCEDURE — 78815 PET IMAGE W/CT SKULL-THIGH: CPT | Mod: PS

## 2023-08-26 RX ADMIN — FLUDEOXYGLUCOSE F-18 13.47 MILLICURIE: 500 INJECTION, SOLUTION INTRAVENOUS at 09:59

## 2023-09-07 ENCOUNTER — INFUSION THERAPY VISIT (OUTPATIENT)
Dept: INFUSION THERAPY | Facility: CLINIC | Age: 58
End: 2023-09-07
Attending: INTERNAL MEDICINE
Payer: COMMERCIAL

## 2023-09-07 ENCOUNTER — ONCOLOGY VISIT (OUTPATIENT)
Dept: ONCOLOGY | Facility: CLINIC | Age: 58
End: 2023-09-07
Payer: COMMERCIAL

## 2023-09-07 ENCOUNTER — APPOINTMENT (OUTPATIENT)
Dept: LAB | Facility: CLINIC | Age: 58
End: 2023-09-07
Payer: COMMERCIAL

## 2023-09-07 ENCOUNTER — PATIENT OUTREACH (OUTPATIENT)
Dept: ONCOLOGY | Facility: CLINIC | Age: 58
End: 2023-09-07

## 2023-09-07 VITALS
SYSTOLIC BLOOD PRESSURE: 123 MMHG | OXYGEN SATURATION: 95 % | HEART RATE: 87 BPM | RESPIRATION RATE: 16 BRPM | WEIGHT: 270.7 LBS | BODY MASS INDEX: 42.4 KG/M2 | TEMPERATURE: 97.8 F | DIASTOLIC BLOOD PRESSURE: 75 MMHG

## 2023-09-07 VITALS
TEMPERATURE: 98 F | SYSTOLIC BLOOD PRESSURE: 136 MMHG | RESPIRATION RATE: 16 BRPM | OXYGEN SATURATION: 96 % | DIASTOLIC BLOOD PRESSURE: 68 MMHG | HEART RATE: 81 BPM

## 2023-09-07 DIAGNOSIS — Z17.31 HER2-POSITIVE CARCINOMA OF RIGHT BREAST (H): Primary | ICD-10-CM

## 2023-09-07 DIAGNOSIS — R79.89 ABNORMAL LFTS: ICD-10-CM

## 2023-09-07 DIAGNOSIS — C50.912 BREAST CANCER METASTASIZED TO LIVER, LEFT (H): ICD-10-CM

## 2023-09-07 DIAGNOSIS — I42.7 DRUG-INDUCED CARDIOMYOPATHY (H): ICD-10-CM

## 2023-09-07 DIAGNOSIS — Z17.1 STAGE IV CARCINOMA OF BREAST, ER-, RIGHT (H): ICD-10-CM

## 2023-09-07 DIAGNOSIS — C50.511 MALIGNANT NEOPLASM OF LOWER-OUTER QUADRANT OF RIGHT FEMALE BREAST, UNSPECIFIED ESTROGEN RECEPTOR STATUS (H): Primary | ICD-10-CM

## 2023-09-07 DIAGNOSIS — C50.911 HER2-POSITIVE CARCINOMA OF RIGHT BREAST (H): Primary | ICD-10-CM

## 2023-09-07 DIAGNOSIS — C50.911 STAGE IV CARCINOMA OF BREAST, ER-, RIGHT (H): ICD-10-CM

## 2023-09-07 DIAGNOSIS — C78.7 BREAST CANCER METASTASIZED TO LIVER, LEFT (H): ICD-10-CM

## 2023-09-07 DIAGNOSIS — C50.511 MALIGNANT NEOPLASM OF LOWER-OUTER QUADRANT OF RIGHT FEMALE BREAST, UNSPECIFIED ESTROGEN RECEPTOR STATUS (H): ICD-10-CM

## 2023-09-07 LAB
ALBUMIN SERPL BCG-MCNC: 4.2 G/DL (ref 3.5–5.2)
ALP SERPL-CCNC: 120 U/L (ref 35–104)
ALT SERPL W P-5'-P-CCNC: 127 U/L (ref 0–50)
ANION GAP SERPL CALCULATED.3IONS-SCNC: 10 MMOL/L (ref 7–15)
AST SERPL W P-5'-P-CCNC: 70 U/L (ref 0–45)
BILIRUB SERPL-MCNC: 0.9 MG/DL
BUN SERPL-MCNC: 13.2 MG/DL (ref 8–23)
CALCIUM SERPL-MCNC: 9.1 MG/DL (ref 8.8–10.2)
CANCER AG15-3 SERPL-ACNC: 20 U/ML
CHLORIDE SERPL-SCNC: 102 MMOL/L (ref 98–107)
CREAT SERPL-MCNC: 0.97 MG/DL (ref 0.51–0.95)
DEPRECATED HCO3 PLAS-SCNC: 25 MMOL/L (ref 22–29)
EGFRCR SERPLBLD CKD-EPI 2021: 67 ML/MIN/1.73M2
GLUCOSE SERPL-MCNC: 108 MG/DL (ref 70–99)
POTASSIUM SERPL-SCNC: 4.2 MMOL/L (ref 3.4–5.3)
PROT SERPL-MCNC: 7 G/DL (ref 6.4–8.3)
SODIUM SERPL-SCNC: 137 MMOL/L (ref 136–145)

## 2023-09-07 PROCEDURE — 250N000011 HC RX IP 250 OP 636: Mod: JZ | Performed by: INTERNAL MEDICINE

## 2023-09-07 PROCEDURE — 36415 COLL VENOUS BLD VENIPUNCTURE: CPT

## 2023-09-07 PROCEDURE — 96401 CHEMO ANTI-NEOPL SQ/IM: CPT

## 2023-09-07 PROCEDURE — 99215 OFFICE O/P EST HI 40 MIN: CPT | Performed by: INTERNAL MEDICINE

## 2023-09-07 PROCEDURE — 80053 COMPREHEN METABOLIC PANEL: CPT

## 2023-09-07 PROCEDURE — 86300 IMMUNOASSAY TUMOR CA 15-3: CPT

## 2023-09-07 RX ORDER — HEPARIN SODIUM (PORCINE) LOCK FLUSH IV SOLN 100 UNIT/ML 100 UNIT/ML
5 SOLUTION INTRAVENOUS
Status: CANCELLED | OUTPATIENT
Start: 2023-10-26

## 2023-09-07 RX ORDER — DIPHENHYDRAMINE HYDROCHLORIDE 50 MG/ML
50 INJECTION INTRAMUSCULAR; INTRAVENOUS
Status: CANCELLED
Start: 2024-01-26

## 2023-09-07 RX ORDER — NALOXONE HYDROCHLORIDE 0.4 MG/ML
0.2 INJECTION, SOLUTION INTRAMUSCULAR; INTRAVENOUS; SUBCUTANEOUS
Status: CANCELLED | OUTPATIENT
Start: 2024-05-31

## 2023-09-07 RX ORDER — ALBUTEROL SULFATE 0.83 MG/ML
2.5 SOLUTION RESPIRATORY (INHALATION)
Status: CANCELLED | OUTPATIENT
Start: 2024-01-05

## 2023-09-07 RX ORDER — MEPERIDINE HYDROCHLORIDE 25 MG/ML
25 INJECTION INTRAMUSCULAR; INTRAVENOUS; SUBCUTANEOUS EVERY 30 MIN PRN
Status: CANCELLED | OUTPATIENT
Start: 2024-03-08

## 2023-09-07 RX ORDER — METHYLPREDNISOLONE SODIUM SUCCINATE 125 MG/2ML
125 INJECTION, POWDER, LYOPHILIZED, FOR SOLUTION INTRAMUSCULAR; INTRAVENOUS
Status: CANCELLED
Start: 2023-11-24

## 2023-09-07 RX ORDER — METHYLPREDNISOLONE SODIUM SUCCINATE 125 MG/2ML
125 INJECTION, POWDER, LYOPHILIZED, FOR SOLUTION INTRAMUSCULAR; INTRAVENOUS
Status: CANCELLED
Start: 2024-06-21

## 2023-09-07 RX ORDER — DIPHENHYDRAMINE HYDROCHLORIDE 50 MG/ML
50 INJECTION INTRAMUSCULAR; INTRAVENOUS
Status: CANCELLED
Start: 2024-02-16

## 2023-09-07 RX ORDER — HEPARIN SODIUM (PORCINE) LOCK FLUSH IV SOLN 100 UNIT/ML 100 UNIT/ML
5 SOLUTION INTRAVENOUS
Status: CANCELLED | OUTPATIENT
Start: 2024-03-29

## 2023-09-07 RX ORDER — NALOXONE HYDROCHLORIDE 0.4 MG/ML
0.2 INJECTION, SOLUTION INTRAMUSCULAR; INTRAVENOUS; SUBCUTANEOUS
Status: CANCELLED | OUTPATIENT
Start: 2023-10-26

## 2023-09-07 RX ORDER — ACETAMINOPHEN 325 MG/1
650 TABLET ORAL
Status: CANCELLED | OUTPATIENT
Start: 2024-01-05

## 2023-09-07 RX ORDER — METHYLPREDNISOLONE SODIUM SUCCINATE 125 MG/2ML
125 INJECTION, POWDER, LYOPHILIZED, FOR SOLUTION INTRAMUSCULAR; INTRAVENOUS
Status: CANCELLED
Start: 2024-05-31

## 2023-09-07 RX ORDER — LORAZEPAM 2 MG/ML
0.5 INJECTION INTRAMUSCULAR EVERY 4 HOURS PRN
Status: CANCELLED | OUTPATIENT
Start: 2024-02-16

## 2023-09-07 RX ORDER — ALBUTEROL SULFATE 0.83 MG/ML
2.5 SOLUTION RESPIRATORY (INHALATION)
Status: CANCELLED | OUTPATIENT
Start: 2023-11-24

## 2023-09-07 RX ORDER — ALBUTEROL SULFATE 90 UG/1
1-2 AEROSOL, METERED RESPIRATORY (INHALATION)
Status: CANCELLED
Start: 2023-12-15

## 2023-09-07 RX ORDER — ALBUTEROL SULFATE 0.83 MG/ML
2.5 SOLUTION RESPIRATORY (INHALATION)
Status: CANCELLED | OUTPATIENT
Start: 2023-12-15

## 2023-09-07 RX ORDER — LORAZEPAM 2 MG/ML
0.5 INJECTION INTRAMUSCULAR EVERY 4 HOURS PRN
Status: CANCELLED | OUTPATIENT
Start: 2024-05-10

## 2023-09-07 RX ORDER — DIPHENHYDRAMINE HCL 25 MG
50 CAPSULE ORAL
Status: CANCELLED | OUTPATIENT
Start: 2024-01-26

## 2023-09-07 RX ORDER — ALBUTEROL SULFATE 90 UG/1
1-2 AEROSOL, METERED RESPIRATORY (INHALATION)
Status: CANCELLED
Start: 2023-11-24

## 2023-09-07 RX ORDER — NALOXONE HYDROCHLORIDE 0.4 MG/ML
0.2 INJECTION, SOLUTION INTRAMUSCULAR; INTRAVENOUS; SUBCUTANEOUS
Status: CANCELLED | OUTPATIENT
Start: 2023-12-15

## 2023-09-07 RX ORDER — DIPHENHYDRAMINE HYDROCHLORIDE 50 MG/ML
50 INJECTION INTRAMUSCULAR; INTRAVENOUS
Status: CANCELLED
Start: 2024-03-08

## 2023-09-07 RX ORDER — NALOXONE HYDROCHLORIDE 0.4 MG/ML
0.2 INJECTION, SOLUTION INTRAMUSCULAR; INTRAVENOUS; SUBCUTANEOUS
Status: CANCELLED | OUTPATIENT
Start: 2024-05-10

## 2023-09-07 RX ORDER — HEPARIN SODIUM (PORCINE) LOCK FLUSH IV SOLN 100 UNIT/ML 100 UNIT/ML
5 SOLUTION INTRAVENOUS
Status: CANCELLED | OUTPATIENT
Start: 2024-04-19

## 2023-09-07 RX ORDER — ALBUTEROL SULFATE 90 UG/1
1-2 AEROSOL, METERED RESPIRATORY (INHALATION)
Status: CANCELLED
Start: 2024-01-05

## 2023-09-07 RX ORDER — HEPARIN SODIUM,PORCINE 10 UNIT/ML
5 VIAL (ML) INTRAVENOUS
Status: CANCELLED | OUTPATIENT
Start: 2023-10-26

## 2023-09-07 RX ORDER — EPINEPHRINE 1 MG/ML
0.3 INJECTION, SOLUTION, CONCENTRATE INTRAVENOUS EVERY 5 MIN PRN
Status: CANCELLED | OUTPATIENT
Start: 2024-05-10

## 2023-09-07 RX ORDER — HEPARIN SODIUM,PORCINE 10 UNIT/ML
5 VIAL (ML) INTRAVENOUS
Status: CANCELLED | OUTPATIENT
Start: 2024-03-29

## 2023-09-07 RX ORDER — LORAZEPAM 2 MG/ML
0.5 INJECTION INTRAMUSCULAR EVERY 4 HOURS PRN
Status: CANCELLED | OUTPATIENT
Start: 2024-04-19

## 2023-09-07 RX ORDER — MEPERIDINE HYDROCHLORIDE 25 MG/ML
25 INJECTION INTRAMUSCULAR; INTRAVENOUS; SUBCUTANEOUS EVERY 30 MIN PRN
Status: CANCELLED | OUTPATIENT
Start: 2024-04-19

## 2023-09-07 RX ORDER — DIPHENHYDRAMINE HCL 25 MG
50 CAPSULE ORAL
Status: CANCELLED | OUTPATIENT
Start: 2024-03-29

## 2023-09-07 RX ORDER — DIPHENHYDRAMINE HYDROCHLORIDE 50 MG/ML
50 INJECTION INTRAMUSCULAR; INTRAVENOUS
Status: CANCELLED
Start: 2023-12-15

## 2023-09-07 RX ORDER — EPINEPHRINE 1 MG/ML
0.3 INJECTION, SOLUTION, CONCENTRATE INTRAVENOUS EVERY 5 MIN PRN
Status: CANCELLED | OUTPATIENT
Start: 2023-12-15

## 2023-09-07 RX ORDER — EPINEPHRINE 1 MG/ML
0.3 INJECTION, SOLUTION, CONCENTRATE INTRAVENOUS EVERY 5 MIN PRN
Status: CANCELLED | OUTPATIENT
Start: 2024-03-08

## 2023-09-07 RX ORDER — HEPARIN SODIUM (PORCINE) LOCK FLUSH IV SOLN 100 UNIT/ML 100 UNIT/ML
5 SOLUTION INTRAVENOUS
Status: CANCELLED | OUTPATIENT
Start: 2024-06-21

## 2023-09-07 RX ORDER — DIPHENHYDRAMINE HCL 25 MG
50 CAPSULE ORAL
Status: CANCELLED | OUTPATIENT
Start: 2024-02-16

## 2023-09-07 RX ORDER — DIPHENHYDRAMINE HYDROCHLORIDE 50 MG/ML
50 INJECTION INTRAMUSCULAR; INTRAVENOUS
Status: CANCELLED
Start: 2023-10-26

## 2023-09-07 RX ORDER — ALBUTEROL SULFATE 0.83 MG/ML
2.5 SOLUTION RESPIRATORY (INHALATION)
Status: CANCELLED | OUTPATIENT
Start: 2024-05-31

## 2023-09-07 RX ORDER — DIPHENHYDRAMINE HYDROCHLORIDE 50 MG/ML
50 INJECTION INTRAMUSCULAR; INTRAVENOUS
Status: CANCELLED
Start: 2024-06-21

## 2023-09-07 RX ORDER — LORAZEPAM 2 MG/ML
0.5 INJECTION INTRAMUSCULAR EVERY 4 HOURS PRN
Status: CANCELLED | OUTPATIENT
Start: 2023-10-26

## 2023-09-07 RX ORDER — ALBUTEROL SULFATE 0.83 MG/ML
2.5 SOLUTION RESPIRATORY (INHALATION)
Status: CANCELLED | OUTPATIENT
Start: 2024-05-10

## 2023-09-07 RX ORDER — MEPERIDINE HYDROCHLORIDE 25 MG/ML
25 INJECTION INTRAMUSCULAR; INTRAVENOUS; SUBCUTANEOUS EVERY 30 MIN PRN
Status: CANCELLED | OUTPATIENT
Start: 2024-03-29

## 2023-09-07 RX ORDER — NALOXONE HYDROCHLORIDE 0.4 MG/ML
0.2 INJECTION, SOLUTION INTRAMUSCULAR; INTRAVENOUS; SUBCUTANEOUS
Status: CANCELLED | OUTPATIENT
Start: 2024-03-08

## 2023-09-07 RX ORDER — EPINEPHRINE 1 MG/ML
0.3 INJECTION, SOLUTION, CONCENTRATE INTRAVENOUS EVERY 5 MIN PRN
Status: CANCELLED | OUTPATIENT
Start: 2024-05-31

## 2023-09-07 RX ORDER — ACETAMINOPHEN 325 MG/1
650 TABLET ORAL
Status: CANCELLED | OUTPATIENT
Start: 2024-03-08

## 2023-09-07 RX ORDER — ALBUTEROL SULFATE 0.83 MG/ML
2.5 SOLUTION RESPIRATORY (INHALATION)
Status: CANCELLED | OUTPATIENT
Start: 2024-01-26

## 2023-09-07 RX ORDER — LORAZEPAM 2 MG/ML
0.5 INJECTION INTRAMUSCULAR EVERY 4 HOURS PRN
Status: CANCELLED | OUTPATIENT
Start: 2023-11-24

## 2023-09-07 RX ORDER — NALOXONE HYDROCHLORIDE 0.4 MG/ML
0.2 INJECTION, SOLUTION INTRAMUSCULAR; INTRAVENOUS; SUBCUTANEOUS
Status: CANCELLED | OUTPATIENT
Start: 2024-03-29

## 2023-09-07 RX ORDER — ACETAMINOPHEN 325 MG/1
650 TABLET ORAL
Status: CANCELLED | OUTPATIENT
Start: 2024-05-31

## 2023-09-07 RX ORDER — DIPHENHYDRAMINE HCL 25 MG
50 CAPSULE ORAL
Status: CANCELLED | OUTPATIENT
Start: 2023-12-15

## 2023-09-07 RX ORDER — MEPERIDINE HYDROCHLORIDE 25 MG/ML
25 INJECTION INTRAMUSCULAR; INTRAVENOUS; SUBCUTANEOUS EVERY 30 MIN PRN
Status: CANCELLED | OUTPATIENT
Start: 2024-05-10

## 2023-09-07 RX ORDER — ACETAMINOPHEN 325 MG/1
650 TABLET ORAL
Status: CANCELLED | OUTPATIENT
Start: 2024-02-16

## 2023-09-07 RX ORDER — MEPERIDINE HYDROCHLORIDE 25 MG/ML
25 INJECTION INTRAMUSCULAR; INTRAVENOUS; SUBCUTANEOUS EVERY 30 MIN PRN
Status: CANCELLED | OUTPATIENT
Start: 2024-05-31

## 2023-09-07 RX ORDER — LORAZEPAM 2 MG/ML
0.5 INJECTION INTRAMUSCULAR EVERY 4 HOURS PRN
Status: CANCELLED | OUTPATIENT
Start: 2024-03-08

## 2023-09-07 RX ORDER — ACETAMINOPHEN 325 MG/1
650 TABLET ORAL
Status: CANCELLED | OUTPATIENT
Start: 2024-04-19

## 2023-09-07 RX ORDER — MEPERIDINE HYDROCHLORIDE 25 MG/ML
25 INJECTION INTRAMUSCULAR; INTRAVENOUS; SUBCUTANEOUS EVERY 30 MIN PRN
Status: CANCELLED | OUTPATIENT
Start: 2024-01-26

## 2023-09-07 RX ORDER — HEPARIN SODIUM (PORCINE) LOCK FLUSH IV SOLN 100 UNIT/ML 100 UNIT/ML
5 SOLUTION INTRAVENOUS
Status: CANCELLED | OUTPATIENT
Start: 2024-02-16

## 2023-09-07 RX ORDER — DIPHENHYDRAMINE HYDROCHLORIDE 50 MG/ML
50 INJECTION INTRAMUSCULAR; INTRAVENOUS
Status: CANCELLED
Start: 2024-05-31

## 2023-09-07 RX ORDER — MEPERIDINE HYDROCHLORIDE 25 MG/ML
25 INJECTION INTRAMUSCULAR; INTRAVENOUS; SUBCUTANEOUS EVERY 30 MIN PRN
Status: CANCELLED | OUTPATIENT
Start: 2024-06-21

## 2023-09-07 RX ORDER — HEPARIN SODIUM,PORCINE 10 UNIT/ML
5 VIAL (ML) INTRAVENOUS
Status: CANCELLED | OUTPATIENT
Start: 2024-02-16

## 2023-09-07 RX ORDER — LORAZEPAM 2 MG/ML
0.5 INJECTION INTRAMUSCULAR EVERY 4 HOURS PRN
Status: CANCELLED | OUTPATIENT
Start: 2024-03-29

## 2023-09-07 RX ORDER — NALOXONE HYDROCHLORIDE 0.4 MG/ML
0.2 INJECTION, SOLUTION INTRAMUSCULAR; INTRAVENOUS; SUBCUTANEOUS
Status: CANCELLED | OUTPATIENT
Start: 2024-06-21

## 2023-09-07 RX ORDER — METHYLPREDNISOLONE SODIUM SUCCINATE 125 MG/2ML
125 INJECTION, POWDER, LYOPHILIZED, FOR SOLUTION INTRAMUSCULAR; INTRAVENOUS
Status: CANCELLED
Start: 2024-03-08

## 2023-09-07 RX ORDER — HEPARIN SODIUM (PORCINE) LOCK FLUSH IV SOLN 100 UNIT/ML 100 UNIT/ML
5 SOLUTION INTRAVENOUS
Status: CANCELLED | OUTPATIENT
Start: 2024-01-26

## 2023-09-07 RX ORDER — HEPARIN SODIUM (PORCINE) LOCK FLUSH IV SOLN 100 UNIT/ML 100 UNIT/ML
5 SOLUTION INTRAVENOUS
Status: CANCELLED | OUTPATIENT
Start: 2024-05-10

## 2023-09-07 RX ORDER — ALBUTEROL SULFATE 0.83 MG/ML
2.5 SOLUTION RESPIRATORY (INHALATION)
Status: CANCELLED | OUTPATIENT
Start: 2023-10-26

## 2023-09-07 RX ORDER — ALBUTEROL SULFATE 90 UG/1
1-2 AEROSOL, METERED RESPIRATORY (INHALATION)
Status: CANCELLED
Start: 2024-02-16

## 2023-09-07 RX ORDER — DIPHENHYDRAMINE HYDROCHLORIDE 50 MG/ML
50 INJECTION INTRAMUSCULAR; INTRAVENOUS
Status: CANCELLED
Start: 2023-11-24

## 2023-09-07 RX ORDER — MEPERIDINE HYDROCHLORIDE 25 MG/ML
25 INJECTION INTRAMUSCULAR; INTRAVENOUS; SUBCUTANEOUS EVERY 30 MIN PRN
Status: CANCELLED | OUTPATIENT
Start: 2023-12-15

## 2023-09-07 RX ORDER — DIPHENHYDRAMINE HCL 25 MG
50 CAPSULE ORAL
Status: CANCELLED | OUTPATIENT
Start: 2024-05-31

## 2023-09-07 RX ORDER — EPINEPHRINE 1 MG/ML
0.3 INJECTION, SOLUTION, CONCENTRATE INTRAVENOUS EVERY 5 MIN PRN
Status: CANCELLED | OUTPATIENT
Start: 2024-02-16

## 2023-09-07 RX ORDER — HEPARIN SODIUM,PORCINE 10 UNIT/ML
5 VIAL (ML) INTRAVENOUS
Status: CANCELLED | OUTPATIENT
Start: 2024-01-05

## 2023-09-07 RX ORDER — ACETAMINOPHEN 325 MG/1
650 TABLET ORAL
Status: CANCELLED | OUTPATIENT
Start: 2023-11-24

## 2023-09-07 RX ORDER — LORAZEPAM 2 MG/ML
0.5 INJECTION INTRAMUSCULAR EVERY 4 HOURS PRN
Status: CANCELLED | OUTPATIENT
Start: 2024-05-31

## 2023-09-07 RX ORDER — NALOXONE HYDROCHLORIDE 0.4 MG/ML
0.2 INJECTION, SOLUTION INTRAMUSCULAR; INTRAVENOUS; SUBCUTANEOUS
Status: CANCELLED | OUTPATIENT
Start: 2024-01-05

## 2023-09-07 RX ORDER — HEPARIN SODIUM (PORCINE) LOCK FLUSH IV SOLN 100 UNIT/ML 100 UNIT/ML
5 SOLUTION INTRAVENOUS
Status: CANCELLED | OUTPATIENT
Start: 2023-11-24

## 2023-09-07 RX ORDER — ALBUTEROL SULFATE 0.83 MG/ML
2.5 SOLUTION RESPIRATORY (INHALATION)
Status: CANCELLED | OUTPATIENT
Start: 2024-06-21

## 2023-09-07 RX ORDER — METHYLPREDNISOLONE SODIUM SUCCINATE 125 MG/2ML
125 INJECTION, POWDER, LYOPHILIZED, FOR SOLUTION INTRAMUSCULAR; INTRAVENOUS
Status: CANCELLED
Start: 2024-03-29

## 2023-09-07 RX ORDER — EPINEPHRINE 1 MG/ML
0.3 INJECTION, SOLUTION, CONCENTRATE INTRAVENOUS EVERY 5 MIN PRN
Status: CANCELLED | OUTPATIENT
Start: 2024-01-05

## 2023-09-07 RX ORDER — NALOXONE HYDROCHLORIDE 0.4 MG/ML
0.2 INJECTION, SOLUTION INTRAMUSCULAR; INTRAVENOUS; SUBCUTANEOUS
Status: CANCELLED | OUTPATIENT
Start: 2024-02-16

## 2023-09-07 RX ORDER — NALOXONE HYDROCHLORIDE 0.4 MG/ML
0.2 INJECTION, SOLUTION INTRAMUSCULAR; INTRAVENOUS; SUBCUTANEOUS
Status: CANCELLED | OUTPATIENT
Start: 2024-01-26

## 2023-09-07 RX ORDER — DIPHENHYDRAMINE HCL 25 MG
50 CAPSULE ORAL
Status: CANCELLED | OUTPATIENT
Start: 2024-06-21

## 2023-09-07 RX ORDER — HEPARIN SODIUM (PORCINE) LOCK FLUSH IV SOLN 100 UNIT/ML 100 UNIT/ML
5 SOLUTION INTRAVENOUS
Status: CANCELLED | OUTPATIENT
Start: 2024-01-05

## 2023-09-07 RX ORDER — DIPHENHYDRAMINE HCL 25 MG
50 CAPSULE ORAL
Status: CANCELLED | OUTPATIENT
Start: 2024-04-19

## 2023-09-07 RX ORDER — ACETAMINOPHEN 325 MG/1
650 TABLET ORAL
Status: CANCELLED | OUTPATIENT
Start: 2023-10-26

## 2023-09-07 RX ORDER — HEPARIN SODIUM,PORCINE 10 UNIT/ML
5 VIAL (ML) INTRAVENOUS
Status: CANCELLED | OUTPATIENT
Start: 2023-11-24

## 2023-09-07 RX ORDER — ALBUTEROL SULFATE 0.83 MG/ML
2.5 SOLUTION RESPIRATORY (INHALATION)
Status: CANCELLED | OUTPATIENT
Start: 2024-03-08

## 2023-09-07 RX ORDER — ALBUTEROL SULFATE 90 UG/1
1-2 AEROSOL, METERED RESPIRATORY (INHALATION)
Status: CANCELLED
Start: 2024-04-19

## 2023-09-07 RX ORDER — ACETAMINOPHEN 325 MG/1
650 TABLET ORAL
Status: CANCELLED | OUTPATIENT
Start: 2023-12-15

## 2023-09-07 RX ORDER — DIPHENHYDRAMINE HCL 25 MG
50 CAPSULE ORAL
Status: CANCELLED | OUTPATIENT
Start: 2024-03-08

## 2023-09-07 RX ORDER — ALBUTEROL SULFATE 90 UG/1
1-2 AEROSOL, METERED RESPIRATORY (INHALATION)
Status: CANCELLED
Start: 2023-10-26

## 2023-09-07 RX ORDER — ALBUTEROL SULFATE 90 UG/1
1-2 AEROSOL, METERED RESPIRATORY (INHALATION)
Status: CANCELLED
Start: 2024-06-21

## 2023-09-07 RX ORDER — ALBUTEROL SULFATE 90 UG/1
1-2 AEROSOL, METERED RESPIRATORY (INHALATION)
Status: CANCELLED
Start: 2024-05-10

## 2023-09-07 RX ORDER — MEPERIDINE HYDROCHLORIDE 25 MG/ML
25 INJECTION INTRAMUSCULAR; INTRAVENOUS; SUBCUTANEOUS EVERY 30 MIN PRN
Status: CANCELLED | OUTPATIENT
Start: 2024-02-16

## 2023-09-07 RX ORDER — HEPARIN SODIUM (PORCINE) LOCK FLUSH IV SOLN 100 UNIT/ML 100 UNIT/ML
5 SOLUTION INTRAVENOUS
Status: CANCELLED | OUTPATIENT
Start: 2024-03-08

## 2023-09-07 RX ORDER — DIPHENHYDRAMINE HCL 25 MG
50 CAPSULE ORAL
Status: CANCELLED | OUTPATIENT
Start: 2023-11-24

## 2023-09-07 RX ORDER — METHYLPREDNISOLONE SODIUM SUCCINATE 125 MG/2ML
125 INJECTION, POWDER, LYOPHILIZED, FOR SOLUTION INTRAMUSCULAR; INTRAVENOUS
Status: CANCELLED
Start: 2024-04-19

## 2023-09-07 RX ORDER — METHYLPREDNISOLONE SODIUM SUCCINATE 125 MG/2ML
125 INJECTION, POWDER, LYOPHILIZED, FOR SOLUTION INTRAMUSCULAR; INTRAVENOUS
Status: CANCELLED
Start: 2023-10-26

## 2023-09-07 RX ORDER — ACETAMINOPHEN 325 MG/1
650 TABLET ORAL
Status: CANCELLED | OUTPATIENT
Start: 2024-06-21

## 2023-09-07 RX ORDER — ACETAMINOPHEN 325 MG/1
650 TABLET ORAL
Status: CANCELLED | OUTPATIENT
Start: 2024-05-10

## 2023-09-07 RX ORDER — DIPHENHYDRAMINE HCL 25 MG
50 CAPSULE ORAL
Status: CANCELLED | OUTPATIENT
Start: 2023-10-26

## 2023-09-07 RX ORDER — LORAZEPAM 2 MG/ML
0.5 INJECTION INTRAMUSCULAR EVERY 4 HOURS PRN
Status: CANCELLED | OUTPATIENT
Start: 2023-12-15

## 2023-09-07 RX ORDER — METHYLPREDNISOLONE SODIUM SUCCINATE 125 MG/2ML
125 INJECTION, POWDER, LYOPHILIZED, FOR SOLUTION INTRAMUSCULAR; INTRAVENOUS
Status: CANCELLED
Start: 2023-12-15

## 2023-09-07 RX ORDER — HEPARIN SODIUM (PORCINE) LOCK FLUSH IV SOLN 100 UNIT/ML 100 UNIT/ML
5 SOLUTION INTRAVENOUS
Status: CANCELLED | OUTPATIENT
Start: 2024-05-31

## 2023-09-07 RX ORDER — ACETAMINOPHEN 325 MG/1
650 TABLET ORAL
Status: CANCELLED | OUTPATIENT
Start: 2024-03-29

## 2023-09-07 RX ORDER — HEPARIN SODIUM,PORCINE 10 UNIT/ML
5 VIAL (ML) INTRAVENOUS
Status: CANCELLED | OUTPATIENT
Start: 2024-03-08

## 2023-09-07 RX ORDER — DIPHENHYDRAMINE HCL 25 MG
50 CAPSULE ORAL
Status: CANCELLED | OUTPATIENT
Start: 2024-01-05

## 2023-09-07 RX ORDER — EPINEPHRINE 1 MG/ML
0.3 INJECTION, SOLUTION, CONCENTRATE INTRAVENOUS EVERY 5 MIN PRN
Status: CANCELLED | OUTPATIENT
Start: 2023-11-24

## 2023-09-07 RX ORDER — LORAZEPAM 2 MG/ML
0.5 INJECTION INTRAMUSCULAR EVERY 4 HOURS PRN
Status: CANCELLED | OUTPATIENT
Start: 2024-06-21

## 2023-09-07 RX ORDER — HEPARIN SODIUM (PORCINE) LOCK FLUSH IV SOLN 100 UNIT/ML 100 UNIT/ML
5 SOLUTION INTRAVENOUS
Status: CANCELLED | OUTPATIENT
Start: 2023-12-15

## 2023-09-07 RX ORDER — METHYLPREDNISOLONE SODIUM SUCCINATE 125 MG/2ML
125 INJECTION, POWDER, LYOPHILIZED, FOR SOLUTION INTRAMUSCULAR; INTRAVENOUS
Status: CANCELLED
Start: 2024-02-16

## 2023-09-07 RX ORDER — EPINEPHRINE 1 MG/ML
0.3 INJECTION, SOLUTION, CONCENTRATE INTRAVENOUS EVERY 5 MIN PRN
Status: CANCELLED | OUTPATIENT
Start: 2024-01-26

## 2023-09-07 RX ORDER — HEPARIN SODIUM,PORCINE 10 UNIT/ML
5 VIAL (ML) INTRAVENOUS
Status: CANCELLED | OUTPATIENT
Start: 2024-05-10

## 2023-09-07 RX ORDER — HEPARIN SODIUM,PORCINE 10 UNIT/ML
5 VIAL (ML) INTRAVENOUS
Status: CANCELLED | OUTPATIENT
Start: 2024-01-26

## 2023-09-07 RX ORDER — ALBUTEROL SULFATE 0.83 MG/ML
2.5 SOLUTION RESPIRATORY (INHALATION)
Status: CANCELLED | OUTPATIENT
Start: 2024-04-19

## 2023-09-07 RX ORDER — MEPERIDINE HYDROCHLORIDE 25 MG/ML
25 INJECTION INTRAMUSCULAR; INTRAVENOUS; SUBCUTANEOUS EVERY 30 MIN PRN
Status: CANCELLED | OUTPATIENT
Start: 2024-01-05

## 2023-09-07 RX ORDER — DIPHENHYDRAMINE HYDROCHLORIDE 50 MG/ML
50 INJECTION INTRAMUSCULAR; INTRAVENOUS
Status: CANCELLED
Start: 2024-04-19

## 2023-09-07 RX ORDER — ALBUTEROL SULFATE 0.83 MG/ML
2.5 SOLUTION RESPIRATORY (INHALATION)
Status: CANCELLED | OUTPATIENT
Start: 2024-02-16

## 2023-09-07 RX ORDER — ALBUTEROL SULFATE 0.83 MG/ML
2.5 SOLUTION RESPIRATORY (INHALATION)
Status: CANCELLED | OUTPATIENT
Start: 2024-03-29

## 2023-09-07 RX ORDER — DIPHENHYDRAMINE HYDROCHLORIDE 50 MG/ML
50 INJECTION INTRAMUSCULAR; INTRAVENOUS
Status: CANCELLED
Start: 2024-05-10

## 2023-09-07 RX ORDER — NALOXONE HYDROCHLORIDE 0.4 MG/ML
0.2 INJECTION, SOLUTION INTRAMUSCULAR; INTRAVENOUS; SUBCUTANEOUS
Status: CANCELLED | OUTPATIENT
Start: 2024-04-19

## 2023-09-07 RX ORDER — DIPHENHYDRAMINE HYDROCHLORIDE 50 MG/ML
50 INJECTION INTRAMUSCULAR; INTRAVENOUS
Status: CANCELLED
Start: 2024-03-29

## 2023-09-07 RX ORDER — MEPERIDINE HYDROCHLORIDE 25 MG/ML
25 INJECTION INTRAMUSCULAR; INTRAVENOUS; SUBCUTANEOUS EVERY 30 MIN PRN
Status: CANCELLED | OUTPATIENT
Start: 2023-10-26

## 2023-09-07 RX ORDER — HEPARIN SODIUM,PORCINE 10 UNIT/ML
5 VIAL (ML) INTRAVENOUS
Status: CANCELLED | OUTPATIENT
Start: 2024-05-31

## 2023-09-07 RX ORDER — EPINEPHRINE 1 MG/ML
0.3 INJECTION, SOLUTION, CONCENTRATE INTRAVENOUS EVERY 5 MIN PRN
Status: CANCELLED | OUTPATIENT
Start: 2023-10-26

## 2023-09-07 RX ORDER — METHYLPREDNISOLONE SODIUM SUCCINATE 125 MG/2ML
125 INJECTION, POWDER, LYOPHILIZED, FOR SOLUTION INTRAMUSCULAR; INTRAVENOUS
Status: CANCELLED
Start: 2024-05-10

## 2023-09-07 RX ORDER — EPINEPHRINE 1 MG/ML
0.3 INJECTION, SOLUTION, CONCENTRATE INTRAVENOUS EVERY 5 MIN PRN
Status: CANCELLED | OUTPATIENT
Start: 2024-06-21

## 2023-09-07 RX ORDER — MEPERIDINE HYDROCHLORIDE 25 MG/ML
25 INJECTION INTRAMUSCULAR; INTRAVENOUS; SUBCUTANEOUS EVERY 30 MIN PRN
Status: CANCELLED | OUTPATIENT
Start: 2023-11-24

## 2023-09-07 RX ORDER — NALOXONE HYDROCHLORIDE 0.4 MG/ML
0.2 INJECTION, SOLUTION INTRAMUSCULAR; INTRAVENOUS; SUBCUTANEOUS
Status: CANCELLED | OUTPATIENT
Start: 2023-11-24

## 2023-09-07 RX ORDER — ACETAMINOPHEN 325 MG/1
650 TABLET ORAL
Status: CANCELLED | OUTPATIENT
Start: 2024-01-26

## 2023-09-07 RX ORDER — HEPARIN SODIUM,PORCINE 10 UNIT/ML
5 VIAL (ML) INTRAVENOUS
Status: CANCELLED | OUTPATIENT
Start: 2024-06-21

## 2023-09-07 RX ORDER — DIPHENHYDRAMINE HCL 25 MG
50 CAPSULE ORAL
Status: CANCELLED | OUTPATIENT
Start: 2024-05-10

## 2023-09-07 RX ORDER — ALBUTEROL SULFATE 90 UG/1
1-2 AEROSOL, METERED RESPIRATORY (INHALATION)
Status: CANCELLED
Start: 2024-05-31

## 2023-09-07 RX ORDER — DIPHENHYDRAMINE HYDROCHLORIDE 50 MG/ML
50 INJECTION INTRAMUSCULAR; INTRAVENOUS
Status: CANCELLED
Start: 2024-01-05

## 2023-09-07 RX ORDER — ALBUTEROL SULFATE 90 UG/1
1-2 AEROSOL, METERED RESPIRATORY (INHALATION)
Status: CANCELLED
Start: 2024-03-08

## 2023-09-07 RX ORDER — LORAZEPAM 2 MG/ML
0.5 INJECTION INTRAMUSCULAR EVERY 4 HOURS PRN
Status: CANCELLED | OUTPATIENT
Start: 2024-01-05

## 2023-09-07 RX ORDER — HEPARIN SODIUM,PORCINE 10 UNIT/ML
5 VIAL (ML) INTRAVENOUS
Status: CANCELLED | OUTPATIENT
Start: 2023-12-15

## 2023-09-07 RX ORDER — HEPARIN SODIUM,PORCINE 10 UNIT/ML
5 VIAL (ML) INTRAVENOUS
Status: CANCELLED | OUTPATIENT
Start: 2024-04-19

## 2023-09-07 RX ORDER — ALBUTEROL SULFATE 90 UG/1
1-2 AEROSOL, METERED RESPIRATORY (INHALATION)
Status: CANCELLED
Start: 2024-03-29

## 2023-09-07 RX ORDER — ALBUTEROL SULFATE 90 UG/1
1-2 AEROSOL, METERED RESPIRATORY (INHALATION)
Status: CANCELLED
Start: 2024-01-26

## 2023-09-07 RX ORDER — METHYLPREDNISOLONE SODIUM SUCCINATE 125 MG/2ML
125 INJECTION, POWDER, LYOPHILIZED, FOR SOLUTION INTRAMUSCULAR; INTRAVENOUS
Status: CANCELLED
Start: 2024-01-05

## 2023-09-07 RX ORDER — EPINEPHRINE 1 MG/ML
0.3 INJECTION, SOLUTION, CONCENTRATE INTRAVENOUS EVERY 5 MIN PRN
Status: CANCELLED | OUTPATIENT
Start: 2024-04-19

## 2023-09-07 RX ORDER — EPINEPHRINE 1 MG/ML
0.3 INJECTION, SOLUTION, CONCENTRATE INTRAVENOUS EVERY 5 MIN PRN
Status: CANCELLED | OUTPATIENT
Start: 2024-03-29

## 2023-09-07 RX ORDER — METHYLPREDNISOLONE SODIUM SUCCINATE 125 MG/2ML
125 INJECTION, POWDER, LYOPHILIZED, FOR SOLUTION INTRAMUSCULAR; INTRAVENOUS
Status: CANCELLED
Start: 2024-01-26

## 2023-09-07 RX ORDER — LORAZEPAM 2 MG/ML
0.5 INJECTION INTRAMUSCULAR EVERY 4 HOURS PRN
Status: CANCELLED | OUTPATIENT
Start: 2024-01-26

## 2023-09-07 RX ADMIN — PERTUZUMAB, TRASTUZUMAB, AND HYALURONIDASE-ZZXF 600 MG: 600; 600; 2000 INJECTION, SOLUTION SUBCUTANEOUS at 11:51

## 2023-09-07 ASSESSMENT — PAIN SCALES - GENERAL
PAINLEVEL: NO PAIN (0)
PAINLEVEL: NO PAIN (0)

## 2023-09-07 NOTE — PROGRESS NOTES
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  # Mar 2022 - May 2022 Taxol / Herceptin / Perjeta; near-CR.  # May 2022 - ongoing Herceptin / Perjeta; CR.    Continue Herceptin and Perjeta subcutaneous; planning indefinite use pending continued response to therapy.  Transaminitis of unclear etiology; HOBSON versus treatment related versus cancer related; trial of ursodiol agreed upon if we can find pill/capsule that she can take.  Radiographically MARNIE; slight activity in breast bears monitoring; no intervention at this time.  Planning cardiac echo October and  for q6 months.  Planning next PET scan q6 months.  Blood counts reassuring; chemistries otherwise stable outside of transaminitis; tumor marker normal.    Adiel Kim MD    --------------------    Interval History:  Tiffanie returns for follow up of breast cancer unaccompanied.  All in all, she is doing quite well.  No major health concerns.  No cardiac complaints.  Mild loose stools manageable.  No abdominal pain, nausea, vomiting, loss of appetite.    Social History:  Social History     Tobacco Use    Smoking status: Never    Smokeless tobacco: Never    Tobacco comments:     no smoking in the home   Vaping Use    Vaping Use: Never used   Substance Use Topics    Alcohol use: Yes     Alcohol/week: 1.7 standard drinks of alcohol    Drug use: No       --------------------    Physical Exam:  VS: /75 (BP Location: Right arm, Patient Position: Chair, Cuff Size: Adult Large)   Pulse 87   Temp 97.8  F (36.6  C) (Temporal)   Resp 16   Wt 122.8 kg (270 lb 11.2 oz)   LMP 2008   SpO2 95%   BMI 42.40 kg/m   .  GEN: Well appearing.    Labs / Imaging:  Reviewed CBC, CMP, .  Reviewed PET/CT w/ independent review together.

## 2023-09-07 NOTE — TELEPHONE ENCOUNTER
Spoke with patient regarding recommendations per pharmacy for:   Disp Refills Start End IRAIDA   ursodiol (ACTIGALL) 500 MG tablet        Pharmacist advised for patient to either try cutting tablet in half, trying 250 mg tablets which are thinner or capsule. Patient states she would not tolerate 1/2 tablet but will try 250 mg tablets. Dr. Kim aware of patient's choice and will send new prescription to local pharmacy.  Radha Neal RNCC

## 2023-09-07 NOTE — PATIENT INSTRUCTIONS
1) Phesgo [id number] through BJT follow-up.  2) BJT MD in person at start of C14 w/ labs and Phesgo.    Adiel Kim MD.

## 2023-09-07 NOTE — PROGRESS NOTES
Infusion Nursing Note:  Tiffanie Mcdermott presents today for C12D43 Phesgo injection.    Patient seen by provider today: Yes: virtual visit with Lennyttier today.   present during visit today: Not Applicable.    Note: Feeling well today no complaints. Ice to right thigh prior to subcutaneous injection.      Intravenous Access:  No Intravenous access/labs at this visit.    Treatment Conditions:  Last echo is from April of 2023. Biplane LVEF was 56%. Pt states that she will be having a new echo sometime in the next month.    Post Infusion Assessment:  Patient tolerated injection without incident.       Discharge Plan:   Patient discharged in stable condition accompanied by: self.  Departure Mode: Ambulatory.      Drea Haynes RN

## 2023-09-07 NOTE — Clinical Note
2023         RE: Tiffanie Mcdermott  [id number] [address] [place] [id number]        Dear Colleague,    Thank you for referring your patient, Tiffanie Mcdermott, to the [facility] [place]. Please see a copy of my visit note below.    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  # Mar 2022 - May 2022 Taxol / Herceptin / Perjeta; near-CR.  # May 2022 - ongoing Herceptin / Perjeta; CR.    Proceed with Herceptin and Perjeta subcutaneous.  Planning indefinite use pending continued response to therapy.  Transaminitis of unclear etiology; HOBSON versus treatment related versus cancer related.  Prior scans of been reassuring; bears close monitoring; trial of ursodiol.  Okay to push cardiac echo to October.  Blood counts reassuring; chemistries otherwise stable; tumor marker normal.    {SEVERITY}  {TOXICITY}    Routine Health Maintenance:  {CANCER SURVEILLANCE}  {IMMUNIZATIONS}    Follow-up / Orders:  {BJT/QUYEN LOCATION TIMEFRAME w/ LABS, IMAGING}    Adiel Kim MD    --------------------    Interval History:  Tiffanie returns for follow up of ***.    Continuity of Care:  Reviewed {NOTES} prior to / during our visit.  Discussed case w/  {DOCTOR} {DATE}.    Family History Cancer:  {FAMILY CANCER}    Social History:  Social History     Tobacco Use    Smoking status: Never    Smokeless tobacco: Never    Tobacco comments:     no smoking in the home   Vaping Use    Vaping Use: Never used   Substance Use Topics    Alcohol use: Yes     Alcohol/week: 1.7 standard drinks of alcohol    Drug use: No       --------------------    Physical Exam:  VS: BP  123/75 (BP Location: Right arm, Patient Position: Chair, Cuff Size: Adult Large)   Pulse 87   Temp 97.8  F (36.6  C) (Temporal)   Resp 16   Wt 122.8 kg (270 lb 11.2 oz)   LMP 08/06/2008   SpO2 95%   BMI 42.40 kg/m  .  GEN: Well appearing.  {EXAM}    Labs / Imaging:  Reviewed {LABS}.  Reviewed {IMAGING} w/ {INDEPENDENT REVIEW}.      Again, thank you for allowing me to participate in the care of your patient.        Sincerely,        Adiel Kim MD

## 2023-09-26 ENCOUNTER — MYC MEDICAL ADVICE (OUTPATIENT)
Dept: ONCOLOGY | Facility: CLINIC | Age: 58
End: 2023-09-26
Payer: COMMERCIAL

## 2023-09-28 ENCOUNTER — PATIENT OUTREACH (OUTPATIENT)
Dept: ONCOLOGY | Facility: CLINIC | Age: 58
End: 2023-09-28
Payer: COMMERCIAL

## 2023-09-28 NOTE — PROGRESS NOTES
[facility]/Voicemail    Clinical Data: Care Coordinator Outreach    Outreach attempted x 1.  Left message on patient's voicemail with call back information and requested return call.    Plan: Care Coordinator will try to reach patient again in 1-2 business days. SEE MYCHART MESSAGE.    Radha Neal RNCC

## 2023-09-28 NOTE — NURSING NOTE
DISCHARGE PLAN:  Next appointments: See patient instruction section  Departure Mode:   Accompanied by:    minutes for nursing discharge (face to face time)     Tiffaniecornel Mcdermott is here today for onc follow up.  Patient was not seen by writing nurse at time of appointment.   Appointments scheduled for everything requested. See pasted note from Becca below. See patient instructions and Oncologist's Progress note for further details. Questions and concerns addressed to patient's satisfaction. Patient verbalized and demonstrated understanding of plan.  Contact information provided and patient is encouraged to call with any that arise in the interim of care.    From: Loida Arevalo   Sent: 9/27/2023  12:40 PM CDT   To: Stephanie Luu CMA; [facility] Oncology Pool   Subject: RE: appts needed                                 Tiffanie is complete. I scheduled the BJT at C14 with Lab the day before. Spoke to Tiffanie and all is good to go.     Thank you,     Becca STEWART [facility]  [phone]  9/28/2023, 10:14 AM

## 2023-09-28 NOTE — TELEPHONE ENCOUNTER
Notified by infusion patient's infusion treatment approved. No further follow-up needed at this time.  Radha Neal RNCC

## 2023-09-29 ENCOUNTER — INFUSION THERAPY VISIT (OUTPATIENT)
Dept: INFUSION THERAPY | Facility: CLINIC | Age: 58
End: 2023-09-29
Attending: INTERNAL MEDICINE
Payer: COMMERCIAL

## 2023-09-29 VITALS
HEART RATE: 68 BPM | RESPIRATION RATE: 16 BRPM | SYSTOLIC BLOOD PRESSURE: 126 MMHG | TEMPERATURE: 97.7 F | BODY MASS INDEX: 42.77 KG/M2 | DIASTOLIC BLOOD PRESSURE: 60 MMHG | OXYGEN SATURATION: 97 % | WEIGHT: 273.1 LBS

## 2023-09-29 DIAGNOSIS — C50.511 MALIGNANT NEOPLASM OF LOWER-OUTER QUADRANT OF RIGHT FEMALE BREAST, UNSPECIFIED ESTROGEN RECEPTOR STATUS (H): Primary | ICD-10-CM

## 2023-09-29 PROCEDURE — 250N000011 HC RX IP 250 OP 636: Mod: JZ | Performed by: INTERNAL MEDICINE

## 2023-09-29 PROCEDURE — 96401 CHEMO ANTI-NEOPL SQ/IM: CPT

## 2023-09-29 RX ADMIN — PERTUZUMAB, TRASTUZUMAB, AND HYALURONIDASE-ZZXF 600 MG: 600; 600; 2000 INJECTION, SOLUTION SUBCUTANEOUS at 11:07

## 2023-09-29 ASSESSMENT — PAIN SCALES - GENERAL: PAINLEVEL: NO PAIN (0)

## 2023-09-29 NOTE — PROGRESS NOTES
Infusion Nursing Note:  Tiffanie Mcdermott presents today for C12D64.    Patient seen by provider today: No   present during visit today: Not Applicable.    Note: Feeling well. Ice to left thigh prior to subcutaneous injection      Intravenous Access:  No Intravenous access/labs at this visit.    Treatment Conditions:  Last echo: April of 2023. Biplane LVEF was 56%. Next echo planned for 10/5/2023.      Post Infusion Assessment:  Patient tolerated injection without incident.       Discharge Plan:   Patient discharged in stable condition accompanied by: self.  Departure Mode: Ambulatory.      Drea Haynes RN

## 2023-10-05 ENCOUNTER — HOSPITAL ENCOUNTER (OUTPATIENT)
Dept: CARDIOLOGY | Facility: CLINIC | Age: 58
Discharge: HOME | End: 2023-10-05
Attending: INTERNAL MEDICINE | Admitting: INTERNAL MEDICINE
Payer: COMMERCIAL

## 2023-10-05 DIAGNOSIS — C50.511 MALIGNANT NEOPLASM OF LOWER-OUTER QUADRANT OF RIGHT FEMALE BREAST, UNSPECIFIED ESTROGEN RECEPTOR STATUS (H): ICD-10-CM

## 2023-10-05 LAB — LVEF ECHO: NORMAL

## 2023-10-05 PROCEDURE — 93308 TTE F-UP OR LMTD: CPT

## 2023-10-05 PROCEDURE — 93321 DOPPLER ECHO F-UP/LMTD STD: CPT | Mod: 26 | Performed by: INTERNAL MEDICINE

## 2023-10-05 PROCEDURE — 93308 TTE F-UP OR LMTD: CPT | Mod: 26 | Performed by: INTERNAL MEDICINE

## 2023-10-05 PROCEDURE — 93325 DOPPLER ECHO COLOR FLOW MAPG: CPT | Mod: 26 | Performed by: INTERNAL MEDICINE

## 2023-10-05 PROCEDURE — 93325 DOPPLER ECHO COLOR FLOW MAPG: CPT

## 2023-10-05 PROCEDURE — 93356 MYOCRD STRAIN IMG SPCKL TRCK: CPT | Performed by: INTERNAL MEDICINE

## 2023-10-19 ENCOUNTER — INFUSION THERAPY VISIT (OUTPATIENT)
Dept: INFUSION THERAPY | Facility: CLINIC | Age: 58
End: 2023-10-19
Attending: INTERNAL MEDICINE
Payer: COMMERCIAL

## 2023-10-19 ENCOUNTER — APPOINTMENT (OUTPATIENT)
Dept: LAB | Facility: CLINIC | Age: 58
End: 2023-10-19
Payer: COMMERCIAL

## 2023-10-19 VITALS
TEMPERATURE: 98.2 F | RESPIRATION RATE: 16 BRPM | SYSTOLIC BLOOD PRESSURE: 149 MMHG | WEIGHT: 273.5 LBS | BODY MASS INDEX: 42.93 KG/M2 | DIASTOLIC BLOOD PRESSURE: 82 MMHG | OXYGEN SATURATION: 97 % | HEART RATE: 79 BPM | HEIGHT: 67 IN

## 2023-10-19 DIAGNOSIS — C50.511 MALIGNANT NEOPLASM OF LOWER-OUTER QUADRANT OF RIGHT FEMALE BREAST, UNSPECIFIED ESTROGEN RECEPTOR STATUS (H): Primary | ICD-10-CM

## 2023-10-19 LAB
ALBUMIN SERPL BCG-MCNC: 4.1 G/DL (ref 3.5–5.2)
ALP SERPL-CCNC: 131 U/L (ref 35–104)
ALT SERPL W P-5'-P-CCNC: 163 U/L (ref 0–50)
ANION GAP SERPL CALCULATED.3IONS-SCNC: 11 MMOL/L (ref 7–15)
AST SERPL W P-5'-P-CCNC: 77 U/L (ref 0–45)
BILIRUB SERPL-MCNC: 0.5 MG/DL
BUN SERPL-MCNC: 15.3 MG/DL (ref 8–23)
CALCIUM SERPL-MCNC: 9.4 MG/DL (ref 8.8–10.2)
CANCER AG15-3 SERPL-ACNC: 21 U/ML
CHLORIDE SERPL-SCNC: 103 MMOL/L (ref 98–107)
CREAT SERPL-MCNC: 0.91 MG/DL (ref 0.51–0.95)
DEPRECATED HCO3 PLAS-SCNC: 24 MMOL/L (ref 22–29)
EGFRCR SERPLBLD CKD-EPI 2021: 72 ML/MIN/1.73M2
GLUCOSE SERPL-MCNC: 120 MG/DL (ref 70–99)
POTASSIUM SERPL-SCNC: 4.2 MMOL/L (ref 3.4–5.3)
PROT SERPL-MCNC: 6.8 G/DL (ref 6.4–8.3)
SODIUM SERPL-SCNC: 138 MMOL/L (ref 135–145)

## 2023-10-19 PROCEDURE — 36415 COLL VENOUS BLD VENIPUNCTURE: CPT

## 2023-10-19 PROCEDURE — 86300 IMMUNOASSAY TUMOR CA 15-3: CPT

## 2023-10-19 PROCEDURE — 250N000013 HC RX MED GY IP 250 OP 250 PS 637: Performed by: INTERNAL MEDICINE

## 2023-10-19 PROCEDURE — 82310 ASSAY OF CALCIUM: CPT

## 2023-10-19 RX ORDER — FAMOTIDINE 20 MG/1
20 TABLET, FILM COATED ORAL ONCE
Status: COMPLETED | OUTPATIENT
Start: 2023-10-19 | End: 2023-10-19

## 2023-10-19 RX ADMIN — FAMOTIDINE 20 MG: 20 TABLET, FILM COATED ORAL at 12:25

## 2023-10-19 ASSESSMENT — PAIN SCALES - GENERAL: PAINLEVEL: NO PAIN (0)

## 2023-10-19 NOTE — PROGRESS NOTES
Infusion Nursing Note:  Tiffanie Mcdermott presents today for C13D1 Herceptin Hylecta subcutaneous/Perjeta IV.    Patient seen by provider today: No   present during visit today: Not Applicable.    Note: Patient ambulated to IVO. Labs drawn before arrival. VS Taken. Denies pain.   Patient unaware of med route change with today's visit. States never notified by anyone. Frustrated and upset. Has not received phone call or letter regarding this. Finance team called by charge nurse-states they attempted x 1, message left. They will review plan again and call patient.     Attempted to get PIV access, but unsuccessful. Patient c/o severe heartburn. Pepcid ordered po and given. Patient does not want anymore IV tries today. Plan to wait to hear from finance to see if able to get Phesgo again.     Intravenous Access:  Peripheral IV attempted x 3 per 2 RN's.     Treatment Conditions:  Lab Results   Component Value Date     10/19/2023    POTASSIUM 4.2 10/19/2023    MAG 1.7 04/25/2022    CR 0.91 10/19/2023    SARAI 9.4 10/19/2023    BILITOTAL 0.5 10/19/2023    ALBUMIN 4.1 10/19/2023     (H) 10/19/2023    AST 77 (H) 10/19/2023       Results reviewed, labs MET treatment parameters, ok to proceed with treatment.  Plan deferred due to unsuccessful IV attempts and waiting for finance to review her plan for PHESGO.       Post Infusion Assessment:  NA.       Discharge Plan:   Discharge instructions reviewed with: Patient.  Departure Mode: Ambulatory.      Alicia Mcgee RN

## 2023-10-23 ENCOUNTER — TELEPHONE (OUTPATIENT)
Dept: ONCOLOGY | Facility: CLINIC | Age: 58
End: 2023-10-23
Payer: COMMERCIAL

## 2023-10-23 ENCOUNTER — MYC MEDICAL ADVICE (OUTPATIENT)
Dept: ONCOLOGY | Facility: CLINIC | Age: 58
End: 2023-10-23
Payer: COMMERCIAL

## 2023-10-31 ENCOUNTER — PATIENT OUTREACH (OUTPATIENT)
Dept: ONCOLOGY | Facility: CLINIC | Age: 58
End: 2023-10-31
Payer: COMMERCIAL

## 2023-10-31 DIAGNOSIS — C50.511 MALIGNANT NEOPLASM OF LOWER-OUTER QUADRANT OF RIGHT FEMALE BREAST, UNSPECIFIED ESTROGEN RECEPTOR STATUS (H): Primary | ICD-10-CM

## 2023-10-31 RX ORDER — HEPARIN SODIUM (PORCINE) LOCK FLUSH IV SOLN 100 UNIT/ML 100 UNIT/ML
5 SOLUTION INTRAVENOUS
Status: CANCELLED | OUTPATIENT
Start: 2023-10-31

## 2023-10-31 RX ORDER — EPINEPHRINE 1 MG/ML
0.3 INJECTION, SOLUTION INTRAMUSCULAR; SUBCUTANEOUS EVERY 5 MIN PRN
Status: CANCELLED | OUTPATIENT
Start: 2023-10-31

## 2023-10-31 RX ORDER — DIPHENHYDRAMINE HCL 25 MG
50 CAPSULE ORAL
Status: CANCELLED | OUTPATIENT
Start: 2023-10-31

## 2023-10-31 RX ORDER — DIPHENHYDRAMINE HYDROCHLORIDE 50 MG/ML
50 INJECTION INTRAMUSCULAR; INTRAVENOUS
Status: CANCELLED
Start: 2023-10-31

## 2023-10-31 RX ORDER — METHYLPREDNISOLONE SODIUM SUCCINATE 125 MG/2ML
125 INJECTION, POWDER, LYOPHILIZED, FOR SOLUTION INTRAMUSCULAR; INTRAVENOUS
Status: CANCELLED
Start: 2023-10-31

## 2023-10-31 RX ORDER — NALOXONE HYDROCHLORIDE 0.4 MG/ML
0.2 INJECTION, SOLUTION INTRAMUSCULAR; INTRAVENOUS; SUBCUTANEOUS
Status: CANCELLED | OUTPATIENT
Start: 2023-10-31

## 2023-10-31 RX ORDER — ALBUTEROL SULFATE 90 UG/1
1-2 AEROSOL, METERED RESPIRATORY (INHALATION)
Status: CANCELLED
Start: 2023-10-31

## 2023-10-31 RX ORDER — HEPARIN SODIUM,PORCINE 10 UNIT/ML
5 VIAL (ML) INTRAVENOUS
Status: CANCELLED | OUTPATIENT
Start: 2023-10-31

## 2023-10-31 RX ORDER — ACETAMINOPHEN 325 MG/1
650 TABLET ORAL
Status: CANCELLED | OUTPATIENT
Start: 2023-10-31

## 2023-10-31 RX ORDER — MEPERIDINE HYDROCHLORIDE 25 MG/ML
25 INJECTION INTRAMUSCULAR; INTRAVENOUS; SUBCUTANEOUS EVERY 30 MIN PRN
Status: CANCELLED | OUTPATIENT
Start: 2023-10-31

## 2023-10-31 RX ORDER — ALBUTEROL SULFATE 0.83 MG/ML
2.5 SOLUTION RESPIRATORY (INHALATION)
Status: CANCELLED | OUTPATIENT
Start: 2023-10-31

## 2023-11-03 ENCOUNTER — INFUSION THERAPY VISIT (OUTPATIENT)
Dept: INFUSION THERAPY | Facility: CLINIC | Age: 58
End: 2023-11-03
Attending: INTERNAL MEDICINE
Payer: COMMERCIAL

## 2023-11-03 VITALS
SYSTOLIC BLOOD PRESSURE: 119 MMHG | BODY MASS INDEX: 42.54 KG/M2 | TEMPERATURE: 98.1 F | HEART RATE: 60 BPM | OXYGEN SATURATION: 96 % | DIASTOLIC BLOOD PRESSURE: 61 MMHG | WEIGHT: 271.6 LBS | RESPIRATION RATE: 18 BRPM

## 2023-11-03 DIAGNOSIS — C50.511 MALIGNANT NEOPLASM OF LOWER-OUTER QUADRANT OF RIGHT FEMALE BREAST, UNSPECIFIED ESTROGEN RECEPTOR STATUS (H): Primary | ICD-10-CM

## 2023-11-03 PROCEDURE — 96401 CHEMO ANTI-NEOPL SQ/IM: CPT

## 2023-11-03 PROCEDURE — 250N000011 HC RX IP 250 OP 636: Mod: JZ | Performed by: INTERNAL MEDICINE

## 2023-11-03 RX ADMIN — PERTUZUMAB, TRASTUZUMAB, AND HYALURONIDASE-ZZXF 600 MG: 600; 600; 2000 INJECTION, SOLUTION SUBCUTANEOUS at 10:43

## 2023-11-03 ASSESSMENT — PAIN SCALES - GENERAL: PAINLEVEL: NO PAIN (0)

## 2023-11-03 NOTE — PROGRESS NOTES
Infusion Nursing Note:  Tiffanie Mcdermott presents today for C13D1 Phesgo.    Patient seen by provider today: No   present during visit today: Not Applicable.    Note: Patient arrives ambulatory today, feeling ok but says she is pretty tired due to not sleeping well last night. Her dog was waking her up part of the night. Denies pain or signs/sx of illness or infection. Will receive today's injection in her R thigh. Ice applied prior to injection. VSS, afebrile.       Intravenous Access:  No Intravenous access/labs at this visit.    Treatment Conditions:  ECHO done on 10/5/23, stable. LVEF 63%.      Post Infusion Assessment:  Patient tolerated subQ injection over 5 minutes without incident. Does have mild discomfort off and on during administration. No bleeding, pain or swelling at site.       Discharge Plan:   AVS to patient via MYCHART.  Patient will return 11/24 for next appointment.   Patient discharged in stable condition accompanied by: self.  Departure Mode: Ambulatory.      Constance Maynard RN

## 2023-11-24 ENCOUNTER — INFUSION THERAPY VISIT (OUTPATIENT)
Dept: INFUSION THERAPY | Facility: CLINIC | Age: 58
End: 2023-11-24
Attending: INTERNAL MEDICINE
Payer: COMMERCIAL

## 2023-11-24 VITALS
OXYGEN SATURATION: 99 % | SYSTOLIC BLOOD PRESSURE: 126 MMHG | TEMPERATURE: 98.7 F | DIASTOLIC BLOOD PRESSURE: 64 MMHG | HEART RATE: 77 BPM | WEIGHT: 274.8 LBS | RESPIRATION RATE: 16 BRPM | BODY MASS INDEX: 43.04 KG/M2

## 2023-11-24 DIAGNOSIS — C50.511 MALIGNANT NEOPLASM OF LOWER-OUTER QUADRANT OF RIGHT FEMALE BREAST, UNSPECIFIED ESTROGEN RECEPTOR STATUS (H): Primary | ICD-10-CM

## 2023-11-24 PROCEDURE — 96401 CHEMO ANTI-NEOPL SQ/IM: CPT

## 2023-11-24 PROCEDURE — 250N000011 HC RX IP 250 OP 636: Mod: JZ | Performed by: INTERNAL MEDICINE

## 2023-11-24 RX ORDER — HEPARIN SODIUM (PORCINE) LOCK FLUSH IV SOLN 100 UNIT/ML 100 UNIT/ML
5 SOLUTION INTRAVENOUS
Status: DISCONTINUED | OUTPATIENT
Start: 2023-11-24 | End: 2023-11-24 | Stop reason: HOSPADM

## 2023-11-24 RX ADMIN — PERTUZUMAB, TRASTUZUMAB, AND HYALURONIDASE-ZZXF 600 MG: 600; 600; 2000 INJECTION, SOLUTION SUBCUTANEOUS at 14:28

## 2023-11-24 ASSESSMENT — PAIN SCALES - GENERAL: PAINLEVEL: NO PAIN (0)

## 2023-11-24 NOTE — PROGRESS NOTES
Infusion Nursing Note:  Tiffanie Mcdermott presents today for C13d22 PHESGO.    Patient seen by provider today: No   present during visit today: Not Applicable.    Note: Tiffanie is feeling well today.  Has anxiety about getting injection but did well.  Ice applied to left thigh for 15-20 min prior to injection.      Intravenous Access:  No Intravenous access/labs at this visit.    Treatment Conditions:  Not Applicable.      Post Infusion Assessment:  Patient tolerated injection without incident.  Patient observed for 15 minutes post injection per protocol.       Discharge Plan:   Discharge instructions reviewed with: Patient.  Patient and/or family verbalized understanding of discharge instructions and all questions answered.  Patient discharged in stable condition accompanied by: self.  Departure Mode: Ambulatory.      Arline Sparks RN

## 2023-12-14 ENCOUNTER — APPOINTMENT (OUTPATIENT)
Dept: LAB | Facility: CLINIC | Age: 58
End: 2023-12-14
Payer: COMMERCIAL

## 2023-12-14 ENCOUNTER — INFUSION THERAPY VISIT (OUTPATIENT)
Dept: INFUSION THERAPY | Facility: CLINIC | Age: 58
End: 2023-12-14
Attending: INTERNAL MEDICINE
Payer: COMMERCIAL

## 2023-12-14 VITALS
RESPIRATION RATE: 16 BRPM | HEART RATE: 77 BPM | SYSTOLIC BLOOD PRESSURE: 119 MMHG | WEIGHT: 270.9 LBS | OXYGEN SATURATION: 97 % | BODY MASS INDEX: 42.43 KG/M2 | TEMPERATURE: 98 F | DIASTOLIC BLOOD PRESSURE: 63 MMHG

## 2023-12-14 DIAGNOSIS — C50.511 MALIGNANT NEOPLASM OF LOWER-OUTER QUADRANT OF RIGHT FEMALE BREAST, UNSPECIFIED ESTROGEN RECEPTOR STATUS (H): Primary | ICD-10-CM

## 2023-12-14 LAB
ALBUMIN SERPL BCG-MCNC: 4 G/DL (ref 3.5–5.2)
ALP SERPL-CCNC: 134 U/L (ref 40–150)
ALT SERPL W P-5'-P-CCNC: 157 U/L (ref 0–50)
ANION GAP SERPL CALCULATED.3IONS-SCNC: 10 MMOL/L (ref 7–15)
AST SERPL W P-5'-P-CCNC: 88 U/L (ref 0–45)
BILIRUB SERPL-MCNC: 0.6 MG/DL
BUN SERPL-MCNC: 16.4 MG/DL (ref 8–23)
CALCIUM SERPL-MCNC: 9.2 MG/DL (ref 8.8–10.2)
CANCER AG15-3 SERPL-ACNC: 22 U/ML
CHLORIDE SERPL-SCNC: 101 MMOL/L (ref 98–107)
CREAT SERPL-MCNC: 0.99 MG/DL (ref 0.51–0.95)
DEPRECATED HCO3 PLAS-SCNC: 27 MMOL/L (ref 22–29)
EGFRCR SERPLBLD CKD-EPI 2021: 65 ML/MIN/1.73M2
GLUCOSE SERPL-MCNC: 126 MG/DL (ref 70–99)
POTASSIUM SERPL-SCNC: 4.1 MMOL/L (ref 3.4–5.3)
PROT SERPL-MCNC: 7 G/DL (ref 6.4–8.3)
SODIUM SERPL-SCNC: 138 MMOL/L (ref 135–145)

## 2023-12-14 PROCEDURE — 80053 COMPREHEN METABOLIC PANEL: CPT

## 2023-12-14 PROCEDURE — 96401 CHEMO ANTI-NEOPL SQ/IM: CPT

## 2023-12-14 PROCEDURE — 250N000011 HC RX IP 250 OP 636: Mod: JZ | Performed by: INTERNAL MEDICINE

## 2023-12-14 PROCEDURE — 86300 IMMUNOASSAY TUMOR CA 15-3: CPT

## 2023-12-14 PROCEDURE — 36415 COLL VENOUS BLD VENIPUNCTURE: CPT

## 2023-12-14 RX ADMIN — PERTUZUMAB, TRASTUZUMAB, AND HYALURONIDASE-ZZXF 600 MG: 600; 600; 2000 INJECTION, SOLUTION SUBCUTANEOUS at 09:42

## 2023-12-14 NOTE — PROGRESS NOTES
Infusion Nursing Note:  Tiffanie Mcdermott presents today for Phesgo.    Patient seen by provider today: No   present during visit today: Not Applicable.    Note: Pt states she is tolerating treatment well and has had less loose stools since starting treatment.      Intravenous Access:  No Intravenous access/labs at this visit.    Treatment Conditions:  Lab Results   Component Value Date     12/14/2023    POTASSIUM 4.1 12/14/2023    MAG 1.7 04/25/2022    CR 0.99 (H) 12/14/2023    SARAI 9.2 12/14/2023    BILITOTAL 0.6 12/14/2023    ALBUMIN 4.0 12/14/2023     (H) 12/14/2023    AST 88 (H) 12/14/2023         Post Infusion Assessment:  Patient tolerated injection without incident.  Patient observed for 15 minutes post Phesgo injection.       Discharge Plan:   Discharge instructions reviewed with: Patient.  Patient discharged in stable condition accompanied by: self.  Departure Mode: Ambulatory.      Maranda Cesar RN

## 2024-01-04 ENCOUNTER — INFUSION THERAPY VISIT (OUTPATIENT)
Dept: INFUSION THERAPY | Facility: CLINIC | Age: 59
End: 2024-01-04
Attending: INTERNAL MEDICINE
Payer: COMMERCIAL

## 2024-01-04 VITALS
OXYGEN SATURATION: 95 % | DIASTOLIC BLOOD PRESSURE: 68 MMHG | RESPIRATION RATE: 18 BRPM | SYSTOLIC BLOOD PRESSURE: 142 MMHG | WEIGHT: 272.7 LBS | TEMPERATURE: 97 F | BODY MASS INDEX: 42.71 KG/M2 | HEART RATE: 85 BPM

## 2024-01-04 DIAGNOSIS — C50.511 MALIGNANT NEOPLASM OF LOWER-OUTER QUADRANT OF RIGHT FEMALE BREAST, UNSPECIFIED ESTROGEN RECEPTOR STATUS (H): Primary | ICD-10-CM

## 2024-01-04 PROCEDURE — 96401 CHEMO ANTI-NEOPL SQ/IM: CPT

## 2024-01-04 PROCEDURE — 250N000011 HC RX IP 250 OP 636: Mod: JZ | Performed by: INTERNAL MEDICINE

## 2024-01-04 RX ADMIN — PERTUZUMAB, TRASTUZUMAB, AND HYALURONIDASE-ZZXF 600 MG: 600; 600; 2000 INJECTION, SOLUTION SUBCUTANEOUS at 09:35

## 2024-01-04 ASSESSMENT — PAIN SCALES - GENERAL: PAINLEVEL: NO PAIN (0)

## 2024-01-04 NOTE — PROGRESS NOTES
Infusion Nursing Note:  Tiffanie Mcdermott presents today for C13D64 Phesgo.    Patient seen by provider today: No   present during visit today: Not Applicable.    Note: Feeling well today.Had an upset stomach a couple of days ago, but lasted only about 24 hrs. Better now. Used ice pack on left leg prior to injection.      Intravenous Access:  No Intravenous access/labs at this visit.    Treatment Conditions:  Not Applicable.      Post Infusion Assessment:  Patient tolerated injection without incident.       Discharge Plan:   Patient discharged in stable condition accompanied by: self.  Departure Mode: Ambulatory.      Drea Haynes RN

## 2024-01-10 ENCOUNTER — LAB (OUTPATIENT)
Dept: LAB | Facility: CLINIC | Age: 59
End: 2024-01-10
Payer: COMMERCIAL

## 2024-01-10 DIAGNOSIS — C50.511 MALIGNANT NEOPLASM OF LOWER-OUTER QUADRANT OF RIGHT FEMALE BREAST, UNSPECIFIED ESTROGEN RECEPTOR STATUS (H): ICD-10-CM

## 2024-01-10 LAB
ALBUMIN SERPL BCG-MCNC: 4.2 G/DL (ref 3.5–5.2)
ALP SERPL-CCNC: 136 U/L (ref 40–150)
ALT SERPL W P-5'-P-CCNC: 171 U/L (ref 0–50)
ANION GAP SERPL CALCULATED.3IONS-SCNC: 9 MMOL/L (ref 7–15)
AST SERPL W P-5'-P-CCNC: 80 U/L (ref 0–45)
BILIRUB SERPL-MCNC: 0.5 MG/DL
BUN SERPL-MCNC: 15.6 MG/DL (ref 8–23)
CALCIUM SERPL-MCNC: 9.2 MG/DL (ref 8.8–10.2)
CHLORIDE SERPL-SCNC: 101 MMOL/L (ref 98–107)
CREAT SERPL-MCNC: 0.96 MG/DL (ref 0.51–0.95)
DEPRECATED HCO3 PLAS-SCNC: 28 MMOL/L (ref 22–29)
EGFRCR SERPLBLD CKD-EPI 2021: 67 ML/MIN/1.73M2
GLUCOSE SERPL-MCNC: 114 MG/DL (ref 70–99)
POTASSIUM SERPL-SCNC: 4.1 MMOL/L (ref 3.4–5.3)
PROT SERPL-MCNC: 7.3 G/DL (ref 6.4–8.3)
SODIUM SERPL-SCNC: 138 MMOL/L (ref 135–145)

## 2024-01-10 PROCEDURE — 86300 IMMUNOASSAY TUMOR CA 15-3: CPT | Performed by: INTERNAL MEDICINE

## 2024-01-10 PROCEDURE — 80053 COMPREHEN METABOLIC PANEL: CPT | Performed by: INTERNAL MEDICINE

## 2024-01-10 PROCEDURE — 36415 COLL VENOUS BLD VENIPUNCTURE: CPT

## 2024-01-11 ENCOUNTER — VIRTUAL VISIT (OUTPATIENT)
Dept: ONCOLOGY | Facility: CLINIC | Age: 59
End: 2024-01-11
Payer: COMMERCIAL

## 2024-01-11 DIAGNOSIS — C50.511 MALIGNANT NEOPLASM OF LOWER-OUTER QUADRANT OF RIGHT FEMALE BREAST, UNSPECIFIED ESTROGEN RECEPTOR STATUS (H): Primary | ICD-10-CM

## 2024-01-11 DIAGNOSIS — C78.7 BREAST CANCER METASTASIZED TO LIVER, LEFT (H): ICD-10-CM

## 2024-01-11 DIAGNOSIS — C50.911 HER2-POSITIVE CARCINOMA OF RIGHT BREAST (H): ICD-10-CM

## 2024-01-11 DIAGNOSIS — R79.89 ABNORMAL LFTS: ICD-10-CM

## 2024-01-11 DIAGNOSIS — Z17.31 HER2-POSITIVE CARCINOMA OF RIGHT BREAST (H): ICD-10-CM

## 2024-01-11 DIAGNOSIS — I42.7 DRUG-INDUCED CARDIOMYOPATHY (H): ICD-10-CM

## 2024-01-11 DIAGNOSIS — C50.912 BREAST CANCER METASTASIZED TO LIVER, LEFT (H): ICD-10-CM

## 2024-01-11 LAB — CANCER AG15-3 SERPL-ACNC: 22 U/ML

## 2024-01-11 PROCEDURE — 99214 OFFICE O/P EST MOD 30 MIN: CPT | Mod: 95 | Performed by: INTERNAL MEDICINE

## 2024-01-11 NOTE — PROGRESS NOTES
[facility] Hematology / Oncology  Progress Note 2024  Name: Tiffanie Mcdermott  :  1965    MRN:  [id number]    --------------------    Assessment / Plan:  Stage IV, hormone negative, HER2 positive breast cancer with dense liver involvement and scattered bone involvement:  # 2022 Presented liver failure and related symptoms secondary to dense tumor burden.  # Mar 2022 - May 2022 Taxol / Herceptin / Perjeta; near-CR.  # May 2022 - ongoing Herceptin / Perjeta; CR.    Continue Herceptin/Perjeta subcutaneous; planning indefinite use pending continued response to therapy.  Transaminitis of unclear etiology; suspect HOBSON versus treatment effect versus cancer related.  Will obtain liver MRI for better visualization of liver given prior extensive disease and reassuring PET scans.  Will refer to hepatology as well; Tiffanie in agreeement.  Planning cardiac echo monitoring every 6 months.  Planning PET scan monitoring every 6 months.  Chemistries otherwise stable; tumor marker within normal limits.    Adiel Kim MD    --------------------    Interval History:  Tiffanie returns for follow-up of breast cancer unaccompanied via video visit.  All in all, she is doing quite well.  She does note a little bit of a nuisance pain on her right flank that is tolerable and does not require any medication or changes to her life.  Overall she feels good.  She continues on ursodiol without issues.  No side effects from Herceptin or Perjeta.    Social History:  Social History     Tobacco Use    Smoking status: Never    Smokeless tobacco: Never    Tobacco comments:     no smoking in the home   Substance Use Topics    Alcohol use: Yes     Alcohol/week: 1.7 standard drinks of alcohol     Family History:  Family History   Problem Relation Age of Onset    Allergies Mother     Anesthesia Reaction Mother         vomiting    Lipids Mother     Gastrointestinal Disease Mother     Heart Disease Maternal Grandmother      Hypertension Maternal Grandmother     Gastrointestinal Disease Maternal Grandmother         cholesystectyomy    Alcohol/Drug Maternal Grandfather     Allergies Sister      Immunizations:  Immunization History   Administered Date(s) Administered    COVID-19 MONOVALENT 12+ (Pfizer) 04/28/2021, 05/19/2021    Influenza (IIV3) PF 11/13/2003, 12/16/2005, 11/14/2006, 12/19/2007, 12/22/2008    Influenza Vaccine 18-64 (Flublok) 02/13/2019, 12/18/2019, 11/10/2022    Influenza Vaccine >6 months,quad, PF 11/09/2016    Influenza Vaccine, 6+MO IM (QUADRIVALENT W/PRESERVATIVES) 02/05/2018    MMR 03/12/1993    TD,PF 7+ (Tenivac) 06/24/1996, 12/08/2006     Health Maintenance Due   Topic Date Due    Pneumococcal Vaccine: Pediatrics (0 to 5 Years) and At-Risk Patients (6 to 64 Years) (1 of 2 - PCV) Never done    ZOSTER IMMUNIZATION (1 of 2) Never done    DTAP/TDAP/TD IMMUNIZATION (1 - Tdap) 12/09/2006    COVID-19 Vaccine (3 - Pfizer risk series) 06/16/2021    INFLUENZA VACCINE (1) 09/01/2023     --------------------    Physical Exam:  Video visit.    Labs / Imaging:  Reviewed CMP, .    Video Visit:  Tiffanie is a 58 year old female who is being evaluated via a billable video visit.  }    Video start time:  8:49 AM  Video end time:  8:59 AM    Provider location: [facility]  Patient location: Home    Mode of transmission: [facility] / [employer].

## 2024-01-11 NOTE — PATIENT INSTRUCTIONS
1) Cycle 14 entirety.  2) Liver MRI now.  2) PET scan in 4 weeks.  3) BJT w/ Cycle 15 and PET scan.    Adiel Kim MD.

## 2024-01-11 NOTE — LETTER
2024         RE: Tiffanie Mcdermott  [address] [address] [place] [phone]        Dear Colleague,    Thank you for referring your patient, Tiffanie Mcdermott, to the [facility] [place]. Please see a copy of my visit note below.    [facility] Hematology / Oncology  Progress Note 2024  Name: Tiffanie Mcdermott  :  1965    MRN:  [id number]    --------------------    Assessment / Plan:  Stage IV, hormone negative, HER2 positive breast cancer with dense liver involvement and scattered bone involvement:  # 2022 Presented liver failure and related symptoms secondary to dense tumor burden.  # Mar 2022 - May 2022 Taxol / Herceptin / Perjeta; near-CR.  # May 2022 - ongoing Herceptin / Perjeta; CR.    Continue Herceptin/Perjeta subcutaneous; planning indefinite use pending continued response to therapy.  Transaminitis of unclear etiology; suspect HOBSON versus treatment effect versus cancer related.  Will obtain liver MRI for better visualization of liver given prior extensive disease and reassuring PET scans.  Will refer to hepatology as well; Tiffanie in agreeement.  Planning cardiac echo monitoring every 6 months.  Planning PET scan monitoring every 6 months.  Chemistries otherwise stable; tumor marker within normal limits.    Adiel Kim MD    --------------------    Interval History:  Tiffanie returns for follow-up of breast cancer unaccompanied via video visit.  All in all, she is doing quite well.  She does note a little bit of a nuisance pain on her right flank that is tolerable and does not require any medication or changes to her life.  Overall she feels good.  She continues on ursodiol without issues.  No side effects from Herceptin or Perjeta.    Social History:  Social History     Tobacco Use     Smoking status: Never     Smokeless tobacco: Never     Tobacco comments:     no smoking in the home   Substance Use Topics     Alcohol use: Yes      Alcohol/week: 1.7 standard drinks of alcohol     Family History:  Family History   Problem Relation Age of Onset     Allergies Mother      Anesthesia Reaction Mother         vomiting     Lipids Mother      Gastrointestinal Disease Mother      Heart Disease Maternal Grandmother      Hypertension Maternal Grandmother      Gastrointestinal Disease Maternal Grandmother         cholesystectyomy     Alcohol/Drug Maternal Grandfather      Allergies Sister      Immunizations:  Immunization History   Administered Date(s) Administered     COVID-19 MONOVALENT 12+ (Pfizer) 04/28/2021, 05/19/2021     Influenza (IIV3) PF 11/13/2003, 12/16/2005, 11/14/2006, 12/19/2007, 12/22/2008     Influenza Vaccine 18-64 (Flublok) 02/13/2019, 12/18/2019, 11/10/2022     Influenza Vaccine >6 months,quad, PF 11/09/2016     Influenza Vaccine, 6+MO IM (QUADRIVALENT W/PRESERVATIVES) 02/05/2018     MMR 03/12/1993     TD,PF 7+ (Tenivac) 06/24/1996, 12/08/2006     Health Maintenance Due   Topic Date Due     Pneumococcal Vaccine: Pediatrics (0 to 5 Years) and At-Risk Patients (6 to 64 Years) (1 of 2 - PCV) Never done     ZOSTER IMMUNIZATION (1 of 2) Never done     DTAP/TDAP/TD IMMUNIZATION (1 - Tdap) 12/09/2006     COVID-19 Vaccine (3 - Pfizer risk series) 06/16/2021     INFLUENZA VACCINE (1) 09/01/2023     --------------------    Physical Exam:  Video visit.    Labs / Imaging:  Reviewed CMP, .      Again, thank you for allowing me to participate in the care of your patient.        Sincerely,        Adiel Kim MD

## 2024-01-11 NOTE — LETTER
2024         RE: Tiffanie Mcdermott  [address] [address] [place] [phone]        Dear Colleague,    Thank you for referring your patient, Tiffanie Mcdermott, to the [facility] [place]. Please see a copy of my visit note below.    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  # Mar 2022 - May 2022 Taxol / Herceptin / Perjeta; near-CR.  # May 2022 - ongoing Herceptin / Perjeta; CR.    Continue Herceptin/Perjeta subcutaneous; planning indefinite use pending continued response to therapy.  Transaminitis of unclear etiology; suspect HOBSON versus treatment effect versus cancer related.  Will obtain liver MRI for better visualization of liver given prior extensive disease and reassuring PET scans.  Will refer to hepatology as well; Tiffanie in agreeement.  Planning cardiac echo monitoring every 6 months.  Planning PET scan monitoring every 6 months.  Chemistries otherwise stable; tumor marker within normal limits.    Adiel Kim MD    --------------------    Interval History:  Tiffanie returns for follow-up of breast cancer unaccompanied via video visit.  All in all, she is doing quite well.  She does note a little bit of a nuisance pain on her right flank that is tolerable and does not require any medication or changes to her life.  Overall she feels good.  She continues on ursodiol without issues.  No side effects from Herceptin or Perjeta.    Social History:  Social History     Tobacco Use     Smoking status: Never     Smokeless tobacco: Never     Tobacco comments:     no smoking in the home   Substance Use Topics     Alcohol use: Yes      Alcohol/week: 1.7 standard drinks of alcohol     Family History:  Family History   Problem Relation Age of Onset     Allergies Mother      Anesthesia Reaction Mother         vomiting     Lipids Mother      Gastrointestinal Disease Mother      Heart Disease Maternal Grandmother      Hypertension Maternal Grandmother      Gastrointestinal Disease Maternal Grandmother         cholesystectyomy     Alcohol/Drug Maternal Grandfather      Allergies Sister      Immunizations:  Immunization History   Administered Date(s) Administered     COVID-19 MONOVALENT 12+ (Pfizer) 04/28/2021, 05/19/2021     Influenza (IIV3) PF 11/13/2003, 12/16/2005, 11/14/2006, 12/19/2007, 12/22/2008     Influenza Vaccine 18-64 (Flublok) 02/13/2019, 12/18/2019, 11/10/2022     Influenza Vaccine >6 months,quad, PF 11/09/2016     Influenza Vaccine, 6+MO IM (QUADRIVALENT W/PRESERVATIVES) 02/05/2018     MMR 03/12/1993     TD,PF 7+ (Tenivac) 06/24/1996, 12/08/2006     Health Maintenance Due   Topic Date Due     Pneumococcal Vaccine: Pediatrics (0 to 5 Years) and At-Risk Patients (6 to 64 Years) (1 of 2 - PCV) Never done     ZOSTER IMMUNIZATION (1 of 2) Never done     DTAP/TDAP/TD IMMUNIZATION (1 - Tdap) 12/09/2006     COVID-19 Vaccine (3 - Pfizer risk series) 06/16/2021     INFLUENZA VACCINE (1) 09/01/2023     --------------------    Physical Exam:  Video visit.    Labs / Imaging:  Reviewed CMP, .      Again, thank you for allowing me to participate in the care of your patient.        Sincerely,        Adiel Kim MD

## 2024-01-23 ENCOUNTER — MEDICAL CORRESPONDENCE (OUTPATIENT)
Dept: HEALTH INFORMATION MANAGEMENT | Facility: CLINIC | Age: 59
End: 2024-01-23

## 2024-01-23 ENCOUNTER — MYC MEDICAL ADVICE (OUTPATIENT)
Dept: ONCOLOGY | Facility: CLINIC | Age: 59
End: 2024-01-23

## 2024-01-23 DIAGNOSIS — F40.240 CLAUSTROPHOBIA: Primary | ICD-10-CM

## 2024-01-23 DIAGNOSIS — R79.89 ABNORMAL LFTS: Primary | ICD-10-CM

## 2024-01-25 ENCOUNTER — APPOINTMENT (OUTPATIENT)
Dept: LAB | Facility: CLINIC | Age: 59
End: 2024-01-25
Payer: COMMERCIAL

## 2024-01-25 ENCOUNTER — INFUSION THERAPY VISIT (OUTPATIENT)
Dept: INFUSION THERAPY | Facility: CLINIC | Age: 59
End: 2024-01-25
Attending: INTERNAL MEDICINE
Payer: COMMERCIAL

## 2024-01-25 VITALS
RESPIRATION RATE: 20 BRPM | BODY MASS INDEX: 42.57 KG/M2 | OXYGEN SATURATION: 97 % | DIASTOLIC BLOOD PRESSURE: 75 MMHG | SYSTOLIC BLOOD PRESSURE: 139 MMHG | TEMPERATURE: 97.9 F | HEART RATE: 82 BPM | WEIGHT: 271.8 LBS

## 2024-01-25 DIAGNOSIS — C50.511 MALIGNANT NEOPLASM OF LOWER-OUTER QUADRANT OF RIGHT FEMALE BREAST, UNSPECIFIED ESTROGEN RECEPTOR STATUS (H): Primary | ICD-10-CM

## 2024-01-25 LAB
ALBUMIN SERPL BCG-MCNC: 3.9 G/DL (ref 3.5–5.2)
ALP SERPL-CCNC: 132 U/L (ref 40–150)
ALT SERPL W P-5'-P-CCNC: 143 U/L (ref 0–50)
AST SERPL W P-5'-P-CCNC: 71 U/L (ref 0–45)
BILIRUB DIRECT SERPL-MCNC: <0.2 MG/DL (ref 0–0.3)
BILIRUB SERPL-MCNC: 0.6 MG/DL
PROT SERPL-MCNC: 6.9 G/DL (ref 6.4–8.3)

## 2024-01-25 PROCEDURE — 250N000011 HC RX IP 250 OP 636: Mod: JZ | Performed by: INTERNAL MEDICINE

## 2024-01-25 PROCEDURE — 96401 CHEMO ANTI-NEOPL SQ/IM: CPT

## 2024-01-25 PROCEDURE — 36415 COLL VENOUS BLD VENIPUNCTURE: CPT | Performed by: INTERNAL MEDICINE

## 2024-01-25 PROCEDURE — 80076 HEPATIC FUNCTION PANEL: CPT | Performed by: INTERNAL MEDICINE

## 2024-01-25 RX ADMIN — PERTUZUMAB, TRASTUZUMAB, AND HYALURONIDASE-ZZXF 600 MG: 600; 600; 2000 INJECTION, SOLUTION SUBCUTANEOUS at 10:24

## 2024-01-25 NOTE — PROGRESS NOTES
Infusion Nursing Note:  Tiffanie Mcdermott presents today for C14D1 Phesgo.    Patient seen by provider today: No   present during visit today: Not Applicable.    Note: Patient arrives ambulatory, alert, pleasant. Denies recent illness, pain or concerning sx. VSS, afebrile.  Did not have completion of her MRI due to anxiety, claustrophobia at time of imaging. Has questions and concerns regarding getting this done. Spent time discussing this with the patient and giving reassurance to proceed with one given options of premeds, etc. Amina GARCIA also took time with patient to answer questions and provide information. Patient receptive to this.  Ice applied to R thigh for a period of time prior to Phesgo.       Intravenous Access:  No Intravenous access/labs at this visit.    Hepatic panel done today, stable. No increase in abnormalities. Pt given results.  Lab Results   Component Value Date     01/10/2024    POTASSIUM 4.1 01/10/2024    MAG 1.7 04/25/2022    CR 0.96 (H) 01/10/2024    SARAI 9.2 01/10/2024    BILITOTAL 0.6 01/25/2024    ALBUMIN 3.9 01/25/2024     (H) 01/25/2024    AST 71 (H) 01/25/2024     ECHO done 10/25/23, stable. LVEF 63%.      Post Infusion Assessment:  Patient tolerated injection without incident.  Patient observed for 15 minutes post injection.  No bleeding, pain or swelling at site.       Discharge Plan:   AVS to patient via MYCHART.  Patient will return 2/15 for next appointment.   Patient discharged in stable condition accompanied by: self.  Departure Mode: Ambulatory.      Constance Maynard RN

## 2024-01-26 NOTE — TELEPHONE ENCOUNTER
Photocollect message sent to patient with update.    Amina Gardner RN on 1/26/2024 at 3:42 PM            Ailyn Carver, DO  You22 minutes ago (3:12 PM)     SK  Agree that 3/2 PET is fine in terms of timing  Please make sure pt has follow up with Dr. Kim after PET, it doesn't look like this scheduled yet  For MRI- yes, agree that she needs MRI since PET has negative findings that could explain elevated LFTs. I would try for MRI with ativan first and if not possible, can explore the other options that you listed.    Please confirm with Dr. Kim when he returns    TY  SK     You  Ailyn Carver DO1 hour ago (2:17 PM)     LN  This patient has never seen an QUYEN, can you please review and advise in Dr. Kim's absence. Patient would like provider input.    Amina Gardner RN on 1/26/2024 at 2:17 PM     Adiel Hopper MDYesterday (11:48 AM)     LN  Dr. Kim,    See Photocollect message. Patient does not feel comfortable doing MRI as she was unable to complete 1/23/24 due to claustrophobia. Writer explained options, such as open MRI, taking benzodiazepines or sedated MRI at [facility]. Patient would like to know your thoughts on this.    Amina Gardner RN on 1/25/2024 at 11:47 AM

## 2024-02-01 RX ORDER — LORAZEPAM 0.5 MG/1
0.5 TABLET ORAL EVERY 6 HOURS PRN
Qty: 2 TABLET | Refills: 0 | Status: CANCELLED | OUTPATIENT
Start: 2024-02-01

## 2024-02-02 RX ORDER — LORAZEPAM 0.5 MG/1
TABLET ORAL
Qty: 2 TABLET | Refills: 0 | Status: SHIPPED | OUTPATIENT
Start: 2024-02-02 | End: 2024-07-11

## 2024-02-15 ENCOUNTER — INFUSION THERAPY VISIT (OUTPATIENT)
Dept: INFUSION THERAPY | Facility: CLINIC | Age: 59
End: 2024-02-15
Attending: INTERNAL MEDICINE
Payer: COMMERCIAL

## 2024-02-15 ENCOUNTER — APPOINTMENT (OUTPATIENT)
Dept: LAB | Facility: CLINIC | Age: 59
End: 2024-02-15
Payer: COMMERCIAL

## 2024-02-15 VITALS
TEMPERATURE: 98.1 F | DIASTOLIC BLOOD PRESSURE: 58 MMHG | HEART RATE: 87 BPM | SYSTOLIC BLOOD PRESSURE: 138 MMHG | WEIGHT: 274.3 LBS | BODY MASS INDEX: 42.96 KG/M2 | RESPIRATION RATE: 18 BRPM | OXYGEN SATURATION: 96 %

## 2024-02-15 DIAGNOSIS — C50.511 MALIGNANT NEOPLASM OF LOWER-OUTER QUADRANT OF RIGHT FEMALE BREAST, UNSPECIFIED ESTROGEN RECEPTOR STATUS (H): Primary | ICD-10-CM

## 2024-02-15 LAB
ALBUMIN SERPL BCG-MCNC: 4 G/DL (ref 3.5–5.2)
ALP SERPL-CCNC: 130 U/L (ref 40–150)
ALT SERPL W P-5'-P-CCNC: 136 U/L (ref 0–50)
AST SERPL W P-5'-P-CCNC: 74 U/L (ref 0–45)
BILIRUB DIRECT SERPL-MCNC: <0.2 MG/DL (ref 0–0.3)
BILIRUB SERPL-MCNC: 0.7 MG/DL
PROT SERPL-MCNC: 7 G/DL (ref 6.4–8.3)

## 2024-02-15 PROCEDURE — 80076 HEPATIC FUNCTION PANEL: CPT

## 2024-02-15 PROCEDURE — 96401 CHEMO ANTI-NEOPL SQ/IM: CPT

## 2024-02-15 PROCEDURE — 36415 COLL VENOUS BLD VENIPUNCTURE: CPT

## 2024-02-15 PROCEDURE — 250N000011 HC RX IP 250 OP 636: Mod: JZ | Performed by: INTERNAL MEDICINE

## 2024-02-15 RX ADMIN — PERTUZUMAB, TRASTUZUMAB, AND HYALURONIDASE-ZZXF 600 MG: 600; 600; 2000 INJECTION, SOLUTION SUBCUTANEOUS at 10:34

## 2024-02-15 ASSESSMENT — PAIN SCALES - GENERAL: PAINLEVEL: NO PAIN (0)

## 2024-02-15 NOTE — PROGRESS NOTES
"Infusion Nursing Note:  Tiffanie Mcdermott presents today for C14d22 PHESGO.    Patient seen by provider today: No   present during visit today: Not Applicable.    Note: Tiffanie reported pain over \"liver\" last week. Pain has resolved.  She thinks it may have to do with some food she ate last week but it was worrisome to her.      Intravenous Access:  No Intravenous access/labs at this visit.    Treatment Conditions:  Not Applicable.      Post Infusion Assessment:  Patient tolerated injection without incident.  Patient observed for 15 minutes post injection per protocol.       Discharge Plan:   Discharge instructions reviewed with: Patient.  Patient and/or family verbalized understanding of discharge instructions and all questions answered.  Patient discharged in stable condition accompanied by: self.  Departure Mode: Ambulatory.      Arline Sparks RN  "

## 2024-02-23 ENCOUNTER — HOSPITAL ENCOUNTER (OUTPATIENT)
Dept: MRI IMAGING | Facility: CLINIC | Age: 59
Discharge: HOME | End: 2024-02-23
Attending: INTERNAL MEDICINE | Admitting: INTERNAL MEDICINE
Payer: COMMERCIAL

## 2024-02-23 PROCEDURE — A9585 GADOBUTROL INJECTION: HCPCS | Performed by: INTERNAL MEDICINE

## 2024-02-23 PROCEDURE — 74183 MRI ABD W/O CNTR FLWD CNTR: CPT

## 2024-02-23 PROCEDURE — 255N000002 HC RX 255 OP 636: Performed by: INTERNAL MEDICINE

## 2024-02-23 RX ORDER — GADOBUTROL 604.72 MG/ML
10 INJECTION INTRAVENOUS ONCE
Status: COMPLETED | OUTPATIENT
Start: 2024-02-23 | End: 2024-02-23

## 2024-02-23 RX ADMIN — GADOBUTROL 10 ML: 604.72 INJECTION INTRAVENOUS at 11:08

## 2024-02-29 ENCOUNTER — MYC MEDICAL ADVICE (OUTPATIENT)
Dept: GASTROENTEROLOGY | Facility: CLINIC | Age: 59
End: 2024-02-29
Payer: COMMERCIAL

## 2024-03-02 ENCOUNTER — HOSPITAL ENCOUNTER (OUTPATIENT)
Dept: PET IMAGING | Facility: CLINIC | Age: 59
Discharge: HOME | End: 2024-03-02
Attending: INTERNAL MEDICINE | Admitting: INTERNAL MEDICINE
Payer: COMMERCIAL

## 2024-03-02 DIAGNOSIS — C50.511 MALIGNANT NEOPLASM OF LOWER-OUTER QUADRANT OF RIGHT FEMALE BREAST, UNSPECIFIED ESTROGEN RECEPTOR STATUS (H): ICD-10-CM

## 2024-03-02 PROCEDURE — 78815 PET IMAGE W/CT SKULL-THIGH: CPT | Mod: PS

## 2024-03-02 PROCEDURE — A9552 F18 FDG: HCPCS | Performed by: INTERNAL MEDICINE

## 2024-03-02 PROCEDURE — 343N000001 HC RX 343: Performed by: INTERNAL MEDICINE

## 2024-03-02 RX ADMIN — FLUDEOXYGLUCOSE F-18 11.52 MILLICURIE: 500 INJECTION, SOLUTION INTRAVENOUS at 08:38

## 2024-03-04 ENCOUNTER — OFFICE VISIT (OUTPATIENT)
Dept: GASTROENTEROLOGY | Facility: CLINIC | Age: 59
End: 2024-03-04
Payer: COMMERCIAL

## 2024-03-04 VITALS
HEART RATE: 76 BPM | SYSTOLIC BLOOD PRESSURE: 136 MMHG | BODY MASS INDEX: 43.16 KG/M2 | WEIGHT: 275 LBS | HEIGHT: 67 IN | DIASTOLIC BLOOD PRESSURE: 70 MMHG

## 2024-03-04 DIAGNOSIS — C50.511 MALIGNANT NEOPLASM OF LOWER-OUTER QUADRANT OF RIGHT FEMALE BREAST, UNSPECIFIED ESTROGEN RECEPTOR STATUS (H): ICD-10-CM

## 2024-03-04 DIAGNOSIS — R93.2 ABNORMAL MRI, LIVER: ICD-10-CM

## 2024-03-04 DIAGNOSIS — R79.89 ABNORMAL LFTS (LIVER FUNCTION TESTS): Primary | ICD-10-CM

## 2024-03-04 DIAGNOSIS — E66.01 MORBID OBESITY (H): ICD-10-CM

## 2024-03-04 DIAGNOSIS — R79.89 ABNORMAL LFTS: ICD-10-CM

## 2024-03-04 PROBLEM — D69.6 THROMBOCYTOPENIA, UNSPECIFIED (H): Status: ACTIVE | Noted: 2024-03-04

## 2024-03-04 PROBLEM — R50.81 NEUTROPENIC FEVER (H): Status: RESOLVED | Noted: 2022-03-15 | Resolved: 2024-03-04

## 2024-03-04 PROBLEM — D70.9 NEUTROPENIC FEVER (H): Status: RESOLVED | Noted: 2022-03-15 | Resolved: 2024-03-04

## 2024-03-04 PROCEDURE — 99207 PR DROP WITH A PROCEDURE: CPT | Mod: 25 | Performed by: INTERNAL MEDICINE

## 2024-03-04 ASSESSMENT — PAIN SCALES - GENERAL: PAINLEVEL: NO PAIN (0)

## 2024-03-04 NOTE — LETTER
3/4/2024         RE: Tiffanie Mcdermott  [address] [address] [place] [phone]        Dear Colleague,    Thank you for referring your patient, Tiffanie Mcdermott, to the [facility]. Please see a copy of my visit note below.    Gastroenterology    58-year-old to review abnormal liver function test.  History of metastatic breast cancer with bone and liver mets and slightly more than 2 years ago patient was ill with significant abnormal labs.  Prior cholecystectomy for gallstones but no evidence of choledocholithiasis 2 years ago.    Patient is on vitamin C no other herbals or food supplements.  Placed empirically on ursodiol 1000 mg daily.  Patient notes weight gain after chemo.  BMI is 43.07.    Use of nonsteroidals is rare perhaps once a month.  Use of alcohol is rare perhaps a 6 drinks per year.  No coffee.  Patient's been avoiding Tylenol.    Prior lab in 2022 hep C- hepatitis B surface antibody negative.  Patient believes that she received standard child vaccines.    Past medical history: History of sepsis, hypercalcemia of malignancy, palpitations.  Lap cholecystectomy in 2005.    Medications reviewed on epic    Allergies reviewed on epic    Family history noncontributory.    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    No acute distress.  Blood pressure 136 x 70 pulse 76, BMI 43.07, 124 kg.  Head and neck unremarkable, cardiac S1-S2 without murmur, no stigmata chronic liver disease no asterixis, cardiac S1-S2 without murmur lungs clear throughout, abdomen limited no organomegaly nontender no masses no lower extremity edema skin without rash.    Labs: , 143, 171, 157, 163.  3 fold.  AST 74, 71, 80, 88, 77.  To fold.  Bili 0.7.  Albumin 4.0.  Alk phos pending.  Iron 58, TIBC 315.  Saturation 18%.    Platelet count 213.  MCV 90.  Hemoglobin 14.2.    Imaging: Liver MR 2/3/2024 reveals nodular contour of the  liver, 6 mm T2 hyperintense focus with the enhancement although this is likely artifactual.  Previous cholecystectomy with absent gallbladder.  No washout or lesions.  Advised follow-up in 3 to 6 months.  No evidence of metastatic disease.  3/2/2024 PET no metabolic evidence of active neoplasia.    Impression: AST, ALT with 2-3 fold elevation.  Normal alkaline phosphatase bilirubin.  Differential diagnosis includes elements of fatty liver, medication effect, other.  Will exclude toxic metabolic factors with additional labs.  Ursodiol may or may not be needed.  Standard dose is 13 to 15 mg/kg.  Ursodiol use to be determined.    Plan: 1.  Round out additional liver labs, 2.  No need for liver biopsy at this point nor elastography.  No indirect evidence of cirrhosis.,  3.  Tylenol would be safe less than 4 g/day if needed.    Follow-up to be determined.    Follow-up by imaging as planned  per radiology hepatic MR 3 to 6 months.    Again, thank you for allowing me to participate in the care of your patient.        Sincerely,        Bimal Mayen MD

## 2024-03-04 NOTE — PROGRESS NOTES
Gastroenterology    58-year-old to review abnormal liver function test.  History of metastatic breast cancer with bone and liver mets and slightly more than 2 years ago patient was ill with significant abnormal labs.  Prior cholecystectomy for gallstones but no evidence of choledocholithiasis 2 years ago.    Patient is on vitamin C no other herbals or food supplements.  Placed empirically on ursodiol 1000 mg daily.  Patient notes weight gain after chemo.  BMI is 43.07.    Use of nonsteroidals is rare perhaps once a month.  Use of alcohol is rare perhaps a 6 drinks per year.  No coffee.  Patient's been avoiding Tylenol.    Prior lab in 2022 hep C- hepatitis B surface antibody negative.  Patient believes that she received standard child vaccines.    Past medical history: History of sepsis, hypercalcemia of malignancy, palpitations.  Lap cholecystectomy in 2005.    Medications reviewed on epic    Allergies reviewed on epic    Family history noncontributory.    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    No acute distress.  Blood pressure 136 x 70 pulse 76, BMI 43.07, 124 kg.  Head and neck unremarkable, cardiac S1-S2 without murmur, no stigmata chronic liver disease no asterixis, cardiac S1-S2 without murmur lungs clear throughout, abdomen limited no organomegaly nontender no masses no lower extremity edema skin without rash.    Labs: , 143, 171, 157, 163.  3 fold.  AST 74, 71, 80, 88, 77.  To fold.  Bili 0.7.  Albumin 4.0.  Alk phos pending.  Iron 58, TIBC 315.  Saturation 18%.    Platelet count 213.  MCV 90.  Hemoglobin 14.2.    Imaging: Liver MR 2/3/2024 reveals nodular contour of the liver, 6 mm T2 hyperintense focus with the enhancement although this is likely artifactual.  Previous cholecystectomy with absent gallbladder.  No washout or lesions.  Advised follow-up in 3 to 6 months.  No evidence of metastatic disease.  3/2/2024 PET no  metabolic evidence of active neoplasia.    Impression: AST, ALT with 2-3 fold elevation.  Normal alkaline phosphatase bilirubin.  Differential diagnosis includes elements of fatty liver, medication effect, other.  Will exclude toxic metabolic factors with additional labs.  Ursodiol may or may not be needed.  Standard dose is 13 to 15 mg/kg.  Ursodiol use to be determined.    Plan: 1.  Round out additional liver labs, 2.  No need for liver biopsy at this point nor elastography.  No indirect evidence of cirrhosis.,  3.  Tylenol would be safe less than 4 g/day if needed.    Follow-up to be determined.    Follow-up by imaging as planned  per radiology hepatic MR 3 to 6 months.

## 2024-03-04 NOTE — PATIENT INSTRUCTIONS
As discussed    1.  Added labs to your Thursday lab draw    2.  Continue current meds at this time    3.  Standard liver monitoring     Follow-up to be determined.  Brief discussion of hepatic labs.  Copy included.  At this point, no need for liver biopsy or elastography.

## 2024-03-07 ENCOUNTER — INFUSION THERAPY VISIT (OUTPATIENT)
Dept: INFUSION THERAPY | Facility: CLINIC | Age: 59
End: 2024-03-07
Attending: INTERNAL MEDICINE
Payer: COMMERCIAL

## 2024-03-07 ENCOUNTER — LAB (OUTPATIENT)
Dept: LAB | Facility: CLINIC | Age: 59
End: 2024-03-07
Payer: COMMERCIAL

## 2024-03-07 VITALS
TEMPERATURE: 98.9 F | RESPIRATION RATE: 18 BRPM | DIASTOLIC BLOOD PRESSURE: 69 MMHG | SYSTOLIC BLOOD PRESSURE: 122 MMHG | OXYGEN SATURATION: 96 % | HEART RATE: 91 BPM | BODY MASS INDEX: 42.35 KG/M2 | WEIGHT: 270.4 LBS

## 2024-03-07 DIAGNOSIS — R79.89 ABNORMAL LFTS: ICD-10-CM

## 2024-03-07 DIAGNOSIS — C50.511 MALIGNANT NEOPLASM OF LOWER-OUTER QUADRANT OF RIGHT FEMALE BREAST, UNSPECIFIED ESTROGEN RECEPTOR STATUS (H): ICD-10-CM

## 2024-03-07 DIAGNOSIS — C50.511 MALIGNANT NEOPLASM OF LOWER-OUTER QUADRANT OF RIGHT FEMALE BREAST, UNSPECIFIED ESTROGEN RECEPTOR STATUS (H): Primary | ICD-10-CM

## 2024-03-07 LAB
ALBUMIN SERPL BCG-MCNC: 4.1 G/DL (ref 3.5–5.2)
ALP SERPL-CCNC: 140 U/L (ref 40–150)
ALT SERPL W P-5'-P-CCNC: 143 U/L (ref 0–50)
ANION GAP SERPL CALCULATED.3IONS-SCNC: 10 MMOL/L (ref 7–15)
AST SERPL W P-5'-P-CCNC: 75 U/L (ref 0–45)
BILIRUB DIRECT SERPL-MCNC: <0.2 MG/DL (ref 0–0.3)
BILIRUB SERPL-MCNC: 0.4 MG/DL
BUN SERPL-MCNC: 13.8 MG/DL (ref 8–23)
CALCIUM SERPL-MCNC: 9 MG/DL (ref 8.8–10.2)
CHLORIDE SERPL-SCNC: 101 MMOL/L (ref 98–107)
CK SERPL-CCNC: 57 U/L (ref 26–192)
CREAT SERPL-MCNC: 0.91 MG/DL (ref 0.51–0.95)
DEPRECATED HCO3 PLAS-SCNC: 25 MMOL/L (ref 22–29)
EGFRCR SERPLBLD CKD-EPI 2021: 72 ML/MIN/1.73M2
GLUCOSE SERPL-MCNC: 226 MG/DL (ref 70–99)
HOLD SPECIMEN: NORMAL
POTASSIUM SERPL-SCNC: 4.1 MMOL/L (ref 3.4–5.3)
PROT SERPL-MCNC: 7.2 G/DL (ref 6.4–8.3)
SODIUM SERPL-SCNC: 136 MMOL/L (ref 135–145)

## 2024-03-07 PROCEDURE — 86038 ANTINUCLEAR ANTIBODIES: CPT

## 2024-03-07 PROCEDURE — 82085 ASSAY OF ALDOLASE: CPT | Mod: 90

## 2024-03-07 PROCEDURE — 84165 PROTEIN E-PHORESIS SERUM: CPT | Performed by: PATHOLOGY

## 2024-03-07 PROCEDURE — 250N000011 HC RX IP 250 OP 636: Mod: JZ | Performed by: INTERNAL MEDICINE

## 2024-03-07 PROCEDURE — 82550 ASSAY OF CK (CPK): CPT

## 2024-03-07 PROCEDURE — 99000 SPECIMEN HANDLING OFFICE-LAB: CPT

## 2024-03-07 PROCEDURE — 96401 CHEMO ANTI-NEOPL SQ/IM: CPT

## 2024-03-07 PROCEDURE — 87340 HEPATITIS B SURFACE AG IA: CPT

## 2024-03-07 PROCEDURE — 84155 ASSAY OF PROTEIN SERUM: CPT

## 2024-03-07 PROCEDURE — 86300 IMMUNOASSAY TUMOR CA 15-3: CPT | Performed by: INTERNAL MEDICINE

## 2024-03-07 PROCEDURE — 84466 ASSAY OF TRANSFERRIN: CPT

## 2024-03-07 PROCEDURE — 82390 ASSAY OF CERULOPLASMIN: CPT

## 2024-03-07 PROCEDURE — 80053 COMPREHEN METABOLIC PANEL: CPT | Performed by: INTERNAL MEDICINE

## 2024-03-07 PROCEDURE — 82248 BILIRUBIN DIRECT: CPT | Performed by: INTERNAL MEDICINE

## 2024-03-07 PROCEDURE — 86706 HEP B SURFACE ANTIBODY: CPT

## 2024-03-07 PROCEDURE — 36415 COLL VENOUS BLD VENIPUNCTURE: CPT

## 2024-03-07 PROCEDURE — 83516 IMMUNOASSAY NONANTIBODY: CPT | Mod: 90

## 2024-03-07 PROCEDURE — 86708 HEPATITIS A ANTIBODY: CPT

## 2024-03-07 PROCEDURE — 86364 TISS TRNSGLTMNASE EA IG CLAS: CPT

## 2024-03-07 RX ADMIN — PERTUZUMAB, TRASTUZUMAB, AND HYALURONIDASE-ZZXF 600 MG: 600; 600; 2000 INJECTION, SOLUTION SUBCUTANEOUS at 10:03

## 2024-03-07 ASSESSMENT — PAIN SCALES - GENERAL: PAINLEVEL: NO PAIN (0)

## 2024-03-07 NOTE — PROGRESS NOTES
Infusion Nursing Note:  Tiffanie Mcdermott presents today for C14d43 PHESGO.    Patient seen by provider today: No   present during visit today: Not Applicable.    Note: Tiffanie is feeling well today.  No complaints.  Labs reviewed and printed for Tiffanie.      Intravenous Access:  No Intravenous access/labs at this visit.    Treatment Conditions:  Lab Results   Component Value Date    HGB 14.2 07/26/2023    WBC 6.0 07/26/2023    ANEU 7.0 03/21/2022    ANEUTAUTO 3.7 07/26/2023     07/26/2023        Lab Results   Component Value Date     03/07/2024    POTASSIUM 4.1 03/07/2024    MAG 1.7 04/25/2022    CR 0.91 03/07/2024    SARAI 9.0 03/07/2024    BILITOTAL 0.4 03/07/2024    ALBUMIN 4.1 03/07/2024     (H) 03/07/2024    AST 75 (H) 03/07/2024         Post Infusion Assessment:  Patient tolerated injection without incident.       Discharge Plan:   Discharge instructions reviewed with: Patient.  Patient and/or family verbalized understanding of discharge instructions and all questions answered.  Patient discharged in stable condition accompanied by: self.  Departure Mode: Ambulatory.      Arline Sparks RN

## 2024-03-08 LAB
ALBUMIN SERPL ELPH-MCNC: 3.9 G/DL (ref 3.7–5.1)
ALPHA1 GLOB SERPL ELPH-MCNC: 0.3 G/DL (ref 0.2–0.4)
ALPHA2 GLOB SERPL ELPH-MCNC: 0.7 G/DL (ref 0.5–0.9)
ANA SER QL IF: NEGATIVE
B-GLOBULIN SERPL ELPH-MCNC: 0.8 G/DL (ref 0.6–1)
CANCER AG15-3 SERPL-ACNC: 21 U/ML
GAMMA GLOB SERPL ELPH-MCNC: 1 G/DL (ref 0.7–1.6)
HAV AB SER QL IA: NONREACTIVE
HBV SURFACE AB SERPL IA-ACNC: <3.5 M[IU]/ML
HBV SURFACE AB SERPL IA-ACNC: NONREACTIVE M[IU]/ML
HBV SURFACE AG SERPL QL IA: NONREACTIVE
LOCATION OF TASK: NORMAL
M PROTEIN SERPL ELPH-MCNC: 0 G/DL
PROT PATTERN SERPL ELPH-IMP: NORMAL
TOTAL PROTEIN SERUM FOR ELP: 6.7 G/DL (ref 6.4–8.3)
TRANSFERRIN SERPL-MCNC: 301 MG/DL (ref 200–360)

## 2024-03-09 LAB
ALDOLASE SERPL-CCNC: 5.9 U/L
SMA IGG SER-ACNC: 14 UNITS

## 2024-03-11 LAB
CERULOPLASMIN SERPL-MCNC: 20 MG/DL (ref 20–60)
TTG IGA SER-ACNC: 0.4 U/ML
TTG IGG SER-ACNC: <0.6 U/ML

## 2024-03-16 ENCOUNTER — HEALTH MAINTENANCE LETTER (OUTPATIENT)
Age: 59
End: 2024-03-16

## 2024-03-28 ENCOUNTER — INFUSION THERAPY VISIT (OUTPATIENT)
Dept: INFUSION THERAPY | Facility: CLINIC | Age: 59
End: 2024-03-28
Attending: INTERNAL MEDICINE
Payer: COMMERCIAL

## 2024-03-28 ENCOUNTER — LAB (OUTPATIENT)
Dept: LAB | Facility: CLINIC | Age: 59
End: 2024-03-28
Payer: COMMERCIAL

## 2024-03-28 VITALS
TEMPERATURE: 96.8 F | SYSTOLIC BLOOD PRESSURE: 136 MMHG | DIASTOLIC BLOOD PRESSURE: 69 MMHG | WEIGHT: 274.4 LBS | RESPIRATION RATE: 18 BRPM | OXYGEN SATURATION: 97 % | HEART RATE: 90 BPM | BODY MASS INDEX: 42.98 KG/M2

## 2024-03-28 DIAGNOSIS — C50.511 MALIGNANT NEOPLASM OF LOWER-OUTER QUADRANT OF RIGHT FEMALE BREAST, UNSPECIFIED ESTROGEN RECEPTOR STATUS (H): ICD-10-CM

## 2024-03-28 DIAGNOSIS — C50.511 MALIGNANT NEOPLASM OF LOWER-OUTER QUADRANT OF RIGHT FEMALE BREAST, UNSPECIFIED ESTROGEN RECEPTOR STATUS (H): Primary | ICD-10-CM

## 2024-03-28 LAB
ALBUMIN SERPL BCG-MCNC: 3.8 G/DL (ref 3.5–5.2)
ALP SERPL-CCNC: 122 U/L (ref 40–150)
ALT SERPL W P-5'-P-CCNC: 148 U/L (ref 0–50)
AST SERPL W P-5'-P-CCNC: 72 U/L (ref 0–45)
BILIRUB DIRECT SERPL-MCNC: <0.2 MG/DL (ref 0–0.3)
BILIRUB SERPL-MCNC: 0.5 MG/DL
PROT SERPL-MCNC: 7 G/DL (ref 6.4–8.3)

## 2024-03-28 PROCEDURE — 80076 HEPATIC FUNCTION PANEL: CPT | Performed by: INTERNAL MEDICINE

## 2024-03-28 PROCEDURE — 36415 COLL VENOUS BLD VENIPUNCTURE: CPT

## 2024-03-28 PROCEDURE — 250N000011 HC RX IP 250 OP 636: Mod: JZ | Performed by: INTERNAL MEDICINE

## 2024-03-28 PROCEDURE — 96401 CHEMO ANTI-NEOPL SQ/IM: CPT

## 2024-03-28 RX ADMIN — PERTUZUMAB, TRASTUZUMAB, AND HYALURONIDASE-ZZXF 600 MG: 600; 600; 2000 INJECTION, SOLUTION SUBCUTANEOUS at 10:45

## 2024-03-28 NOTE — PROGRESS NOTES
Infusion Nursing Note:  Tiffanie Mcdermott presents today for Phesgo injection.    Patient seen by provider today: No   present during visit today: Not Applicable.    Note: Patient ambulated to IVO. VSS. Denies pain. Ice applied to left thigh x 10-15 minutes prior to injection.      Intravenous Access:  No Intravenous access at this visit.    Treatment Conditions:  Lab Results   Component Value Date     03/07/2024    POTASSIUM 4.1 03/07/2024    MAG 1.7 04/25/2022    CR 0.91 03/07/2024    SARAI 9.0 03/07/2024    BILITOTAL 0.5 03/28/2024    ALBUMIN 3.8 03/28/2024     (H) 03/28/2024    AST 72 (H) 03/28/2024       Not Applicable.      Post Infusion Assessment:  Patient tolerated injection without incident.  Patient observed for 15 minutes post injection per protocol.       Discharge Plan:   Discharge instructions reviewed with: Patient.  Patient discharged in stable condition accompanied by: self.  Departure Mode: Ambulatory.      Alicia Mcgee RN  
no

## 2024-04-01 ENCOUNTER — TELEPHONE (OUTPATIENT)
Dept: ONCOLOGY | Facility: CLINIC | Age: 59
End: 2024-04-01
Payer: COMMERCIAL

## 2024-04-01 NOTE — TELEPHONE ENCOUNTER
Reason for Call:  Other appointment    Detailed comments: Left a message for patient to schedule a follow up with Dr. Kim - brenda virtual appointment in [place] or  or first available in person at either site     Phone Number Patient can be reached at: Home number on file [phone] (home)    Best Time: any    Can we leave a detailed message on this number? YES    Call taken on 4/1/2024 at 1:35 PM by Lucinda Arevalo

## 2024-04-03 ENCOUNTER — TELEPHONE (OUTPATIENT)
Dept: ONCOLOGY | Facility: CLINIC | Age: 59
End: 2024-04-03
Payer: COMMERCIAL

## 2024-04-03 NOTE — TELEPHONE ENCOUNTER
[facility]: Cancer Care                                                                                          Spoke with patient and offered virtual visit with Dr. Kim on 04/18/24 virtually. Patient agreeable and expressed frustration over needing to be in clinic early to complete a virtual visit. This RN provided active listening and will make note of patient requesting to do all future virtual visits at home the day before infusion or in-person with Dr. Kim. No further questions or concerns at this time.    Signature:  Radha Neal RN

## 2024-04-17 ENCOUNTER — LAB (OUTPATIENT)
Dept: LAB | Facility: CLINIC | Age: 59
End: 2024-04-17
Payer: COMMERCIAL

## 2024-04-17 DIAGNOSIS — C50.511 MALIGNANT NEOPLASM OF LOWER-OUTER QUADRANT OF RIGHT FEMALE BREAST, UNSPECIFIED ESTROGEN RECEPTOR STATUS (H): ICD-10-CM

## 2024-04-17 LAB
ALBUMIN SERPL BCG-MCNC: 4 G/DL (ref 3.5–5.2)
ALP SERPL-CCNC: 134 U/L (ref 40–150)
ALT SERPL W P-5'-P-CCNC: 174 U/L (ref 0–50)
ANION GAP SERPL CALCULATED.3IONS-SCNC: 10 MMOL/L (ref 7–15)
AST SERPL W P-5'-P-CCNC: 97 U/L (ref 0–45)
BILIRUB SERPL-MCNC: 0.5 MG/DL
BUN SERPL-MCNC: 14.8 MG/DL (ref 8–23)
CALCIUM SERPL-MCNC: 9.2 MG/DL (ref 8.8–10.2)
CHLORIDE SERPL-SCNC: 101 MMOL/L (ref 98–107)
CREAT SERPL-MCNC: 0.96 MG/DL (ref 0.51–0.95)
DEPRECATED HCO3 PLAS-SCNC: 24 MMOL/L (ref 22–29)
EGFRCR SERPLBLD CKD-EPI 2021: 67 ML/MIN/1.73M2
GLUCOSE SERPL-MCNC: 79 MG/DL (ref 70–99)
POTASSIUM SERPL-SCNC: 4.4 MMOL/L (ref 3.4–5.3)
PROT SERPL-MCNC: 7.5 G/DL (ref 6.4–8.3)
SODIUM SERPL-SCNC: 135 MMOL/L (ref 135–145)

## 2024-04-17 PROCEDURE — 80053 COMPREHEN METABOLIC PANEL: CPT | Performed by: INTERNAL MEDICINE

## 2024-04-17 PROCEDURE — 86300 IMMUNOASSAY TUMOR CA 15-3: CPT | Performed by: INTERNAL MEDICINE

## 2024-04-17 PROCEDURE — 36415 COLL VENOUS BLD VENIPUNCTURE: CPT

## 2024-04-18 ENCOUNTER — INFUSION THERAPY VISIT (OUTPATIENT)
Dept: INFUSION THERAPY | Facility: CLINIC | Age: 59
End: 2024-04-18
Attending: INTERNAL MEDICINE
Payer: COMMERCIAL

## 2024-04-18 ENCOUNTER — VIRTUAL VISIT (OUTPATIENT)
Dept: ONCOLOGY | Facility: CLINIC | Age: 59
End: 2024-04-18
Attending: INTERNAL MEDICINE
Payer: COMMERCIAL

## 2024-04-18 VITALS
HEART RATE: 94 BPM | OXYGEN SATURATION: 96 % | RESPIRATION RATE: 18 BRPM | DIASTOLIC BLOOD PRESSURE: 55 MMHG | TEMPERATURE: 97.6 F | BODY MASS INDEX: 43.27 KG/M2 | SYSTOLIC BLOOD PRESSURE: 145 MMHG | WEIGHT: 276.3 LBS

## 2024-04-18 VITALS — WEIGHT: 270 LBS | HEIGHT: 67 IN | BODY MASS INDEX: 42.38 KG/M2

## 2024-04-18 DIAGNOSIS — C50.911 HER2-POSITIVE CARCINOMA OF RIGHT BREAST (H): ICD-10-CM

## 2024-04-18 DIAGNOSIS — C50.511 MALIGNANT NEOPLASM OF LOWER-OUTER QUADRANT OF RIGHT BREAST OF FEMALE, ESTROGEN RECEPTOR NEGATIVE (H): Primary | ICD-10-CM

## 2024-04-18 DIAGNOSIS — R79.89 ABNORMAL LFTS: ICD-10-CM

## 2024-04-18 DIAGNOSIS — I42.7 DRUG-INDUCED CARDIOMYOPATHY (H): ICD-10-CM

## 2024-04-18 DIAGNOSIS — C78.7 BREAST CANCER METASTASIZED TO LIVER, LEFT (H): ICD-10-CM

## 2024-04-18 DIAGNOSIS — Z17.31 HER2-POSITIVE CARCINOMA OF RIGHT BREAST (H): ICD-10-CM

## 2024-04-18 DIAGNOSIS — C50.511 MALIGNANT NEOPLASM OF LOWER-OUTER QUADRANT OF RIGHT FEMALE BREAST, UNSPECIFIED ESTROGEN RECEPTOR STATUS (H): Primary | ICD-10-CM

## 2024-04-18 DIAGNOSIS — Z17.1 MALIGNANT NEOPLASM OF LOWER-OUTER QUADRANT OF RIGHT BREAST OF FEMALE, ESTROGEN RECEPTOR NEGATIVE (H): Primary | ICD-10-CM

## 2024-04-18 DIAGNOSIS — C50.511 MALIGNANT NEOPLASM OF LOWER-OUTER QUADRANT OF RIGHT FEMALE BREAST, UNSPECIFIED ESTROGEN RECEPTOR STATUS (H): ICD-10-CM

## 2024-04-18 DIAGNOSIS — C50.912 BREAST CANCER METASTASIZED TO LIVER, LEFT (H): ICD-10-CM

## 2024-04-18 LAB — CANCER AG15-3 SERPL-ACNC: 22 U/ML

## 2024-04-18 PROCEDURE — 96401 CHEMO ANTI-NEOPL SQ/IM: CPT

## 2024-04-18 PROCEDURE — 99215 OFFICE O/P EST HI 40 MIN: CPT | Mod: 95 | Performed by: INTERNAL MEDICINE

## 2024-04-18 PROCEDURE — 250N000011 HC RX IP 250 OP 636: Mod: JZ | Performed by: INTERNAL MEDICINE

## 2024-04-18 RX ADMIN — PERTUZUMAB, TRASTUZUMAB, AND HYALURONIDASE-ZZXF 600 MG: 600; 600; 2000 INJECTION, SOLUTION SUBCUTANEOUS at 09:42

## 2024-04-18 ASSESSMENT — PAIN SCALES - GENERAL
PAINLEVEL: NO PAIN (0)
PAINLEVEL: NO PAIN (0)

## 2024-04-18 NOTE — LETTER
2024         RE: Tiffanie Mcdermott  [address] [address] [place] [phone]        Dear Colleague,    Thank you for referring your patient, Tiffanie Mcdermott, to the [facility] [place]. Please see a copy of my visit note below.    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  # Mar 2022 - May 2022 Taxol / Herceptin / Perjeta; near-CR.  # May 2022 - ongoing Herceptin / Perjeta; CR.    Continue Herceptin/Perjeta subcutaneous; planning indefinite use pending continued response to therapy.  Transaminitis of unclear etiology; continuing to monitor; cirrhotic appearing liver 2/2 cancer.  Planning cardiac echo monitoring every 6 months.  Planning PET scan monitoring every 6 months.  Chemistries otherwise stable; tumor marker within normal limits.    Adiel Kim MD    --------------------    Interval History:  Tiffanie returns for follow-up of breast cancer unaccompanied.  All in all, she is doing well.  Continues to tolerate treatments well and without issues.  No nausea, vomiting.  No abdominal pain.  Some loose stools tolerated.    Social History:  Social History     Tobacco Use     Smoking status: Never     Smokeless tobacco: Never     Tobacco comments:     no smoking in the home   Substance Use Topics     Alcohol use: Yes     Alcohol/week: 1.7 standard drinks of alcohol     Family History:  Family History   Problem Relation Age of Onset     Allergies Mother      Anesthesia Reaction Mother         vomiting     Lipids Mother      Gastrointestinal Disease Mother      Heart Disease Maternal Grandmother      Hypertension Maternal Grandmother       Gastrointestinal Disease Maternal Grandmother         cholesystectyomy     Alcohol/Drug Maternal Grandfather      Allergies Sister      Immunizations:  Immunization History   Administered Date(s) Administered     COVID-19 MONOVALENT 12+ (Pfizer) 04/28/2021, 05/19/2021     Influenza (IIV3) PF 11/13/2003, 12/16/2005, 11/14/2006, 12/19/2007, 12/22/2008     Influenza Vaccine 18-64 (Flublok) 02/13/2019, 12/18/2019, 11/10/2022     Influenza Vaccine >6 months,quad, PF 11/09/2016     Influenza Vaccine, 6+MO IM (QUADRIVALENT W/PRESERVATIVES) 02/05/2018     MMR 03/12/1993     TD,PF 7+ (Tenivac) 06/24/1996, 12/08/2006     Health Maintenance Due   Topic Date Due     Pneumococcal Vaccine: Pediatrics (0 to 5 Years) and At-Risk Patients (6 to 64 Years) (1 of 2 - PCV) Never done     ZOSTER IMMUNIZATION (1 of 2) Never done     DTAP/TDAP/TD IMMUNIZATION (1 - Tdap) 12/09/2006     COVID-19 Vaccine (3 - Pfizer risk series) 06/16/2021     INFLUENZA VACCINE (1) 09/01/2023     --------------------    Physical Exam:  Video visit.    Labs / Imaging:  Reviewed CBC, CMP, .    Video Visit:  Tiffanie is a 59 year old female who is being evaluated via a billable video visit.  }    Video start time:  8:34 AM  Video end time:  8:56 AM  Provider location: [facility]  Patient location: Home  Mode of transmission:  Qlibri / Peeky.      Again, thank you for allowing me to participate in the care of your patient.        Sincerely,        Adiel Kim MD

## 2024-04-18 NOTE — LETTER
2024         RE: Tiffanie Mcdermott  [address] [address] [place] [id number]        Dear Colleague,    Thank you for referring your patient, Tiffanie Mcdermott, to the [facility] [place]. Please see a copy of my visit note below.    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  # Mar 2022 - May 2022 Taxol / Herceptin / Perjeta; near-CR.  # May 2022 - ongoing Herceptin / Perjeta; CR.    Continue Herceptin/Perjeta subcutaneous; planning indefinite use pending continued response to therapy.  Transaminitis of unclear etiology; continuing to monitor; cirrhotic appearing liver 2/2 cancer.  Planning cardiac echo monitoring every 6 months.  Planning PET scan monitoring every 6 months.  Chemistries otherwise stable; tumor marker within normal limits.    Adiel Kim MD    --------------------    Interval History:  Tiffanie returns for follow-up of breast cancer unaccompanied.  All in all, she is doing well.  Continues to tolerate treatments well and without issues.  No nausea, vomiting.  No abdominal pain.  Some loose stools tolerated.    Social History:  Social History     Tobacco Use     Smoking status: Never     Smokeless tobacco: Never     Tobacco comments:     no smoking in the home   Substance Use Topics     Alcohol use: Yes     Alcohol/week: 1.7 standard drinks of alcohol     Family History:  Family History   Problem Relation Age of Onset     Allergies Mother      Anesthesia Reaction Mother         vomiting     Lipids Mother      Gastrointestinal Disease Mother      Heart Disease Maternal Grandmother      Hypertension Maternal Grandmother       Gastrointestinal Disease Maternal Grandmother         cholesystectyomy     Alcohol/Drug Maternal Grandfather      Allergies Sister      Immunizations:  Immunization History   Administered Date(s) Administered     COVID-19 MONOVALENT 12+ (Pfizer) 04/28/2021, 05/19/2021     Influenza (IIV3) PF 11/13/2003, 12/16/2005, 11/14/2006, 12/19/2007, 12/22/2008     Influenza Vaccine 18-64 (Flublok) 02/13/2019, 12/18/2019, 11/10/2022     Influenza Vaccine >6 months,quad, PF 11/09/2016     Influenza Vaccine, 6+MO IM (QUADRIVALENT W/PRESERVATIVES) 02/05/2018     MMR 03/12/1993     TD,PF 7+ (Tenivac) 06/24/1996, 12/08/2006     Health Maintenance Due   Topic Date Due     Pneumococcal Vaccine: Pediatrics (0 to 5 Years) and At-Risk Patients (6 to 64 Years) (1 of 2 - PCV) Never done     ZOSTER IMMUNIZATION (1 of 2) Never done     DTAP/TDAP/TD IMMUNIZATION (1 - Tdap) 12/09/2006     COVID-19 Vaccine (3 - Pfizer risk series) 06/16/2021     INFLUENZA VACCINE (1) 09/01/2023     --------------------    Physical Exam:  Video visit.    Labs / Imaging:  Reviewed CBC, CMP, .    Video Visit:  Tiffanie is a 59 year old female who is being evaluated via a billable video visit.  }    Video start time:  8:34 AM  Video end time:  8:56 AM  Provider location: [facility]  Patient location: Home  Mode of transmission:  [employer] / [employer].      Again, thank you for allowing me to participate in the care of your patient.        Sincerely,        Adiel Kim MD

## 2024-04-18 NOTE — PROGRESS NOTES
"Virtual Visit Details    Type of service:  Video Visit     Originating Location (pt. Location): {video visit patient location:918513::\"Home\"}  {PROVIDER LOCATION On-site should be selected for visits conducted from your clinic location or adjoining [facility] hospital, academic office, or other nearby [facility] building. Off-site should be selected for all other provider locations, including home:[id number]}  Distant Location (provider location):  {virtual location provider:039351}  Platform used for Video Visit: {Virtual Visit Platforms:835079::\"[employer]\"}  "

## 2024-04-18 NOTE — PROGRESS NOTES
Infusion Nursing Note:  Tiffanie Mcdermott presents today for Phesgo injection in RIGHT leg.  Patient seen by provider today: Yes: Virtual with Julio.   present during visit today: Not Applicable.    Note: Doing well. Allergies have been acting up lately. Ice pack applied to right leg prior to injection.      Intravenous Access:  No Intravenous access/labs at this visit.    Treatment Conditions:  Not Applicable.      Post Infusion Assessment:  Patient tolerated injection without incident.       Discharge Plan:   Patient discharged in stable condition accompanied by: self.  Departure Mode: Ambulatory.      Drea Haynes RN

## 2024-04-18 NOTE — NURSING NOTE
Is the patient currently in the state of [place]? YES    Visit mode:VIDEO    If the visit is dropped, the patient can be reconnected by: VIDEO VISIT: Text to cell phone:   Telephone Information:   Mobile [phone]       Will anyone else be joining the visit? NO  (If patient encounters technical issues they should call [phone]    How would you like to obtain your AVS? MyChart    Are changes needed to the allergy or medication list? Pt stated no changes to allergies and Pt stated no med changes    Are refills needed on medications prescribed by this physician? NO    Reason for visit: RECHECK    Linda HERNANDEZ

## 2024-04-26 RX ORDER — ALBUTEROL SULFATE 0.83 MG/ML
2.5 SOLUTION RESPIRATORY (INHALATION)
OUTPATIENT
Start: 2024-11-17

## 2024-04-26 RX ORDER — ALBUTEROL SULFATE 0.83 MG/ML
2.5 SOLUTION RESPIRATORY (INHALATION)
Status: CANCELLED | OUTPATIENT
Start: 2024-08-03

## 2024-04-26 RX ORDER — METHYLPREDNISOLONE SODIUM SUCCINATE 125 MG/2ML
125 INJECTION, POWDER, LYOPHILIZED, FOR SOLUTION INTRAMUSCULAR; INTRAVENOUS
Start: 2024-11-17

## 2024-04-26 RX ORDER — LORAZEPAM 2 MG/ML
0.5 INJECTION INTRAMUSCULAR EVERY 4 HOURS PRN
Status: CANCELLED | OUTPATIENT
Start: 2024-08-24

## 2024-04-26 RX ORDER — DIPHENHYDRAMINE HCL 25 MG
50 CAPSULE ORAL
Status: CANCELLED | OUTPATIENT
Start: 2024-08-03

## 2024-04-26 RX ORDER — LORAZEPAM 2 MG/ML
0.5 INJECTION INTRAMUSCULAR EVERY 4 HOURS PRN
OUTPATIENT
Start: 2024-12-08

## 2024-04-26 RX ORDER — EPINEPHRINE 1 MG/ML
0.3 INJECTION, SOLUTION INTRAMUSCULAR; SUBCUTANEOUS EVERY 5 MIN PRN
OUTPATIENT
Start: 2024-12-08

## 2024-04-26 RX ORDER — DIPHENHYDRAMINE HCL 25 MG
50 CAPSULE ORAL
Status: CANCELLED | OUTPATIENT
Start: 2024-08-24

## 2024-04-26 RX ORDER — NALOXONE HYDROCHLORIDE 0.4 MG/ML
0.2 INJECTION, SOLUTION INTRAMUSCULAR; INTRAVENOUS; SUBCUTANEOUS
OUTPATIENT
Start: 2024-12-08

## 2024-04-26 RX ORDER — MEPERIDINE HYDROCHLORIDE 25 MG/ML
25 INJECTION INTRAMUSCULAR; INTRAVENOUS; SUBCUTANEOUS EVERY 30 MIN PRN
Status: CANCELLED | OUTPATIENT
Start: 2024-08-24

## 2024-04-26 RX ORDER — DIPHENHYDRAMINE HCL 25 MG
50 CAPSULE ORAL
OUTPATIENT
Start: 2024-10-27

## 2024-04-26 RX ORDER — EPINEPHRINE 1 MG/ML
0.3 INJECTION, SOLUTION INTRAMUSCULAR; SUBCUTANEOUS EVERY 5 MIN PRN
Status: CANCELLED | OUTPATIENT
Start: 2024-08-24

## 2024-04-26 RX ORDER — MEPERIDINE HYDROCHLORIDE 25 MG/ML
25 INJECTION INTRAMUSCULAR; INTRAVENOUS; SUBCUTANEOUS EVERY 30 MIN PRN
Status: CANCELLED | OUTPATIENT
Start: 2024-09-14

## 2024-04-26 RX ORDER — METHYLPREDNISOLONE SODIUM SUCCINATE 125 MG/2ML
125 INJECTION, POWDER, LYOPHILIZED, FOR SOLUTION INTRAMUSCULAR; INTRAVENOUS
Status: CANCELLED
Start: 2024-08-24

## 2024-04-26 RX ORDER — ACETAMINOPHEN 325 MG/1
650 TABLET ORAL
Status: CANCELLED | OUTPATIENT
Start: 2024-09-14

## 2024-04-26 RX ORDER — MEPERIDINE HYDROCHLORIDE 25 MG/ML
25 INJECTION INTRAMUSCULAR; INTRAVENOUS; SUBCUTANEOUS EVERY 30 MIN PRN
OUTPATIENT
Start: 2024-11-17

## 2024-04-26 RX ORDER — DIPHENHYDRAMINE HCL 25 MG
50 CAPSULE ORAL
Status: CANCELLED | OUTPATIENT
Start: 2024-07-13

## 2024-04-26 RX ORDER — ALBUTEROL SULFATE 0.83 MG/ML
2.5 SOLUTION RESPIRATORY (INHALATION)
Status: CANCELLED | OUTPATIENT
Start: 2024-10-06

## 2024-04-26 RX ORDER — HEPARIN SODIUM (PORCINE) LOCK FLUSH IV SOLN 100 UNIT/ML 100 UNIT/ML
5 SOLUTION INTRAVENOUS
Status: CANCELLED | OUTPATIENT
Start: 2024-10-06

## 2024-04-26 RX ORDER — NALOXONE HYDROCHLORIDE 0.4 MG/ML
0.2 INJECTION, SOLUTION INTRAMUSCULAR; INTRAVENOUS; SUBCUTANEOUS
Status: CANCELLED | OUTPATIENT
Start: 2024-07-13

## 2024-04-26 RX ORDER — ACETAMINOPHEN 325 MG/1
650 TABLET ORAL
OUTPATIENT
Start: 2024-11-17

## 2024-04-26 RX ORDER — DIPHENHYDRAMINE HYDROCHLORIDE 50 MG/ML
50 INJECTION INTRAMUSCULAR; INTRAVENOUS
Start: 2024-10-27

## 2024-04-26 RX ORDER — HEPARIN SODIUM,PORCINE 10 UNIT/ML
5 VIAL (ML) INTRAVENOUS
Status: CANCELLED | OUTPATIENT
Start: 2024-08-24

## 2024-04-26 RX ORDER — DIPHENHYDRAMINE HYDROCHLORIDE 50 MG/ML
50 INJECTION INTRAMUSCULAR; INTRAVENOUS
Status: CANCELLED
Start: 2024-08-03

## 2024-04-26 RX ORDER — METHYLPREDNISOLONE SODIUM SUCCINATE 125 MG/2ML
125 INJECTION, POWDER, LYOPHILIZED, FOR SOLUTION INTRAMUSCULAR; INTRAVENOUS
Start: 2024-10-27

## 2024-04-26 RX ORDER — ACETAMINOPHEN 325 MG/1
650 TABLET ORAL
Status: CANCELLED | OUTPATIENT
Start: 2024-08-03

## 2024-04-26 RX ORDER — HEPARIN SODIUM,PORCINE 10 UNIT/ML
5 VIAL (ML) INTRAVENOUS
OUTPATIENT
Start: 2024-12-08

## 2024-04-26 RX ORDER — DIPHENHYDRAMINE HYDROCHLORIDE 50 MG/ML
50 INJECTION INTRAMUSCULAR; INTRAVENOUS
Status: CANCELLED
Start: 2024-08-24

## 2024-04-26 RX ORDER — METHYLPREDNISOLONE SODIUM SUCCINATE 125 MG/2ML
125 INJECTION, POWDER, LYOPHILIZED, FOR SOLUTION INTRAMUSCULAR; INTRAVENOUS
Status: CANCELLED
Start: 2024-09-14

## 2024-04-26 RX ORDER — METHYLPREDNISOLONE SODIUM SUCCINATE 125 MG/2ML
125 INJECTION, POWDER, LYOPHILIZED, FOR SOLUTION INTRAMUSCULAR; INTRAVENOUS
Status: CANCELLED
Start: 2024-08-03

## 2024-04-26 RX ORDER — ALBUTEROL SULFATE 0.83 MG/ML
2.5 SOLUTION RESPIRATORY (INHALATION)
OUTPATIENT
Start: 2024-10-27

## 2024-04-26 RX ORDER — HEPARIN SODIUM,PORCINE 10 UNIT/ML
5 VIAL (ML) INTRAVENOUS
Status: CANCELLED | OUTPATIENT
Start: 2024-07-13

## 2024-04-26 RX ORDER — NALOXONE HYDROCHLORIDE 0.4 MG/ML
0.2 INJECTION, SOLUTION INTRAMUSCULAR; INTRAVENOUS; SUBCUTANEOUS
Status: CANCELLED | OUTPATIENT
Start: 2024-08-24

## 2024-04-26 RX ORDER — METHYLPREDNISOLONE SODIUM SUCCINATE 125 MG/2ML
125 INJECTION, POWDER, LYOPHILIZED, FOR SOLUTION INTRAMUSCULAR; INTRAVENOUS
Status: CANCELLED
Start: 2024-07-13

## 2024-04-26 RX ORDER — LORAZEPAM 2 MG/ML
0.5 INJECTION INTRAMUSCULAR EVERY 4 HOURS PRN
OUTPATIENT
Start: 2024-10-27

## 2024-04-26 RX ORDER — HEPARIN SODIUM (PORCINE) LOCK FLUSH IV SOLN 100 UNIT/ML 100 UNIT/ML
5 SOLUTION INTRAVENOUS
OUTPATIENT
Start: 2024-12-08

## 2024-04-26 RX ORDER — LORAZEPAM 2 MG/ML
0.5 INJECTION INTRAMUSCULAR EVERY 4 HOURS PRN
Status: CANCELLED | OUTPATIENT
Start: 2024-07-13

## 2024-04-26 RX ORDER — ACETAMINOPHEN 325 MG/1
650 TABLET ORAL
OUTPATIENT
Start: 2024-12-08

## 2024-04-26 RX ORDER — LORAZEPAM 2 MG/ML
0.5 INJECTION INTRAMUSCULAR EVERY 4 HOURS PRN
Status: CANCELLED | OUTPATIENT
Start: 2024-08-03

## 2024-04-26 RX ORDER — ALBUTEROL SULFATE 0.83 MG/ML
2.5 SOLUTION RESPIRATORY (INHALATION)
Status: CANCELLED | OUTPATIENT
Start: 2024-08-24

## 2024-04-26 RX ORDER — DIPHENHYDRAMINE HCL 25 MG
50 CAPSULE ORAL
OUTPATIENT
Start: 2024-12-08

## 2024-04-26 RX ORDER — HEPARIN SODIUM (PORCINE) LOCK FLUSH IV SOLN 100 UNIT/ML 100 UNIT/ML
5 SOLUTION INTRAVENOUS
Status: CANCELLED | OUTPATIENT
Start: 2024-07-13

## 2024-04-26 RX ORDER — METHYLPREDNISOLONE SODIUM SUCCINATE 125 MG/2ML
125 INJECTION, POWDER, LYOPHILIZED, FOR SOLUTION INTRAMUSCULAR; INTRAVENOUS
Status: CANCELLED
Start: 2024-10-06

## 2024-04-26 RX ORDER — MEPERIDINE HYDROCHLORIDE 25 MG/ML
25 INJECTION INTRAMUSCULAR; INTRAVENOUS; SUBCUTANEOUS EVERY 30 MIN PRN
Status: CANCELLED | OUTPATIENT
Start: 2024-08-03

## 2024-04-26 RX ORDER — NALOXONE HYDROCHLORIDE 0.4 MG/ML
0.2 INJECTION, SOLUTION INTRAMUSCULAR; INTRAVENOUS; SUBCUTANEOUS
Status: CANCELLED | OUTPATIENT
Start: 2024-08-03

## 2024-04-26 RX ORDER — DIPHENHYDRAMINE HCL 25 MG
50 CAPSULE ORAL
OUTPATIENT
Start: 2024-11-17

## 2024-04-26 RX ORDER — ALBUTEROL SULFATE 0.83 MG/ML
2.5 SOLUTION RESPIRATORY (INHALATION)
Status: CANCELLED | OUTPATIENT
Start: 2024-07-13

## 2024-04-26 RX ORDER — DIPHENHYDRAMINE HYDROCHLORIDE 50 MG/ML
50 INJECTION INTRAMUSCULAR; INTRAVENOUS
Start: 2024-11-17

## 2024-04-26 RX ORDER — ALBUTEROL SULFATE 90 UG/1
1-2 AEROSOL, METERED RESPIRATORY (INHALATION)
Start: 2024-11-17

## 2024-04-26 RX ORDER — ALBUTEROL SULFATE 90 UG/1
1-2 AEROSOL, METERED RESPIRATORY (INHALATION)
Start: 2024-10-27

## 2024-04-26 RX ORDER — HEPARIN SODIUM,PORCINE 10 UNIT/ML
5 VIAL (ML) INTRAVENOUS
Status: CANCELLED | OUTPATIENT
Start: 2024-09-14

## 2024-04-26 RX ORDER — HEPARIN SODIUM (PORCINE) LOCK FLUSH IV SOLN 100 UNIT/ML 100 UNIT/ML
5 SOLUTION INTRAVENOUS
Status: CANCELLED | OUTPATIENT
Start: 2024-08-03

## 2024-04-26 RX ORDER — ALBUTEROL SULFATE 0.83 MG/ML
2.5 SOLUTION RESPIRATORY (INHALATION)
OUTPATIENT
Start: 2024-12-08

## 2024-04-26 RX ORDER — ACETAMINOPHEN 325 MG/1
650 TABLET ORAL
Status: CANCELLED | OUTPATIENT
Start: 2024-10-06

## 2024-04-26 RX ORDER — EPINEPHRINE 1 MG/ML
0.3 INJECTION, SOLUTION INTRAMUSCULAR; SUBCUTANEOUS EVERY 5 MIN PRN
Status: CANCELLED | OUTPATIENT
Start: 2024-07-13

## 2024-04-26 RX ORDER — ACETAMINOPHEN 325 MG/1
650 TABLET ORAL
Status: CANCELLED | OUTPATIENT
Start: 2024-07-13

## 2024-04-26 RX ORDER — EPINEPHRINE 1 MG/ML
0.3 INJECTION, SOLUTION INTRAMUSCULAR; SUBCUTANEOUS EVERY 5 MIN PRN
Status: CANCELLED | OUTPATIENT
Start: 2024-09-14

## 2024-04-26 RX ORDER — LORAZEPAM 2 MG/ML
0.5 INJECTION INTRAMUSCULAR EVERY 4 HOURS PRN
Status: CANCELLED | OUTPATIENT
Start: 2024-10-06

## 2024-04-26 RX ORDER — DIPHENHYDRAMINE HYDROCHLORIDE 50 MG/ML
50 INJECTION INTRAMUSCULAR; INTRAVENOUS
Status: CANCELLED
Start: 2024-09-14

## 2024-04-26 RX ORDER — ALBUTEROL SULFATE 0.83 MG/ML
2.5 SOLUTION RESPIRATORY (INHALATION)
Status: CANCELLED | OUTPATIENT
Start: 2024-09-14

## 2024-04-26 RX ORDER — LORAZEPAM 2 MG/ML
0.5 INJECTION INTRAMUSCULAR EVERY 4 HOURS PRN
Status: CANCELLED | OUTPATIENT
Start: 2024-09-14

## 2024-04-26 RX ORDER — EPINEPHRINE 1 MG/ML
0.3 INJECTION, SOLUTION INTRAMUSCULAR; SUBCUTANEOUS EVERY 5 MIN PRN
OUTPATIENT
Start: 2024-11-17

## 2024-04-26 RX ORDER — ALBUTEROL SULFATE 90 UG/1
1-2 AEROSOL, METERED RESPIRATORY (INHALATION)
Start: 2024-12-08

## 2024-04-26 RX ORDER — DIPHENHYDRAMINE HCL 25 MG
50 CAPSULE ORAL
Status: CANCELLED | OUTPATIENT
Start: 2024-09-14

## 2024-04-26 RX ORDER — MEPERIDINE HYDROCHLORIDE 25 MG/ML
25 INJECTION INTRAMUSCULAR; INTRAVENOUS; SUBCUTANEOUS EVERY 30 MIN PRN
Status: CANCELLED | OUTPATIENT
Start: 2024-07-13

## 2024-04-26 RX ORDER — HEPARIN SODIUM,PORCINE 10 UNIT/ML
5 VIAL (ML) INTRAVENOUS
OUTPATIENT
Start: 2024-10-27

## 2024-04-26 RX ORDER — DIPHENHYDRAMINE HYDROCHLORIDE 50 MG/ML
50 INJECTION INTRAMUSCULAR; INTRAVENOUS
Status: CANCELLED
Start: 2024-07-13

## 2024-04-26 RX ORDER — ACETAMINOPHEN 325 MG/1
650 TABLET ORAL
OUTPATIENT
Start: 2024-10-27

## 2024-04-26 RX ORDER — NALOXONE HYDROCHLORIDE 0.4 MG/ML
0.2 INJECTION, SOLUTION INTRAMUSCULAR; INTRAVENOUS; SUBCUTANEOUS
OUTPATIENT
Start: 2024-10-27

## 2024-04-26 RX ORDER — ALBUTEROL SULFATE 90 UG/1
1-2 AEROSOL, METERED RESPIRATORY (INHALATION)
Status: CANCELLED
Start: 2024-08-03

## 2024-04-26 RX ORDER — HEPARIN SODIUM,PORCINE 10 UNIT/ML
5 VIAL (ML) INTRAVENOUS
OUTPATIENT
Start: 2024-11-17

## 2024-04-26 RX ORDER — EPINEPHRINE 1 MG/ML
0.3 INJECTION, SOLUTION INTRAMUSCULAR; SUBCUTANEOUS EVERY 5 MIN PRN
OUTPATIENT
Start: 2024-10-27

## 2024-04-26 RX ORDER — DIPHENHYDRAMINE HYDROCHLORIDE 50 MG/ML
50 INJECTION INTRAMUSCULAR; INTRAVENOUS
Status: CANCELLED
Start: 2024-10-06

## 2024-04-26 RX ORDER — HEPARIN SODIUM,PORCINE 10 UNIT/ML
5 VIAL (ML) INTRAVENOUS
Status: CANCELLED | OUTPATIENT
Start: 2024-10-06

## 2024-04-26 RX ORDER — NALOXONE HYDROCHLORIDE 0.4 MG/ML
0.2 INJECTION, SOLUTION INTRAMUSCULAR; INTRAVENOUS; SUBCUTANEOUS
Status: CANCELLED | OUTPATIENT
Start: 2024-09-14

## 2024-04-26 RX ORDER — EPINEPHRINE 1 MG/ML
0.3 INJECTION, SOLUTION INTRAMUSCULAR; SUBCUTANEOUS EVERY 5 MIN PRN
Status: CANCELLED | OUTPATIENT
Start: 2024-10-06

## 2024-04-26 RX ORDER — MEPERIDINE HYDROCHLORIDE 25 MG/ML
25 INJECTION INTRAMUSCULAR; INTRAVENOUS; SUBCUTANEOUS EVERY 30 MIN PRN
Status: CANCELLED | OUTPATIENT
Start: 2024-10-06

## 2024-04-26 RX ORDER — ALBUTEROL SULFATE 90 UG/1
1-2 AEROSOL, METERED RESPIRATORY (INHALATION)
Status: CANCELLED
Start: 2024-08-24

## 2024-04-26 RX ORDER — HEPARIN SODIUM (PORCINE) LOCK FLUSH IV SOLN 100 UNIT/ML 100 UNIT/ML
5 SOLUTION INTRAVENOUS
OUTPATIENT
Start: 2024-10-27

## 2024-04-26 RX ORDER — ALBUTEROL SULFATE 90 UG/1
1-2 AEROSOL, METERED RESPIRATORY (INHALATION)
Status: CANCELLED
Start: 2024-07-13

## 2024-04-26 RX ORDER — ALBUTEROL SULFATE 90 UG/1
1-2 AEROSOL, METERED RESPIRATORY (INHALATION)
Status: CANCELLED
Start: 2024-09-14

## 2024-04-26 RX ORDER — EPINEPHRINE 1 MG/ML
0.3 INJECTION, SOLUTION INTRAMUSCULAR; SUBCUTANEOUS EVERY 5 MIN PRN
Status: CANCELLED | OUTPATIENT
Start: 2024-08-03

## 2024-04-26 RX ORDER — HEPARIN SODIUM (PORCINE) LOCK FLUSH IV SOLN 100 UNIT/ML 100 UNIT/ML
5 SOLUTION INTRAVENOUS
OUTPATIENT
Start: 2024-11-17

## 2024-04-26 RX ORDER — MEPERIDINE HYDROCHLORIDE 25 MG/ML
25 INJECTION INTRAMUSCULAR; INTRAVENOUS; SUBCUTANEOUS EVERY 30 MIN PRN
OUTPATIENT
Start: 2024-10-27

## 2024-04-26 RX ORDER — HEPARIN SODIUM,PORCINE 10 UNIT/ML
5 VIAL (ML) INTRAVENOUS
Status: CANCELLED | OUTPATIENT
Start: 2024-08-03

## 2024-04-26 RX ORDER — MEPERIDINE HYDROCHLORIDE 25 MG/ML
25 INJECTION INTRAMUSCULAR; INTRAVENOUS; SUBCUTANEOUS EVERY 30 MIN PRN
OUTPATIENT
Start: 2024-12-08

## 2024-04-26 RX ORDER — NALOXONE HYDROCHLORIDE 0.4 MG/ML
0.2 INJECTION, SOLUTION INTRAMUSCULAR; INTRAVENOUS; SUBCUTANEOUS
OUTPATIENT
Start: 2024-11-17

## 2024-04-26 RX ORDER — ALBUTEROL SULFATE 90 UG/1
1-2 AEROSOL, METERED RESPIRATORY (INHALATION)
Status: CANCELLED
Start: 2024-10-06

## 2024-04-26 RX ORDER — DIPHENHYDRAMINE HCL 25 MG
50 CAPSULE ORAL
Status: CANCELLED | OUTPATIENT
Start: 2024-10-06

## 2024-04-26 RX ORDER — ACETAMINOPHEN 325 MG/1
650 TABLET ORAL
Status: CANCELLED | OUTPATIENT
Start: 2024-08-24

## 2024-04-26 RX ORDER — LORAZEPAM 2 MG/ML
0.5 INJECTION INTRAMUSCULAR EVERY 4 HOURS PRN
OUTPATIENT
Start: 2024-11-17

## 2024-04-26 RX ORDER — NALOXONE HYDROCHLORIDE 0.4 MG/ML
0.2 INJECTION, SOLUTION INTRAMUSCULAR; INTRAVENOUS; SUBCUTANEOUS
Status: CANCELLED | OUTPATIENT
Start: 2024-10-06

## 2024-04-26 RX ORDER — DIPHENHYDRAMINE HYDROCHLORIDE 50 MG/ML
50 INJECTION INTRAMUSCULAR; INTRAVENOUS
Start: 2024-12-08

## 2024-04-26 RX ORDER — METHYLPREDNISOLONE SODIUM SUCCINATE 125 MG/2ML
125 INJECTION, POWDER, LYOPHILIZED, FOR SOLUTION INTRAMUSCULAR; INTRAVENOUS
Start: 2024-12-08

## 2024-04-26 RX ORDER — HEPARIN SODIUM (PORCINE) LOCK FLUSH IV SOLN 100 UNIT/ML 100 UNIT/ML
5 SOLUTION INTRAVENOUS
Status: CANCELLED | OUTPATIENT
Start: 2024-09-14

## 2024-04-26 RX ORDER — HEPARIN SODIUM (PORCINE) LOCK FLUSH IV SOLN 100 UNIT/ML 100 UNIT/ML
5 SOLUTION INTRAVENOUS
Status: CANCELLED | OUTPATIENT
Start: 2024-08-24

## 2024-04-26 NOTE — PATIENT INSTRUCTIONS
1) Treatments through BJT follow-up.  2) ECHO July.  3) PET September.  4) QUYEN WY 3 months in person w/ treatment.  5) BJT W[place] 6 months in person w/ treatment.    Adiel Kim MD.

## 2024-04-26 NOTE — PROGRESS NOTES
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  # Mar 2022 - May 2022 Taxol / Herceptin / Perjeta; near-CR.  # May 2022 - ongoing Herceptin / Perjeta; CR.    Continue Herceptin/Perjeta subcutaneous; planning indefinite use pending continued response to therapy.  Transaminitis of unclear etiology; continuing to monitor; cirrhotic appearing liver 2/2 cancer.  Planning cardiac echo monitoring every 6 months.  Planning PET scan monitoring every 6 months.  Chemistries otherwise stable; tumor marker within normal limits.    Adiel Kim MD    --------------------    Interval History:  Tiffanie returns for follow-up of breast cancer unaccompanied.  All in all, she is doing well.  Continues to tolerate treatments well and without issues.  No nausea, vomiting.  No abdominal pain.  Some loose stools tolerated.    Social History:  Social History     Tobacco Use    Smoking status: Never    Smokeless tobacco: Never    Tobacco comments:     no smoking in the home   Substance Use Topics    Alcohol use: Yes     Alcohol/week: 1.7 standard drinks of alcohol     Family History:  Family History   Problem Relation Age of Onset    Allergies Mother     Anesthesia Reaction Mother         vomiting    Lipids Mother     Gastrointestinal Disease Mother     Heart Disease Maternal Grandmother     Hypertension Maternal Grandmother     Gastrointestinal Disease Maternal Grandmother         cholesystectyomy    Alcohol/Drug Maternal Grandfather     Allergies Sister      Immunizations:  Immunization History   Administered Date(s) Administered    COVID-19 MONOVALENT 12+ (Pfizer) 2021, 2021    Influenza (IIV3) PF  11/13/2003, 12/16/2005, 11/14/2006, 12/19/2007, 12/22/2008    Influenza Vaccine 18-64 (Flublok) 02/13/2019, 12/18/2019, 11/10/2022    Influenza Vaccine >6 months,quad, PF 11/09/2016    Influenza Vaccine, 6+MO IM (QUADRIVALENT W/PRESERVATIVES) 02/05/2018    MMR 03/12/1993    TD,PF 7+ (Tenivac) 06/24/1996, 12/08/2006     Health Maintenance Due   Topic Date Due    Pneumococcal Vaccine: Pediatrics (0 to 5 Years) and At-Risk Patients (6 to 64 Years) (1 of 2 - PCV) Never done    ZOSTER IMMUNIZATION (1 of 2) Never done    DTAP/TDAP/TD IMMUNIZATION (1 - Tdap) 12/09/2006    COVID-19 Vaccine (3 - Pfizer risk series) 06/16/2021    INFLUENZA VACCINE (1) 09/01/2023     --------------------    Physical Exam:  Video visit.    Labs / Imaging:  Reviewed CBC, CMP, .    Video Visit:  Tiffanie is a 59 year old female who is being evaluated via a billable video visit.  }    Video start time:  8:34 AM  Video end time:  8:56 AM  Provider location: [facility]  Patient location: Home  Mode of transmission:  [employer] / [employer].

## 2024-05-08 ENCOUNTER — HOSPITAL ENCOUNTER (OUTPATIENT)
Dept: CARDIOLOGY | Facility: CLINIC | Age: 59
Discharge: HOME | End: 2024-05-08
Attending: INTERNAL MEDICINE | Admitting: INTERNAL MEDICINE
Payer: COMMERCIAL

## 2024-05-08 DIAGNOSIS — C50.511 MALIGNANT NEOPLASM OF LOWER-OUTER QUADRANT OF RIGHT FEMALE BREAST, UNSPECIFIED ESTROGEN RECEPTOR STATUS (H): ICD-10-CM

## 2024-05-08 DIAGNOSIS — I42.7 DRUG-INDUCED CARDIOMYOPATHY (H): ICD-10-CM

## 2024-05-08 LAB — BI-PLANE LVEF ECHO: NORMAL

## 2024-05-08 PROCEDURE — 93321 DOPPLER ECHO F-UP/LMTD STD: CPT | Mod: 26 | Performed by: INTERNAL MEDICINE

## 2024-05-08 PROCEDURE — 93325 DOPPLER ECHO COLOR FLOW MAPG: CPT | Mod: 26 | Performed by: INTERNAL MEDICINE

## 2024-05-08 PROCEDURE — 93356 MYOCRD STRAIN IMG SPCKL TRCK: CPT | Performed by: INTERNAL MEDICINE

## 2024-05-08 PROCEDURE — 93325 DOPPLER ECHO COLOR FLOW MAPG: CPT

## 2024-05-08 PROCEDURE — 93308 TTE F-UP OR LMTD: CPT | Mod: 26 | Performed by: INTERNAL MEDICINE

## 2024-05-09 ENCOUNTER — INFUSION THERAPY VISIT (OUTPATIENT)
Dept: INFUSION THERAPY | Facility: CLINIC | Age: 59
End: 2024-05-09
Attending: INTERNAL MEDICINE
Payer: COMMERCIAL

## 2024-05-09 VITALS
BODY MASS INDEX: 43.18 KG/M2 | HEART RATE: 90 BPM | TEMPERATURE: 98.4 F | OXYGEN SATURATION: 95 % | SYSTOLIC BLOOD PRESSURE: 128 MMHG | WEIGHT: 275.7 LBS | DIASTOLIC BLOOD PRESSURE: 75 MMHG | RESPIRATION RATE: 18 BRPM

## 2024-05-09 DIAGNOSIS — C50.511 MALIGNANT NEOPLASM OF LOWER-OUTER QUADRANT OF RIGHT FEMALE BREAST, UNSPECIFIED ESTROGEN RECEPTOR STATUS (H): Primary | ICD-10-CM

## 2024-05-09 PROCEDURE — 250N000011 HC RX IP 250 OP 636: Mod: JZ | Performed by: INTERNAL MEDICINE

## 2024-05-09 PROCEDURE — 96401 CHEMO ANTI-NEOPL SQ/IM: CPT

## 2024-05-09 RX ORDER — FLUTICASONE PROPIONATE 50 MCG
1 SPRAY, SUSPENSION (ML) NASAL DAILY
COMMUNITY

## 2024-05-09 RX ADMIN — PERTUZUMAB, TRASTUZUMAB, AND HYALURONIDASE-ZZXF 600 MG: 600; 600; 2000 INJECTION, SOLUTION SUBCUTANEOUS at 09:32

## 2024-05-09 NOTE — PROGRESS NOTES
Infusion Nursing Note:  Tiffanie Mcdermott presents today for C15d22 PHESGO.    Patient seen by provider today: No   present during visit today: Not Applicable.    Note: Tiffanie has spring allergies that are bothersome today, otherwise feeling well. PHESGO injection into LEFT thigh.      Intravenous Access:  No Intravenous access/labs at this visit.    Treatment Conditions:  Not Applicable.      Post Infusion Assessment:  Patient tolerated infusion without incident.       Discharge Plan:   Discharge instructions reviewed with: Patient.  Patient and/or family verbalized understanding of discharge instructions and all questions answered.  Patient discharged in stable condition accompanied by: self.  Departure Mode: Ambulatory.      Arline Sparks RN

## 2024-05-25 ENCOUNTER — HEALTH MAINTENANCE LETTER (OUTPATIENT)
Age: 59
End: 2024-05-25

## 2024-05-28 ENCOUNTER — PATIENT OUTREACH (OUTPATIENT)
Dept: ONCOLOGY | Facility: CLINIC | Age: 59
End: 2024-05-28
Payer: COMMERCIAL

## 2024-05-30 ENCOUNTER — APPOINTMENT (OUTPATIENT)
Dept: LAB | Facility: CLINIC | Age: 59
End: 2024-05-30
Payer: COMMERCIAL

## 2024-05-30 ENCOUNTER — INFUSION THERAPY VISIT (OUTPATIENT)
Dept: INFUSION THERAPY | Facility: CLINIC | Age: 59
End: 2024-05-30
Attending: INTERNAL MEDICINE
Payer: COMMERCIAL

## 2024-05-30 VITALS
OXYGEN SATURATION: 97 % | TEMPERATURE: 97 F | WEIGHT: 275.7 LBS | HEART RATE: 98 BPM | RESPIRATION RATE: 16 BRPM | BODY MASS INDEX: 43.18 KG/M2 | DIASTOLIC BLOOD PRESSURE: 75 MMHG | SYSTOLIC BLOOD PRESSURE: 135 MMHG

## 2024-05-30 DIAGNOSIS — C50.511 MALIGNANT NEOPLASM OF LOWER-OUTER QUADRANT OF RIGHT FEMALE BREAST, UNSPECIFIED ESTROGEN RECEPTOR STATUS (H): Primary | ICD-10-CM

## 2024-05-30 LAB
ALBUMIN SERPL BCG-MCNC: 3.8 G/DL (ref 3.5–5.2)
ALP SERPL-CCNC: 128 U/L (ref 40–150)
ALT SERPL W P-5'-P-CCNC: 184 U/L (ref 0–50)
ANION GAP SERPL CALCULATED.3IONS-SCNC: 10 MMOL/L (ref 7–15)
AST SERPL W P-5'-P-CCNC: 95 U/L (ref 0–45)
BILIRUB SERPL-MCNC: 0.7 MG/DL
BUN SERPL-MCNC: 16.7 MG/DL (ref 8–23)
CALCIUM SERPL-MCNC: 9 MG/DL (ref 8.8–10.2)
CHLORIDE SERPL-SCNC: 102 MMOL/L (ref 98–107)
CREAT SERPL-MCNC: 0.9 MG/DL (ref 0.51–0.95)
DEPRECATED HCO3 PLAS-SCNC: 23 MMOL/L (ref 22–29)
EGFRCR SERPLBLD CKD-EPI 2021: 72 ML/MIN/1.73M2
GLUCOSE SERPL-MCNC: 292 MG/DL (ref 70–99)
POTASSIUM SERPL-SCNC: 4.1 MMOL/L (ref 3.4–5.3)
PROT SERPL-MCNC: 6.7 G/DL (ref 6.4–8.3)
SODIUM SERPL-SCNC: 135 MMOL/L (ref 135–145)

## 2024-05-30 PROCEDURE — 82040 ASSAY OF SERUM ALBUMIN: CPT

## 2024-05-30 PROCEDURE — 36415 COLL VENOUS BLD VENIPUNCTURE: CPT

## 2024-05-30 PROCEDURE — 96401 CHEMO ANTI-NEOPL SQ/IM: CPT

## 2024-05-30 PROCEDURE — 82374 ASSAY BLOOD CARBON DIOXIDE: CPT

## 2024-05-30 PROCEDURE — 250N000011 HC RX IP 250 OP 636: Mod: JZ | Performed by: INTERNAL MEDICINE

## 2024-05-30 RX ADMIN — PERTUZUMAB, TRASTUZUMAB, AND HYALURONIDASE-ZZXF 600 MG: 600; 600; 2000 INJECTION, SOLUTION SUBCUTANEOUS at 09:34

## 2024-05-30 ASSESSMENT — PAIN SCALES - GENERAL: PAINLEVEL: NO PAIN (0)

## 2024-05-30 NOTE — PROGRESS NOTES
Infusion Nursing Note:  Tiffanie Mcdermott presents today for Phesgo Injection.    Patient seen by provider today: No   present during visit today: Not Applicable.    Note: Patient ambulated to IVO. Labs drawn here for CMP. VSS. Denies pain. Doing well, no complaints. Ice applied to left thigh x 10 min prior to injection.      Intravenous Access:  No Intravenous access at this visit.    Treatment Conditions:  Lab Results   Component Value Date     05/30/2024    POTASSIUM 4.1 05/30/2024    MAG 1.7 04/25/2022    CR 0.90 05/30/2024    SARAI 9.0 05/30/2024    BILITOTAL 0.7 05/30/2024    ALBUMIN 3.8 05/30/2024     (H) 05/30/2024    AST 95 (H) 05/30/2024       Not Applicable.      Post Infusion Assessment:  Patient tolerated injection without incident.  Patient observed for 15 minutes post injection per protocol.       Discharge Plan:   Patient discharged in stable condition accompanied by: self.  Departure Mode: Ambulatory.      Alicia Mcgee RN

## 2024-06-20 ENCOUNTER — TELEPHONE (OUTPATIENT)
Dept: ONCOLOGY | Facility: CLINIC | Age: 59
End: 2024-06-20

## 2024-06-20 ENCOUNTER — INFUSION THERAPY VISIT (OUTPATIENT)
Dept: INFUSION THERAPY | Facility: CLINIC | Age: 59
End: 2024-06-20
Attending: INTERNAL MEDICINE
Payer: COMMERCIAL

## 2024-06-20 VITALS
WEIGHT: 274.5 LBS | DIASTOLIC BLOOD PRESSURE: 71 MMHG | TEMPERATURE: 97.1 F | BODY MASS INDEX: 42.99 KG/M2 | RESPIRATION RATE: 16 BRPM | OXYGEN SATURATION: 96 % | HEART RATE: 69 BPM | SYSTOLIC BLOOD PRESSURE: 134 MMHG

## 2024-06-20 DIAGNOSIS — C50.511 MALIGNANT NEOPLASM OF LOWER-OUTER QUADRANT OF RIGHT FEMALE BREAST, UNSPECIFIED ESTROGEN RECEPTOR STATUS (H): Primary | ICD-10-CM

## 2024-06-20 PROCEDURE — 96401 CHEMO ANTI-NEOPL SQ/IM: CPT

## 2024-06-20 PROCEDURE — 250N000011 HC RX IP 250 OP 636: Mod: JZ | Performed by: INTERNAL MEDICINE

## 2024-06-20 RX ADMIN — PERTUZUMAB, TRASTUZUMAB, AND HYALURONIDASE-ZZXF 600 MG: 600; 600; 2000 INJECTION, SOLUTION SUBCUTANEOUS at 10:02

## 2024-06-20 NOTE — PROGRESS NOTES
Infusion Nursing Note:  Tiffanie Mcdermott presents today for Phesgo injection.    Patient seen by provider today: No   present during visit today: Not Applicable.    Note: Patient ambulated to IVO. VSS. Denies pain. Doing well.       Intravenous Access:  No Intravenous access/labs at this visit.    Treatment Conditions:  Not Applicable.      Post Infusion Assessment:  Patient tolerated injection without incident.  Site patent and intact, free from redness, edema or discomfort.       Discharge Plan:   Patient discharged in stable condition accompanied by: self.  Departure Mode: Ambulatory.      Alicia Mcgee RN

## 2024-06-26 NOTE — TELEPHONE ENCOUNTER
Tiffanie called stated she was returning Radha's call and asks to be called back.   [phone]     Thank you,  Nicole

## 2024-06-26 NOTE — TELEPHONE ENCOUNTER
ROSALVA on file. FMLA form complete. Emailed  to [email or web address], copy to scanning, copy placed in RN faxed items drawer.    Radha Neal RN  [facility]  6/26/2024 2:07 PM

## 2024-07-10 NOTE — PROGRESS NOTES
Oncology Follow Up Visit: July 11, 2024    Oncologist: Dr. 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  # Mar 2022 - May 2022 Taxol / Herceptin / Perjeta; near-CR.  # May 2022 - ongoing Herceptin / Perjeta; CR.    Interval History:  Ms. Moreno is seen in clinic today prior to treatment for review. She has been doing well lately and tolerating treatment with manageable side effects. She has been focusing on improving her sleep hygiene and eating habits which helps significantly with symptoms of fatigue and nausea/upset stomach. She has otherwise been in good health with no new concerns.     Patient denies any of the following except if noted above: fevers, chills, difficulty with energy, vision or hearing changes, chest pain, dyspnea, abdominal pain, nausea, vomiting, diarrhea, constipation, urinary concerns, headaches, numbness, tingling, issues with sleep or mood. She also denies lumps, bumps, rashes or skin lesions, bleeding or bruising issues.    Physical Exam:  /70 (BP Location: Left arm, Patient Position: Sitting, Cuff Size: Adult Regular)   Temp 98.9  F (37.2  C) (Temporal)   Wt 124.5 kg (274 lb 8 oz)   LMP 08/06/2008   BMI 42.99 kg/m     BP Readings from Last 6 Encounters:   07/11/24 112/70   06/20/24 134/71   05/30/24 135/75   05/09/24 128/75   04/18/24 (!) 145/55   03/28/24 136/69     Wt Readings from Last 6 Encounters:   07/11/24 124.5 kg (274 lb 8 oz)   06/20/24 124.5 kg (274 lb 8 oz)   05/30/24 125.1 kg (275 lb 11.2 oz)   05/09/24 125.1 kg (275 lb 11.2 oz)   04/18/24 125.3 kg (276 lb 4.8 oz)   04/18/24 122.5 kg (270 lb)      Constitutional: No acute distress, pleasant, appropriately groomed.   ENT: PERRLA, sclera without erythema.    Neck: Trachea midline, no adenopathy.   Resp: No respiratory distress, adequate depth and rate of respirations.   Cardiac: No cyanosis, JVD, or peripheral edema.   MS:  5/5 muscle strength, adequate ROM.   Skin: No rashes, lesions, or wounds on exposed skin.  Neuro: A/O x 4, sensation intact.   Psych: Appropriate mentation and affect.     Laboratory Results:   Results for orders placed or performed in visit on 07/11/24   Comprehensive metabolic panel     Status: Abnormal   Result Value Ref Range    Sodium 139 135 - 145 mmol/L    Potassium 3.9 3.4 - 5.3 mmol/L    Carbon Dioxide (CO2) 26 22 - 29 mmol/L    Anion Gap 9 7 - 15 mmol/L    Urea Nitrogen 12.6 8.0 - 23.0 mg/dL    Creatinine 0.97 (H) 0.51 - 0.95 mg/dL    GFR Estimate 66 >60 mL/min/1.73m2    Calcium 8.9 8.8 - 10.2 mg/dL    Chloride 104 98 - 107 mmol/L    Glucose 244 (H) 70 - 99 mg/dL    Alkaline Phosphatase 125 40 - 150 U/L    AST 90 (H) 0 - 45 U/L     (H) 0 - 50 U/L    Protein Total 6.8 6.4 - 8.3 g/dL    Albumin 4.0 3.5 - 5.2 g/dL    Bilirubin Total 0.8 <=1.2 mg/dL   CBC with platelets and differential     Status: None   Result Value Ref Range    WBC Count 4.5 4.0 - 11.0 10e3/uL    RBC Count 4.89 3.80 - 5.20 10e6/uL    Hemoglobin 14.5 11.7 - 15.7 g/dL    Hematocrit 44.9 35.0 - 47.0 %    MCV 92 78 - 100 fL    MCH 29.7 26.5 - 33.0 pg    MCHC 32.3 31.5 - 36.5 g/dL    RDW 12.8 10.0 - 15.0 %    Platelet Count 182 150 - 450 10e3/uL    % Neutrophils 59 %    % Lymphocytes 28 %    % Monocytes 7 %    % Eosinophils 5 %    % Basophils 1 %    % Immature Granulocytes 0 %    NRBCs per 100 WBC 0 <1 /100    Absolute Neutrophils 2.7 1.6 - 8.3 10e3/uL    Absolute Lymphocytes 1.2 0.8 - 5.3 10e3/uL    Absolute Monocytes 0.3 0.0 - 1.3 10e3/uL    Absolute Eosinophils 0.2 0.0 - 0.7 10e3/uL    Absolute Basophils 0.0 0.0 - 0.2 10e3/uL    Absolute Immature Granulocytes 0.0 <=0.4 10e3/uL    Absolute NRBCs 0.0 10e3/uL   CBC with platelets differential     Status: None     Narrative    The following orders were created for panel order CBC with platelets differential.  Procedure                               Abnormality         Status                     ---------                               -----------         ------                     CBC with platelets and d...[[id number]]                      Final result                 Please view results for these tests on the individual orders.   I reviewed the above labs today.    Imaging:  Echocardiogram Limited  [id number]  [id number]  [id number]  [id number]^ABRAN^LATASHA^SYDNIE     [facility]  Echocardiography Laboratory  [address] [place] [address]     Name: AUDREY ALVAREZ  MRN: [id number]  : 1965  Study Date: 2024 09:00 AM  Age: 59 yrs  Gender: Female  Patient Location: Psychiatric  Reason For Study: Malignant neoplasm of lower-outer quadrant of right female  breas  History: obestiy, bone mets, breast cancer  Ordering Physician: LATASHA OSULLIVAN  Referring Physician: LATASHA OSULLIVAN  Performed By: Adela Dao     BSA: 2.3 m2  Height: 67 in  Weight: 276 lb  HR: 93  BP: 138/86 mmHg  ______________________________________________________________________________  Procedure  Limited Echo Adult. Myocardial Strain Imaging performed. Technically difficult  study.  ______________________________________________________________________________  Interpretation Summary     Limited echocardiogram per order.  Left ventricular systolic function is normal.  Global peak LV longitudinal strain is averaged at -18.0%. This is within  reported normal limits (normal <-18%).  Biplane LVEF is 60%.  The right ventricle is normal in size and function.  No valve disease.     Compared to prior study, there is no significant change.  ______________________________________________________________________________  Left Ventricle  The left ventricle is normal in size. There is normal left ventricular  wall  thickness. Left ventricular systolic function is normal. Global peak LV  longitudinal strain is averaged at -18.0%. This is within reported normal  limits (normal <-18%). Biplane LVEF is 60%. No regional wall motion  abnormalities noted.     Right Ventricle  The right ventricle is normal in size and function.     Mitral Valve  The mitral valve leaflets appear normal. There is no evidence of stenosis,  fluttering, or prolapse. There is trace mitral regurgitation. There is no  mitral valve stenosis.     Tricuspid Valve  Normal tricuspid valve. There is trace tricuspid regurgitation.     Aortic Valve  The aortic valve is trileaflet. No aortic regurgitation is present. No aortic  stenosis is present.     Pericardium  There is no pericardial effusion.     Rhythm  Sinus rhythm was noted.  ______________________________________________________________________________  MMode/2D Measurements & Calculations  IVSd: 0.83 cm  LVIDd: 5.0 cm  LVIDs: 3.3 cm  LVPWd: 0.81 cm  FS: 33.3 %  LV mass(C)d: 138.7 grams  LV mass(C)dI: 59.8 grams/m2  Ao root diam: 2.9 cm  LA dimension: 3.9 cm  LA/Ao: 1.4  Ao root diam index Ht(cm/m): 1.7  Ao root diam index BSA (cm/m2): 1.2  EF Biplane: 56.2 %  RWT: 0.33     Doppler Measurements & Calculations  Ao V2 max: 156.4 cm/sec  Ao max P.0 mmHg     ______________________________________________________________________________  Report approved by: Dr Asya Clifton 2024 11:38 AM          I reviewed the above imaging report today.    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 Ursodiol renewed.   - Tolerating well with manageable side effects.  - ECHO q6 months, last completed 2024 with LVEF 60%. No concerning cardiac s/s today.  - PET q6 months, next scheduled for  9/28/24  - Labs reviewed, okay to proceed with C16 without changes. Patient is agreeable.     Transaminitis - hx of cirrhotic appearance of liver  - Elevated AST and ALT - slightly improved today  - Reviewed avoiding acetaminophen and alcohol use  - Has seen gastroenterology previously with recommendation for close monitoring      Fidelia SHRESTHA, CNP

## 2024-07-11 ENCOUNTER — INFUSION THERAPY VISIT (OUTPATIENT)
Dept: INFUSION THERAPY | Facility: CLINIC | Age: 59
End: 2024-07-11
Attending: INTERNAL MEDICINE
Payer: COMMERCIAL

## 2024-07-11 ENCOUNTER — ONCOLOGY VISIT (OUTPATIENT)
Dept: ONCOLOGY | Facility: CLINIC | Age: 59
End: 2024-07-11
Payer: COMMERCIAL

## 2024-07-11 ENCOUNTER — APPOINTMENT (OUTPATIENT)
Dept: LAB | Facility: CLINIC | Age: 59
End: 2024-07-11
Payer: COMMERCIAL

## 2024-07-11 VITALS
TEMPERATURE: 98.9 F | WEIGHT: 274.5 LBS | DIASTOLIC BLOOD PRESSURE: 70 MMHG | SYSTOLIC BLOOD PRESSURE: 112 MMHG | BODY MASS INDEX: 42.99 KG/M2

## 2024-07-11 DIAGNOSIS — Z17.1 MALIGNANT NEOPLASM OF LOWER-OUTER QUADRANT OF RIGHT BREAST OF FEMALE, ESTROGEN RECEPTOR NEGATIVE (H): Primary | ICD-10-CM

## 2024-07-11 DIAGNOSIS — C78.7 BREAST CANCER METASTASIZED TO LIVER, LEFT (H): ICD-10-CM

## 2024-07-11 DIAGNOSIS — C50.911 HER2-POSITIVE CARCINOMA OF RIGHT BREAST (H): ICD-10-CM

## 2024-07-11 DIAGNOSIS — R79.89 ABNORMAL LFTS: ICD-10-CM

## 2024-07-11 DIAGNOSIS — Z17.31 HER2-POSITIVE CARCINOMA OF RIGHT BREAST (H): ICD-10-CM

## 2024-07-11 DIAGNOSIS — C50.511 MALIGNANT NEOPLASM OF LOWER-OUTER QUADRANT OF RIGHT FEMALE BREAST, UNSPECIFIED ESTROGEN RECEPTOR STATUS (H): Primary | ICD-10-CM

## 2024-07-11 DIAGNOSIS — C50.511 MALIGNANT NEOPLASM OF LOWER-OUTER QUADRANT OF RIGHT BREAST OF FEMALE, ESTROGEN RECEPTOR NEGATIVE (H): Primary | ICD-10-CM

## 2024-07-11 DIAGNOSIS — C50.912 BREAST CANCER METASTASIZED TO LIVER, LEFT (H): ICD-10-CM

## 2024-07-11 LAB
ALBUMIN SERPL BCG-MCNC: 4 G/DL (ref 3.5–5.2)
ALP SERPL-CCNC: 125 U/L (ref 40–150)
ALT SERPL W P-5'-P-CCNC: 168 U/L (ref 0–50)
ANION GAP SERPL CALCULATED.3IONS-SCNC: 9 MMOL/L (ref 7–15)
AST SERPL W P-5'-P-CCNC: 90 U/L (ref 0–45)
BASOPHILS # BLD AUTO: 0 10E3/UL (ref 0–0.2)
BASOPHILS NFR BLD AUTO: 1 %
BILIRUB SERPL-MCNC: 0.8 MG/DL
BUN SERPL-MCNC: 12.6 MG/DL (ref 8–23)
CALCIUM SERPL-MCNC: 8.9 MG/DL (ref 8.8–10.2)
CHLORIDE SERPL-SCNC: 104 MMOL/L (ref 98–107)
CREAT SERPL-MCNC: 0.97 MG/DL (ref 0.51–0.95)
DEPRECATED HCO3 PLAS-SCNC: 26 MMOL/L (ref 22–29)
EGFRCR SERPLBLD CKD-EPI 2021: 66 ML/MIN/1.73M2
EOSINOPHIL # BLD AUTO: 0.2 10E3/UL (ref 0–0.7)
EOSINOPHIL NFR BLD AUTO: 5 %
ERYTHROCYTE [DISTWIDTH] IN BLOOD BY AUTOMATED COUNT: 12.8 % (ref 10–15)
GLUCOSE SERPL-MCNC: 244 MG/DL (ref 70–99)
HCT VFR BLD AUTO: 44.9 % (ref 35–47)
HGB BLD-MCNC: 14.5 G/DL (ref 11.7–15.7)
IMM GRANULOCYTES # BLD: 0 10E3/UL
IMM GRANULOCYTES NFR BLD: 0 %
LYMPHOCYTES # BLD AUTO: 1.2 10E3/UL (ref 0.8–5.3)
LYMPHOCYTES NFR BLD AUTO: 28 %
MCH RBC QN AUTO: 29.7 PG (ref 26.5–33)
MCHC RBC AUTO-ENTMCNC: 32.3 G/DL (ref 31.5–36.5)
MCV RBC AUTO: 92 FL (ref 78–100)
MONOCYTES # BLD AUTO: 0.3 10E3/UL (ref 0–1.3)
MONOCYTES NFR BLD AUTO: 7 %
NEUTROPHILS # BLD AUTO: 2.7 10E3/UL (ref 1.6–8.3)
NEUTROPHILS NFR BLD AUTO: 59 %
NRBC # BLD AUTO: 0 10E3/UL
NRBC BLD AUTO-RTO: 0 /100
PLATELET # BLD AUTO: 182 10E3/UL (ref 150–450)
POTASSIUM SERPL-SCNC: 3.9 MMOL/L (ref 3.4–5.3)
PROT SERPL-MCNC: 6.8 G/DL (ref 6.4–8.3)
RBC # BLD AUTO: 4.89 10E6/UL (ref 3.8–5.2)
SODIUM SERPL-SCNC: 139 MMOL/L (ref 135–145)
WBC # BLD AUTO: 4.5 10E3/UL (ref 4–11)

## 2024-07-11 PROCEDURE — 36415 COLL VENOUS BLD VENIPUNCTURE: CPT

## 2024-07-11 PROCEDURE — 80053 COMPREHEN METABOLIC PANEL: CPT

## 2024-07-11 PROCEDURE — 96401 CHEMO ANTI-NEOPL SQ/IM: CPT

## 2024-07-11 PROCEDURE — 85025 COMPLETE CBC W/AUTO DIFF WBC: CPT

## 2024-07-11 PROCEDURE — 86300 IMMUNOASSAY TUMOR CA 15-3: CPT

## 2024-07-11 PROCEDURE — 250N000011 HC RX IP 250 OP 636: Performed by: INTERNAL MEDICINE

## 2024-07-11 PROCEDURE — 99214 OFFICE O/P EST MOD 30 MIN: CPT

## 2024-07-11 RX ORDER — URSODIOL 250 MG/1
250 TABLET, FILM COATED ORAL 2 TIMES DAILY
Qty: 60 TABLET | Refills: 3 | Status: SHIPPED | OUTPATIENT
Start: 2024-07-11 | End: 2024-07-11

## 2024-07-11 RX ORDER — URSODIOL 250 MG/1
250 TABLET, FILM COATED ORAL 2 TIMES DAILY
COMMUNITY
Start: 2024-06-19 | End: 2024-07-11

## 2024-07-11 RX ORDER — URSODIOL 250 MG/1
250 TABLET, FILM COATED ORAL 2 TIMES DAILY
Qty: 60 TABLET | Refills: 11 | Status: SHIPPED | OUTPATIENT
Start: 2024-07-11 | End: 2024-08-26

## 2024-07-11 RX ADMIN — PERTUZUMAB, TRASTUZUMAB, AND HYALURONIDASE-ZZXF 600 MG: 600; 600; 2000 INJECTION, SOLUTION SUBCUTANEOUS at 14:52

## 2024-07-11 ASSESSMENT — PAIN SCALES - GENERAL: PAINLEVEL: NO PAIN (1)

## 2024-07-11 NOTE — Clinical Note
7/11/2024      Tiffanie Mcderomtt  [address] [address] [place] [id number]      Dear Colleague,    Thank you for referring your patient, Tiffanie Mcdermott, to the [facility]. Please see a copy of my visit note below.    Oncology Follow Up Visit: July 11, 2024    Oncologist: Dr. 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  # Mar 2022 - May 2022 Taxol / Herceptin / Perjeta; near-CR.  # May 2022 - ongoing Herceptin / Perjeta; CR.    Interval History:      Patient denies any of the following except if noted above: fevers, chills, difficulty with energy, vision or hearing changes, chest pain, dyspnea, abdominal pain, nausea, vomiting, diarrhea, constipation, urinary concerns, headaches, numbness, tingling, issues with sleep or mood. He/She also denies lumps, bumps, rashes or skin lesions, bleeding or bruising issues.    Physical Exam:  LMP 08/06/2008    BP Readings from Last 6 Encounters:   06/20/24 134/71   05/30/24 135/75   05/09/24 128/75   04/18/24 (!) 145/55   03/28/24 136/69   03/07/24 122/69     Wt Readings from Last 6 Encounters:   06/20/24 124.5 kg (274 lb 8 oz)   05/30/24 125.1 kg (275 lb 11.2 oz)   05/09/24 125.1 kg (275 lb 11.2 oz)   04/18/24 125.3 kg (276 lb 4.8 oz)   04/18/24 122.5 kg (270 lb)   03/28/24 124.5 kg (274 lb 6.4 oz)        Constitutional: No acute distress, pleasant, appropriately groomed.   ENT: PERRLA, sclera without erythema. Appropriate dentition, no mouth sores.  Neck: Trachea midline, no adenopathy.   Resp: CTA, adequate depth and rate of respirations.   Cardiac: S1/S2, RRR, no murmurs.   Abdomen: BS active, abdomen soft and non-tender. No masses or organomegaly.   MS:  5/5 muscle  strength, adequate ROM.   Skin: No rashes, lesions, or wounds on exposed skin.  Neuro: A/O x 4, sensation intact.   Lymph: No palpable anterior/posterior cervical, axillary, or supraclavicular nodes.   Psych: Appropriate mentation and affect.  ECOG:       Laboratory Results:   No results found for any visits on 24.  I reviewed the above labs today.    Imaging:  Echocardiogram Limited  [id number]  [id number]  [id number]  [id number]^ABRAN^LATASHA^SYDNIE     [facility]  Echocardiography Laboratory  [address] [place] [address]     Name: AUDREY ALVAREZ  MRN: [id number]  : 1965  Study Date: 2024 09:00 AM  Age: 59 yrs  Gender: Female  Patient Location: [facility]  Reason For Study: Malignant neoplasm of lower-outer quadrant of right female  breas  History: obestiy, bone mets, breast cancer  Ordering Physician: LATASHA OSULLIVAN  Referring Physician: LATASHA OSULLIVAN  Performed By: Adela Dao     BSA: 2.3 m2  Height: 67 in  Weight: 276 lb  HR: 93  BP: 138/86 mmHg  ______________________________________________________________________________  Procedure  Limited Echo Adult. Myocardial Strain Imaging performed. Technically difficult  study.  ______________________________________________________________________________  Interpretation Summary     Limited echocardiogram per order.  Left ventricular systolic function is normal.  Global peak LV longitudinal strain is averaged at -18.0%. This is within  reported normal limits (normal <-18%).  Biplane LVEF is 60%.  The right ventricle is normal in size and function.  No valve disease.     Compared to prior study, there is no significant change.  ______________________________________________________________________________  Left Ventricle  The left ventricle is normal in size. There is normal left ventricular wall  thickness. Left ventricular systolic function is normal. Global peak LV  longitudinal strain is averaged  at -18.0%. This is within reported normal  limits (normal <-18%). Biplane LVEF is 60%. No regional wall motion  abnormalities noted.     Right Ventricle  The right ventricle is normal in size and function.     Mitral Valve  The mitral valve leaflets appear normal. There is no evidence of stenosis,  fluttering, or prolapse. There is trace mitral regurgitation. There is no  mitral valve stenosis.     Tricuspid Valve  Normal tricuspid valve. There is trace tricuspid regurgitation.     Aortic Valve  The aortic valve is trileaflet. No aortic regurgitation is present. No aortic  stenosis is present.     Pericardium  There is no pericardial effusion.     Rhythm  Sinus rhythm was noted.  ______________________________________________________________________________  MMode/2D Measurements & Calculations  IVSd: 0.83 cm  LVIDd: 5.0 cm  LVIDs: 3.3 cm  LVPWd: 0.81 cm  FS: 33.3 %  LV mass(C)d: 138.7 grams  LV mass(C)dI: 59.8 grams/m2  Ao root diam: 2.9 cm  LA dimension: 3.9 cm  LA/Ao: 1.4  Ao root diam index Ht(cm/m): 1.7  Ao root diam index BSA (cm/m2): 1.2  EF Biplane: 56.2 %  RWT: 0.33     Doppler Measurements & Calculations  Ao V2 max: 156.4 cm/sec  Ao max P.0 mmHg     ______________________________________________________________________________  Report approved by: Dr Asya Clifton 2024 11:38 AM          I reviewed the above imaging report today.    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  - ECHO q6 months, last completed 2024 with LVEF 60%. No concerning cardiac s/s today.  - PET q6 months, next scheduled for 24  - Labs reviewed, okay to proceed with C16 without changes. Patient is agreeable.     Transaminitis - hx of cirrhotic appearance of  liver  - Elevated AST and ALT  - Reviewed avoiding acetaminophen and alcohol use  - Has seen gastroenterology previously with recommendation for close monitoring              Fidelia Mcgee - HENRIETTA, CNP      Again, thank you for allowing me to participate in the care of your patient.        Sincerely,        HENRIETTA Motta CNP

## 2024-07-11 NOTE — PROGRESS NOTES
Infusion Nursing Note:  Tiffanie STEWART Javierceci presents today for PHESGO.    Patient seen by provider today: Yes, Fidelia Mcgee CNP   present during visit today: Not Applicable.    Note: Patient ambulated to IVO after provider visit in speciality clinic. VS taken in clinic. Denies pain. No complaints. Ice pack applied to left thigh x 10 minutes.      Intravenous Access:  No Intravenous access at this visit.    Treatment Conditions:  Lab Results   Component Value Date    HGB 14.5 07/11/2024    WBC 4.5 07/11/2024    ANEU 7.0 03/21/2022    ANEUTAUTO 2.7 07/11/2024     07/11/2024        Lab Results   Component Value Date     07/11/2024    POTASSIUM 3.9 07/11/2024    MAG 1.7 04/25/2022    CR 0.97 (H) 07/11/2024    SARAI 8.9 07/11/2024    BILITOTAL 0.8 07/11/2024    ALBUMIN 4.0 07/11/2024     (H) 07/11/2024    AST 90 (H) 07/11/2024         Post Infusion Assessment:  Patient tolerated injection without incident.  Patient observed for 15 minutes post Injection per protocol.       Discharge Plan:   Patient discharged in stable condition accompanied by: self.  Departure Mode: Ambulatory.      Alicia Mcgee RN

## 2024-07-12 LAB — CANCER AG15-3 SERPL-ACNC: 21 U/ML

## 2024-07-31 ENCOUNTER — INFUSION THERAPY VISIT (OUTPATIENT)
Dept: INFUSION THERAPY | Facility: CLINIC | Age: 59
End: 2024-07-31
Attending: INTERNAL MEDICINE
Payer: COMMERCIAL

## 2024-07-31 VITALS
BODY MASS INDEX: 43.09 KG/M2 | DIASTOLIC BLOOD PRESSURE: 64 MMHG | SYSTOLIC BLOOD PRESSURE: 135 MMHG | WEIGHT: 275.1 LBS | HEART RATE: 96 BPM | OXYGEN SATURATION: 95 % | RESPIRATION RATE: 24 BRPM | TEMPERATURE: 96.6 F

## 2024-07-31 DIAGNOSIS — C50.511 MALIGNANT NEOPLASM OF LOWER-OUTER QUADRANT OF RIGHT FEMALE BREAST, UNSPECIFIED ESTROGEN RECEPTOR STATUS (H): Primary | ICD-10-CM

## 2024-07-31 PROCEDURE — 250N000011 HC RX IP 250 OP 636: Performed by: INTERNAL MEDICINE

## 2024-07-31 PROCEDURE — 96401 CHEMO ANTI-NEOPL SQ/IM: CPT

## 2024-07-31 RX ADMIN — PERTUZUMAB, TRASTUZUMAB, AND HYALURONIDASE-ZZXF 600 MG: 600; 600; 2000 INJECTION, SOLUTION SUBCUTANEOUS at 09:52

## 2024-07-31 ASSESSMENT — PAIN SCALES - GENERAL: PAINLEVEL: NO PAIN (0)

## 2024-07-31 NOTE — PROGRESS NOTES
Infusion Nursing Note:  Tiffanie Mcdermott presents today for chemotherapy.    Patient seen by provider today: No   present during visit today: Not Applicable.    Note: Pt states is tolerating treatment well.  .      Intravenous Access:  No Intravenous access/labs at this visit.    Treatment Conditions:  Not Applicable.      Post Infusion Assessment:  Patient tolerated injection without incident.  Patient observed for 15 minutes post Phesgo injection.       Discharge Plan:   Discharge instructions reviewed with: Patient.  Patient discharged in stable condition accompanied by: self.  Departure Mode: Ambulatory.      Maranda Cesar RN

## 2024-08-22 ENCOUNTER — INFUSION THERAPY VISIT (OUTPATIENT)
Dept: INFUSION THERAPY | Facility: CLINIC | Age: 59
End: 2024-08-22
Attending: INTERNAL MEDICINE
Payer: COMMERCIAL

## 2024-08-22 ENCOUNTER — APPOINTMENT (OUTPATIENT)
Dept: LAB | Facility: CLINIC | Age: 59
End: 2024-08-22
Payer: COMMERCIAL

## 2024-08-22 VITALS
WEIGHT: 268.9 LBS | RESPIRATION RATE: 18 BRPM | SYSTOLIC BLOOD PRESSURE: 130 MMHG | DIASTOLIC BLOOD PRESSURE: 55 MMHG | OXYGEN SATURATION: 96 % | HEART RATE: 92 BPM | BODY MASS INDEX: 42.12 KG/M2 | TEMPERATURE: 97.5 F

## 2024-08-22 DIAGNOSIS — C50.511 MALIGNANT NEOPLASM OF LOWER-OUTER QUADRANT OF RIGHT FEMALE BREAST, UNSPECIFIED ESTROGEN RECEPTOR STATUS (H): Primary | ICD-10-CM

## 2024-08-22 LAB
ALBUMIN SERPL BCG-MCNC: 3.8 G/DL (ref 3.5–5.2)
ALP SERPL-CCNC: 122 U/L (ref 40–150)
ALT SERPL W P-5'-P-CCNC: 80 U/L (ref 0–50)
ANION GAP SERPL CALCULATED.3IONS-SCNC: 14 MMOL/L (ref 7–15)
AST SERPL W P-5'-P-CCNC: 55 U/L (ref 0–45)
BILIRUB SERPL-MCNC: 0.5 MG/DL
BUN SERPL-MCNC: 16.4 MG/DL (ref 8–23)
CALCIUM SERPL-MCNC: 9.1 MG/DL (ref 8.8–10.4)
CHLORIDE SERPL-SCNC: 100 MMOL/L (ref 98–107)
CREAT SERPL-MCNC: 0.9 MG/DL (ref 0.51–0.95)
EGFRCR SERPLBLD CKD-EPI 2021: 72 ML/MIN/1.73M2
GLUCOSE SERPL-MCNC: 231 MG/DL (ref 70–99)
HCO3 SERPL-SCNC: 23 MMOL/L (ref 22–29)
POTASSIUM SERPL-SCNC: 4 MMOL/L (ref 3.4–5.3)
PROT SERPL-MCNC: 7 G/DL (ref 6.4–8.3)
SODIUM SERPL-SCNC: 137 MMOL/L (ref 135–145)

## 2024-08-22 PROCEDURE — 96401 CHEMO ANTI-NEOPL SQ/IM: CPT

## 2024-08-22 PROCEDURE — 80053 COMPREHEN METABOLIC PANEL: CPT

## 2024-08-22 PROCEDURE — 36415 COLL VENOUS BLD VENIPUNCTURE: CPT

## 2024-08-22 PROCEDURE — 250N000011 HC RX IP 250 OP 636: Performed by: INTERNAL MEDICINE

## 2024-08-22 RX ADMIN — PERTUZUMAB, TRASTUZUMAB, AND HYALURONIDASE-ZZXF 600 MG: 600; 600; 2000 INJECTION, SOLUTION SUBCUTANEOUS at 09:34

## 2024-08-22 ASSESSMENT — PAIN SCALES - GENERAL: PAINLEVEL: NO PAIN (0)

## 2024-08-22 NOTE — PROGRESS NOTES
Infusion Nursing Note:  Tiffanie STEWART Sharron presents today for PHESGO.    Patient seen by provider today: No   present during visit today: Not Applicable.    Note: Patient has hoarse voice from allergies. VSS, Afebrile. Does not feel ill. Denies pain. .      Intravenous Access:  No Intravenous access at this visit.    Treatment Conditions:  Lab Results   Component Value Date     08/22/2024    POTASSIUM 4.0 08/22/2024    MAG 1.7 04/25/2022    CR 0.90 08/22/2024    SARAI 9.1 08/22/2024    BILITOTAL 0.5 08/22/2024    ALBUMIN 3.8 08/22/2024    ALT 80 (H) 08/22/2024    AST 55 (H) 08/22/2024       Not Applicable.      Post Infusion Assessment:  Patient tolerated injection without incident.  Patient observed for 10 minutes post injection per protocol.       Discharge Plan:   Discharge instructions reviewed with: Patient.  Patient discharged in stable condition accompanied by: self.  Departure Mode: Ambulatory.      Alicia Mcgee RN

## 2024-08-23 ENCOUNTER — TELEPHONE (OUTPATIENT)
Dept: ONCOLOGY | Facility: CLINIC | Age: 59
End: 2024-08-23

## 2024-08-23 NOTE — TELEPHONE ENCOUNTER
Ursodiol 250 mg tablets, was the dose decrease on 7/11/24 intentional? Pt wasn't aware of dose change and has run out of meds before insurance will cover.  Please send new RX with 2 tabs twice a day or reach out to Pt if dose decrease is correct to 1 tablet twice a day.    Thank you,  Bethany Guerra, Pharmacy [facility] [place]

## 2024-08-23 NOTE — TELEPHONE ENCOUNTER
Looks as though the correct dose was 500mg twice daily but patient couldn't swallow large tablets and it was changed to 250mg tablets but directions did not get changed to 2 tablets twice daily on 7/11/24.  Michela Overton, [facility]  [phone]

## 2024-08-23 NOTE — TELEPHONE ENCOUNTER
Couple observations:    1) It would appear Tiffanie reported the incorrect dosing on a refill request from what I can tell on 7/11?  2) I am not able to then confirm who made that change because it came from [facility].  3) The last provider to sign orders was Fidelia Mcgee, so this message should probably go to her.      Adiel Kim MD.

## 2024-08-26 DIAGNOSIS — R79.89 ABNORMAL LFTS: ICD-10-CM

## 2024-08-26 RX ORDER — URSODIOL 250 MG/1
500 TABLET, FILM COATED ORAL 2 TIMES DAILY
Qty: 60 TABLET | Refills: 11 | Status: SHIPPED | OUTPATIENT
Start: 2024-08-26 | End: 2024-10-02

## 2024-09-12 ENCOUNTER — INFUSION THERAPY VISIT (OUTPATIENT)
Dept: INFUSION THERAPY | Facility: CLINIC | Age: 59
End: 2024-09-12
Attending: INTERNAL MEDICINE
Payer: COMMERCIAL

## 2024-09-12 VITALS
DIASTOLIC BLOOD PRESSURE: 64 MMHG | BODY MASS INDEX: 42.38 KG/M2 | WEIGHT: 270.6 LBS | TEMPERATURE: 97.7 F | OXYGEN SATURATION: 99 % | SYSTOLIC BLOOD PRESSURE: 144 MMHG | RESPIRATION RATE: 18 BRPM | HEART RATE: 84 BPM

## 2024-09-12 DIAGNOSIS — C50.511 MALIGNANT NEOPLASM OF LOWER-OUTER QUADRANT OF RIGHT FEMALE BREAST, UNSPECIFIED ESTROGEN RECEPTOR STATUS (H): Primary | ICD-10-CM

## 2024-09-12 PROCEDURE — 96401 CHEMO ANTI-NEOPL SQ/IM: CPT

## 2024-09-12 PROCEDURE — 250N000011 HC RX IP 250 OP 636: Performed by: INTERNAL MEDICINE

## 2024-09-12 RX ADMIN — PERTUZUMAB, TRASTUZUMAB, AND HYALURONIDASE-ZZXF 600 MG: 600; 600; 2000 INJECTION, SOLUTION SUBCUTANEOUS at 09:59

## 2024-09-12 ASSESSMENT — PAIN SCALES - GENERAL: PAINLEVEL: NO PAIN (0)

## 2024-09-12 NOTE — PROGRESS NOTES
Infusion Nursing Note:  Tiffanie Mcdermott presents today for C16d64 .  pertuzumab 600 mg/trastuzumab 600 mg/hyaluronidase 20,000 units/10 mL (PHESGO) MAINTENANCE DOSE injection   Patient seen by provider today: No   present during visit today: Not Applicable.    Note: N/A.      Intravenous Access:  No Intravenous access/labs at this visit.    Treatment Conditions:  Not Applicable.      Post Infusion Assessment:  Patient tolerated injection without incident.  Patient observed for 15 minutes post subcutaneous injection per protocol.  Site patent and intact, free from redness, edema or discomfort.  No evidence of extravasations.       Discharge Plan:   Patient declined prescription refills.  Patient and/or family verbalized understanding of discharge instructions and all questions answered.  AVS to patient via [employer].  Patient will return 9/28/24 for next appointment.   Patient discharged in stable condition accompanied by: self.  Departure Mode: Ambulatory.      Berta Joseph RN

## 2024-09-28 ENCOUNTER — HOSPITAL ENCOUNTER (OUTPATIENT)
Dept: PET IMAGING | Facility: CLINIC | Age: 59
Discharge: HOME | End: 2024-09-28
Attending: INTERNAL MEDICINE | Admitting: INTERNAL MEDICINE
Payer: COMMERCIAL

## 2024-09-28 DIAGNOSIS — Z17.1 MALIGNANT NEOPLASM OF LOWER-OUTER QUADRANT OF RIGHT BREAST OF FEMALE, ESTROGEN RECEPTOR NEGATIVE (H): ICD-10-CM

## 2024-09-28 DIAGNOSIS — C78.7 BREAST CANCER METASTASIZED TO LIVER, LEFT (H): ICD-10-CM

## 2024-09-28 DIAGNOSIS — C50.511 MALIGNANT NEOPLASM OF LOWER-OUTER QUADRANT OF RIGHT BREAST OF FEMALE, ESTROGEN RECEPTOR NEGATIVE (H): ICD-10-CM

## 2024-09-28 DIAGNOSIS — C50.912 BREAST CANCER METASTASIZED TO LIVER, LEFT (H): ICD-10-CM

## 2024-09-28 PROCEDURE — 343N000001 HC RX 343: Performed by: INTERNAL MEDICINE

## 2024-09-28 PROCEDURE — 78815 PET IMAGE W/CT SKULL-THIGH: CPT | Mod: PS

## 2024-09-28 PROCEDURE — A9552 F18 FDG: HCPCS | Performed by: INTERNAL MEDICINE

## 2024-09-28 RX ORDER — FLUDEOXYGLUCOSE F 18 200 MCI/ML
10-18 INJECTION, SOLUTION INTRAVENOUS ONCE
Status: COMPLETED | OUTPATIENT
Start: 2024-09-28 | End: 2024-09-28

## 2024-09-28 RX ADMIN — FLUDEOXYGLUCOSE F 18 11.92 MILLICURIE: 200 INJECTION, SOLUTION INTRAVENOUS at 08:31

## 2024-10-01 ENCOUNTER — LAB (OUTPATIENT)
Dept: LAB | Facility: CLINIC | Age: 59
End: 2024-10-01
Payer: COMMERCIAL

## 2024-10-01 DIAGNOSIS — C50.511 MALIGNANT NEOPLASM OF LOWER-OUTER QUADRANT OF RIGHT FEMALE BREAST, UNSPECIFIED ESTROGEN RECEPTOR STATUS (H): ICD-10-CM

## 2024-10-01 LAB
ALBUMIN SERPL BCG-MCNC: 4.2 G/DL (ref 3.5–5.2)
ALP SERPL-CCNC: 133 U/L (ref 40–150)
ALT SERPL W P-5'-P-CCNC: 132 U/L (ref 0–50)
ANION GAP SERPL CALCULATED.3IONS-SCNC: 12 MMOL/L (ref 7–15)
AST SERPL W P-5'-P-CCNC: 74 U/L (ref 0–45)
BASOPHILS # BLD AUTO: 0 10E3/UL (ref 0–0.2)
BASOPHILS NFR BLD AUTO: 1 %
BILIRUB SERPL-MCNC: 0.6 MG/DL
BUN SERPL-MCNC: 15.2 MG/DL (ref 8–23)
CALCIUM SERPL-MCNC: 9.6 MG/DL (ref 8.8–10.4)
CANCER AG15-3 SERPL-ACNC: 22 U/ML
CHLORIDE SERPL-SCNC: 101 MMOL/L (ref 98–107)
CREAT SERPL-MCNC: 0.93 MG/DL (ref 0.51–0.95)
EGFRCR SERPLBLD CKD-EPI 2021: 70 ML/MIN/1.73M2
EOSINOPHIL # BLD AUTO: 0.2 10E3/UL (ref 0–0.7)
EOSINOPHIL NFR BLD AUTO: 3 %
ERYTHROCYTE [DISTWIDTH] IN BLOOD BY AUTOMATED COUNT: 12.9 % (ref 10–15)
GLUCOSE SERPL-MCNC: 89 MG/DL (ref 70–99)
HCO3 SERPL-SCNC: 25 MMOL/L (ref 22–29)
HCT VFR BLD AUTO: 45.4 % (ref 35–47)
HGB BLD-MCNC: 14.7 G/DL (ref 11.7–15.7)
IMM GRANULOCYTES # BLD: 0 10E3/UL
IMM GRANULOCYTES NFR BLD: 0 %
LYMPHOCYTES # BLD AUTO: 1.4 10E3/UL (ref 0.8–5.3)
LYMPHOCYTES NFR BLD AUTO: 23 %
MCH RBC QN AUTO: 29.7 PG (ref 26.5–33)
MCHC RBC AUTO-ENTMCNC: 32.4 G/DL (ref 31.5–36.5)
MCV RBC AUTO: 92 FL (ref 78–100)
MONOCYTES # BLD AUTO: 0.5 10E3/UL (ref 0–1.3)
MONOCYTES NFR BLD AUTO: 9 %
NEUTROPHILS # BLD AUTO: 3.7 10E3/UL (ref 1.6–8.3)
NEUTROPHILS NFR BLD AUTO: 64 %
NRBC # BLD AUTO: 0 10E3/UL
NRBC BLD AUTO-RTO: 0 /100
PLATELET # BLD AUTO: 226 10E3/UL (ref 150–450)
POTASSIUM SERPL-SCNC: 4.4 MMOL/L (ref 3.4–5.3)
PROT SERPL-MCNC: 7.5 G/DL (ref 6.4–8.3)
RBC # BLD AUTO: 4.95 10E6/UL (ref 3.8–5.2)
SODIUM SERPL-SCNC: 138 MMOL/L (ref 135–145)
WBC # BLD AUTO: 5.9 10E3/UL (ref 4–11)

## 2024-10-01 PROCEDURE — 80053 COMPREHEN METABOLIC PANEL: CPT

## 2024-10-01 PROCEDURE — 36415 COLL VENOUS BLD VENIPUNCTURE: CPT

## 2024-10-01 PROCEDURE — 86300 IMMUNOASSAY TUMOR CA 15-3: CPT

## 2024-10-01 PROCEDURE — 85025 COMPLETE CBC W/AUTO DIFF WBC: CPT

## 2024-10-02 ENCOUNTER — VIRTUAL VISIT (OUTPATIENT)
Dept: ONCOLOGY | Facility: CLINIC | Age: 59
End: 2024-10-02
Attending: INTERNAL MEDICINE
Payer: COMMERCIAL

## 2024-10-02 ENCOUNTER — INFUSION THERAPY VISIT (OUTPATIENT)
Dept: INFUSION THERAPY | Facility: CLINIC | Age: 59
End: 2024-10-02
Attending: INTERNAL MEDICINE
Payer: COMMERCIAL

## 2024-10-02 VITALS
TEMPERATURE: 97.7 F | WEIGHT: 267.9 LBS | HEART RATE: 89 BPM | OXYGEN SATURATION: 96 % | RESPIRATION RATE: 18 BRPM | DIASTOLIC BLOOD PRESSURE: 67 MMHG | SYSTOLIC BLOOD PRESSURE: 140 MMHG | BODY MASS INDEX: 41.96 KG/M2

## 2024-10-02 VITALS — HEIGHT: 67 IN | BODY MASS INDEX: 42.38 KG/M2 | WEIGHT: 270 LBS

## 2024-10-02 DIAGNOSIS — Z17.1 MALIGNANT NEOPLASM OF LOWER-OUTER QUADRANT OF RIGHT BREAST OF FEMALE, ESTROGEN RECEPTOR NEGATIVE (H): Primary | ICD-10-CM

## 2024-10-02 DIAGNOSIS — T45.1X5A CHEMOTHERAPY-INDUCED NEUROPATHY (H): ICD-10-CM

## 2024-10-02 DIAGNOSIS — G62.0 CHEMOTHERAPY-INDUCED NEUROPATHY (H): ICD-10-CM

## 2024-10-02 DIAGNOSIS — R79.89 ABNORMAL LFTS: ICD-10-CM

## 2024-10-02 DIAGNOSIS — I42.7 CHEMOTHERAPY-INDUCED CARDIOMYOPATHY (H): ICD-10-CM

## 2024-10-02 DIAGNOSIS — C78.7 BREAST CANCER METASTASIZED TO LIVER, LEFT (H): ICD-10-CM

## 2024-10-02 DIAGNOSIS — Z17.31 HER2-POSITIVE CARCINOMA OF RIGHT BREAST (H): ICD-10-CM

## 2024-10-02 DIAGNOSIS — C50.511 MALIGNANT NEOPLASM OF LOWER-OUTER QUADRANT OF RIGHT BREAST OF FEMALE, ESTROGEN RECEPTOR NEGATIVE (H): Primary | ICD-10-CM

## 2024-10-02 DIAGNOSIS — C50.911 HER2-POSITIVE CARCINOMA OF RIGHT BREAST (H): ICD-10-CM

## 2024-10-02 DIAGNOSIS — T45.1X5A CHEMOTHERAPY-INDUCED CARDIOMYOPATHY (H): ICD-10-CM

## 2024-10-02 DIAGNOSIS — C50.511 MALIGNANT NEOPLASM OF LOWER-OUTER QUADRANT OF RIGHT FEMALE BREAST, UNSPECIFIED ESTROGEN RECEPTOR STATUS (H): Primary | ICD-10-CM

## 2024-10-02 DIAGNOSIS — C50.912 BREAST CANCER METASTASIZED TO LIVER, LEFT (H): ICD-10-CM

## 2024-10-02 PROCEDURE — 250N000011 HC RX IP 250 OP 636: Mod: JZ | Performed by: INTERNAL MEDICINE

## 2024-10-02 PROCEDURE — G2211 COMPLEX E/M VISIT ADD ON: HCPCS | Mod: 95 | Performed by: INTERNAL MEDICINE

## 2024-10-02 PROCEDURE — 99214 OFFICE O/P EST MOD 30 MIN: CPT | Mod: 95 | Performed by: INTERNAL MEDICINE

## 2024-10-02 PROCEDURE — 96401 CHEMO ANTI-NEOPL SQ/IM: CPT

## 2024-10-02 RX ORDER — DIPHENHYDRAMINE HCL 25 MG
50 CAPSULE ORAL
OUTPATIENT
Start: 2025-02-10

## 2024-10-02 RX ORDER — DIPHENHYDRAMINE HYDROCHLORIDE 50 MG/ML
50 INJECTION INTRAMUSCULAR; INTRAVENOUS
Status: CANCELLED
Start: 2025-10-23

## 2024-10-02 RX ORDER — ACETAMINOPHEN 325 MG/1
650 TABLET ORAL
OUTPATIENT
Start: 2025-02-10

## 2024-10-02 RX ORDER — METHYLPREDNISOLONE SODIUM SUCCINATE 125 MG/2ML
125 INJECTION INTRAMUSCULAR; INTRAVENOUS
Status: CANCELLED
Start: 2025-06-18

## 2024-10-02 RX ORDER — HEPARIN SODIUM,PORCINE 10 UNIT/ML
5 VIAL (ML) INTRAVENOUS
OUTPATIENT
Start: 2025-05-06

## 2024-10-02 RX ORDER — HEPARIN SODIUM,PORCINE 10 UNIT/ML
5 VIAL (ML) INTRAVENOUS
OUTPATIENT
Start: 2025-04-15

## 2024-10-02 RX ORDER — NALOXONE HYDROCHLORIDE 0.4 MG/ML
0.2 INJECTION, SOLUTION INTRAMUSCULAR; INTRAVENOUS; SUBCUTANEOUS
Status: CANCELLED | OUTPATIENT
Start: 2025-07-09

## 2024-10-02 RX ORDER — HEPARIN SODIUM,PORCINE 10 UNIT/ML
5 VIAL (ML) INTRAVENOUS
OUTPATIENT
Start: 2025-02-10

## 2024-10-02 RX ORDER — NALOXONE HYDROCHLORIDE 0.4 MG/ML
0.2 INJECTION, SOLUTION INTRAMUSCULAR; INTRAVENOUS; SUBCUTANEOUS
Status: CANCELLED | OUTPATIENT
Start: 2025-10-23

## 2024-10-02 RX ORDER — HEPARIN SODIUM,PORCINE 10 UNIT/ML
5 VIAL (ML) INTRAVENOUS
OUTPATIENT
Start: 2025-09-11

## 2024-10-02 RX ORDER — ACETAMINOPHEN 325 MG/1
650 TABLET ORAL
OUTPATIENT
Start: 2025-01-20

## 2024-10-02 RX ORDER — HEPARIN SODIUM (PORCINE) LOCK FLUSH IV SOLN 100 UNIT/ML 100 UNIT/ML
5 SOLUTION INTRAVENOUS
OUTPATIENT
Start: 2025-07-30

## 2024-10-02 RX ORDER — METHYLPREDNISOLONE SODIUM SUCCINATE 125 MG/2ML
125 INJECTION INTRAMUSCULAR; INTRAVENOUS
Status: CANCELLED
Start: 2025-05-06

## 2024-10-02 RX ORDER — MEPERIDINE HYDROCHLORIDE 25 MG/ML
25 INJECTION INTRAMUSCULAR; INTRAVENOUS; SUBCUTANEOUS EVERY 30 MIN PRN
Status: CANCELLED | OUTPATIENT
Start: 2025-05-06

## 2024-10-02 RX ORDER — METHYLPREDNISOLONE SODIUM SUCCINATE 125 MG/2ML
125 INJECTION INTRAMUSCULAR; INTRAVENOUS
Status: CANCELLED
Start: 2025-11-13

## 2024-10-02 RX ORDER — ALBUTEROL SULFATE 0.83 MG/ML
2.5 SOLUTION RESPIRATORY (INHALATION)
Status: CANCELLED | OUTPATIENT
Start: 2025-05-27

## 2024-10-02 RX ORDER — ACETAMINOPHEN 325 MG/1
650 TABLET ORAL
OUTPATIENT
Start: 2024-12-30

## 2024-10-02 RX ORDER — DIPHENHYDRAMINE HYDROCHLORIDE 50 MG/ML
50 INJECTION INTRAMUSCULAR; INTRAVENOUS
Status: CANCELLED
Start: 2025-06-18

## 2024-10-02 RX ORDER — EPINEPHRINE 1 MG/ML
0.3 INJECTION, SOLUTION INTRAMUSCULAR; SUBCUTANEOUS EVERY 5 MIN PRN
Status: CANCELLED | OUTPATIENT
Start: 2025-07-09

## 2024-10-02 RX ORDER — MEPERIDINE HYDROCHLORIDE 25 MG/ML
25 INJECTION INTRAMUSCULAR; INTRAVENOUS; SUBCUTANEOUS EVERY 30 MIN PRN
Status: CANCELLED | OUTPATIENT
Start: 2025-06-18

## 2024-10-02 RX ORDER — DIPHENHYDRAMINE HYDROCHLORIDE 50 MG/ML
50 INJECTION INTRAMUSCULAR; INTRAVENOUS
Status: CANCELLED
Start: 2025-11-13

## 2024-10-02 RX ORDER — EPINEPHRINE 1 MG/ML
0.3 INJECTION, SOLUTION INTRAMUSCULAR; SUBCUTANEOUS EVERY 5 MIN PRN
Status: CANCELLED | OUTPATIENT
Start: 2025-02-10

## 2024-10-02 RX ORDER — NALOXONE HYDROCHLORIDE 0.4 MG/ML
0.2 INJECTION, SOLUTION INTRAMUSCULAR; INTRAVENOUS; SUBCUTANEOUS
Status: CANCELLED | OUTPATIENT
Start: 2025-04-15

## 2024-10-02 RX ORDER — METHYLPREDNISOLONE SODIUM SUCCINATE 125 MG/2ML
125 INJECTION INTRAMUSCULAR; INTRAVENOUS
Status: CANCELLED
Start: 2025-07-09

## 2024-10-02 RX ORDER — HEPARIN SODIUM (PORCINE) LOCK FLUSH IV SOLN 100 UNIT/ML 100 UNIT/ML
5 SOLUTION INTRAVENOUS
OUTPATIENT
Start: 2025-03-03

## 2024-10-02 RX ORDER — NALOXONE HYDROCHLORIDE 0.4 MG/ML
0.2 INJECTION, SOLUTION INTRAMUSCULAR; INTRAVENOUS; SUBCUTANEOUS
Status: CANCELLED | OUTPATIENT
Start: 2025-05-27

## 2024-10-02 RX ORDER — METHYLPREDNISOLONE SODIUM SUCCINATE 125 MG/2ML
125 INJECTION INTRAMUSCULAR; INTRAVENOUS
Status: CANCELLED
Start: 2025-09-11

## 2024-10-02 RX ORDER — ALBUTEROL SULFATE 90 UG/1
1-2 INHALANT RESPIRATORY (INHALATION)
Status: CANCELLED
Start: 2025-03-03

## 2024-10-02 RX ORDER — EPINEPHRINE 1 MG/ML
0.3 INJECTION, SOLUTION INTRAMUSCULAR; SUBCUTANEOUS EVERY 5 MIN PRN
Status: CANCELLED | OUTPATIENT
Start: 2025-01-20

## 2024-10-02 RX ORDER — HEPARIN SODIUM (PORCINE) LOCK FLUSH IV SOLN 100 UNIT/ML 100 UNIT/ML
5 SOLUTION INTRAVENOUS
OUTPATIENT
Start: 2025-08-20

## 2024-10-02 RX ORDER — DIPHENHYDRAMINE HCL 25 MG
50 CAPSULE ORAL
OUTPATIENT
Start: 2025-01-20

## 2024-10-02 RX ORDER — HEPARIN SODIUM (PORCINE) LOCK FLUSH IV SOLN 100 UNIT/ML 100 UNIT/ML
5 SOLUTION INTRAVENOUS
OUTPATIENT
Start: 2025-10-23

## 2024-10-02 RX ORDER — ALBUTEROL SULFATE 90 UG/1
1-2 INHALANT RESPIRATORY (INHALATION)
Status: CANCELLED
Start: 2025-10-23

## 2024-10-02 RX ORDER — EPINEPHRINE 1 MG/ML
0.3 INJECTION, SOLUTION INTRAMUSCULAR; SUBCUTANEOUS EVERY 5 MIN PRN
Status: CANCELLED | OUTPATIENT
Start: 2025-04-15

## 2024-10-02 RX ORDER — NALOXONE HYDROCHLORIDE 0.4 MG/ML
0.2 INJECTION, SOLUTION INTRAMUSCULAR; INTRAVENOUS; SUBCUTANEOUS
Status: CANCELLED | OUTPATIENT
Start: 2024-12-30

## 2024-10-02 RX ORDER — MEPERIDINE HYDROCHLORIDE 25 MG/ML
25 INJECTION INTRAMUSCULAR; INTRAVENOUS; SUBCUTANEOUS EVERY 30 MIN PRN
Status: CANCELLED | OUTPATIENT
Start: 2025-10-02

## 2024-10-02 RX ORDER — NALOXONE HYDROCHLORIDE 0.4 MG/ML
0.2 INJECTION, SOLUTION INTRAMUSCULAR; INTRAVENOUS; SUBCUTANEOUS
Status: CANCELLED | OUTPATIENT
Start: 2025-06-18

## 2024-10-02 RX ORDER — MEPERIDINE HYDROCHLORIDE 25 MG/ML
25 INJECTION INTRAMUSCULAR; INTRAVENOUS; SUBCUTANEOUS EVERY 30 MIN PRN
Status: CANCELLED | OUTPATIENT
Start: 2025-03-25

## 2024-10-02 RX ORDER — HEPARIN SODIUM,PORCINE 10 UNIT/ML
5 VIAL (ML) INTRAVENOUS
OUTPATIENT
Start: 2025-05-27

## 2024-10-02 RX ORDER — ALBUTEROL SULFATE 90 UG/1
1-2 INHALANT RESPIRATORY (INHALATION)
Status: CANCELLED
Start: 2025-10-02

## 2024-10-02 RX ORDER — NALOXONE HYDROCHLORIDE 0.4 MG/ML
0.2 INJECTION, SOLUTION INTRAMUSCULAR; INTRAVENOUS; SUBCUTANEOUS
Status: CANCELLED | OUTPATIENT
Start: 2025-02-10

## 2024-10-02 RX ORDER — ALBUTEROL SULFATE 0.83 MG/ML
2.5 SOLUTION RESPIRATORY (INHALATION)
Status: CANCELLED | OUTPATIENT
Start: 2025-08-20

## 2024-10-02 RX ORDER — ACETAMINOPHEN 325 MG/1
650 TABLET ORAL
OUTPATIENT
Start: 2025-04-15

## 2024-10-02 RX ORDER — ALBUTEROL SULFATE 0.83 MG/ML
2.5 SOLUTION RESPIRATORY (INHALATION)
Status: CANCELLED | OUTPATIENT
Start: 2025-04-15

## 2024-10-02 RX ORDER — HEPARIN SODIUM (PORCINE) LOCK FLUSH IV SOLN 100 UNIT/ML 100 UNIT/ML
5 SOLUTION INTRAVENOUS
OUTPATIENT
Start: 2025-07-09

## 2024-10-02 RX ORDER — NALOXONE HYDROCHLORIDE 0.4 MG/ML
0.2 INJECTION, SOLUTION INTRAMUSCULAR; INTRAVENOUS; SUBCUTANEOUS
Status: CANCELLED | OUTPATIENT
Start: 2025-07-30

## 2024-10-02 RX ORDER — DIPHENHYDRAMINE HYDROCHLORIDE 50 MG/ML
50 INJECTION INTRAMUSCULAR; INTRAVENOUS
Status: CANCELLED
Start: 2025-07-09

## 2024-10-02 RX ORDER — DIPHENHYDRAMINE HYDROCHLORIDE 50 MG/ML
50 INJECTION INTRAMUSCULAR; INTRAVENOUS
Status: CANCELLED
Start: 2024-12-30

## 2024-10-02 RX ORDER — ALBUTEROL SULFATE 90 UG/1
1-2 INHALANT RESPIRATORY (INHALATION)
Status: CANCELLED
Start: 2025-06-18

## 2024-10-02 RX ORDER — NALOXONE HYDROCHLORIDE 0.4 MG/ML
0.2 INJECTION, SOLUTION INTRAMUSCULAR; INTRAVENOUS; SUBCUTANEOUS
Status: CANCELLED | OUTPATIENT
Start: 2025-03-25

## 2024-10-02 RX ORDER — DIPHENHYDRAMINE HCL 25 MG
50 CAPSULE ORAL
OUTPATIENT
Start: 2025-08-20

## 2024-10-02 RX ORDER — ALBUTEROL SULFATE 0.83 MG/ML
2.5 SOLUTION RESPIRATORY (INHALATION)
Status: CANCELLED | OUTPATIENT
Start: 2025-10-02

## 2024-10-02 RX ORDER — EPINEPHRINE 1 MG/ML
0.3 INJECTION, SOLUTION INTRAMUSCULAR; SUBCUTANEOUS EVERY 5 MIN PRN
Status: CANCELLED | OUTPATIENT
Start: 2025-11-13

## 2024-10-02 RX ORDER — EPINEPHRINE 1 MG/ML
0.3 INJECTION, SOLUTION INTRAMUSCULAR; SUBCUTANEOUS EVERY 5 MIN PRN
Status: CANCELLED | OUTPATIENT
Start: 2025-05-06

## 2024-10-02 RX ORDER — HEPARIN SODIUM,PORCINE 10 UNIT/ML
5 VIAL (ML) INTRAVENOUS
OUTPATIENT
Start: 2025-10-23

## 2024-10-02 RX ORDER — EPINEPHRINE 1 MG/ML
0.3 INJECTION, SOLUTION INTRAMUSCULAR; SUBCUTANEOUS EVERY 5 MIN PRN
Status: CANCELLED | OUTPATIENT
Start: 2025-06-18

## 2024-10-02 RX ORDER — EPINEPHRINE 1 MG/ML
0.3 INJECTION, SOLUTION INTRAMUSCULAR; SUBCUTANEOUS EVERY 5 MIN PRN
Status: CANCELLED | OUTPATIENT
Start: 2025-03-03

## 2024-10-02 RX ORDER — DIPHENHYDRAMINE HYDROCHLORIDE 50 MG/ML
50 INJECTION INTRAMUSCULAR; INTRAVENOUS
Status: CANCELLED
Start: 2025-05-27

## 2024-10-02 RX ORDER — ACETAMINOPHEN 325 MG/1
650 TABLET ORAL
OUTPATIENT
Start: 2025-03-03

## 2024-10-02 RX ORDER — EPINEPHRINE 1 MG/ML
0.3 INJECTION, SOLUTION INTRAMUSCULAR; SUBCUTANEOUS EVERY 5 MIN PRN
Status: CANCELLED | OUTPATIENT
Start: 2025-10-02

## 2024-10-02 RX ORDER — ALBUTEROL SULFATE 0.83 MG/ML
2.5 SOLUTION RESPIRATORY (INHALATION)
Status: CANCELLED | OUTPATIENT
Start: 2025-07-09

## 2024-10-02 RX ORDER — MEPERIDINE HYDROCHLORIDE 25 MG/ML
25 INJECTION INTRAMUSCULAR; INTRAVENOUS; SUBCUTANEOUS EVERY 30 MIN PRN
Status: CANCELLED | OUTPATIENT
Start: 2025-05-27

## 2024-10-02 RX ORDER — HEPARIN SODIUM (PORCINE) LOCK FLUSH IV SOLN 100 UNIT/ML 100 UNIT/ML
5 SOLUTION INTRAVENOUS
OUTPATIENT
Start: 2025-03-25

## 2024-10-02 RX ORDER — DIPHENHYDRAMINE HCL 25 MG
50 CAPSULE ORAL
OUTPATIENT
Start: 2025-04-15

## 2024-10-02 RX ORDER — DIPHENHYDRAMINE HYDROCHLORIDE 50 MG/ML
50 INJECTION INTRAMUSCULAR; INTRAVENOUS
Status: CANCELLED
Start: 2025-01-20

## 2024-10-02 RX ORDER — ALBUTEROL SULFATE 0.83 MG/ML
2.5 SOLUTION RESPIRATORY (INHALATION)
Status: CANCELLED | OUTPATIENT
Start: 2025-03-03

## 2024-10-02 RX ORDER — METHYLPREDNISOLONE SODIUM SUCCINATE 125 MG/2ML
125 INJECTION INTRAMUSCULAR; INTRAVENOUS
Status: CANCELLED
Start: 2025-08-20

## 2024-10-02 RX ORDER — NALOXONE HYDROCHLORIDE 0.4 MG/ML
0.2 INJECTION, SOLUTION INTRAMUSCULAR; INTRAVENOUS; SUBCUTANEOUS
Status: CANCELLED | OUTPATIENT
Start: 2025-10-02

## 2024-10-02 RX ORDER — MEPERIDINE HYDROCHLORIDE 25 MG/ML
25 INJECTION INTRAMUSCULAR; INTRAVENOUS; SUBCUTANEOUS EVERY 30 MIN PRN
Status: CANCELLED | OUTPATIENT
Start: 2025-02-10

## 2024-10-02 RX ORDER — ACETAMINOPHEN 325 MG/1
650 TABLET ORAL
OUTPATIENT
Start: 2025-09-11

## 2024-10-02 RX ORDER — HEPARIN SODIUM,PORCINE 10 UNIT/ML
5 VIAL (ML) INTRAVENOUS
OUTPATIENT
Start: 2025-06-18

## 2024-10-02 RX ORDER — ACETAMINOPHEN 325 MG/1
650 TABLET ORAL
OUTPATIENT
Start: 2025-08-20

## 2024-10-02 RX ORDER — MEPERIDINE HYDROCHLORIDE 25 MG/ML
25 INJECTION INTRAMUSCULAR; INTRAVENOUS; SUBCUTANEOUS EVERY 30 MIN PRN
Status: CANCELLED | OUTPATIENT
Start: 2025-01-20

## 2024-10-02 RX ORDER — HEPARIN SODIUM,PORCINE 10 UNIT/ML
5 VIAL (ML) INTRAVENOUS
OUTPATIENT
Start: 2025-08-20

## 2024-10-02 RX ORDER — MEPERIDINE HYDROCHLORIDE 25 MG/ML
25 INJECTION INTRAMUSCULAR; INTRAVENOUS; SUBCUTANEOUS EVERY 30 MIN PRN
Status: CANCELLED | OUTPATIENT
Start: 2025-03-03

## 2024-10-02 RX ORDER — HEPARIN SODIUM,PORCINE 10 UNIT/ML
5 VIAL (ML) INTRAVENOUS
OUTPATIENT
Start: 2025-07-09

## 2024-10-02 RX ORDER — ALBUTEROL SULFATE 90 UG/1
1-2 INHALANT RESPIRATORY (INHALATION)
Status: CANCELLED
Start: 2025-03-25

## 2024-10-02 RX ORDER — ACETAMINOPHEN 325 MG/1
650 TABLET ORAL
OUTPATIENT
Start: 2025-07-09

## 2024-10-02 RX ORDER — ALBUTEROL SULFATE 0.83 MG/ML
2.5 SOLUTION RESPIRATORY (INHALATION)
Status: CANCELLED | OUTPATIENT
Start: 2025-10-23

## 2024-10-02 RX ORDER — MEPERIDINE HYDROCHLORIDE 25 MG/ML
25 INJECTION INTRAMUSCULAR; INTRAVENOUS; SUBCUTANEOUS EVERY 30 MIN PRN
Status: CANCELLED | OUTPATIENT
Start: 2025-04-15

## 2024-10-02 RX ORDER — ACETAMINOPHEN 325 MG/1
650 TABLET ORAL
OUTPATIENT
Start: 2025-05-27

## 2024-10-02 RX ORDER — ALBUTEROL SULFATE 0.83 MG/ML
2.5 SOLUTION RESPIRATORY (INHALATION)
Status: CANCELLED | OUTPATIENT
Start: 2024-12-30

## 2024-10-02 RX ORDER — NALOXONE HYDROCHLORIDE 0.4 MG/ML
0.2 INJECTION, SOLUTION INTRAMUSCULAR; INTRAVENOUS; SUBCUTANEOUS
Status: CANCELLED | OUTPATIENT
Start: 2025-08-20

## 2024-10-02 RX ORDER — ACETAMINOPHEN 325 MG/1
650 TABLET ORAL
OUTPATIENT
Start: 2025-11-13

## 2024-10-02 RX ORDER — DIPHENHYDRAMINE HCL 25 MG
50 CAPSULE ORAL
OUTPATIENT
Start: 2025-05-06

## 2024-10-02 RX ORDER — EPINEPHRINE 1 MG/ML
0.3 INJECTION, SOLUTION INTRAMUSCULAR; SUBCUTANEOUS EVERY 5 MIN PRN
Status: CANCELLED | OUTPATIENT
Start: 2025-05-27

## 2024-10-02 RX ORDER — METHYLPREDNISOLONE SODIUM SUCCINATE 125 MG/2ML
125 INJECTION INTRAMUSCULAR; INTRAVENOUS
Status: CANCELLED
Start: 2025-10-23

## 2024-10-02 RX ORDER — DIPHENHYDRAMINE HCL 25 MG
50 CAPSULE ORAL
OUTPATIENT
Start: 2025-06-18

## 2024-10-02 RX ORDER — HEPARIN SODIUM,PORCINE 10 UNIT/ML
5 VIAL (ML) INTRAVENOUS
OUTPATIENT
Start: 2025-01-20

## 2024-10-02 RX ORDER — ALBUTEROL SULFATE 0.83 MG/ML
2.5 SOLUTION RESPIRATORY (INHALATION)
Status: CANCELLED | OUTPATIENT
Start: 2025-07-30

## 2024-10-02 RX ORDER — ALBUTEROL SULFATE 90 UG/1
1-2 INHALANT RESPIRATORY (INHALATION)
Status: CANCELLED
Start: 2025-04-15

## 2024-10-02 RX ORDER — ALBUTEROL SULFATE 90 UG/1
1-2 INHALANT RESPIRATORY (INHALATION)
Status: CANCELLED
Start: 2024-12-30

## 2024-10-02 RX ORDER — MEPERIDINE HYDROCHLORIDE 25 MG/ML
25 INJECTION INTRAMUSCULAR; INTRAVENOUS; SUBCUTANEOUS EVERY 30 MIN PRN
Status: CANCELLED | OUTPATIENT
Start: 2025-10-23

## 2024-10-02 RX ORDER — DIPHENHYDRAMINE HCL 25 MG
50 CAPSULE ORAL
OUTPATIENT
Start: 2025-05-27

## 2024-10-02 RX ORDER — METHYLPREDNISOLONE SODIUM SUCCINATE 125 MG/2ML
125 INJECTION INTRAMUSCULAR; INTRAVENOUS
Status: CANCELLED
Start: 2025-02-10

## 2024-10-02 RX ORDER — ALBUTEROL SULFATE 0.83 MG/ML
2.5 SOLUTION RESPIRATORY (INHALATION)
Status: CANCELLED | OUTPATIENT
Start: 2025-01-20

## 2024-10-02 RX ORDER — ALBUTEROL SULFATE 0.83 MG/ML
2.5 SOLUTION RESPIRATORY (INHALATION)
Status: CANCELLED | OUTPATIENT
Start: 2025-03-25

## 2024-10-02 RX ORDER — METHYLPREDNISOLONE SODIUM SUCCINATE 125 MG/2ML
125 INJECTION INTRAMUSCULAR; INTRAVENOUS
Status: CANCELLED
Start: 2025-10-02

## 2024-10-02 RX ORDER — HEPARIN SODIUM,PORCINE 10 UNIT/ML
5 VIAL (ML) INTRAVENOUS
OUTPATIENT
Start: 2025-03-25

## 2024-10-02 RX ORDER — DIPHENHYDRAMINE HCL 25 MG
50 CAPSULE ORAL
OUTPATIENT
Start: 2025-10-23

## 2024-10-02 RX ORDER — DIPHENHYDRAMINE HCL 25 MG
50 CAPSULE ORAL
OUTPATIENT
Start: 2025-03-25

## 2024-10-02 RX ORDER — MEPERIDINE HYDROCHLORIDE 25 MG/ML
25 INJECTION INTRAMUSCULAR; INTRAVENOUS; SUBCUTANEOUS EVERY 30 MIN PRN
Status: CANCELLED | OUTPATIENT
Start: 2025-09-11

## 2024-10-02 RX ORDER — HEPARIN SODIUM,PORCINE 10 UNIT/ML
5 VIAL (ML) INTRAVENOUS
OUTPATIENT
Start: 2025-11-13

## 2024-10-02 RX ORDER — HEPARIN SODIUM (PORCINE) LOCK FLUSH IV SOLN 100 UNIT/ML 100 UNIT/ML
5 SOLUTION INTRAVENOUS
OUTPATIENT
Start: 2025-04-15

## 2024-10-02 RX ORDER — DIPHENHYDRAMINE HYDROCHLORIDE 50 MG/ML
50 INJECTION INTRAMUSCULAR; INTRAVENOUS
Status: CANCELLED
Start: 2025-08-20

## 2024-10-02 RX ORDER — METHYLPREDNISOLONE SODIUM SUCCINATE 125 MG/2ML
125 INJECTION INTRAMUSCULAR; INTRAVENOUS
Status: CANCELLED
Start: 2025-04-15

## 2024-10-02 RX ORDER — HEPARIN SODIUM (PORCINE) LOCK FLUSH IV SOLN 100 UNIT/ML 100 UNIT/ML
5 SOLUTION INTRAVENOUS
OUTPATIENT
Start: 2024-12-30

## 2024-10-02 RX ORDER — DIPHENHYDRAMINE HYDROCHLORIDE 50 MG/ML
50 INJECTION INTRAMUSCULAR; INTRAVENOUS
Status: CANCELLED
Start: 2025-07-30

## 2024-10-02 RX ORDER — ALBUTEROL SULFATE 90 UG/1
1-2 INHALANT RESPIRATORY (INHALATION)
Status: CANCELLED
Start: 2025-05-06

## 2024-10-02 RX ORDER — DIPHENHYDRAMINE HCL 25 MG
50 CAPSULE ORAL
OUTPATIENT
Start: 2025-03-03

## 2024-10-02 RX ORDER — EPINEPHRINE 1 MG/ML
0.3 INJECTION, SOLUTION INTRAMUSCULAR; SUBCUTANEOUS EVERY 5 MIN PRN
Status: CANCELLED | OUTPATIENT
Start: 2025-10-23

## 2024-10-02 RX ORDER — METHYLPREDNISOLONE SODIUM SUCCINATE 125 MG/2ML
125 INJECTION INTRAMUSCULAR; INTRAVENOUS
Status: CANCELLED
Start: 2025-05-27

## 2024-10-02 RX ORDER — ALBUTEROL SULFATE 90 UG/1
1-2 INHALANT RESPIRATORY (INHALATION)
Status: CANCELLED
Start: 2025-11-13

## 2024-10-02 RX ORDER — EPINEPHRINE 1 MG/ML
0.3 INJECTION, SOLUTION INTRAMUSCULAR; SUBCUTANEOUS EVERY 5 MIN PRN
Status: CANCELLED | OUTPATIENT
Start: 2024-12-30

## 2024-10-02 RX ORDER — ALBUTEROL SULFATE 0.83 MG/ML
2.5 SOLUTION RESPIRATORY (INHALATION)
Status: CANCELLED | OUTPATIENT
Start: 2025-05-06

## 2024-10-02 RX ORDER — EPINEPHRINE 1 MG/ML
0.3 INJECTION, SOLUTION INTRAMUSCULAR; SUBCUTANEOUS EVERY 5 MIN PRN
Status: CANCELLED | OUTPATIENT
Start: 2025-03-25

## 2024-10-02 RX ORDER — HEPARIN SODIUM (PORCINE) LOCK FLUSH IV SOLN 100 UNIT/ML 100 UNIT/ML
5 SOLUTION INTRAVENOUS
OUTPATIENT
Start: 2025-11-13

## 2024-10-02 RX ORDER — ACETAMINOPHEN 325 MG/1
650 TABLET ORAL
OUTPATIENT
Start: 2025-03-25

## 2024-10-02 RX ORDER — EPINEPHRINE 1 MG/ML
0.3 INJECTION, SOLUTION INTRAMUSCULAR; SUBCUTANEOUS EVERY 5 MIN PRN
Status: CANCELLED | OUTPATIENT
Start: 2025-07-30

## 2024-10-02 RX ORDER — NALOXONE HYDROCHLORIDE 0.4 MG/ML
0.2 INJECTION, SOLUTION INTRAMUSCULAR; INTRAVENOUS; SUBCUTANEOUS
Status: CANCELLED | OUTPATIENT
Start: 2025-11-13

## 2024-10-02 RX ORDER — DIPHENHYDRAMINE HCL 25 MG
50 CAPSULE ORAL
OUTPATIENT
Start: 2025-10-02

## 2024-10-02 RX ORDER — HEPARIN SODIUM,PORCINE 10 UNIT/ML
5 VIAL (ML) INTRAVENOUS
OUTPATIENT
Start: 2025-07-30

## 2024-10-02 RX ORDER — ACETAMINOPHEN 325 MG/1
650 TABLET ORAL
OUTPATIENT
Start: 2025-10-02

## 2024-10-02 RX ORDER — ALBUTEROL SULFATE 90 UG/1
1-2 INHALANT RESPIRATORY (INHALATION)
Status: CANCELLED
Start: 2025-07-30

## 2024-10-02 RX ORDER — URSODIOL 250 MG/1
500 TABLET, FILM COATED ORAL 2 TIMES DAILY
Qty: 120 TABLET | Refills: 11 | Status: SHIPPED | OUTPATIENT
Start: 2024-10-02

## 2024-10-02 RX ORDER — ALBUTEROL SULFATE 90 UG/1
1-2 INHALANT RESPIRATORY (INHALATION)
Status: CANCELLED
Start: 2025-07-09

## 2024-10-02 RX ORDER — ALBUTEROL SULFATE 0.83 MG/ML
2.5 SOLUTION RESPIRATORY (INHALATION)
Status: CANCELLED | OUTPATIENT
Start: 2025-11-13

## 2024-10-02 RX ORDER — HEPARIN SODIUM (PORCINE) LOCK FLUSH IV SOLN 100 UNIT/ML 100 UNIT/ML
5 SOLUTION INTRAVENOUS
OUTPATIENT
Start: 2025-10-02

## 2024-10-02 RX ORDER — HEPARIN SODIUM (PORCINE) LOCK FLUSH IV SOLN 100 UNIT/ML 100 UNIT/ML
5 SOLUTION INTRAVENOUS
OUTPATIENT
Start: 2025-05-06

## 2024-10-02 RX ORDER — ALBUTEROL SULFATE 0.83 MG/ML
2.5 SOLUTION RESPIRATORY (INHALATION)
Status: CANCELLED | OUTPATIENT
Start: 2025-06-18

## 2024-10-02 RX ORDER — DIPHENHYDRAMINE HYDROCHLORIDE 50 MG/ML
50 INJECTION INTRAMUSCULAR; INTRAVENOUS
Status: CANCELLED
Start: 2025-02-10

## 2024-10-02 RX ORDER — NALOXONE HYDROCHLORIDE 0.4 MG/ML
0.2 INJECTION, SOLUTION INTRAMUSCULAR; INTRAVENOUS; SUBCUTANEOUS
Status: CANCELLED | OUTPATIENT
Start: 2025-01-20

## 2024-10-02 RX ORDER — DIPHENHYDRAMINE HCL 25 MG
50 CAPSULE ORAL
OUTPATIENT
Start: 2025-11-13

## 2024-10-02 RX ORDER — MEPERIDINE HYDROCHLORIDE 25 MG/ML
25 INJECTION INTRAMUSCULAR; INTRAVENOUS; SUBCUTANEOUS EVERY 30 MIN PRN
Status: CANCELLED | OUTPATIENT
Start: 2025-11-13

## 2024-10-02 RX ORDER — DIPHENHYDRAMINE HCL 25 MG
50 CAPSULE ORAL
OUTPATIENT
Start: 2024-12-30

## 2024-10-02 RX ORDER — HEPARIN SODIUM,PORCINE 10 UNIT/ML
5 VIAL (ML) INTRAVENOUS
OUTPATIENT
Start: 2024-12-30

## 2024-10-02 RX ORDER — NALOXONE HYDROCHLORIDE 0.4 MG/ML
0.2 INJECTION, SOLUTION INTRAMUSCULAR; INTRAVENOUS; SUBCUTANEOUS
Status: CANCELLED | OUTPATIENT
Start: 2025-03-03

## 2024-10-02 RX ORDER — DIPHENHYDRAMINE HCL 25 MG
50 CAPSULE ORAL
OUTPATIENT
Start: 2025-09-11

## 2024-10-02 RX ORDER — MEPERIDINE HYDROCHLORIDE 25 MG/ML
25 INJECTION INTRAMUSCULAR; INTRAVENOUS; SUBCUTANEOUS EVERY 30 MIN PRN
Status: CANCELLED | OUTPATIENT
Start: 2024-12-30

## 2024-10-02 RX ORDER — HEPARIN SODIUM,PORCINE 10 UNIT/ML
5 VIAL (ML) INTRAVENOUS
OUTPATIENT
Start: 2025-10-02

## 2024-10-02 RX ORDER — EPINEPHRINE 1 MG/ML
0.3 INJECTION, SOLUTION INTRAMUSCULAR; SUBCUTANEOUS EVERY 5 MIN PRN
Status: CANCELLED | OUTPATIENT
Start: 2025-09-11

## 2024-10-02 RX ORDER — ALBUTEROL SULFATE 90 UG/1
1-2 INHALANT RESPIRATORY (INHALATION)
Status: CANCELLED
Start: 2025-09-11

## 2024-10-02 RX ORDER — DIPHENHYDRAMINE HYDROCHLORIDE 50 MG/ML
50 INJECTION INTRAMUSCULAR; INTRAVENOUS
Status: CANCELLED
Start: 2025-04-15

## 2024-10-02 RX ORDER — HEPARIN SODIUM,PORCINE 10 UNIT/ML
5 VIAL (ML) INTRAVENOUS
OUTPATIENT
Start: 2025-03-03

## 2024-10-02 RX ORDER — HEPARIN SODIUM (PORCINE) LOCK FLUSH IV SOLN 100 UNIT/ML 100 UNIT/ML
5 SOLUTION INTRAVENOUS
OUTPATIENT
Start: 2025-01-20

## 2024-10-02 RX ORDER — METHYLPREDNISOLONE SODIUM SUCCINATE 125 MG/2ML
125 INJECTION INTRAMUSCULAR; INTRAVENOUS
Status: CANCELLED
Start: 2024-12-30

## 2024-10-02 RX ORDER — ACETAMINOPHEN 325 MG/1
650 TABLET ORAL
OUTPATIENT
Start: 2025-06-18

## 2024-10-02 RX ORDER — NALOXONE HYDROCHLORIDE 0.4 MG/ML
0.2 INJECTION, SOLUTION INTRAMUSCULAR; INTRAVENOUS; SUBCUTANEOUS
Status: CANCELLED | OUTPATIENT
Start: 2025-05-06

## 2024-10-02 RX ORDER — ALBUTEROL SULFATE 90 UG/1
1-2 INHALANT RESPIRATORY (INHALATION)
Status: CANCELLED
Start: 2025-02-10

## 2024-10-02 RX ORDER — ALBUTEROL SULFATE 90 UG/1
1-2 INHALANT RESPIRATORY (INHALATION)
Status: CANCELLED
Start: 2025-01-20

## 2024-10-02 RX ORDER — NALOXONE HYDROCHLORIDE 0.4 MG/ML
0.2 INJECTION, SOLUTION INTRAMUSCULAR; INTRAVENOUS; SUBCUTANEOUS
Status: CANCELLED | OUTPATIENT
Start: 2025-09-11

## 2024-10-02 RX ORDER — METHYLPREDNISOLONE SODIUM SUCCINATE 125 MG/2ML
125 INJECTION INTRAMUSCULAR; INTRAVENOUS
Status: CANCELLED
Start: 2025-01-20

## 2024-10-02 RX ORDER — DIPHENHYDRAMINE HYDROCHLORIDE 50 MG/ML
50 INJECTION INTRAMUSCULAR; INTRAVENOUS
Status: CANCELLED
Start: 2025-03-03

## 2024-10-02 RX ORDER — ACETAMINOPHEN 325 MG/1
650 TABLET ORAL
OUTPATIENT
Start: 2025-10-23

## 2024-10-02 RX ORDER — ALBUTEROL SULFATE 90 UG/1
1-2 INHALANT RESPIRATORY (INHALATION)
Status: CANCELLED
Start: 2025-08-20

## 2024-10-02 RX ORDER — ACETAMINOPHEN 325 MG/1
650 TABLET ORAL
OUTPATIENT
Start: 2025-07-30

## 2024-10-02 RX ORDER — HEPARIN SODIUM (PORCINE) LOCK FLUSH IV SOLN 100 UNIT/ML 100 UNIT/ML
5 SOLUTION INTRAVENOUS
OUTPATIENT
Start: 2025-06-18

## 2024-10-02 RX ORDER — ALBUTEROL SULFATE 0.83 MG/ML
2.5 SOLUTION RESPIRATORY (INHALATION)
Status: CANCELLED | OUTPATIENT
Start: 2025-09-11

## 2024-10-02 RX ORDER — ALBUTEROL SULFATE 90 UG/1
1-2 INHALANT RESPIRATORY (INHALATION)
Status: CANCELLED
Start: 2025-05-27

## 2024-10-02 RX ORDER — MEPERIDINE HYDROCHLORIDE 25 MG/ML
25 INJECTION INTRAMUSCULAR; INTRAVENOUS; SUBCUTANEOUS EVERY 30 MIN PRN
Status: CANCELLED | OUTPATIENT
Start: 2025-08-20

## 2024-10-02 RX ORDER — HEPARIN SODIUM (PORCINE) LOCK FLUSH IV SOLN 100 UNIT/ML 100 UNIT/ML
5 SOLUTION INTRAVENOUS
OUTPATIENT
Start: 2025-02-10

## 2024-10-02 RX ORDER — MEPERIDINE HYDROCHLORIDE 25 MG/ML
25 INJECTION INTRAMUSCULAR; INTRAVENOUS; SUBCUTANEOUS EVERY 30 MIN PRN
Status: CANCELLED | OUTPATIENT
Start: 2025-07-09

## 2024-10-02 RX ORDER — DIPHENHYDRAMINE HCL 25 MG
50 CAPSULE ORAL
OUTPATIENT
Start: 2025-07-30

## 2024-10-02 RX ORDER — ALBUTEROL SULFATE 0.83 MG/ML
2.5 SOLUTION RESPIRATORY (INHALATION)
Status: CANCELLED | OUTPATIENT
Start: 2025-02-10

## 2024-10-02 RX ORDER — DIPHENHYDRAMINE HYDROCHLORIDE 50 MG/ML
50 INJECTION INTRAMUSCULAR; INTRAVENOUS
Status: CANCELLED
Start: 2025-03-25

## 2024-10-02 RX ORDER — DIPHENHYDRAMINE HCL 25 MG
50 CAPSULE ORAL
OUTPATIENT
Start: 2025-07-09

## 2024-10-02 RX ORDER — METHYLPREDNISOLONE SODIUM SUCCINATE 125 MG/2ML
125 INJECTION INTRAMUSCULAR; INTRAVENOUS
Status: CANCELLED
Start: 2025-03-25

## 2024-10-02 RX ORDER — EPINEPHRINE 1 MG/ML
0.3 INJECTION, SOLUTION INTRAMUSCULAR; SUBCUTANEOUS EVERY 5 MIN PRN
Status: CANCELLED | OUTPATIENT
Start: 2025-08-20

## 2024-10-02 RX ORDER — DIPHENHYDRAMINE HYDROCHLORIDE 50 MG/ML
50 INJECTION INTRAMUSCULAR; INTRAVENOUS
Status: CANCELLED
Start: 2025-05-06

## 2024-10-02 RX ORDER — METHYLPREDNISOLONE SODIUM SUCCINATE 125 MG/2ML
125 INJECTION INTRAMUSCULAR; INTRAVENOUS
Status: CANCELLED
Start: 2025-03-03

## 2024-10-02 RX ORDER — HEPARIN SODIUM (PORCINE) LOCK FLUSH IV SOLN 100 UNIT/ML 100 UNIT/ML
5 SOLUTION INTRAVENOUS
OUTPATIENT
Start: 2025-05-27

## 2024-10-02 RX ORDER — MEPERIDINE HYDROCHLORIDE 25 MG/ML
25 INJECTION INTRAMUSCULAR; INTRAVENOUS; SUBCUTANEOUS EVERY 30 MIN PRN
Status: CANCELLED | OUTPATIENT
Start: 2025-07-30

## 2024-10-02 RX ORDER — ACETAMINOPHEN 325 MG/1
650 TABLET ORAL
OUTPATIENT
Start: 2025-05-06

## 2024-10-02 RX ORDER — DIPHENHYDRAMINE HYDROCHLORIDE 50 MG/ML
50 INJECTION INTRAMUSCULAR; INTRAVENOUS
Status: CANCELLED
Start: 2025-10-02

## 2024-10-02 RX ORDER — HEPARIN SODIUM (PORCINE) LOCK FLUSH IV SOLN 100 UNIT/ML 100 UNIT/ML
5 SOLUTION INTRAVENOUS
OUTPATIENT
Start: 2025-09-11

## 2024-10-02 RX ORDER — DIPHENHYDRAMINE HYDROCHLORIDE 50 MG/ML
50 INJECTION INTRAMUSCULAR; INTRAVENOUS
Status: CANCELLED
Start: 2025-09-11

## 2024-10-02 RX ORDER — METHYLPREDNISOLONE SODIUM SUCCINATE 125 MG/2ML
125 INJECTION INTRAMUSCULAR; INTRAVENOUS
Status: CANCELLED
Start: 2025-07-30

## 2024-10-02 RX ADMIN — PERTUZUMAB, TRASTUZUMAB, AND HYALURONIDASE-ZZXF 600 MG: 600; 600; 2000 INJECTION, SOLUTION SUBCUTANEOUS at 10:08

## 2024-10-02 ASSESSMENT — PAIN SCALES - GENERAL
PAINLEVEL: NO PAIN (0)
PAINLEVEL: NO PAIN (0)

## 2024-10-02 NOTE — PROGRESS NOTES
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  # Mar 2022 - May 2022 Taxol / Herceptin / Perjeta; near-CR.  # May 2022 - ongoing Herceptin / Perjeta; CR.    Continue Herceptin/Perjeta subcutaneous; planning indefinite use pending continued response to therapy.  Radiographically, we reviewed continued complete remission; reviewed incidental findings.  Transaminitis (ALT more than anything) of unclear etiology; continuing to monitor; marked improvement in other liver enzymes since diagnosis and response to therapy.  Planning cardiac echo monitoring every 6 months; ordered for November; EF 60% last check and adequate for treatment.  Planning PET scan monitoring every 6 months; ordered for March.  Chemistries otherwise stable; tumor marker within normal limits.  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    Follow-up:  1) Cycles 17-19 Treatment.  2) RJ MG/HI w/ C18D1.  3) BJT MG/HI w/ C19D1.  4) ECHO November.  5) PET scan prior to C19D1.    Adiel Kim MD    --------------------    Interval History:  Tiffanie returns for follow-up of breast cancer unaccompanied.  All in all, she continues to feel \"great\".  She continues on ursodiol without issue.  She continues on Phesgo without issue.  Some occasional loose stools here and there.    Social History:  Social History     Tobacco Use    Smoking status: Never    Smokeless tobacco: Never    Tobacco comments:     no smoking in the home   Substance Use Topics    Alcohol use: Yes     Alcohol/week: 1.7 standard drinks of alcohol     Family History:  Family History   Problem Relation Age of Onset    Allergies " Mother     Anesthesia Reaction Mother         vomiting    Lipids Mother     Gastrointestinal Disease Mother     Heart Disease Maternal Grandmother     Hypertension Maternal Grandmother     Gastrointestinal Disease Maternal Grandmother         cholesystectyomy    Alcohol/Drug Maternal Grandfather     Allergies Sister      Immunizations:  Immunization History   Administered Date(s) Administered    COVID-19 MONOVALENT 12+ (Pfizer) 04/28/2021, 05/19/2021    Influenza (IIV3) PF 11/13/2003, 12/16/2005, 11/14/2006, 12/19/2007, 12/22/2008    Influenza Vaccine 18-64 (Flublok) 02/13/2019, 12/18/2019, 11/10/2022    Influenza Vaccine >6 months,quad, PF 11/09/2016    Influenza Vaccine, 6+MO IM (QUADRIVALENT W/PRESERVATIVES) 02/05/2018    MMR 03/12/1993    TD,PF 7+ (Tenivac) 06/24/1996, 12/08/2006     Health Maintenance Due   Topic Date Due    Pneumococcal Vaccine: Pediatrics (0 to 5 Years) and At-Risk Patients (6 to 64 Years) (1 of 2 - PCV) Never done    ZOSTER IMMUNIZATION (1 of 2) Never done    DTAP/TDAP/TD IMMUNIZATION (1 - Tdap) 12/09/2006    COVID-19 Vaccine (3 - Pfizer risk series) 06/16/2021    INFLUENZA VACCINE (1) 09/01/2023     --------------------    Physical Exam:  Video visit.    Labs / Imaging:  Reviewed CBC, CMP, .  Reviewed PET scan w/ independent review.    Video Visit:  Tiffanie is a 59 year old female who is being evaluated via a billable video visit.  }    Video start time:  8:33 AM  Video end time:  8:58 AM  Provider location: [facility]  Patient location: Home  Mode of transmission:  DonorsPlay / Flow[employer].

## 2024-10-02 NOTE — LETTER
"10/2/2024      Tiffanie Mcdermott  [address] [facility] [address]      Dear Colleague,    Thank you for referring your patient, Tiffanie Mcdermott, to the [facility]. Please see a copy of my visit note below.    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  # Mar 2022 - May 2022 Taxol / Herceptin / Perjeta; near-CR.  # May 2022 - ongoing Herceptin / Perjeta; CR.    Continue Herceptin/Perjeta subcutaneous; planning indefinite use pending continued response to therapy.  Radiographically, we reviewed continued complete remission; reviewed incidental findings.  Transaminitis (ALT more than anything) of unclear etiology; continuing to monitor; marked improvement in other liver enzymes since diagnosis and response to therapy.  Planning cardiac echo monitoring every 6 months; ordered for November.  Planning PET scan monitoring every 6 months; ordered for March.  Chemistries otherwise stable; tumor marker within normal limits.  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    Follow-up:  1) Cycles 17-19 Treatment.  2) RJ MG/ND w/ C18D1.  3) BJT MG/ND w/ C19D1.  4) ECHO November.  5) PET scan prior to C19D1.    Adiel Kim MD    --------------------    Interval History:  Tiffanie returns for follow-up of breast cancer unaccompanied.  All in all, she continues to feel \"great\".  She continues on ursodiol without issue.  She continues on Phesgo without issue.  Some occasional loose stools here and there.    Social History:  Social History     Tobacco Use     Smoking status: Never     Smokeless tobacco: Never     " Tobacco comments:     no smoking in the home   Substance Use Topics     Alcohol use: Yes     Alcohol/week: 1.7 standard drinks of alcohol     Family History:  Family History   Problem Relation Age of Onset     Allergies Mother      Anesthesia Reaction Mother         vomiting     Lipids Mother      Gastrointestinal Disease Mother      Heart Disease Maternal Grandmother      Hypertension Maternal Grandmother      Gastrointestinal Disease Maternal Grandmother         cholesystectyomy     Alcohol/Drug Maternal Grandfather      Allergies Sister      Immunizations:  Immunization History   Administered Date(s) Administered     COVID-19 MONOVALENT 12+ (Pfizer) 04/28/2021, 05/19/2021     Influenza (IIV3) PF 11/13/2003, 12/16/2005, 11/14/2006, 12/19/2007, 12/22/2008     Influenza Vaccine 18-64 (Flublok) 02/13/2019, 12/18/2019, 11/10/2022     Influenza Vaccine >6 months,quad, PF 11/09/2016     Influenza Vaccine, 6+MO IM (QUADRIVALENT W/PRESERVATIVES) 02/05/2018     MMR 03/12/1993     TD,PF 7+ (Tenivac) 06/24/1996, 12/08/2006     Health Maintenance Due   Topic Date Due     Pneumococcal Vaccine: Pediatrics (0 to 5 Years) and At-Risk Patients (6 to 64 Years) (1 of 2 - PCV) Never done     ZOSTER IMMUNIZATION (1 of 2) Never done     DTAP/TDAP/TD IMMUNIZATION (1 - Tdap) 12/09/2006     COVID-19 Vaccine (3 - Pfizer risk series) 06/16/2021     INFLUENZA VACCINE (1) 09/01/2023     --------------------    Physical Exam:  Video visit.    Labs / Imaging:  Reviewed CBC, CMP, .  Reviewed PET scan w/ independent review.    Video Visit:  Tiffanie is a 59 year old female who is being evaluated via a billable video visit.  }    Video start time:  8:33 AM  Video end time:  8:58 AM  [facility]  Patient location: Home  Mode of transmission:  raksul / Zentyal.      Again, thank you for allowing me to participate in the care of your patient.        Sincerely,        Adiel Kim MD

## 2024-10-02 NOTE — PROGRESS NOTES
Infusion Nursing Note:  Tiffanie Mcdermott presents today for C17D1 Phesgo.    Patient seen by provider today: Yes: Julio   present during visit today: Not Applicable.    Note: Feeling well. Ice to left left prior to injection.      Intravenous Access:  No Intravenous access/labs at this visit.    Treatment Conditions:  Lab Results   Component Value Date    HGB 14.7 10/01/2024    WBC 5.9 10/01/2024    ANEU 7.0 03/21/2022    ANEUTAUTO 3.7 10/01/2024     10/01/2024      Last Comprehensive Metabolic Panel:  Sodium   Date Value Ref Range Status   10/01/2024 138 135 - 145 mmol/L Final   09/05/2005 137 133 - 144 mmol/L Final     Potassium   Date Value Ref Range Status   10/01/2024 4.4 3.4 - 5.3 mmol/L Final   11/10/2022 4.0 3.4 - 5.3 mmol/L Final   09/05/2005 4.2 3.4 - 5.3 mmol/L Final     Chloride   Date Value Ref Range Status   10/01/2024 101 98 - 107 mmol/L Final   11/10/2022 107 94 - 109 mmol/L Final   09/05/2005 104 94 - 109 mmol/L Final     Carbon Dioxide   Date Value Ref Range Status   09/05/2005 25 20 - 32 mmol/L Final     Carbon Dioxide (CO2)   Date Value Ref Range Status   10/01/2024 25 22 - 29 mmol/L Final   11/10/2022 27 20 - 32 mmol/L Final     Anion Gap   Date Value Ref Range Status   10/01/2024 12 7 - 15 mmol/L Final   11/10/2022 5 3 - 14 mmol/L Final   09/05/2005 8 6 - 17 mmol/L Final     Glucose   Date Value Ref Range Status   10/01/2024 89 70 - 99 mg/dL Final   11/10/2022 188 (H) 70 - 99 mg/dL Final   09/05/2005 122 (H) 60 - 110 mg/dL Final     Urea Nitrogen   Date Value Ref Range Status   10/01/2024 15.2 8.0 - 23.0 mg/dL Final   11/10/2022 19 7 - 30 mg/dL Final   09/05/2005 12 5 - 24 mg/dL Final     Creatinine   Date Value Ref Range Status   10/01/2024 0.93 0.51 - 0.95 mg/dL Final   09/05/2005 0.77 0.60 - 1.30 mg/dL Final     GFR Estimate   Date Value Ref Range Status   10/01/2024 70 >60 mL/min/1.73m2 Final     Comment:     eGFR calculated using 2021 CKD-EPI equation.   09/05/2005  >80 >60 mL/min/1.7m2 Final     Calcium   Date Value Ref Range Status   10/01/2024 9.6 8.8 - 10.4 mg/dL Final     Comment:     Reference intervals for this test were updated on 7/16/2024 to reflect our healthy population more accurately. There may be differences in the flagging of prior results with similar values performed with this method. Those prior results can be interpreted in the context of the updated reference intervals.   09/05/2005 8.9 8.5 - 10.4 mg/dL Final     Bilirubin Total   Date Value Ref Range Status   10/01/2024 0.6 <=1.2 mg/dL Final   01/31/2006 0.5 0.2 - 1.3 mg/dL Final     Alkaline Phosphatase   Date Value Ref Range Status   10/01/2024 133 40 - 150 U/L Final   01/31/2006 82 40 - 150 U/L Final     ALT   Date Value Ref Range Status   10/01/2024 132 (H) 0 - 50 U/L Final   01/31/2006 31 0 - 50 U/L Final     AST   Date Value Ref Range Status   10/01/2024 74 (H) 0 - 45 U/L Final   01/31/2006 39 0 - 45 U/L Final       Post Infusion Assessment:  Patient tolerated injection without incident.       Discharge Plan:   Patient discharged in stable condition accompanied by: self.  Departure Mode: Ambulatory.      Drea Haynes RN

## 2024-10-02 NOTE — PATIENT INSTRUCTIONS
1) Cycles 17-19 Treatment.  2) RJ MG/WA w/ C18D1.  3) BJT MG/WA w/ C19D1.  4) ECHO November.  5) PET scan prior to C19D1.    Adiel Kim MD.

## 2024-10-02 NOTE — PROGRESS NOTES
"Virtual Visit Details    Type of service:  Video Visit     Originating Location (pt. Location): {video visit patient location:761532::\"Home\"}  {PROVIDER LOCATION On-site should be selected for visits conducted from your clinic location or adjoining [facility] hospital, academic office, or other nearby [facility] building. Off-site should be selected for all other provider locations, including home:[id number]}  Distant Location (provider location):  {virtual location provider:576061}  Platform used for Video Visit: {Virtual Visit Platforms:281547::\"Asseta\"}  "

## 2024-10-02 NOTE — NURSING NOTE
Current patient location: [address] [place] [id number]    Is the patient currently in the state of [place]? YES    Visit mode:VIDEO    If the visit is dropped, the patient can be reconnected by: VIDEO VISIT: Text to cell phone:   Telephone Information:   Mobile [phone]       Will anyone else be joining the visit? NO  (If patient encounters technical issues they should call [phone]    Are changes needed to the allergy or medication list? No    Are refills needed on medications prescribed by this physician? NO    Rooming Documentation:  Pt declined    Reason for visit: LUC HOWELLF

## 2024-10-11 ENCOUNTER — TELEPHONE (OUTPATIENT)
Dept: ONCOLOGY | Facility: CLINIC | Age: 59
End: 2024-10-11
Payer: COMMERCIAL

## 2024-10-11 NOTE — CONFIDENTIAL NOTE
PA denied.  Reason given:      Not medically necessary  Patient notified:  Spoke with patient regarding denial    This RN called [facility] peer to peer line to arrange appointment, detailed LVM per prompts. This RN created medical necessity letter for PHESGO. This letter was sent to [facility] appeal process department at [phone]. A copy of this letter is in the patients chart, oncology RN drawer.     Patient would like to know if infusion appointment on 10/24/23 will need to be adjusted to 10/23/24 due to coverage ending 10/23/24. This RN will check in with patient next week if medication has been approved by insurance company or not and adjust appointments accordingly. No further questions or concerns at this time.  Radha Neal RNCC

## 2024-10-11 NOTE — LETTER
"10/11/2024      Tiffanie Mcdermott  [address] [facility] [address]      To whom it may concern,      This letter is written to document that Ms. Tiffanie Mcdermott has been under the medical care of Dr. Adiel Kim of [facility] since 02/28/2022 for the treatment of Malignant neoplasm of lower-outer quadrant of right female breast, unspecified estrogen receptor status (H) . Recently Dr. Kim was alerted that Ms. Mcdermott's treatment of PHESGO was not covered by insurance due to the procedure having been deemed \"not medically necessary.\"      Ms. Mcdermott treatment with PHESGO is needed. The recommended treatment per your policy is for pertuzumab and trastuzumab-hyaluronidase-oysk, unfortunately this patient does not have a central line or PICC line which is recommended for this treatment. Patient as struggles with poor veins, causing multiple unpleasant and unsuccessful attempts of starting IVs and sustained injury for this treatment. Ms. Mcdermott has already tried pertuzumab and trastuzumab-hyaluronidase-oysk which was not favorable due to these reasons and side effects. PHESGO would be more favorable due to the patient's poor vein anatomy, less invasive and reduced harm to Ms. Mcdermott.     Please take the above information into consideration when determining coverage for Ms. Mcdermott's medically necessary treatment. If there are questions or concerns regarding this information, please contact the [facility] [phone]. Thank you for your time and consideration in this extremely important matter.     Sincerely,              Dr. Adiel Kim    "

## 2024-10-17 ENCOUNTER — MYC MEDICAL ADVICE (OUTPATIENT)
Dept: ONCOLOGY | Facility: CLINIC | Age: 59
End: 2024-10-17
Payer: COMMERCIAL

## 2024-10-21 NOTE — CONFIDENTIAL NOTE
Per finance/pharmacy [employer] has approved patient's phesgo. This RN sent SiBEAM message to patient with updated approval information.  Radha Neal RNCC

## 2024-10-23 ENCOUNTER — INFUSION THERAPY VISIT (OUTPATIENT)
Dept: INFUSION THERAPY | Facility: CLINIC | Age: 59
End: 2024-10-23
Attending: INTERNAL MEDICINE
Payer: COMMERCIAL

## 2024-10-23 VITALS
TEMPERATURE: 97.5 F | DIASTOLIC BLOOD PRESSURE: 59 MMHG | OXYGEN SATURATION: 96 % | WEIGHT: 264.2 LBS | SYSTOLIC BLOOD PRESSURE: 115 MMHG | BODY MASS INDEX: 41.47 KG/M2 | RESPIRATION RATE: 18 BRPM | HEART RATE: 77 BPM | HEIGHT: 67 IN

## 2024-10-23 DIAGNOSIS — C50.511 MALIGNANT NEOPLASM OF LOWER-OUTER QUADRANT OF RIGHT FEMALE BREAST, UNSPECIFIED ESTROGEN RECEPTOR STATUS (H): Primary | ICD-10-CM

## 2024-10-23 PROCEDURE — 96401 CHEMO ANTI-NEOPL SQ/IM: CPT

## 2024-10-23 PROCEDURE — 250N000011 HC RX IP 250 OP 636: Mod: JZ | Performed by: INTERNAL MEDICINE

## 2024-10-23 RX ADMIN — PERTUZUMAB, TRASTUZUMAB, AND HYALURONIDASE-ZZXF 600 MG: 600; 600; 2000 INJECTION, SOLUTION SUBCUTANEOUS at 11:14

## 2024-10-23 ASSESSMENT — PAIN SCALES - GENERAL: PAINLEVEL_OUTOF10: MILD PAIN (2)

## 2024-10-23 NOTE — PROGRESS NOTES
Infusion Nursing Note:  Tiffanie Mcdermott presents today for [id number].    Patient seen by provider today: No   present during visit today: Not Applicable.    Note: Pt states she has been doing well. She had a recent trip to [place] to visit her dad.  She states she had a GI bug for 1-2 days, but has been good for about a week.  She states her stomach is still slightly sensitive, but denies fever and chills.  Pt tolerated injection well without reaction.  Ice applied before and after injection.  Vitally stable, afebrile.  Will return on 11/14/24 for next treatment.      Intravenous Access:  No Intravenous access/labs at this visit.    Treatment Conditions:  Not Applicable.      Post Infusion Assessment:  Patient tolerated injection without incident.  Site patent and intact, free from redness, edema or discomfort.       Discharge Plan:   Discharge instructions reviewed with: Patient.  Patient and/or family verbalized understanding of discharge instructions and all questions answered.  AVS to patient via [employer].  Patient will return 11/14/24 for next appointment.   Patient discharged in stable condition accompanied by: self.  Departure Mode: Ambulatory.      Lucinda Rodríguez RN

## 2024-10-28 ENCOUNTER — OFFICE VISIT (OUTPATIENT)
Dept: FAMILY MEDICINE | Facility: CLINIC | Age: 59
End: 2024-10-28
Payer: COMMERCIAL

## 2024-10-28 VITALS
OXYGEN SATURATION: 97 % | HEIGHT: 67 IN | HEART RATE: 103 BPM | RESPIRATION RATE: 18 BRPM | DIASTOLIC BLOOD PRESSURE: 60 MMHG | BODY MASS INDEX: 41.21 KG/M2 | TEMPERATURE: 100.5 F | WEIGHT: 262.6 LBS | SYSTOLIC BLOOD PRESSURE: 116 MMHG

## 2024-10-28 DIAGNOSIS — R10.84 ABDOMINAL PAIN, GENERALIZED: ICD-10-CM

## 2024-10-28 DIAGNOSIS — R19.7 DIARRHEA, UNSPECIFIED TYPE: Primary | ICD-10-CM

## 2024-10-28 DIAGNOSIS — A04.72 C. DIFFICILE COLITIS: ICD-10-CM

## 2024-10-28 DIAGNOSIS — R11.0 NAUSEA: ICD-10-CM

## 2024-10-28 DIAGNOSIS — R14.0 BLOATING: ICD-10-CM

## 2024-10-28 DIAGNOSIS — K92.1 BLOOD IN STOOL: ICD-10-CM

## 2024-10-28 LAB
ADV 40+41 DNA STL QL NAA+NON-PROBE: NEGATIVE
ALBUMIN SERPL BCG-MCNC: 3.3 G/DL (ref 3.5–5.2)
ALBUMIN UR-MCNC: NEGATIVE MG/DL
ALP SERPL-CCNC: 100 U/L (ref 40–150)
ALT SERPL W P-5'-P-CCNC: 56 U/L (ref 0–50)
AMYLASE SERPL-CCNC: 75 U/L (ref 28–100)
ANION GAP SERPL CALCULATED.3IONS-SCNC: 13 MMOL/L (ref 7–15)
APPEARANCE UR: CLEAR
AST SERPL W P-5'-P-CCNC: 35 U/L (ref 0–45)
ASTRO TYP 1-8 RNA STL QL NAA+NON-PROBE: NEGATIVE
BASOPHILS # BLD AUTO: 0 10E3/UL (ref 0–0.2)
BASOPHILS NFR BLD AUTO: 1 %
BILIRUB SERPL-MCNC: 0.5 MG/DL
BILIRUB UR QL STRIP: NEGATIVE
BUN SERPL-MCNC: 10 MG/DL (ref 8–23)
C CAYETANENSIS DNA STL QL NAA+NON-PROBE: NEGATIVE
C DIFF GDH STL QL IA: NEGATIVE
C DIFF TOX A+B STL QL IA: NEGATIVE
C DIFF TOX B STL QL: POSITIVE
CALCIUM SERPL-MCNC: 8.8 MG/DL (ref 8.8–10.4)
CAMPYLOBACTER DNA SPEC NAA+PROBE: NEGATIVE
CHLORIDE SERPL-SCNC: 100 MMOL/L (ref 98–107)
COLOR UR AUTO: YELLOW
CREAT SERPL-MCNC: 1.05 MG/DL (ref 0.51–0.95)
CRYPTOSP DNA STL QL NAA+NON-PROBE: NEGATIVE
E COLI O157 DNA STL QL NAA+NON-PROBE: NORMAL
E HISTOLYT DNA STL QL NAA+NON-PROBE: NEGATIVE
EAEC ASTA GENE ISLT QL NAA+PROBE: NEGATIVE
EC STX1+STX2 GENES STL QL NAA+NON-PROBE: NEGATIVE
EGFRCR SERPLBLD CKD-EPI 2021: 60 ML/MIN/1.73M2
EOSINOPHIL # BLD AUTO: 0.1 10E3/UL (ref 0–0.7)
EOSINOPHIL NFR BLD AUTO: 1 %
EPEC EAE GENE STL QL NAA+NON-PROBE: NEGATIVE
ERYTHROCYTE [DISTWIDTH] IN BLOOD BY AUTOMATED COUNT: 12.4 % (ref 10–15)
ETEC LTA+ST1A+ST1B TOX ST NAA+NON-PROBE: NEGATIVE
G LAMBLIA DNA STL QL NAA+NON-PROBE: NEGATIVE
GLUCOSE SERPL-MCNC: 208 MG/DL (ref 70–99)
GLUCOSE UR STRIP-MCNC: NEGATIVE MG/DL
HCO3 SERPL-SCNC: 21 MMOL/L (ref 22–29)
HCT VFR BLD AUTO: 40.1 % (ref 35–47)
HGB BLD-MCNC: 13.1 G/DL (ref 11.7–15.7)
HGB UR QL STRIP: NEGATIVE
IMM GRANULOCYTES # BLD: 0 10E3/UL
IMM GRANULOCYTES NFR BLD: 0 %
KETONES UR STRIP-MCNC: NEGATIVE MG/DL
LEUKOCYTE ESTERASE UR QL STRIP: NEGATIVE
LIPASE SERPL-CCNC: 138 U/L (ref 13–60)
LYMPHOCYTES # BLD AUTO: 0.6 10E3/UL (ref 0.8–5.3)
LYMPHOCYTES NFR BLD AUTO: 10 %
MCH RBC QN AUTO: 29.6 PG (ref 26.5–33)
MCHC RBC AUTO-ENTMCNC: 32.7 G/DL (ref 31.5–36.5)
MCV RBC AUTO: 91 FL (ref 78–100)
MONOCYTES # BLD AUTO: 0.5 10E3/UL (ref 0–1.3)
MONOCYTES NFR BLD AUTO: 8 %
NEUTROPHILS # BLD AUTO: 5.4 10E3/UL (ref 1.6–8.3)
NEUTROPHILS NFR BLD AUTO: 81 %
NITRATE UR QL: NEGATIVE
NOROVIRUS GI+II RNA STL QL NAA+NON-PROBE: NEGATIVE
NRBC # BLD AUTO: 0 10E3/UL
NRBC BLD AUTO-RTO: 0 /100
P SHIGELLOIDES DNA STL QL NAA+NON-PROBE: NEGATIVE
PH UR STRIP: 6 [PH] (ref 5–7)
PLATELET # BLD AUTO: 241 10E3/UL (ref 150–450)
POTASSIUM SERPL-SCNC: 3.8 MMOL/L (ref 3.4–5.3)
PROT SERPL-MCNC: 6.5 G/DL (ref 6.4–8.3)
RBC # BLD AUTO: 4.42 10E6/UL (ref 3.8–5.2)
RVA RNA STL QL NAA+NON-PROBE: NEGATIVE
SALMONELLA SP RPOD STL QL NAA+PROBE: NEGATIVE
SAPO I+II+IV+V RNA STL QL NAA+NON-PROBE: NEGATIVE
SHIGELLA SP+EIEC IPAH ST NAA+NON-PROBE: NEGATIVE
SODIUM SERPL-SCNC: 134 MMOL/L (ref 135–145)
SP GR UR STRIP: 1.01 (ref 1–1.03)
UROBILINOGEN UR STRIP-MCNC: NORMAL MG/DL
V CHOLERAE DNA SPEC QL NAA+PROBE: NEGATIVE
VIBRIO DNA SPEC NAA+PROBE: NEGATIVE
WBC # BLD AUTO: 6.7 10E3/UL (ref 4–11)
Y ENTEROCOL DNA STL QL NAA+PROBE: NEGATIVE

## 2024-10-28 PROCEDURE — 80053 COMPREHEN METABOLIC PANEL: CPT

## 2024-10-28 PROCEDURE — 82150 ASSAY OF AMYLASE: CPT

## 2024-10-28 PROCEDURE — 87493 C DIFF AMPLIFIED PROBE: CPT | Mod: 59

## 2024-10-28 PROCEDURE — 99214 OFFICE O/P EST MOD 30 MIN: CPT

## 2024-10-28 PROCEDURE — 81003 URINALYSIS AUTO W/O SCOPE: CPT

## 2024-10-28 PROCEDURE — 85025 COMPLETE CBC W/AUTO DIFF WBC: CPT

## 2024-10-28 PROCEDURE — 83690 ASSAY OF LIPASE: CPT

## 2024-10-28 PROCEDURE — 36415 COLL VENOUS BLD VENIPUNCTURE: CPT

## 2024-10-28 PROCEDURE — 87324 CLOSTRIDIUM AG IA: CPT | Mod: 59

## 2024-10-28 PROCEDURE — 87507 IADNA-DNA/RNA PROBE TQ 12-25: CPT

## 2024-10-28 RX ORDER — ONDANSETRON 4 MG/1
4 TABLET, ORALLY DISINTEGRATING ORAL EVERY 8 HOURS PRN
Qty: 30 TABLET | Refills: 0 | Status: SHIPPED | OUTPATIENT
Start: 2024-10-28

## 2024-10-28 ASSESSMENT — ENCOUNTER SYMPTOMS
FATIGUE: 1
DIARRHEA: 1
ABDOMINAL PAIN: 1

## 2024-10-28 ASSESSMENT — PAIN SCALES - GENERAL: PAINLEVEL_OUTOF10: SEVERE PAIN (7)

## 2024-10-28 NOTE — PATIENT INSTRUCTIONS
- Test results:  - Stool test for E. coli ordered  - Urinalysis for UTI ordered  - Blood work ordered to check white blood cells    Assessment  - Differential diagnosis includes possible E. coli infection due to symptoms of bloody diarrhea and nausea.  - Consideration of other bacterial infections given the lack of response to Imodium.  - Rule out gallbladder issues as the gallbladder has been removed.  - Potential impact of recent immunotherapy on gastrointestinal symptoms.  - Further testing required to confirm diagnosis and guide treatment.    Plan  - Prescribe Zofran for nausea.  - Increase omeprazole to 40 mg, take two tablets first thing in the morning.  - Perform stool test for E. coli.  - Conduct urinalysis to check for UTI.  - Perform blood work to assess white blood cells.  - Avoid taking Imodium until test results are back.  - Consider abdominal imaging if tests return normal.  - [facility] with [facility] results.  - Urgent care as an option if symptoms worsen.          Omeprazole 40 mg each day    Tests today    Zofran as needed for nausea    Follow up with your PCP    Follow up sooner if needed    Treatment for abdominal pain depends on the underlying cause and severity of the pain. Here s a general overview of approaches to managing abdominal pain:    ### 1. **Medical Evaluation and Diagnosis**    - **History and Physical Examination**: Detailed assessment to determine the location, duration, and characteristics of the pain.  - **Diagnostic Tests**: Depending on suspected causes, these may include blood tests, imaging (such as ultrasound, CT scan, or MRI), endoscopy, or other specialized tests.    ### 2. **Treatment Options**    #### **Based on Cause**    - **Gastrointestinal Conditions**: Treatment may include medications, dietary adjustments, and lifestyle changes.    - **Acid Reflux or GERD**: Antacids, H2 blockers (e.g., ranitidine), or proton pump inhibitors (e.g., omeprazole).    - **Peptic  Ulcer Disease**: Antibiotics to eradicate H. pylori infection, acid-suppressing medications.    - **Inflammatory Bowel Disease**: Anti-inflammatory medications, immune-modulating drugs, and dietary changes.    - **Gallstones**: Pain management, medications to dissolve stones, or surgical removal (cholecystectomy).    - **Pancreatitis**: Hospitalization, pain control, IV fluids, and management of underlying causes (e.g., alcohol cessation).    - **IBS (Irritable Bowel Syndrome)**: Dietary modifications, stress management, and medications to relieve symptoms.  - **Gynecological Conditions**: Treatment options may include medications, hormonal therapy, or surgical interventions for conditions like ovarian cysts, endometriosis, or fibroids.  - **Urinary Tract Conditions**: Antibiotics for urinary tract infections, pain management for kidney stones, or surgical interventions if needed.    #### **Pain Management**    - **Analgesics**: Over-the-counter pain relievers such as acetaminophen or NSAIDs (like ibuprofen) may help alleviate mild to moderate pain.  - **Prescription Pain Medications**: Stronger pain relievers may be prescribed for severe pain under medical supervision.  - **Antispasmodic Medications**: For conditions causing muscle spasms in the gastrointestinal tract.    ### 3. **Lifestyle and Home Remedies**    - **Dietary Changes**: Avoiding trigger foods, increasing fiber intake for constipation, and maintaining a balanced diet.  - **Hydration**: Drinking plenty of fluids to prevent dehydration and support digestive health.  - **Stress Management**: Techniques such as relaxation exercises or cognitive behavioral therapy may help manage stress-related abdominal pain.    ### 4. **Surgical Interventions**    - **Emergency Situations**: Surgery may be necessary for conditions like appendicitis, bowel obstruction, or perforated ulcers.    ### 5. **Follow-Up and Monitoring**    - **Regular Monitoring**: Especially  important for chronic conditions or following surgical interventions to ensure healing and manage ongoing symptoms.  - **Medication Adjustments**: Monitoring for side effects and adjusting medications as needed.    ### 6. **Specialist Consultation**    - **Referral**: Depending on the suspected cause of abdominal pain, consultation with a gastroenterologist, gynecologist, urologist, or other specialists may be necessary for specialized evaluation and management.    ### Summary    The treatment of abdominal pain is tailored to the underlying cause, which requires a thorough evaluation by a healthcare provider. Effective management often involves a combination of medications, lifestyle changes, and sometimes surgical interventions. Prompt medical attention is crucial, especially for severe or persistent abdominal pain, to diagnose the cause and initiate appropriate treatment to alleviate symptoms and prevent complications.     Kwendnote    Patient understood and verbally consented to the treatment plan. Discussed symptoms that would warrant an urgent or emergent visit. All of the patients' questions were answered. Patient was instructed to contact the clinic if questions or concerns arise. Recommend follow up appointments if symptoms worsen or fail to improve. Recommend follow up as needed. Recommend ER in the case of an emergency.    Isha Del Cid PA-C    Please note: Voice recognition software may have been used in preparing this note, unintended word substitutions may be present.

## 2024-10-28 NOTE — RESULT ENCOUNTER NOTE
Hello -    Here are my comments about the recent results.  Your lipase is elevated which could be concerning for pancreatitis.  I recommend they go to the ER for further evaluation and management.  Your sodium is slightly low, creatinine is elevated, GFR is decreased.  Blood sugar is 208.  Liver enzymes are slightly increased and albumin is slightly low.  I recommend that you increase salt in the diet, increase fluid intake, and follow-up with your primary care provider.    Please let us know if you have any questions or concerns.    Regards,  Isha Del Cid PA-C

## 2024-10-28 NOTE — PROGRESS NOTES
"  Assessment & Plan     Diarrhea, unspecified type    Abdominal pain, generalized  - Enteric Bacteria and Virus Panel by [facility] Stool; Future  - UA Macroscopic with reflex to Microscopic and Culture - Lab Collect; Future  - C. difficile Toxin B PCR with reflex to C. difficile Antigen and Toxins A/B EIA; Future  - ondansetron (ZOFRAN ODT) 4 MG ODT tab; Take 1 tablet (4 mg) by mouth every 8 hours as needed for nausea.  - Comprehensive metabolic panel; Future  - CBC with Platelets & Differential; Future  - Amylase; Future  - Lipase; Future    Bloating    Nausea    Blood in stool        I spent a total of 20 minutes on the day of the visit.   Time spent by me doing chart review, history and exam, documentation and further activities per the note    BMI  Estimated body mass index is 41.22 kg/m  as calculated from the following:    Height as of this encounter: 1.7 m (5' 6.93\").    Weight as of this encounter: 119.1 kg (262 lb 9.6 oz).         See Patient Instructions  Patient Instructions   - Test results:  - Stool test for E. coli ordered  - Urinalysis for UTI ordered  - Blood work ordered to check white blood cells    Assessment  - Differential diagnosis includes possible E. coli infection due to symptoms of bloody diarrhea and nausea.  - Consideration of other bacterial infections given the lack of response to Imodium.  - Rule out gallbladder issues as the gallbladder has been removed.  - Potential impact of recent immunotherapy on gastrointestinal symptoms.  - Further testing required to confirm diagnosis and guide treatment.    Plan  - Prescribe Zofran for nausea.  - Increase omeprazole to 40 mg, take two tablets first thing in the morning.  - Perform stool test for E. coli.  - Conduct urinalysis to check for UTI.  - Perform blood work to assess white blood cells.  - Avoid taking Imodium until test results are back.  - Consider abdominal imaging if tests return normal.  - [facility].  - " Urgent care as an option if symptoms worsen.          Omeprazole 40 mg each day    Tests today    Zofran as needed for nausea    Follow up with your PCP    Follow up sooner if needed    Treatment for abdominal pain depends on the underlying cause and severity of the pain. Here s a general overview of approaches to managing abdominal pain:    ### 1. **Medical Evaluation and Diagnosis**    - **History and Physical Examination**: Detailed assessment to determine the location, duration, and characteristics of the pain.  - **Diagnostic Tests**: Depending on suspected causes, these may include blood tests, imaging (such as ultrasound, CT scan, or MRI), endoscopy, or other specialized tests.    ### 2. **Treatment Options**    #### **Based on Cause**    - **Gastrointestinal Conditions**: Treatment may include medications, dietary adjustments, and lifestyle changes.    - **Acid Reflux or GERD**: Antacids, H2 blockers (e.g., ranitidine), or proton pump inhibitors (e.g., omeprazole).    - **Peptic Ulcer Disease**: Antibiotics to eradicate H. pylori infection, acid-suppressing medications.    - **Inflammatory Bowel Disease**: Anti-inflammatory medications, immune-modulating drugs, and dietary changes.    - **Gallstones**: Pain management, medications to dissolve stones, or surgical removal (cholecystectomy).    - **Pancreatitis**: Hospitalization, pain control, IV fluids, and management of underlying causes (e.g., alcohol cessation).    - **IBS (Irritable Bowel Syndrome)**: Dietary modifications, stress management, and medications to relieve symptoms.  - **Gynecological Conditions**: Treatment options may include medications, hormonal therapy, or surgical interventions for conditions like ovarian cysts, endometriosis, or fibroids.  - **Urinary Tract Conditions**: Antibiotics for urinary tract infections, pain management for kidney stones, or surgical interventions if needed.    #### **Pain Management**    - **Analgesics**:  Over-the-counter pain relievers such as acetaminophen or NSAIDs (like ibuprofen) may help alleviate mild to moderate pain.  - **Prescription Pain Medications**: Stronger pain relievers may be prescribed for severe pain under medical supervision.  - **Antispasmodic Medications**: For conditions causing muscle spasms in the gastrointestinal tract.    ### 3. **Lifestyle and Home Remedies**    - **Dietary Changes**: Avoiding trigger foods, increasing fiber intake for constipation, and maintaining a balanced diet.  - **Hydration**: Drinking plenty of fluids to prevent dehydration and support digestive health.  - **Stress Management**: Techniques such as relaxation exercises or cognitive behavioral therapy may help manage stress-related abdominal pain.    ### 4. **Surgical Interventions**    - **Emergency Situations**: Surgery may be necessary for conditions like appendicitis, bowel obstruction, or perforated ulcers.    ### 5. **Follow-Up and Monitoring**    - **Regular Monitoring**: Especially important for chronic conditions or following surgical interventions to ensure healing and manage ongoing symptoms.  - **Medication Adjustments**: Monitoring for side effects and adjusting medications as needed.    ### 6. **Specialist Consultation**    - **Referral**: Depending on the suspected cause of abdominal pain, consultation with a gastroenterologist, gynecologist, urologist, or other specialists may be necessary for specialized evaluation and management.    ### Summary    The treatment of abdominal pain is tailored to the underlying cause, which requires a thorough evaluation by a healthcare provider. Effective management often involves a combination of medications, lifestyle changes, and sometimes surgical interventions. Prompt medical attention is crucial, especially for severe or persistent abdominal pain, to diagnose the cause and initiate appropriate treatment to alleviate symptoms and prevent complications.      Brigidendnote    Patient understood and verbally consented to the treatment plan. Discussed symptoms that would warrant an urgent or emergent visit. All of the patients' questions were answered. Patient was instructed to contact the clinic if questions or concerns arise. Recommend follow up appointments if symptoms worsen or fail to improve. Recommend follow up as needed. Recommend ER in the case of an emergency.    Isha Del Cid PA-C    Please note: Voice recognition software may have been used in preparing this note, unintended word substitutions may be present.      Subjective   Tiffanie is a 59 year old, presenting for the following health issues:  Diarrhea, Abdominal Pain, Bloated, and Fatigue        10/28/2024     8:27 AM   Additional Questions   Roomed by Radha     Via the Health Maintenance questionnaire, the patient has reported the following services have been completed -Mammogram: [facility] 2022-03-01, this information has not been sent to the abstraction team.  History of Present Illness       Reason for visit:  Feel like i may have e. coli  Symptom onset:  1-2 weeks ago  Symptoms include:  Diarrhea stomach pain nausea fatigue  Symptom intensity:  Severe  Symptom progression:  Worsening  Had these symptoms before:  Yes  Has tried/received treatment for these symptoms:  Yes  What makes it worse:  Greasy or heavy foods  What makes it better:  Rest   She is taking medications regularly.     Was having diarrhea every 4 hours, yesterday had dairrhea every few hours. Started 2 weeks ago, decreased appetite. Trying to do a bland diet. Family member s with no diarrhea symptoms. No antibiotics. Immunotherapy every three weeks. , last one was wedensday and can cause loose bowls and cold/hot.   Subjective  The patient reports feeling like they may have contracted E. coli, with stomach issues persisting for nearly two weeks. Symptoms began with general queasiness, nausea, and stomach ache, progressing to diarrhea  within the first 24 hours. Imodium was taken initially but had no effect, leading to its discontinuation. Since Friday, the patient has experienced bloody diarrhea with bright red blood. Diarrhea occurs approximately every two hours, including during the night. The patient has scaled back food intake, maintaining a bland diet with items like toast, soups, and cereal. They were on a trip to [place] with family and are unsure of how they contracted the illness, noting no similar symptoms in family members. The patient denies vomiting but reports nausea and occasional stomach burning. Lower abdominal pain was significant a week ago but has since improved. No recent antibiotic use. The patient is undergoing immunotherapy every three weeks for cancer, with the last session occurring last Wednesday. They experience fluctuations in body temperature but consider it normal. The patient has a history of breast cancer, diagnosed two and a half years ago, and had their gallbladder removed years ago. They recall experiencing acid reflux in [place] and have been taking over-the-counter omeprazole, which they stopped temporarily. The patient avoids triggers like greasy foods and garlic, suspecting chicken wild rice soup may have been too high in fat. They express concern about the possibility of a UTI, though symptoms have improved. The patient recalls a past episode of E. coli during cancer treatment but is unsure of the details. They recently had a PET scan with positive results, showing no liver issues.    Objective  - Imaging results:   - Recent PET scan: liver looks great, no issues noted    - Test results:  - Stool test for E. coli ordered  - Urinalysis for UTI ordered  - Blood work ordered to check white blood cells    Assessment  - Differential diagnosis includes possible E. coli infection due to symptoms of bloody diarrhea and nausea.  - Consideration of other bacterial infections given the lack of response to Imodium.  -  "Rule out gallbladder issues as the gallbladder has been removed.  - Potential impact of recent immunotherapy on gastrointestinal symptoms.  - Further testing required to confirm diagnosis and guide treatment.    Plan  - Prescribe Zofran for nausea.  - Increase omeprazole to 40 mg, take two tablets first thing in the morning.  - Perform stool test for E. coli.  - Conduct urinalysis to check for UTI.  - Perform blood work to assess white blood cells.  - Avoid taking Imodium until test results are back.  - Consider abdominal imaging if tests return normal.  - [facility].  - Urgent care as an option if symptoms worsen.                    Objective    /60 (BP Location: Right arm, Patient Position: Sitting, Cuff Size: Adult Large)   Pulse 103   Temp (!) 100.5  F (38.1  C) (Temporal)   Resp 18   Ht 1.7 m (5' 6.93\")   Wt 119.1 kg (262 lb 9.6 oz)   LMP 08/06/2008   SpO2 97%   BMI 41.22 kg/m    Body mass index is 41.22 kg/m .  Physical Exam   GENERAL: alert and no distress  EYES: Eyes grossly normal to inspection, PERRL and conjunctivae and sclerae normal  ABDOMEN: soft, nontender, no hepatosplenomegaly, no masses and bowel sounds normal  MS: no gross musculoskeletal defects noted, no edema  SKIN: no suspicious lesions or rashes  NEURO: Normal strength and tone, mentation intact and speech normal  PSYCH: mentation appears normal, affect normal/bright    Lab on 10/01/2024   Component Date Value Ref Range Status    Sodium 10/01/2024 138  135 - 145 mmol/L Final    Potassium 10/01/2024 4.4  3.4 - 5.3 mmol/L Final    Carbon Dioxide (CO2) 10/01/2024 25  22 - 29 mmol/L Final    Anion Gap 10/01/2024 12  7 - 15 mmol/L Final    Urea Nitrogen 10/01/2024 15.2  8.0 - 23.0 mg/dL Final    Creatinine 10/01/2024 0.93  0.51 - 0.95 mg/dL Final    GFR Estimate 10/01/2024 70  >60 mL/min/1.73m2 Final    eGFR calculated using 2021 CKD-EPI equation.    Calcium 10/01/2024 9.6  8.8 - 10.4 mg/dL Final    " Reference intervals for this test were updated on 7/16/2024 to reflect our healthy population more accurately. There may be differences in the flagging of prior results with similar values performed with this method. Those prior results can be interpreted in the context of the updated reference intervals.    Chloride 10/01/2024 101  98 - 107 mmol/L Final    Glucose 10/01/2024 89  70 - 99 mg/dL Final    Alkaline Phosphatase 10/01/2024 133  40 - 150 U/L Final    AST 10/01/2024 74 (H)  0 - 45 U/L Final    ALT 10/01/2024 132 (H)  0 - 50 U/L Final    Protein Total 10/01/2024 7.5  6.4 - 8.3 g/dL Final    Albumin 10/01/2024 4.2  3.5 - 5.2 g/dL Final    Bilirubin Total 10/01/2024 0.6  <=1.2 mg/dL Final    Cancer Antigen 15-3 10/01/2024 22  <=25 U/mL Final    This result is obtained using the Roche govirals CA 15-3 II method on the moy e801 immunoassay analyzer. Results obtained with different assay methods or kits cannot be used interchangeably.    WBC Count 10/01/2024 5.9  4.0 - 11.0 10e3/uL Final    RBC Count 10/01/2024 4.95  3.80 - 5.20 10e6/uL Final    Hemoglobin 10/01/2024 14.7  11.7 - 15.7 g/dL Final    Hematocrit 10/01/2024 45.4  35.0 - 47.0 % Final    MCV 10/01/2024 92  78 - 100 fL Final    MCH 10/01/2024 29.7  26.5 - 33.0 pg Final    MCHC 10/01/2024 32.4  31.5 - 36.5 g/dL Final    RDW 10/01/2024 12.9  10.0 - 15.0 % Final    Platelet Count 10/01/2024 226  150 - 450 10e3/uL Final    % Neutrophils 10/01/2024 64  % Final    % Lymphocytes 10/01/2024 23  % Final    % Monocytes 10/01/2024 9  % Final    % Eosinophils 10/01/2024 3  % Final    % Basophils 10/01/2024 1  % Final    % Immature Granulocytes 10/01/2024 0  % Final    NRBCs per 100 WBC 10/01/2024 0  <1 /100 Final    Absolute Neutrophils 10/01/2024 3.7  1.6 - 8.3 10e3/uL Final    Absolute Lymphocytes 10/01/2024 1.4  0.8 - 5.3 10e3/uL Final    Absolute Monocytes 10/01/2024 0.5  0.0 - 1.3 10e3/uL Final    Absolute Eosinophils 10/01/2024 0.2  0.0 - 0.7 10e3/uL Final     Absolute Basophils 10/01/2024 0.0  0.0 - 0.2 10e3/uL Final    Absolute Immature Granulocytes 10/01/2024 0.0  <=0.4 10e3/uL Final    Absolute NRBCs 10/01/2024 0.0  10e3/uL Final     No results found for any visits on 10/28/24.  No results found.        Signed Electronically by: Isha Del Cid PA-C

## 2024-10-28 NOTE — RESULT ENCOUNTER NOTE
Hello -    Here are my comments about the recent results.  Your white blood cell count and your hemoglobin is normal.    Please let us know if you have any questions or concerns.    Regards,  Isha Del Cid PA-C

## 2024-10-28 NOTE — RESULT ENCOUNTER NOTE
Hello -    Here are my comments about the recent results.  Your urine test is normal.    Please let us know if you have any questions or concerns.    Regards,  Isha Del Cid PA-C

## 2024-10-29 ENCOUNTER — TELEPHONE (OUTPATIENT)
Dept: FAMILY MEDICINE | Facility: CLINIC | Age: 59
End: 2024-10-29
Payer: COMMERCIAL

## 2024-10-29 RX ORDER — VANCOMYCIN HYDROCHLORIDE 125 MG/1
125 CAPSULE ORAL 4 TIMES DAILY
Qty: 40 CAPSULE | Refills: 0 | Status: SHIPPED | OUTPATIENT
Start: 2024-10-29 | End: 2024-11-08

## 2024-10-29 NOTE — TELEPHONE ENCOUNTER
----- Message from Ching LEO sent at 10/29/2024  9:38 AM CDT -----  Called patient  and informed her of her test results. Patient had many questions regarding this diagnosis that I could not answer. Please call patient to discuss.    Ching Tejeda, [facility]

## 2024-10-29 NOTE — RESULT ENCOUNTER NOTE
Team - please call patient with results. Thank you!  Your test is positive for C. difficile.  This is an infection in the colon.  Recommend treatment with make vancomycin 4 times daily.  This medication has been sent to your pharmacy.  C. difficile is very contagious make sure you wash your hands thoroughly and wash all high touch surfaces with bleach.

## 2024-10-29 NOTE — TELEPHONE ENCOUNTER
Patient asking about education on C-diff as she was diagnosed. Educated patient on how to clean surfaces and how to take Vancomycin. Discussed dietary changes. Will also send information on MyChart.    LA Becerra, RN

## 2024-10-31 RX ORDER — METHYLPREDNISOLONE SODIUM SUCCINATE 40 MG/ML
40 INJECTION INTRAMUSCULAR; INTRAVENOUS
Start: 2025-03-25

## 2024-10-31 RX ORDER — DIPHENHYDRAMINE HYDROCHLORIDE 50 MG/ML
50 INJECTION INTRAMUSCULAR; INTRAVENOUS
Start: 2025-05-06

## 2024-10-31 RX ORDER — MEPERIDINE HYDROCHLORIDE 25 MG/ML
25 INJECTION INTRAMUSCULAR; INTRAVENOUS; SUBCUTANEOUS
OUTPATIENT
Start: 2025-05-06

## 2024-10-31 RX ORDER — DIPHENHYDRAMINE HYDROCHLORIDE 50 MG/ML
50 INJECTION INTRAMUSCULAR; INTRAVENOUS
Start: 2025-09-11

## 2024-10-31 RX ORDER — METHYLPREDNISOLONE SODIUM SUCCINATE 40 MG/ML
40 INJECTION INTRAMUSCULAR; INTRAVENOUS
Start: 2025-06-18

## 2024-10-31 RX ORDER — ALBUTEROL SULFATE 90 UG/1
1-2 INHALANT RESPIRATORY (INHALATION)
Start: 2025-06-18

## 2024-10-31 RX ORDER — ALBUTEROL SULFATE 90 UG/1
1-2 INHALANT RESPIRATORY (INHALATION)
Start: 2025-05-27

## 2024-10-31 RX ORDER — MEPERIDINE HYDROCHLORIDE 25 MG/ML
25 INJECTION INTRAMUSCULAR; INTRAVENOUS; SUBCUTANEOUS
OUTPATIENT
Start: 2025-11-13

## 2024-10-31 RX ORDER — EPINEPHRINE 1 MG/ML
0.3 INJECTION, SOLUTION, CONCENTRATE INTRAVENOUS EVERY 5 MIN PRN
OUTPATIENT
Start: 2025-07-09

## 2024-10-31 RX ORDER — DIPHENHYDRAMINE HYDROCHLORIDE 50 MG/ML
50 INJECTION INTRAMUSCULAR; INTRAVENOUS
Start: 2025-07-09

## 2024-10-31 RX ORDER — METHYLPREDNISOLONE SODIUM SUCCINATE 40 MG/ML
40 INJECTION INTRAMUSCULAR; INTRAVENOUS
Start: 2024-12-08

## 2024-10-31 RX ORDER — METHYLPREDNISOLONE SODIUM SUCCINATE 40 MG/ML
40 INJECTION INTRAMUSCULAR; INTRAVENOUS
Start: 2025-11-13

## 2024-10-31 RX ORDER — DIPHENHYDRAMINE HYDROCHLORIDE 50 MG/ML
50 INJECTION INTRAMUSCULAR; INTRAVENOUS
Start: 2025-06-18

## 2024-10-31 RX ORDER — ALBUTEROL SULFATE 0.83 MG/ML
2.5 SOLUTION RESPIRATORY (INHALATION)
OUTPATIENT
Start: 2025-04-15

## 2024-10-31 RX ORDER — ALBUTEROL SULFATE 90 UG/1
1-2 INHALANT RESPIRATORY (INHALATION)
Start: 2025-05-06

## 2024-10-31 RX ORDER — ALBUTEROL SULFATE 0.83 MG/ML
2.5 SOLUTION RESPIRATORY (INHALATION)
OUTPATIENT
Start: 2024-11-17

## 2024-10-31 RX ORDER — DIPHENHYDRAMINE HYDROCHLORIDE 50 MG/ML
50 INJECTION INTRAMUSCULAR; INTRAVENOUS
Start: 2024-12-08

## 2024-10-31 RX ORDER — ALBUTEROL SULFATE 90 UG/1
1-2 INHALANT RESPIRATORY (INHALATION)
Start: 2024-12-30

## 2024-10-31 RX ORDER — MEPERIDINE HYDROCHLORIDE 25 MG/ML
25 INJECTION INTRAMUSCULAR; INTRAVENOUS; SUBCUTANEOUS
OUTPATIENT
Start: 2025-06-18

## 2024-10-31 RX ORDER — ALBUTEROL SULFATE 0.83 MG/ML
2.5 SOLUTION RESPIRATORY (INHALATION)
OUTPATIENT
Start: 2024-12-08

## 2024-10-31 RX ORDER — ALBUTEROL SULFATE 90 UG/1
1-2 INHALANT RESPIRATORY (INHALATION)
Start: 2025-07-09

## 2024-10-31 RX ORDER — ALBUTEROL SULFATE 0.83 MG/ML
2.5 SOLUTION RESPIRATORY (INHALATION)
OUTPATIENT
Start: 2025-08-20

## 2024-10-31 RX ORDER — EPINEPHRINE 1 MG/ML
0.3 INJECTION, SOLUTION, CONCENTRATE INTRAVENOUS EVERY 5 MIN PRN
OUTPATIENT
Start: 2025-04-15

## 2024-10-31 RX ORDER — ALBUTEROL SULFATE 90 UG/1
1-2 INHALANT RESPIRATORY (INHALATION)
Start: 2025-07-30

## 2024-10-31 RX ORDER — MEPERIDINE HYDROCHLORIDE 25 MG/ML
25 INJECTION INTRAMUSCULAR; INTRAVENOUS; SUBCUTANEOUS
OUTPATIENT
Start: 2025-02-10

## 2024-10-31 RX ORDER — ALBUTEROL SULFATE 90 UG/1
1-2 INHALANT RESPIRATORY (INHALATION)
Start: 2025-10-02

## 2024-10-31 RX ORDER — ALBUTEROL SULFATE 0.83 MG/ML
2.5 SOLUTION RESPIRATORY (INHALATION)
OUTPATIENT
Start: 2025-11-13

## 2024-10-31 RX ORDER — DIPHENHYDRAMINE HYDROCHLORIDE 50 MG/ML
50 INJECTION INTRAMUSCULAR; INTRAVENOUS
Start: 2025-10-23

## 2024-10-31 RX ORDER — DIPHENHYDRAMINE HYDROCHLORIDE 50 MG/ML
25 INJECTION INTRAMUSCULAR; INTRAVENOUS
Start: 2025-01-20

## 2024-10-31 RX ORDER — DIPHENHYDRAMINE HYDROCHLORIDE 50 MG/ML
50 INJECTION INTRAMUSCULAR; INTRAVENOUS
Start: 2025-07-30

## 2024-10-31 RX ORDER — EPINEPHRINE 1 MG/ML
0.3 INJECTION, SOLUTION, CONCENTRATE INTRAVENOUS EVERY 5 MIN PRN
OUTPATIENT
Start: 2025-10-23

## 2024-10-31 RX ORDER — DIPHENHYDRAMINE HYDROCHLORIDE 50 MG/ML
25 INJECTION INTRAMUSCULAR; INTRAVENOUS
Start: 2025-10-02

## 2024-10-31 RX ORDER — MEPERIDINE HYDROCHLORIDE 25 MG/ML
25 INJECTION INTRAMUSCULAR; INTRAVENOUS; SUBCUTANEOUS
OUTPATIENT
Start: 2024-12-08

## 2024-10-31 RX ORDER — METHYLPREDNISOLONE SODIUM SUCCINATE 40 MG/ML
40 INJECTION INTRAMUSCULAR; INTRAVENOUS
Start: 2025-07-30

## 2024-10-31 RX ORDER — EPINEPHRINE 1 MG/ML
0.3 INJECTION, SOLUTION, CONCENTRATE INTRAVENOUS EVERY 5 MIN PRN
OUTPATIENT
Start: 2025-06-18

## 2024-10-31 RX ORDER — DIPHENHYDRAMINE HYDROCHLORIDE 50 MG/ML
50 INJECTION INTRAMUSCULAR; INTRAVENOUS
Start: 2025-10-02

## 2024-10-31 RX ORDER — ALBUTEROL SULFATE 0.83 MG/ML
2.5 SOLUTION RESPIRATORY (INHALATION)
OUTPATIENT
Start: 2025-03-25

## 2024-10-31 RX ORDER — METHYLPREDNISOLONE SODIUM SUCCINATE 40 MG/ML
40 INJECTION INTRAMUSCULAR; INTRAVENOUS
Start: 2025-05-06

## 2024-10-31 RX ORDER — ALBUTEROL SULFATE 0.83 MG/ML
2.5 SOLUTION RESPIRATORY (INHALATION)
OUTPATIENT
Start: 2025-05-06

## 2024-10-31 RX ORDER — ALBUTEROL SULFATE 90 UG/1
1-2 INHALANT RESPIRATORY (INHALATION)
Start: 2025-02-10

## 2024-10-31 RX ORDER — DIPHENHYDRAMINE HYDROCHLORIDE 50 MG/ML
50 INJECTION INTRAMUSCULAR; INTRAVENOUS
Start: 2025-01-20

## 2024-10-31 RX ORDER — ALBUTEROL SULFATE 90 UG/1
1-2 INHALANT RESPIRATORY (INHALATION)
Start: 2025-01-20

## 2024-10-31 RX ORDER — DIPHENHYDRAMINE HYDROCHLORIDE 50 MG/ML
25 INJECTION INTRAMUSCULAR; INTRAVENOUS
Start: 2025-03-25

## 2024-10-31 RX ORDER — ALBUTEROL SULFATE 90 UG/1
1-2 INHALANT RESPIRATORY (INHALATION)
Start: 2024-12-08

## 2024-10-31 RX ORDER — EPINEPHRINE 1 MG/ML
0.3 INJECTION, SOLUTION, CONCENTRATE INTRAVENOUS EVERY 5 MIN PRN
OUTPATIENT
Start: 2025-07-30

## 2024-10-31 RX ORDER — DIPHENHYDRAMINE HYDROCHLORIDE 50 MG/ML
25 INJECTION INTRAMUSCULAR; INTRAVENOUS
Start: 2025-06-18

## 2024-10-31 RX ORDER — ALBUTEROL SULFATE 0.83 MG/ML
2.5 SOLUTION RESPIRATORY (INHALATION)
OUTPATIENT
Start: 2024-12-30

## 2024-10-31 RX ORDER — EPINEPHRINE 1 MG/ML
0.3 INJECTION, SOLUTION, CONCENTRATE INTRAVENOUS EVERY 5 MIN PRN
OUTPATIENT
Start: 2024-12-30

## 2024-10-31 RX ORDER — DIPHENHYDRAMINE HYDROCHLORIDE 50 MG/ML
50 INJECTION INTRAMUSCULAR; INTRAVENOUS
Start: 2025-03-25

## 2024-10-31 RX ORDER — DIPHENHYDRAMINE HYDROCHLORIDE 50 MG/ML
25 INJECTION INTRAMUSCULAR; INTRAVENOUS
Start: 2025-03-03

## 2024-10-31 RX ORDER — MEPERIDINE HYDROCHLORIDE 25 MG/ML
25 INJECTION INTRAMUSCULAR; INTRAVENOUS; SUBCUTANEOUS
OUTPATIENT
Start: 2025-03-25

## 2024-10-31 RX ORDER — DIPHENHYDRAMINE HYDROCHLORIDE 50 MG/ML
50 INJECTION INTRAMUSCULAR; INTRAVENOUS
Start: 2025-02-10

## 2024-10-31 RX ORDER — ALBUTEROL SULFATE 0.83 MG/ML
2.5 SOLUTION RESPIRATORY (INHALATION)
OUTPATIENT
Start: 2025-09-11

## 2024-10-31 RX ORDER — MEPERIDINE HYDROCHLORIDE 25 MG/ML
25 INJECTION INTRAMUSCULAR; INTRAVENOUS; SUBCUTANEOUS
OUTPATIENT
Start: 2024-11-17

## 2024-10-31 RX ORDER — ALBUTEROL SULFATE 90 UG/1
1-2 INHALANT RESPIRATORY (INHALATION)
Start: 2025-09-11

## 2024-10-31 RX ORDER — EPINEPHRINE 1 MG/ML
0.3 INJECTION, SOLUTION, CONCENTRATE INTRAVENOUS EVERY 5 MIN PRN
OUTPATIENT
Start: 2024-12-08

## 2024-10-31 RX ORDER — METHYLPREDNISOLONE SODIUM SUCCINATE 40 MG/ML
40 INJECTION INTRAMUSCULAR; INTRAVENOUS
Start: 2025-01-20

## 2024-10-31 RX ORDER — DIPHENHYDRAMINE HYDROCHLORIDE 50 MG/ML
25 INJECTION INTRAMUSCULAR; INTRAVENOUS
Start: 2024-11-17

## 2024-10-31 RX ORDER — EPINEPHRINE 1 MG/ML
0.3 INJECTION, SOLUTION, CONCENTRATE INTRAVENOUS EVERY 5 MIN PRN
OUTPATIENT
Start: 2025-11-13

## 2024-10-31 RX ORDER — METHYLPREDNISOLONE SODIUM SUCCINATE 40 MG/ML
40 INJECTION INTRAMUSCULAR; INTRAVENOUS
Start: 2024-12-30

## 2024-10-31 RX ORDER — ALBUTEROL SULFATE 0.83 MG/ML
2.5 SOLUTION RESPIRATORY (INHALATION)
OUTPATIENT
Start: 2025-01-20

## 2024-10-31 RX ORDER — EPINEPHRINE 1 MG/ML
0.3 INJECTION, SOLUTION, CONCENTRATE INTRAVENOUS EVERY 5 MIN PRN
OUTPATIENT
Start: 2025-03-25

## 2024-10-31 RX ORDER — METHYLPREDNISOLONE SODIUM SUCCINATE 40 MG/ML
40 INJECTION INTRAMUSCULAR; INTRAVENOUS
Start: 2025-10-23

## 2024-10-31 RX ORDER — METHYLPREDNISOLONE SODIUM SUCCINATE 40 MG/ML
40 INJECTION INTRAMUSCULAR; INTRAVENOUS
Start: 2025-08-20

## 2024-10-31 RX ORDER — DIPHENHYDRAMINE HYDROCHLORIDE 50 MG/ML
25 INJECTION INTRAMUSCULAR; INTRAVENOUS
Start: 2025-04-15

## 2024-10-31 RX ORDER — METHYLPREDNISOLONE SODIUM SUCCINATE 40 MG/ML
40 INJECTION INTRAMUSCULAR; INTRAVENOUS
Start: 2025-02-10

## 2024-10-31 RX ORDER — EPINEPHRINE 1 MG/ML
0.3 INJECTION, SOLUTION, CONCENTRATE INTRAVENOUS EVERY 5 MIN PRN
OUTPATIENT
Start: 2025-03-03

## 2024-10-31 RX ORDER — MEPERIDINE HYDROCHLORIDE 25 MG/ML
25 INJECTION INTRAMUSCULAR; INTRAVENOUS; SUBCUTANEOUS
OUTPATIENT
Start: 2025-10-23

## 2024-10-31 RX ORDER — EPINEPHRINE 1 MG/ML
0.3 INJECTION, SOLUTION, CONCENTRATE INTRAVENOUS EVERY 5 MIN PRN
OUTPATIENT
Start: 2025-05-27

## 2024-10-31 RX ORDER — DIPHENHYDRAMINE HYDROCHLORIDE 50 MG/ML
25 INJECTION INTRAMUSCULAR; INTRAVENOUS
Start: 2024-12-30

## 2024-10-31 RX ORDER — DIPHENHYDRAMINE HYDROCHLORIDE 50 MG/ML
50 INJECTION INTRAMUSCULAR; INTRAVENOUS
Start: 2025-05-27

## 2024-10-31 RX ORDER — DIPHENHYDRAMINE HYDROCHLORIDE 50 MG/ML
25 INJECTION INTRAMUSCULAR; INTRAVENOUS
Start: 2025-07-09

## 2024-10-31 RX ORDER — DIPHENHYDRAMINE HYDROCHLORIDE 50 MG/ML
25 INJECTION INTRAMUSCULAR; INTRAVENOUS
Start: 2025-10-23

## 2024-10-31 RX ORDER — ALBUTEROL SULFATE 90 UG/1
1-2 INHALANT RESPIRATORY (INHALATION)
Start: 2025-03-25

## 2024-10-31 RX ORDER — MEPERIDINE HYDROCHLORIDE 25 MG/ML
25 INJECTION INTRAMUSCULAR; INTRAVENOUS; SUBCUTANEOUS
OUTPATIENT
Start: 2025-03-03

## 2024-10-31 RX ORDER — EPINEPHRINE 1 MG/ML
0.3 INJECTION, SOLUTION, CONCENTRATE INTRAVENOUS EVERY 5 MIN PRN
OUTPATIENT
Start: 2025-05-06

## 2024-10-31 RX ORDER — DIPHENHYDRAMINE HYDROCHLORIDE 50 MG/ML
50 INJECTION INTRAMUSCULAR; INTRAVENOUS
Start: 2025-04-15

## 2024-10-31 RX ORDER — ALBUTEROL SULFATE 0.83 MG/ML
2.5 SOLUTION RESPIRATORY (INHALATION)
OUTPATIENT
Start: 2025-06-18

## 2024-10-31 RX ORDER — MEPERIDINE HYDROCHLORIDE 25 MG/ML
25 INJECTION INTRAMUSCULAR; INTRAVENOUS; SUBCUTANEOUS
OUTPATIENT
Start: 2025-01-20

## 2024-10-31 RX ORDER — ALBUTEROL SULFATE 90 UG/1
1-2 INHALANT RESPIRATORY (INHALATION)
Start: 2025-03-03

## 2024-10-31 RX ORDER — METHYLPREDNISOLONE SODIUM SUCCINATE 40 MG/ML
40 INJECTION INTRAMUSCULAR; INTRAVENOUS
Start: 2025-05-27

## 2024-10-31 RX ORDER — EPINEPHRINE 1 MG/ML
0.3 INJECTION, SOLUTION, CONCENTRATE INTRAVENOUS EVERY 5 MIN PRN
OUTPATIENT
Start: 2025-01-20

## 2024-10-31 RX ORDER — ALBUTEROL SULFATE 0.83 MG/ML
2.5 SOLUTION RESPIRATORY (INHALATION)
OUTPATIENT
Start: 2025-10-23

## 2024-10-31 RX ORDER — ALBUTEROL SULFATE 0.83 MG/ML
2.5 SOLUTION RESPIRATORY (INHALATION)
OUTPATIENT
Start: 2025-05-27

## 2024-10-31 RX ORDER — ALBUTEROL SULFATE 0.83 MG/ML
2.5 SOLUTION RESPIRATORY (INHALATION)
OUTPATIENT
Start: 2025-02-10

## 2024-10-31 RX ORDER — ALBUTEROL SULFATE 0.83 MG/ML
2.5 SOLUTION RESPIRATORY (INHALATION)
OUTPATIENT
Start: 2025-03-03

## 2024-10-31 RX ORDER — METHYLPREDNISOLONE SODIUM SUCCINATE 40 MG/ML
40 INJECTION INTRAMUSCULAR; INTRAVENOUS
Start: 2025-07-09

## 2024-10-31 RX ORDER — EPINEPHRINE 1 MG/ML
0.3 INJECTION, SOLUTION, CONCENTRATE INTRAVENOUS EVERY 5 MIN PRN
OUTPATIENT
Start: 2024-11-17

## 2024-10-31 RX ORDER — DIPHENHYDRAMINE HYDROCHLORIDE 50 MG/ML
25 INJECTION INTRAMUSCULAR; INTRAVENOUS
Start: 2025-08-20

## 2024-10-31 RX ORDER — EPINEPHRINE 1 MG/ML
0.3 INJECTION, SOLUTION, CONCENTRATE INTRAVENOUS EVERY 5 MIN PRN
OUTPATIENT
Start: 2025-10-02

## 2024-10-31 RX ORDER — DIPHENHYDRAMINE HYDROCHLORIDE 50 MG/ML
25 INJECTION INTRAMUSCULAR; INTRAVENOUS
Start: 2025-11-13

## 2024-10-31 RX ORDER — DIPHENHYDRAMINE HYDROCHLORIDE 50 MG/ML
25 INJECTION INTRAMUSCULAR; INTRAVENOUS
Start: 2025-09-11

## 2024-10-31 RX ORDER — EPINEPHRINE 1 MG/ML
0.3 INJECTION, SOLUTION, CONCENTRATE INTRAVENOUS EVERY 5 MIN PRN
OUTPATIENT
Start: 2025-08-20

## 2024-10-31 RX ORDER — METHYLPREDNISOLONE SODIUM SUCCINATE 40 MG/ML
40 INJECTION INTRAMUSCULAR; INTRAVENOUS
Start: 2025-09-11

## 2024-10-31 RX ORDER — METHYLPREDNISOLONE SODIUM SUCCINATE 40 MG/ML
40 INJECTION INTRAMUSCULAR; INTRAVENOUS
Start: 2025-03-03

## 2024-10-31 RX ORDER — METHYLPREDNISOLONE SODIUM SUCCINATE 40 MG/ML
40 INJECTION INTRAMUSCULAR; INTRAVENOUS
Start: 2025-10-02

## 2024-10-31 RX ORDER — ALBUTEROL SULFATE 90 UG/1
1-2 INHALANT RESPIRATORY (INHALATION)
Start: 2024-11-17

## 2024-10-31 RX ORDER — MEPERIDINE HYDROCHLORIDE 25 MG/ML
25 INJECTION INTRAMUSCULAR; INTRAVENOUS; SUBCUTANEOUS
OUTPATIENT
Start: 2024-12-30

## 2024-10-31 RX ORDER — DIPHENHYDRAMINE HYDROCHLORIDE 50 MG/ML
25 INJECTION INTRAMUSCULAR; INTRAVENOUS
Start: 2025-02-10

## 2024-10-31 RX ORDER — DIPHENHYDRAMINE HYDROCHLORIDE 50 MG/ML
50 INJECTION INTRAMUSCULAR; INTRAVENOUS
Start: 2025-03-03

## 2024-10-31 RX ORDER — DIPHENHYDRAMINE HYDROCHLORIDE 50 MG/ML
50 INJECTION INTRAMUSCULAR; INTRAVENOUS
Start: 2025-08-20

## 2024-10-31 RX ORDER — MEPERIDINE HYDROCHLORIDE 25 MG/ML
25 INJECTION INTRAMUSCULAR; INTRAVENOUS; SUBCUTANEOUS
OUTPATIENT
Start: 2025-04-15

## 2024-10-31 RX ORDER — DIPHENHYDRAMINE HYDROCHLORIDE 50 MG/ML
25 INJECTION INTRAMUSCULAR; INTRAVENOUS
Start: 2025-05-06

## 2024-10-31 RX ORDER — DIPHENHYDRAMINE HYDROCHLORIDE 50 MG/ML
25 INJECTION INTRAMUSCULAR; INTRAVENOUS
Start: 2024-12-08

## 2024-10-31 RX ORDER — ALBUTEROL SULFATE 90 UG/1
1-2 INHALANT RESPIRATORY (INHALATION)
Start: 2025-10-23

## 2024-10-31 RX ORDER — ALBUTEROL SULFATE 0.83 MG/ML
2.5 SOLUTION RESPIRATORY (INHALATION)
OUTPATIENT
Start: 2025-10-02

## 2024-10-31 RX ORDER — MEPERIDINE HYDROCHLORIDE 25 MG/ML
25 INJECTION INTRAMUSCULAR; INTRAVENOUS; SUBCUTANEOUS
OUTPATIENT
Start: 2025-10-02

## 2024-10-31 RX ORDER — METHYLPREDNISOLONE SODIUM SUCCINATE 40 MG/ML
40 INJECTION INTRAMUSCULAR; INTRAVENOUS
Start: 2024-11-17

## 2024-10-31 RX ORDER — MEPERIDINE HYDROCHLORIDE 25 MG/ML
25 INJECTION INTRAMUSCULAR; INTRAVENOUS; SUBCUTANEOUS
OUTPATIENT
Start: 2025-07-30

## 2024-10-31 RX ORDER — MEPERIDINE HYDROCHLORIDE 25 MG/ML
25 INJECTION INTRAMUSCULAR; INTRAVENOUS; SUBCUTANEOUS
OUTPATIENT
Start: 2025-09-11

## 2024-10-31 RX ORDER — ALBUTEROL SULFATE 90 UG/1
1-2 INHALANT RESPIRATORY (INHALATION)
Start: 2025-04-15

## 2024-10-31 RX ORDER — DIPHENHYDRAMINE HYDROCHLORIDE 50 MG/ML
50 INJECTION INTRAMUSCULAR; INTRAVENOUS
Start: 2024-12-30

## 2024-10-31 RX ORDER — MEPERIDINE HYDROCHLORIDE 25 MG/ML
25 INJECTION INTRAMUSCULAR; INTRAVENOUS; SUBCUTANEOUS
OUTPATIENT
Start: 2025-05-27

## 2024-10-31 RX ORDER — EPINEPHRINE 1 MG/ML
0.3 INJECTION, SOLUTION, CONCENTRATE INTRAVENOUS EVERY 5 MIN PRN
OUTPATIENT
Start: 2025-09-11

## 2024-10-31 RX ORDER — DIPHENHYDRAMINE HYDROCHLORIDE 50 MG/ML
25 INJECTION INTRAMUSCULAR; INTRAVENOUS
Start: 2025-05-27

## 2024-10-31 RX ORDER — MEPERIDINE HYDROCHLORIDE 25 MG/ML
25 INJECTION INTRAMUSCULAR; INTRAVENOUS; SUBCUTANEOUS
OUTPATIENT
Start: 2025-08-20

## 2024-10-31 RX ORDER — DIPHENHYDRAMINE HYDROCHLORIDE 50 MG/ML
25 INJECTION INTRAMUSCULAR; INTRAVENOUS
Start: 2025-07-30

## 2024-10-31 RX ORDER — ALBUTEROL SULFATE 0.83 MG/ML
2.5 SOLUTION RESPIRATORY (INHALATION)
OUTPATIENT
Start: 2025-07-30

## 2024-10-31 RX ORDER — MEPERIDINE HYDROCHLORIDE 25 MG/ML
25 INJECTION INTRAMUSCULAR; INTRAVENOUS; SUBCUTANEOUS
OUTPATIENT
Start: 2025-07-09

## 2024-10-31 RX ORDER — ALBUTEROL SULFATE 90 UG/1
1-2 INHALANT RESPIRATORY (INHALATION)
Start: 2025-11-13

## 2024-10-31 RX ORDER — DIPHENHYDRAMINE HYDROCHLORIDE 50 MG/ML
50 INJECTION INTRAMUSCULAR; INTRAVENOUS
Start: 2024-11-17

## 2024-10-31 RX ORDER — ALBUTEROL SULFATE 0.83 MG/ML
2.5 SOLUTION RESPIRATORY (INHALATION)
OUTPATIENT
Start: 2025-07-09

## 2024-10-31 RX ORDER — METHYLPREDNISOLONE SODIUM SUCCINATE 40 MG/ML
40 INJECTION INTRAMUSCULAR; INTRAVENOUS
Start: 2025-04-15

## 2024-10-31 RX ORDER — ALBUTEROL SULFATE 90 UG/1
1-2 INHALANT RESPIRATORY (INHALATION)
Start: 2025-08-20

## 2024-10-31 RX ORDER — EPINEPHRINE 1 MG/ML
0.3 INJECTION, SOLUTION, CONCENTRATE INTRAVENOUS EVERY 5 MIN PRN
OUTPATIENT
Start: 2025-02-10

## 2024-10-31 RX ORDER — DIPHENHYDRAMINE HYDROCHLORIDE 50 MG/ML
50 INJECTION INTRAMUSCULAR; INTRAVENOUS
Start: 2025-11-13
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
## 2024-11-12 ENCOUNTER — TELEPHONE (OUTPATIENT)
Dept: INFUSION THERAPY | Facility: CLINIC | Age: 59
End: 2024-11-12
Payer: COMMERCIAL

## 2024-11-12 NOTE — PROGRESS NOTES
Called patient to check on patient.  She is scheduled to receive Phesgo on 11/14, but tested + for C Diff on 10/29.  Pt states that she is much improved.  Does not have the nausea, fatigue or abd pain like previously.  She does still have 3-4 diarrhea/ watery stools in the morning then the rest of the day she states that she feels pretty good.   Plan: Will confirm with Dr Kim whether or not to proceed with treatment.

## 2024-11-13 ENCOUNTER — TELEPHONE (OUTPATIENT)
Dept: INFUSION THERAPY | Facility: CLINIC | Age: 59
End: 2024-11-13
Payer: COMMERCIAL

## 2024-11-13 NOTE — PROGRESS NOTES
"Called patient this morning to follow up re: her C diff status.  Pt states that she is NOT having loose , watery stools.  She just goes several times when she first gets up in the morning and then seems to be \"fine\".  She also stated that she is not having stomach pain any longer. Pt has also been eating yogurt trying to get her bowels back to normal.    Pt has opted to come for Phesgo treatment tomorrow despite the possibililty that the Phesgo could aggrevate her bowels.  Pt was advised to follow up with her primary Dr re: her C diff.  She acknowledged understanding .   "

## 2024-11-14 ENCOUNTER — INFUSION THERAPY VISIT (OUTPATIENT)
Dept: INFUSION THERAPY | Facility: CLINIC | Age: 59
End: 2024-11-14
Attending: INTERNAL MEDICINE
Payer: COMMERCIAL

## 2024-11-14 VITALS
RESPIRATION RATE: 18 BRPM | HEART RATE: 93 BPM | BODY MASS INDEX: 40.6 KG/M2 | DIASTOLIC BLOOD PRESSURE: 67 MMHG | OXYGEN SATURATION: 96 % | SYSTOLIC BLOOD PRESSURE: 129 MMHG | TEMPERATURE: 98.6 F | WEIGHT: 258.7 LBS

## 2024-11-14 DIAGNOSIS — C50.511 MALIGNANT NEOPLASM OF LOWER-OUTER QUADRANT OF RIGHT FEMALE BREAST, UNSPECIFIED ESTROGEN RECEPTOR STATUS (H): Primary | ICD-10-CM

## 2024-11-14 PROCEDURE — 250N000011 HC RX IP 250 OP 636: Mod: JZ | Performed by: INTERNAL MEDICINE

## 2024-11-14 RX ADMIN — PERTUZUMAB, TRASTUZUMAB, AND HYALURONIDASE-ZZXF 600 MG: 600; 600; 2000 INJECTION, SOLUTION SUBCUTANEOUS at 09:53

## 2024-11-14 ASSESSMENT — PAIN SCALES - GENERAL: PAINLEVEL_OUTOF10: NO PAIN (0)

## 2024-11-14 NOTE — PROGRESS NOTES
Infusion Nursing Note:  Tiffanie Mcdermott presents today for C17D43 Phesgo.    Patient seen by provider today: No   present during visit today: Not Applicable.    Note: Tiffanie arrives ambulatory, alert, pleasant. Had dx of C-diff on 10/29, started on Vanco then. Finished antibiotic late last week. Feels her sx have improved. Less frequent diarrhea, stools are loose not runny. Mostly occurs in a.m. Occasional gas pain but no true abdominal pain or cramping admitted to. Fatigue level more around her norm now. Starting to eat more variety of foods but being cautious about this. No fevers. Stopped her B Complex for now during this time. Is planning to message her PCP to discuss how she is currently since stopping her Vancomycin. Feels good enough and agrees to proceed with her Phesgo injection today.   Was put in private room on Contact/enteric precautions during this visit.       Intravenous Access:  No Intravenous access/labs at this visit.    Treatment Conditions:  ECHO on 5/8/24 Biplane LVEF 60%.   Has next ECHO on 11/22.      Post Infusion Assessment:  Patient tolerated subQ injection without incident. A little pain/burning during administration.   No bleeding, pain after injection or swelling at site.       Discharge Plan:   [facility]  Patient will return 12/5 for next appointment.   Patient discharged in stable condition accompanied by: self.  Departure Mode: Ambulatory.      Constance Maynard RN

## 2024-11-22 ENCOUNTER — HOSPITAL ENCOUNTER (OUTPATIENT)
Dept: CARDIOLOGY | Facility: CLINIC | Age: 59
Discharge: HOME | End: 2024-11-22
Attending: INTERNAL MEDICINE | Admitting: INTERNAL MEDICINE
Payer: COMMERCIAL

## 2024-11-22 DIAGNOSIS — I42.7 DRUG-INDUCED CARDIOMYOPATHY (H): ICD-10-CM

## 2024-11-22 LAB — LVEF ECHO: NORMAL

## 2024-11-22 PROCEDURE — 93321 DOPPLER ECHO F-UP/LMTD STD: CPT | Mod: 26 | Performed by: INTERNAL MEDICINE

## 2024-11-22 PROCEDURE — 93325 DOPPLER ECHO COLOR FLOW MAPG: CPT | Mod: 26 | Performed by: INTERNAL MEDICINE

## 2024-11-22 PROCEDURE — 93325 DOPPLER ECHO COLOR FLOW MAPG: CPT

## 2024-11-22 PROCEDURE — 93308 TTE F-UP OR LMTD: CPT | Mod: 26 | Performed by: INTERNAL MEDICINE

## 2024-12-05 ENCOUNTER — INFUSION THERAPY VISIT (OUTPATIENT)
Dept: INFUSION THERAPY | Facility: CLINIC | Age: 59
End: 2024-12-05
Attending: INTERNAL MEDICINE
Payer: COMMERCIAL

## 2024-12-05 VITALS
BODY MASS INDEX: 40.31 KG/M2 | RESPIRATION RATE: 18 BRPM | OXYGEN SATURATION: 96 % | TEMPERATURE: 97.3 F | WEIGHT: 256.8 LBS | HEART RATE: 91 BPM | SYSTOLIC BLOOD PRESSURE: 135 MMHG | DIASTOLIC BLOOD PRESSURE: 72 MMHG

## 2024-12-05 DIAGNOSIS — C50.511 MALIGNANT NEOPLASM OF LOWER-OUTER QUADRANT OF RIGHT FEMALE BREAST, UNSPECIFIED ESTROGEN RECEPTOR STATUS (H): Primary | ICD-10-CM

## 2024-12-05 ASSESSMENT — PAIN SCALES - GENERAL: PAINLEVEL_OUTOF10: NO PAIN (0)

## 2024-12-05 NOTE — PROGRESS NOTES
Infusion Nursing Note:  Tiffanie Mcdermott presents today for C17D64 Phesgo.    Patient seen by provider today: No   present during visit today: Not Applicable.    Note: No complaints today. Ice to right thigh prior to injection.      Intravenous Access:  No Intravenous access/labs at this visit.    Treatment Conditions:  Not Applicable.      Post Infusion Assessment:  Patient tolerated injection without incident.       Discharge Plan:   Patient discharged in stable condition accompanied by: self.  Departure Mode: Ambulatory.      Drea Haynes RN

## 2024-12-23 ENCOUNTER — VIRTUAL VISIT (OUTPATIENT)
Dept: ONCOLOGY | Facility: CLINIC | Age: 59
End: 2024-12-23
Attending: INTERNAL MEDICINE
Payer: COMMERCIAL

## 2024-12-23 VITALS — WEIGHT: 256 LBS | BODY MASS INDEX: 40.18 KG/M2 | HEIGHT: 67 IN

## 2024-12-23 DIAGNOSIS — C50.911 HER2-POSITIVE CARCINOMA OF RIGHT BREAST (H): ICD-10-CM

## 2024-12-23 DIAGNOSIS — C78.7 BREAST CANCER METASTASIZED TO LIVER, LEFT (H): ICD-10-CM

## 2024-12-23 DIAGNOSIS — Z17.31 HER2-POSITIVE CARCINOMA OF RIGHT BREAST (H): ICD-10-CM

## 2024-12-23 DIAGNOSIS — R79.89 ABNORMAL LFTS: ICD-10-CM

## 2024-12-23 DIAGNOSIS — C50.511 MALIGNANT NEOPLASM OF LOWER-OUTER QUADRANT OF RIGHT BREAST OF FEMALE, ESTROGEN RECEPTOR NEGATIVE (H): Primary | ICD-10-CM

## 2024-12-23 DIAGNOSIS — C50.912 BREAST CANCER METASTASIZED TO LIVER, LEFT (H): ICD-10-CM

## 2024-12-23 DIAGNOSIS — Z17.1 MALIGNANT NEOPLASM OF LOWER-OUTER QUADRANT OF RIGHT BREAST OF FEMALE, ESTROGEN RECEPTOR NEGATIVE (H): Primary | ICD-10-CM

## 2024-12-23 PROCEDURE — 99214 OFFICE O/P EST MOD 30 MIN: CPT | Mod: 95 | Performed by: NURSE PRACTITIONER

## 2024-12-23 ASSESSMENT — PAIN SCALES - GENERAL: PAINLEVEL_OUTOF10: NO PAIN (0)

## 2024-12-23 NOTE — NURSING NOTE
Patient declined medicaotn       Current patient location: [address] [facility] [address]    Is the patient currently in the state of [place]? YES    Visit mode:VIDEO    If the visit is dropped, the patient can be reconnected by:VIDEO VISIT: Text to cell phone:   Telephone Information:   Mobile [phone]       Will anyone else be joining the visit? NO  (If patient encounters technical issues they should call [phone]    Are changes needed to the allergy or medication list? Pt declined med review and Pt stated no med changes    Are refills needed on medications prescribed by this physician? NO    Rooming Documentation:  Questionnaire(s) completed    Reason for visit: RECHECK (UP coming appts. )    Opal HERNANDEZ

## 2024-12-23 NOTE — PROGRESS NOTES
Virtual Visit Details    Type of service:  Video Visit     Originating Location (pt. Location): Home    Distant Location (provider location):  On-site  Platform used for Video Visit: [facility]        Oncology Follow Up Visit: December 23, 2024     Oncologist: Dr. 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  3/2022 - May 2022 Taxol / Herceptin / Perjeta; near-CR.  6/2022 - ongoing Herceptin / Perjeta; CR.    Interval History:  Ms. Moreno is seen by video today prior to treatment with Herceptin/Perjeta.    Loose stools - intermittent - better since new drug started- ursodiol. Rarely uses imodium.    Fatigue- hits for about 3 days post treatment but feels well after weekend and is able to work her job without problems.   States she is sleeping well most days and has good appetite without weight loss.   Did not yet get flu vaccination and is refusing COvid vaccination for now.   Rest of complete and comprehensive ROS is negative.     Physical Exam:  LMP 08/06/2008    GENERAL: alert and no distress  EYES: Eyes grossly normal to inspection.  No discharge or erythema, or obvious scleral/conjunctival abnormalities.  RESP: No audible wheeze, cough, or visible cyanosis.    SKIN: Visible skin clear. No significant rash, abnormal pigmentation or lesions.  NEURO: Cranial nerves grossly intact.  Mentation and speech appropriate for age.  PSYCH: Appropriate affect, tone, and pace of words  The rest of a comprehensive physical examination is deferred due to video visit restrictions      Laboratory/Imaging Results:   No results found for any visits on 12/23/24.  [facility]  Echocardiography Laboratory  [address] [place] [address]     Name:  AUDRYE ALVAREZ  MRN: [id number]  : 1965  Study Date: 2024 11:03 AM  Age: 59 yrs  Gender: Female  Patient Location: [facility]  Reason For Study: Drug-induced cardiomyopathy  History: Breast Cancer with bone/liver mets  Ordering Physician: LATASHA OSULLIVAN  Referring Physician: LATASHA OSULLIVAN  Performed By: Lia Reis     BSA: 2.2 m2  Height: 66 in  Weight: 258 lb  HR: 90  BP: 129/67 mmHg  ______________________________________________________________________________  Procedure  Limited Echo Adult. Technically difficult study.  ______________________________________________________________________________  Interpretation Summary     Technically difficult study. Contrast not used due to patient declined.     The visual ejection fraction is estimated at 55%.  The right ventricle is normal in size and function.  There is no pericardial effusion.  No hemodynamically significant valvular disease.  ______________________________________________________________________________  Left Ventricle  The left ventricle is normal in size. The visual ejection fraction is  estimated at 55%.     Right Ventricle  The right ventricle is normal in size and function.     Atria  Normal left atrial size. Right atrial size is normal.     Mitral Valve  The mitral valve is normal in structure and function.     Tricuspid Valve  The tricuspid valve is not well visualized, but is grossly normal. Right  ventricular systolic pressure could not be approximated due to inadequate  tricuspid regurgitation.     Aortic Valve  The aortic valve is normal in structure and function. No aortic regurgitation  is present. No hemodynamically significant valvular aortic stenosis.     Pulmonic Valve  The pulmonic valve is not well visualized.     Vessels  The aortic root is normal size. Inferior vena cava not well visualized for  estimation of right atrial pressure.     Pericardium  There is no pericardial effusion.      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  - Tolerating well with manageable side effects- noting occasional loose stools through cycle and some fatigue just after treatment for 3 days. .  - ECHO q6 months, last completed 2024 with LVEFnormal. No concerning cardiac issues today with review or exam.   - PET q6 months, next scheduled for 3/2025  - Pt due for next labs and infusion on  - no changes to plan for now. .     Transaminitis - hx of cirrhotic appearance of liver  - Elevated AST and ALT - improved with last labs in October - will review labs for this cycle -Close monitoring recommended by gastroenterology.    Elevated blood sugars - noted several times in last several months but pt stats she always comes after meals. WIll get Hgb A1c if noted to be over 250.    The total time of this encounter amounted to  35 minutes. This time included video time spent with the patient, prep work, review and ordering tests, and performing post visit documentation.  Rosi Baxter,Cnp

## 2024-12-23 NOTE — LETTER
12/23/2024      Tiffanie Mcdermott  [address] [facility] [address]      Dear Colleague,    Thank you for referring your patient, Tiffanie Mcdermott, to the [facility]. Please see a copy of my visit note below.    Virtual Visit Details    Type of service:  Video Visit     Originating Location (pt. Location): Home    Distant Location (provider location):  On-site  Platform used for Video Visit: [facility]        Oncology Follow Up Visit: December 23, 2024     Oncologist: Dr. 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  3/2022 - May 2022 Taxol / Herceptin / Perjeta; near-CR.  6/2022 - ongoing Herceptin / Perjeta; CR.    Interval History:  Ms. Moreno is seen by video today prior to treatment with Herceptin/Perjeta.    Loose stools - intermittent - better since new drug started- ursodiol. Rarely uses imodium.    Fatigue- hits for about 3 days post treatment but feels well after weekend and is able to work her job without problems.   States she is sleeping well most days and has good appetite without weight loss.   Did not yet get flu vaccination and is refusing COvid vaccination for now.   Rest of complete and comprehensive ROS is negative.     Physical Exam:  [facility] 08/06/2008    GENERAL: alert and no distress  EYES: Eyes grossly normal to inspection.  No discharge or erythema, or obvious scleral/conjunctival abnormalities.  RESP: No audible wheeze, cough, or visible cyanosis.    SKIN: Visible skin clear. No significant rash, abnormal pigmentation or lesions.  NEURO: Cranial nerves grossly intact.  Mentation and speech appropriate for age.  PSYCH: Appropriate affect, tone, and pace of words  The rest of a comprehensive physical  examination is deferred due to video visit restrictions      Laboratory/Imaging Results:   No results found for any visits on 24.  [facility]  Echocardiography Laboratory  [address] [place] [address]     Name: AUDREY ALVAREZ  MRN: [id number]  : 1965  Study Date: 2024 11:03 AM  Age: 59 yrs  Gender: Female  Patient Location: [facility]  Reason For Study: Drug-induced cardiomyopathy  History: Breast Cancer with bone/liver mets  Ordering Physician: LATASHA OSULLIVAN  Referring Physician: LATASHA OSULLIVAN  Performed By: Lia Reis     BSA: 2.2 m2  Height: 66 in  Weight: 258 lb  HR: 90  BP: 129/67 mmHg  ______________________________________________________________________________  Procedure  Limited Echo Adult. Technically difficult study.  ______________________________________________________________________________  Interpretation Summary     Technically difficult study. Contrast not used due to patient declined.     The visual ejection fraction is estimated at 55%.  The right ventricle is normal in size and function.  There is no pericardial effusion.  No hemodynamically significant valvular disease.  ______________________________________________________________________________  Left Ventricle  The left ventricle is normal in size. The visual ejection fraction is  estimated at 55%.     Right Ventricle  The right ventricle is normal in size and function.     Atria  Normal left atrial size. Right atrial size is normal.     Mitral Valve  The mitral valve is normal in structure and function.     Tricuspid Valve  The tricuspid valve is not well visualized, but is grossly normal. Right  ventricular systolic pressure could not be approximated due to inadequate  tricuspid regurgitation.     Aortic Valve  The aortic valve is normal in structure and function. No aortic regurgitation  is present. No hemodynamically significant valvular aortic stenosis.      Pulmonic Valve  The pulmonic valve is not well visualized.     Vessels  The aortic root is normal size. Inferior vena cava not well visualized for  estimation of right atrial pressure.     Pericardium  There is no pericardial effusion.     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  - Tolerating well with manageable side effects- noting occasional loose stools through cycle and some fatigue just after treatment for 3 days. .  - ECHO q6 months, last completed 2024 with LVEFnormal. No concerning cardiac issues today with review or exam.   - PET q6 months, next scheduled for 3/2025  - Pt due for next labs and infusion on  - no changes to plan for now. .     Transaminitis - hx of cirrhotic appearance of liver  - Elevated AST and ALT - improved with last labs in October - will review labs for this cycle -Close monitoring recommended by gastroenterology.    Elevated blood sugars - noted several times in last several months but pt stats she always comes after meals. WIll get Hgb A1c if noted to be over 250.    The total time of this encounter amounted to  35 minutes. This time included video time spent with the patient, prep work, review and ordering tests, and performing post visit  documentation.  Rosi Baxetr Cnp      Again, thank you for allowing me to participate in the care of your patient.        Sincerely,        Rosi Baxter NP, APRN CNP    Electronically signed

## 2024-12-26 ENCOUNTER — INFUSION THERAPY VISIT (OUTPATIENT)
Dept: INFUSION THERAPY | Facility: CLINIC | Age: 59
End: 2024-12-26
Attending: INTERNAL MEDICINE
Payer: COMMERCIAL

## 2024-12-26 VITALS
WEIGHT: 256.5 LBS | BODY MASS INDEX: 40.17 KG/M2 | HEART RATE: 73 BPM | RESPIRATION RATE: 16 BRPM | DIASTOLIC BLOOD PRESSURE: 78 MMHG | TEMPERATURE: 97.8 F | OXYGEN SATURATION: 96 % | SYSTOLIC BLOOD PRESSURE: 129 MMHG

## 2024-12-26 DIAGNOSIS — C50.511 MALIGNANT NEOPLASM OF LOWER-OUTER QUADRANT OF RIGHT FEMALE BREAST, UNSPECIFIED ESTROGEN RECEPTOR STATUS (H): Primary | ICD-10-CM

## 2024-12-26 LAB
ALBUMIN SERPL BCG-MCNC: 4.2 G/DL (ref 3.5–5.2)
ALP SERPL-CCNC: 116 U/L (ref 40–150)
ALT SERPL W P-5'-P-CCNC: 79 U/L (ref 0–50)
ANION GAP SERPL CALCULATED.3IONS-SCNC: 9 MMOL/L (ref 7–15)
AST SERPL W P-5'-P-CCNC: 54 U/L (ref 0–45)
BASOPHILS # BLD AUTO: 0 10E3/UL (ref 0–0.2)
BASOPHILS NFR BLD AUTO: 1 %
BILIRUB SERPL-MCNC: 0.3 MG/DL
BUN SERPL-MCNC: 13.4 MG/DL (ref 8–23)
CALCIUM SERPL-MCNC: 8.9 MG/DL (ref 8.8–10.4)
CANCER AG15-3 SERPL-ACNC: 19 U/ML
CHLORIDE SERPL-SCNC: 104 MMOL/L (ref 98–107)
CREAT SERPL-MCNC: 0.89 MG/DL (ref 0.51–0.95)
EGFRCR SERPLBLD CKD-EPI 2021: 73 ML/MIN/1.73M2
EOSINOPHIL # BLD AUTO: 0.2 10E3/UL (ref 0–0.7)
EOSINOPHIL NFR BLD AUTO: 4 %
ERYTHROCYTE [DISTWIDTH] IN BLOOD BY AUTOMATED COUNT: 13.3 % (ref 10–15)
GLUCOSE SERPL-MCNC: 102 MG/DL (ref 70–99)
HCO3 SERPL-SCNC: 28 MMOL/L (ref 22–29)
HCT VFR BLD AUTO: 41.6 % (ref 35–47)
HGB BLD-MCNC: 13.4 G/DL (ref 11.7–15.7)
IMM GRANULOCYTES # BLD: 0 10E3/UL
IMM GRANULOCYTES NFR BLD: 0 %
LYMPHOCYTES # BLD AUTO: 1.5 10E3/UL (ref 0.8–5.3)
LYMPHOCYTES NFR BLD AUTO: 27 %
MCH RBC QN AUTO: 29.3 PG (ref 26.5–33)
MCHC RBC AUTO-ENTMCNC: 32.2 G/DL (ref 31.5–36.5)
MCV RBC AUTO: 91 FL (ref 78–100)
MONOCYTES # BLD AUTO: 0.5 10E3/UL (ref 0–1.3)
MONOCYTES NFR BLD AUTO: 8 %
NEUTROPHILS # BLD AUTO: 3.4 10E3/UL (ref 1.6–8.3)
NEUTROPHILS NFR BLD AUTO: 60 %
NRBC # BLD AUTO: 0 10E3/UL
NRBC BLD AUTO-RTO: 0 /100
PLATELET # BLD AUTO: 205 10E3/UL (ref 150–450)
POTASSIUM SERPL-SCNC: 4 MMOL/L (ref 3.4–5.3)
PROT SERPL-MCNC: 7 G/DL (ref 6.4–8.3)
RBC # BLD AUTO: 4.58 10E6/UL (ref 3.8–5.2)
SODIUM SERPL-SCNC: 141 MMOL/L (ref 135–145)
WBC # BLD AUTO: 5.6 10E3/UL (ref 4–11)

## 2024-12-26 PROCEDURE — 85004 AUTOMATED DIFF WBC COUNT: CPT

## 2024-12-26 PROCEDURE — 250N000011 HC RX IP 250 OP 636: Performed by: INTERNAL MEDICINE

## 2024-12-26 PROCEDURE — 86300 IMMUNOASSAY TUMOR CA 15-3: CPT

## 2024-12-26 PROCEDURE — 36415 COLL VENOUS BLD VENIPUNCTURE: CPT

## 2024-12-26 PROCEDURE — G0008 ADMIN INFLUENZA VIRUS VAC: HCPCS | Performed by: INTERNAL MEDICINE

## 2024-12-26 PROCEDURE — 90673 RIV3 VACCINE NO PRESERV IM: CPT | Performed by: INTERNAL MEDICINE

## 2024-12-26 PROCEDURE — 80053 COMPREHEN METABOLIC PANEL: CPT

## 2024-12-26 PROCEDURE — 85041 AUTOMATED RBC COUNT: CPT

## 2024-12-26 RX ADMIN — INFLUENZA A VIRUS A/WEST VIRGINIA/30/2022 (H1N1) RECOMBINANT HEMAGGLUTININ ANTIGEN, INFLUENZA A VIRUS A/MASSACHUSETTS/18/2022 (H3N2) RECOMBINANT HEMAGGLUTININ ANTIGEN, AND INFLUENZA B VIRUS B/AUSTRIA/1359417/2021 RECOMBINANT HEMAGGLUTININ ANTIGEN 0.5 ML: 45; 45; 45 INJECTION INTRAMUSCULAR at 14:39

## 2024-12-26 ASSESSMENT — PAIN SCALES - GENERAL: PAINLEVEL_OUTOF10: NO PAIN (0)

## 2024-12-26 NOTE — PROGRESS NOTES
Infusion Nursing Note:  Tiffanie Mcdermott presents today for C18D1 Phesgo with seasonal flu shot.  Patient seen by provider today: No   present during visit today: Not Applicable.    Note: Feeling well. No complaints.      Intravenous Access:  No Intravenous access/labs at this visit.    Treatment Conditions:  Not Applicable.      Post Infusion Assessment:  Patient tolerated injection without incident.       Discharge Plan:   Patient discharged in stable condition accompanied by: self.  Departure Mode: Ambulatory.      Drea Haynes RN

## 2024-12-30 ENCOUNTER — PATIENT OUTREACH (OUTPATIENT)
Dept: ONCOLOGY | Facility: CLINIC | Age: 59
End: 2024-12-30
Payer: COMMERCIAL

## 2024-12-30 NOTE — TELEPHONE ENCOUNTER
[facility]: Cancer Care Follow-Up Note                                    Discussion with Patient:                                                      Chart review, enrolled into [employer] per CC monitoring.    Dates of Treatment:                                                       12/26/2024  2:18 PM   Chemotherapy    pertuzumab-trastuzumab-hyaluronidase (PHESGO)  mg      Assessment:                                                    [facility] Short Symptom Review:     Assessment completed with:: VM-chart review    General/Short Assessment  Does the patient have all their medications?: Yes  Is the patient experiencing any new or worsening symptoms?: No  Discussion with patient: Reviewed patient's future appointments    Patient Coping  Accepting    Clinic Utilization  Patient Aware of Next Appointment?: Yes    Other Patient Concerns  Other Patient Reported Concerns: Yes, see notes    Intervention/Education provided during outreach:                                                       No intervention needed at this time. Patient scheduled appropriately, will continue to follow.      Patient to follow up as scheduled at next appt  Patient to call/KitOrdert message with updates  Confirmed patient has clinic and triage numbers    Signature:  Radha Neal RN

## 2025-01-16 ENCOUNTER — INFUSION THERAPY VISIT (OUTPATIENT)
Dept: INFUSION THERAPY | Facility: CLINIC | Age: 60
End: 2025-01-16
Attending: INTERNAL MEDICINE
Payer: COMMERCIAL

## 2025-01-16 VITALS
TEMPERATURE: 98.3 F | OXYGEN SATURATION: 95 % | RESPIRATION RATE: 16 BRPM | SYSTOLIC BLOOD PRESSURE: 124 MMHG | WEIGHT: 256.1 LBS | DIASTOLIC BLOOD PRESSURE: 73 MMHG | HEART RATE: 98 BPM | BODY MASS INDEX: 40.11 KG/M2

## 2025-01-16 DIAGNOSIS — C50.511 MALIGNANT NEOPLASM OF LOWER-OUTER QUADRANT OF RIGHT FEMALE BREAST, UNSPECIFIED ESTROGEN RECEPTOR STATUS (H): Primary | ICD-10-CM

## 2025-01-16 PROCEDURE — 250N000011 HC RX IP 250 OP 636: Mod: JZ | Performed by: INTERNAL MEDICINE

## 2025-01-16 RX ADMIN — PERTUZUMAB, TRASTUZUMAB, AND HYALURONIDASE-ZZXF 600 MG: 600; 600; 2000 INJECTION, SOLUTION SUBCUTANEOUS at 09:55

## 2025-01-16 ASSESSMENT — PAIN SCALES - GENERAL: PAINLEVEL_OUTOF10: NO PAIN (0)

## 2025-01-16 NOTE — PROGRESS NOTES
Infusion Nursing Note:  Tiffanie Mcdermott presents today for C18D22  Phesgo subcutaneous injection.    Patient seen by provider today: No   present during visit today: Not Applicable.    Note: Tiffanie is here today on her own for her Phesgo injection.  Given today in her right thigh.  Patient has overall been feeling okay.  No changes from last visit.  Has had a little GI upset since last night but attributes it to something she ate.  Otherwise no concerns.       Intravenous Access:  No Intravenous access/labs at this visit.    Treatment Conditions:  Not Applicable.      Post Infusion Assessment:  Patient tolerated infusion without incident.       Discharge Plan:   Patient discharged in stable condition accompanied by: self.  Departure Mode: Ambulatory.  Patient has next appointment in infusion on 02/13/2025.      Cindy Robbins RN

## 2025-02-13 ENCOUNTER — INFUSION THERAPY VISIT (OUTPATIENT)
Dept: INFUSION THERAPY | Facility: CLINIC | Age: 60
End: 2025-02-13
Attending: INTERNAL MEDICINE
Payer: COMMERCIAL

## 2025-02-13 VITALS
OXYGEN SATURATION: 99 % | HEART RATE: 86 BPM | TEMPERATURE: 97.5 F | SYSTOLIC BLOOD PRESSURE: 148 MMHG | BODY MASS INDEX: 40.69 KG/M2 | RESPIRATION RATE: 18 BRPM | DIASTOLIC BLOOD PRESSURE: 56 MMHG | WEIGHT: 259.8 LBS

## 2025-02-13 DIAGNOSIS — C50.511 MALIGNANT NEOPLASM OF LOWER-OUTER QUADRANT OF RIGHT FEMALE BREAST, UNSPECIFIED ESTROGEN RECEPTOR STATUS (H): Primary | ICD-10-CM

## 2025-02-13 NOTE — PROGRESS NOTES
Infusion Nursing Note:  Tiffanie Mcdermott presents today for C18D43 PHESGO.    Patient seen by provider today: No   present during visit today: Not Applicable.    Note: Patient arrived ambulatory. VSS, denies pain. Doing well. No changes in neuropathy.       Intravenous Access:  No Intravenous access/labs at this visit.    Treatment Conditions:  Not Applicable.      Post Infusion Assessment:  Patient tolerated injection over 5 minutes without incident.  Ice applied to left thigh x 10 minutes prior to injection and after.       Discharge Plan:   Patient discharged in stable condition accompanied by: self.  Departure Mode: Ambulatory.      Alicia Mcgee RN

## 2025-03-06 ENCOUNTER — INFUSION THERAPY VISIT (OUTPATIENT)
Dept: INFUSION THERAPY | Facility: CLINIC | Age: 60
End: 2025-03-06
Attending: INTERNAL MEDICINE
Payer: COMMERCIAL

## 2025-03-06 VITALS
SYSTOLIC BLOOD PRESSURE: 144 MMHG | DIASTOLIC BLOOD PRESSURE: 72 MMHG | TEMPERATURE: 97.6 F | OXYGEN SATURATION: 96 % | HEART RATE: 94 BPM | WEIGHT: 261 LBS | RESPIRATION RATE: 18 BRPM | BODY MASS INDEX: 40.88 KG/M2

## 2025-03-06 DIAGNOSIS — C50.511 MALIGNANT NEOPLASM OF LOWER-OUTER QUADRANT OF RIGHT FEMALE BREAST, UNSPECIFIED ESTROGEN RECEPTOR STATUS (H): Primary | ICD-10-CM

## 2025-03-06 PROCEDURE — 250N000011 HC RX IP 250 OP 636: Mod: JZ | Performed by: INTERNAL MEDICINE

## 2025-03-06 RX ADMIN — PERTUZUMAB, TRASTUZUMAB, AND HYALURONIDASE-ZZXF 600 MG: 600; 600; 2000 INJECTION, SOLUTION SUBCUTANEOUS at 09:40

## 2025-03-06 ASSESSMENT — PAIN SCALES - GENERAL: PAINLEVEL_OUTOF10: NO PAIN (0)

## 2025-03-06 NOTE — PROGRESS NOTES
"Infusion Nursing Note:  Tiffanie PAT Mcdermott presents today for C18D64 Phesgo.    Patient seen by provider today: No   present during visit today: Not Applicable.    Note: Feeling well. A couple of days ago she had some \"liver pain\" after eating some fatty/garlicy food. She said this happens sometimes. No issues today.      Intravenous Access:  No Intravenous access/labs at this visit.    Treatment Conditions:  Not Applicable.      Post Infusion Assessment:  Patient tolerated injection without incident.       Discharge Plan:   Patient discharged in stable condition accompanied by: self.  Departure Mode: Ambulatory.      Drea Haynes RN  "

## 2025-03-22 ENCOUNTER — HEALTH MAINTENANCE LETTER (OUTPATIENT)
Age: 60
End: 2025-03-22

## 2025-03-26 ENCOUNTER — LAB (OUTPATIENT)
Dept: LAB | Facility: CLINIC | Age: 60
End: 2025-03-26
Payer: COMMERCIAL

## 2025-03-26 DIAGNOSIS — C50.511 MALIGNANT NEOPLASM OF LOWER-OUTER QUADRANT OF RIGHT FEMALE BREAST, UNSPECIFIED ESTROGEN RECEPTOR STATUS (H): ICD-10-CM

## 2025-03-26 LAB
ALBUMIN SERPL BCG-MCNC: 4.1 G/DL (ref 3.5–5.2)
ALP SERPL-CCNC: 117 U/L (ref 40–150)
ALT SERPL W P-5'-P-CCNC: 90 U/L (ref 0–50)
ANION GAP SERPL CALCULATED.3IONS-SCNC: 10 MMOL/L (ref 7–15)
AST SERPL W P-5'-P-CCNC: 60 U/L (ref 0–45)
BASOPHILS # BLD AUTO: 0 10E3/UL (ref 0–0.2)
BASOPHILS NFR BLD AUTO: 0 %
BILIRUB SERPL-MCNC: 0.6 MG/DL
BUN SERPL-MCNC: 16.3 MG/DL (ref 8–23)
CALCIUM SERPL-MCNC: 9 MG/DL (ref 8.8–10.4)
CANCER AG15-3 SERPL-ACNC: 19 U/ML
CHLORIDE SERPL-SCNC: 102 MMOL/L (ref 98–107)
CREAT SERPL-MCNC: 0.86 MG/DL (ref 0.51–0.95)
EGFRCR SERPLBLD CKD-EPI 2021: 76 ML/MIN/1.73M2
EOSINOPHIL # BLD AUTO: 0.2 10E3/UL (ref 0–0.7)
EOSINOPHIL NFR BLD AUTO: 4 %
ERYTHROCYTE [DISTWIDTH] IN BLOOD BY AUTOMATED COUNT: 12.9 % (ref 10–15)
GLUCOSE SERPL-MCNC: 205 MG/DL (ref 70–99)
HCO3 SERPL-SCNC: 24 MMOL/L (ref 22–29)
HCT VFR BLD AUTO: 41.9 % (ref 35–47)
HGB BLD-MCNC: 13.7 G/DL (ref 11.7–15.7)
IMM GRANULOCYTES # BLD: 0 10E3/UL
IMM GRANULOCYTES NFR BLD: 1 %
LYMPHOCYTES # BLD AUTO: 1.4 10E3/UL (ref 0.8–5.3)
LYMPHOCYTES NFR BLD AUTO: 28 %
MCH RBC QN AUTO: 29.6 PG (ref 26.5–33)
MCHC RBC AUTO-ENTMCNC: 32.7 G/DL (ref 31.5–36.5)
MCV RBC AUTO: 91 FL (ref 78–100)
MONOCYTES # BLD AUTO: 0.3 10E3/UL (ref 0–1.3)
MONOCYTES NFR BLD AUTO: 6 %
NEUTROPHILS # BLD AUTO: 3 10E3/UL (ref 1.6–8.3)
NEUTROPHILS NFR BLD AUTO: 61 %
NRBC # BLD AUTO: 0 10E3/UL
NRBC BLD AUTO-RTO: 0 /100
PLATELET # BLD AUTO: 176 10E3/UL (ref 150–450)
POTASSIUM SERPL-SCNC: 4 MMOL/L (ref 3.4–5.3)
PROT SERPL-MCNC: 6.8 G/DL (ref 6.4–8.3)
RBC # BLD AUTO: 4.63 10E6/UL (ref 3.8–5.2)
SODIUM SERPL-SCNC: 136 MMOL/L (ref 135–145)
WBC # BLD AUTO: 5 10E3/UL (ref 4–11)

## 2025-03-26 PROCEDURE — 86300 IMMUNOASSAY TUMOR CA 15-3: CPT

## 2025-03-26 PROCEDURE — 80053 COMPREHEN METABOLIC PANEL: CPT

## 2025-03-26 PROCEDURE — 36415 COLL VENOUS BLD VENIPUNCTURE: CPT

## 2025-03-26 PROCEDURE — 85025 COMPLETE CBC W/AUTO DIFF WBC: CPT

## 2025-03-27 ENCOUNTER — INFUSION THERAPY VISIT (OUTPATIENT)
Dept: INFUSION THERAPY | Facility: CLINIC | Age: 60
End: 2025-03-27
Attending: INTERNAL MEDICINE
Payer: COMMERCIAL

## 2025-03-27 ENCOUNTER — VIRTUAL VISIT (OUTPATIENT)
Dept: ONCOLOGY | Facility: CLINIC | Age: 60
End: 2025-03-27
Attending: INTERNAL MEDICINE
Payer: COMMERCIAL

## 2025-03-27 VITALS
BODY MASS INDEX: 41 KG/M2 | WEIGHT: 261.8 LBS | SYSTOLIC BLOOD PRESSURE: 152 MMHG | HEART RATE: 78 BPM | TEMPERATURE: 97.5 F | DIASTOLIC BLOOD PRESSURE: 87 MMHG

## 2025-03-27 DIAGNOSIS — C50.511 MALIGNANT NEOPLASM OF LOWER-OUTER QUADRANT OF RIGHT FEMALE BREAST, UNSPECIFIED ESTROGEN RECEPTOR STATUS (H): Primary | ICD-10-CM

## 2025-03-27 DIAGNOSIS — T45.1X5A CHEMOTHERAPY INDUCED CARDIOMYOPATHY: ICD-10-CM

## 2025-03-27 DIAGNOSIS — C50.912 BREAST CANCER METASTASIZED TO LIVER, LEFT (H): ICD-10-CM

## 2025-03-27 DIAGNOSIS — I42.7 CHEMOTHERAPY INDUCED CARDIOMYOPATHY: ICD-10-CM

## 2025-03-27 DIAGNOSIS — C79.51 BREAST CANCER METASTASIZED TO BONE, RIGHT (H): ICD-10-CM

## 2025-03-27 DIAGNOSIS — C50.911 HER2-POSITIVE CARCINOMA OF RIGHT BREAST (H): ICD-10-CM

## 2025-03-27 DIAGNOSIS — C78.7 BREAST CANCER METASTASIZED TO LIVER, LEFT (H): ICD-10-CM

## 2025-03-27 DIAGNOSIS — C50.911 BREAST CANCER METASTASIZED TO BONE, RIGHT (H): ICD-10-CM

## 2025-03-27 DIAGNOSIS — C50.511 MALIGNANT NEOPLASM OF LOWER-OUTER QUADRANT OF RIGHT BREAST OF FEMALE, ESTROGEN RECEPTOR NEGATIVE (H): Primary | ICD-10-CM

## 2025-03-27 DIAGNOSIS — R79.89 ABNORMAL LFTS: ICD-10-CM

## 2025-03-27 DIAGNOSIS — Z17.1 MALIGNANT NEOPLASM OF LOWER-OUTER QUADRANT OF RIGHT BREAST OF FEMALE, ESTROGEN RECEPTOR NEGATIVE (H): Primary | ICD-10-CM

## 2025-03-27 DIAGNOSIS — Z17.31 HER2-POSITIVE CARCINOMA OF RIGHT BREAST (H): ICD-10-CM

## 2025-03-27 PROCEDURE — 96401 CHEMO ANTI-NEOPL SQ/IM: CPT

## 2025-03-27 PROCEDURE — 250N000011 HC RX IP 250 OP 636: Mod: JZ | Performed by: INTERNAL MEDICINE

## 2025-03-27 RX ORDER — LORAZEPAM 2 MG/ML
0.5 INJECTION INTRAMUSCULAR EVERY 4 HOURS PRN
OUTPATIENT
Start: 2026-02-06

## 2025-03-27 RX ORDER — DIPHENHYDRAMINE HYDROCHLORIDE 50 MG/ML
50 INJECTION, SOLUTION INTRAMUSCULAR; INTRAVENOUS
Start: 2025-12-26

## 2025-03-27 RX ORDER — LORAZEPAM 2 MG/ML
0.5 INJECTION INTRAMUSCULAR EVERY 4 HOURS PRN
OUTPATIENT
Start: 2026-04-11

## 2025-03-27 RX ORDER — DIPHENHYDRAMINE HCL 25 MG
50 CAPSULE ORAL
OUTPATIENT
Start: 2026-02-06

## 2025-03-27 RX ORDER — ALBUTEROL SULFATE 0.83 MG/ML
2.5 SOLUTION RESPIRATORY (INHALATION)
OUTPATIENT
Start: 2026-04-11

## 2025-03-27 RX ORDER — METHYLPREDNISOLONE SODIUM SUCCINATE 40 MG/ML
40 INJECTION INTRAMUSCULAR; INTRAVENOUS
Start: 2026-02-06

## 2025-03-27 RX ORDER — DIPHENHYDRAMINE HYDROCHLORIDE 50 MG/ML
50 INJECTION, SOLUTION INTRAMUSCULAR; INTRAVENOUS
Start: 2026-02-06

## 2025-03-27 RX ORDER — LORAZEPAM 2 MG/ML
0.5 INJECTION INTRAMUSCULAR EVERY 4 HOURS PRN
OUTPATIENT
Start: 2026-02-28

## 2025-03-27 RX ORDER — DIPHENHYDRAMINE HYDROCHLORIDE 50 MG/ML
50 INJECTION, SOLUTION INTRAMUSCULAR; INTRAVENOUS
Start: 2026-05-02

## 2025-03-27 RX ORDER — HEPARIN SODIUM (PORCINE) LOCK FLUSH IV SOLN 100 UNIT/ML 100 UNIT/ML
5 SOLUTION INTRAVENOUS
OUTPATIENT
Start: 2025-12-26

## 2025-03-27 RX ORDER — ALBUTEROL SULFATE 0.83 MG/ML
2.5 SOLUTION RESPIRATORY (INHALATION)
OUTPATIENT
Start: 2026-05-02

## 2025-03-27 RX ORDER — HEPARIN SODIUM (PORCINE) LOCK FLUSH IV SOLN 100 UNIT/ML 100 UNIT/ML
5 SOLUTION INTRAVENOUS
OUTPATIENT
Start: 2026-04-11

## 2025-03-27 RX ORDER — ALBUTEROL SULFATE 0.83 MG/ML
2.5 SOLUTION RESPIRATORY (INHALATION)
OUTPATIENT
Start: 2026-02-28

## 2025-03-27 RX ORDER — ALBUTEROL SULFATE 0.83 MG/ML
2.5 SOLUTION RESPIRATORY (INHALATION)
OUTPATIENT
Start: 2026-01-16

## 2025-03-27 RX ORDER — ACETAMINOPHEN 325 MG/1
650 TABLET ORAL
OUTPATIENT
Start: 2026-02-06

## 2025-03-27 RX ORDER — ALBUTEROL SULFATE 90 UG/1
1-2 INHALANT RESPIRATORY (INHALATION)
Start: 2026-02-06

## 2025-03-27 RX ORDER — ALBUTEROL SULFATE 90 UG/1
1-2 INHALANT RESPIRATORY (INHALATION)
Start: 2026-03-21

## 2025-03-27 RX ORDER — ALBUTEROL SULFATE 90 UG/1
1-2 INHALANT RESPIRATORY (INHALATION)
Start: 2026-05-02

## 2025-03-27 RX ORDER — DIPHENHYDRAMINE HYDROCHLORIDE 50 MG/ML
25 INJECTION, SOLUTION INTRAMUSCULAR; INTRAVENOUS
Start: 2026-03-21

## 2025-03-27 RX ORDER — ALBUTEROL SULFATE 90 UG/1
1-2 INHALANT RESPIRATORY (INHALATION)
Start: 2026-01-16

## 2025-03-27 RX ORDER — ALBUTEROL SULFATE 90 UG/1
1-2 INHALANT RESPIRATORY (INHALATION)
Start: 2025-12-26

## 2025-03-27 RX ORDER — MEPERIDINE HYDROCHLORIDE 25 MG/ML
25 INJECTION INTRAMUSCULAR; INTRAVENOUS; SUBCUTANEOUS
OUTPATIENT
Start: 2026-05-02

## 2025-03-27 RX ORDER — EPINEPHRINE 1 MG/ML
0.3 INJECTION, SOLUTION, CONCENTRATE INTRAVENOUS EVERY 5 MIN PRN
OUTPATIENT
Start: 2026-03-21

## 2025-03-27 RX ORDER — ALBUTEROL SULFATE 90 UG/1
1-2 INHALANT RESPIRATORY (INHALATION)
Start: 2026-02-28

## 2025-03-27 RX ORDER — HEPARIN SODIUM,PORCINE 10 UNIT/ML
5 VIAL (ML) INTRAVENOUS
OUTPATIENT
Start: 2026-05-02

## 2025-03-27 RX ORDER — HEPARIN SODIUM,PORCINE 10 UNIT/ML
5 VIAL (ML) INTRAVENOUS
OUTPATIENT
Start: 2025-12-05

## 2025-03-27 RX ORDER — HEPARIN SODIUM,PORCINE 10 UNIT/ML
5 VIAL (ML) INTRAVENOUS
OUTPATIENT
Start: 2025-12-26

## 2025-03-27 RX ORDER — HEPARIN SODIUM (PORCINE) LOCK FLUSH IV SOLN 100 UNIT/ML 100 UNIT/ML
5 SOLUTION INTRAVENOUS
OUTPATIENT
Start: 2026-05-02

## 2025-03-27 RX ORDER — LORAZEPAM 2 MG/ML
0.5 INJECTION INTRAMUSCULAR EVERY 4 HOURS PRN
OUTPATIENT
Start: 2026-01-16

## 2025-03-27 RX ORDER — DIPHENHYDRAMINE HCL 25 MG
50 CAPSULE ORAL
OUTPATIENT
Start: 2026-02-28

## 2025-03-27 RX ORDER — ALBUTEROL SULFATE 0.83 MG/ML
2.5 SOLUTION RESPIRATORY (INHALATION)
OUTPATIENT
Start: 2026-02-06

## 2025-03-27 RX ORDER — DIPHENHYDRAMINE HYDROCHLORIDE 50 MG/ML
25 INJECTION, SOLUTION INTRAMUSCULAR; INTRAVENOUS
Start: 2026-02-06

## 2025-03-27 RX ORDER — ACETAMINOPHEN 325 MG/1
650 TABLET ORAL
OUTPATIENT
Start: 2026-01-16

## 2025-03-27 RX ORDER — EPINEPHRINE 1 MG/ML
0.3 INJECTION, SOLUTION, CONCENTRATE INTRAVENOUS EVERY 5 MIN PRN
OUTPATIENT
Start: 2026-04-11

## 2025-03-27 RX ORDER — MEPERIDINE HYDROCHLORIDE 25 MG/ML
25 INJECTION INTRAMUSCULAR; INTRAVENOUS; SUBCUTANEOUS
OUTPATIENT
Start: 2025-12-26

## 2025-03-27 RX ORDER — METHYLPREDNISOLONE SODIUM SUCCINATE 40 MG/ML
40 INJECTION INTRAMUSCULAR; INTRAVENOUS
Start: 2026-02-28

## 2025-03-27 RX ORDER — MEPERIDINE HYDROCHLORIDE 25 MG/ML
25 INJECTION INTRAMUSCULAR; INTRAVENOUS; SUBCUTANEOUS
OUTPATIENT
Start: 2026-03-21

## 2025-03-27 RX ORDER — DIPHENHYDRAMINE HCL 25 MG
50 CAPSULE ORAL
OUTPATIENT
Start: 2026-05-02

## 2025-03-27 RX ORDER — DIPHENHYDRAMINE HCL 25 MG
50 CAPSULE ORAL
OUTPATIENT
Start: 2026-04-11

## 2025-03-27 RX ORDER — HEPARIN SODIUM,PORCINE 10 UNIT/ML
5 VIAL (ML) INTRAVENOUS
OUTPATIENT
Start: 2026-03-21

## 2025-03-27 RX ORDER — DIPHENHYDRAMINE HYDROCHLORIDE 50 MG/ML
25 INJECTION, SOLUTION INTRAMUSCULAR; INTRAVENOUS
Start: 2025-12-26

## 2025-03-27 RX ORDER — DIPHENHYDRAMINE HYDROCHLORIDE 50 MG/ML
50 INJECTION, SOLUTION INTRAMUSCULAR; INTRAVENOUS
Start: 2026-04-11

## 2025-03-27 RX ORDER — DIPHENHYDRAMINE HYDROCHLORIDE 50 MG/ML
25 INJECTION, SOLUTION INTRAMUSCULAR; INTRAVENOUS
Start: 2026-02-28

## 2025-03-27 RX ORDER — EPINEPHRINE 1 MG/ML
0.3 INJECTION, SOLUTION, CONCENTRATE INTRAVENOUS EVERY 5 MIN PRN
OUTPATIENT
Start: 2026-01-16

## 2025-03-27 RX ORDER — METHYLPREDNISOLONE SODIUM SUCCINATE 40 MG/ML
40 INJECTION INTRAMUSCULAR; INTRAVENOUS
Start: 2026-03-21

## 2025-03-27 RX ORDER — EPINEPHRINE 1 MG/ML
0.3 INJECTION, SOLUTION, CONCENTRATE INTRAVENOUS EVERY 5 MIN PRN
OUTPATIENT
Start: 2025-12-26

## 2025-03-27 RX ORDER — ACETAMINOPHEN 325 MG/1
650 TABLET ORAL
OUTPATIENT
Start: 2026-04-11

## 2025-03-27 RX ORDER — LORAZEPAM 2 MG/ML
0.5 INJECTION INTRAMUSCULAR EVERY 4 HOURS PRN
OUTPATIENT
Start: 2025-12-05

## 2025-03-27 RX ORDER — DIPHENHYDRAMINE HYDROCHLORIDE 50 MG/ML
50 INJECTION, SOLUTION INTRAMUSCULAR; INTRAVENOUS
Start: 2025-12-05

## 2025-03-27 RX ORDER — ACETAMINOPHEN 325 MG/1
650 TABLET ORAL
OUTPATIENT
Start: 2026-03-21

## 2025-03-27 RX ORDER — MEPERIDINE HYDROCHLORIDE 25 MG/ML
25 INJECTION INTRAMUSCULAR; INTRAVENOUS; SUBCUTANEOUS
OUTPATIENT
Start: 2026-01-16

## 2025-03-27 RX ORDER — HEPARIN SODIUM (PORCINE) LOCK FLUSH IV SOLN 100 UNIT/ML 100 UNIT/ML
5 SOLUTION INTRAVENOUS
OUTPATIENT
Start: 2025-12-05

## 2025-03-27 RX ORDER — MEPERIDINE HYDROCHLORIDE 25 MG/ML
25 INJECTION INTRAMUSCULAR; INTRAVENOUS; SUBCUTANEOUS
OUTPATIENT
Start: 2026-02-28

## 2025-03-27 RX ORDER — EPINEPHRINE 1 MG/ML
0.3 INJECTION, SOLUTION, CONCENTRATE INTRAVENOUS EVERY 5 MIN PRN
OUTPATIENT
Start: 2025-12-05

## 2025-03-27 RX ORDER — DIPHENHYDRAMINE HYDROCHLORIDE 50 MG/ML
25 INJECTION, SOLUTION INTRAMUSCULAR; INTRAVENOUS
Start: 2026-04-11

## 2025-03-27 RX ORDER — HEPARIN SODIUM,PORCINE 10 UNIT/ML
5 VIAL (ML) INTRAVENOUS
OUTPATIENT
Start: 2026-04-11

## 2025-03-27 RX ORDER — HEPARIN SODIUM (PORCINE) LOCK FLUSH IV SOLN 100 UNIT/ML 100 UNIT/ML
5 SOLUTION INTRAVENOUS
OUTPATIENT
Start: 2026-01-16

## 2025-03-27 RX ORDER — ALBUTEROL SULFATE 0.83 MG/ML
2.5 SOLUTION RESPIRATORY (INHALATION)
OUTPATIENT
Start: 2026-03-21

## 2025-03-27 RX ORDER — ACETAMINOPHEN 325 MG/1
650 TABLET ORAL
OUTPATIENT
Start: 2026-02-28

## 2025-03-27 RX ORDER — DIPHENHYDRAMINE HYDROCHLORIDE 50 MG/ML
25 INJECTION, SOLUTION INTRAMUSCULAR; INTRAVENOUS
Start: 2026-01-16

## 2025-03-27 RX ORDER — DIPHENHYDRAMINE HYDROCHLORIDE 50 MG/ML
50 INJECTION, SOLUTION INTRAMUSCULAR; INTRAVENOUS
Start: 2026-02-28

## 2025-03-27 RX ORDER — ALBUTEROL SULFATE 90 UG/1
1-2 INHALANT RESPIRATORY (INHALATION)
Start: 2026-04-11

## 2025-03-27 RX ORDER — METHYLPREDNISOLONE SODIUM SUCCINATE 40 MG/ML
40 INJECTION INTRAMUSCULAR; INTRAVENOUS
Start: 2026-04-11

## 2025-03-27 RX ORDER — EPINEPHRINE 1 MG/ML
0.3 INJECTION, SOLUTION, CONCENTRATE INTRAVENOUS EVERY 5 MIN PRN
OUTPATIENT
Start: 2026-02-06

## 2025-03-27 RX ORDER — DIPHENHYDRAMINE HYDROCHLORIDE 50 MG/ML
50 INJECTION, SOLUTION INTRAMUSCULAR; INTRAVENOUS
Start: 2026-03-21

## 2025-03-27 RX ORDER — METHYLPREDNISOLONE SODIUM SUCCINATE 40 MG/ML
40 INJECTION INTRAMUSCULAR; INTRAVENOUS
Start: 2026-05-02

## 2025-03-27 RX ORDER — HEPARIN SODIUM,PORCINE 10 UNIT/ML
5 VIAL (ML) INTRAVENOUS
OUTPATIENT
Start: 2026-02-28

## 2025-03-27 RX ORDER — HEPARIN SODIUM (PORCINE) LOCK FLUSH IV SOLN 100 UNIT/ML 100 UNIT/ML
5 SOLUTION INTRAVENOUS
OUTPATIENT
Start: 2026-03-21

## 2025-03-27 RX ORDER — HEPARIN SODIUM,PORCINE 10 UNIT/ML
5 VIAL (ML) INTRAVENOUS
OUTPATIENT
Start: 2026-01-16

## 2025-03-27 RX ORDER — DIPHENHYDRAMINE HCL 25 MG
50 CAPSULE ORAL
OUTPATIENT
Start: 2026-01-16

## 2025-03-27 RX ORDER — DIPHENHYDRAMINE HCL 25 MG
50 CAPSULE ORAL
OUTPATIENT
Start: 2025-12-26

## 2025-03-27 RX ORDER — HEPARIN SODIUM (PORCINE) LOCK FLUSH IV SOLN 100 UNIT/ML 100 UNIT/ML
5 SOLUTION INTRAVENOUS
OUTPATIENT
Start: 2026-02-28

## 2025-03-27 RX ORDER — DIPHENHYDRAMINE HYDROCHLORIDE 50 MG/ML
25 INJECTION, SOLUTION INTRAMUSCULAR; INTRAVENOUS
Start: 2025-12-05

## 2025-03-27 RX ORDER — MEPERIDINE HYDROCHLORIDE 25 MG/ML
25 INJECTION INTRAMUSCULAR; INTRAVENOUS; SUBCUTANEOUS
OUTPATIENT
Start: 2026-02-06

## 2025-03-27 RX ORDER — DIPHENHYDRAMINE HYDROCHLORIDE 50 MG/ML
25 INJECTION, SOLUTION INTRAMUSCULAR; INTRAVENOUS
Start: 2026-05-02

## 2025-03-27 RX ORDER — ACETAMINOPHEN 325 MG/1
650 TABLET ORAL
OUTPATIENT
Start: 2026-05-02

## 2025-03-27 RX ORDER — ALBUTEROL SULFATE 0.83 MG/ML
2.5 SOLUTION RESPIRATORY (INHALATION)
OUTPATIENT
Start: 2025-12-05

## 2025-03-27 RX ORDER — LORAZEPAM 2 MG/ML
0.5 INJECTION INTRAMUSCULAR EVERY 4 HOURS PRN
OUTPATIENT
Start: 2026-05-02

## 2025-03-27 RX ORDER — HEPARIN SODIUM,PORCINE 10 UNIT/ML
5 VIAL (ML) INTRAVENOUS
OUTPATIENT
Start: 2026-02-06

## 2025-03-27 RX ORDER — METHYLPREDNISOLONE SODIUM SUCCINATE 40 MG/ML
40 INJECTION INTRAMUSCULAR; INTRAVENOUS
Start: 2025-12-05

## 2025-03-27 RX ORDER — ACETAMINOPHEN 325 MG/1
650 TABLET ORAL
OUTPATIENT
Start: 2025-12-05

## 2025-03-27 RX ORDER — DIPHENHYDRAMINE HYDROCHLORIDE 50 MG/ML
50 INJECTION, SOLUTION INTRAMUSCULAR; INTRAVENOUS
Start: 2026-01-16

## 2025-03-27 RX ORDER — LORAZEPAM 2 MG/ML
0.5 INJECTION INTRAMUSCULAR EVERY 4 HOURS PRN
OUTPATIENT
Start: 2025-12-26

## 2025-03-27 RX ORDER — EPINEPHRINE 1 MG/ML
0.3 INJECTION, SOLUTION, CONCENTRATE INTRAVENOUS EVERY 5 MIN PRN
OUTPATIENT
Start: 2026-02-28

## 2025-03-27 RX ORDER — DIPHENHYDRAMINE HCL 25 MG
50 CAPSULE ORAL
OUTPATIENT
Start: 2025-12-05

## 2025-03-27 RX ORDER — ACETAMINOPHEN 325 MG/1
650 TABLET ORAL
OUTPATIENT
Start: 2025-12-26

## 2025-03-27 RX ORDER — METHYLPREDNISOLONE SODIUM SUCCINATE 40 MG/ML
40 INJECTION INTRAMUSCULAR; INTRAVENOUS
Start: 2026-01-16

## 2025-03-27 RX ORDER — LORAZEPAM 2 MG/ML
0.5 INJECTION INTRAMUSCULAR EVERY 4 HOURS PRN
OUTPATIENT
Start: 2026-03-21

## 2025-03-27 RX ORDER — HEPARIN SODIUM (PORCINE) LOCK FLUSH IV SOLN 100 UNIT/ML 100 UNIT/ML
5 SOLUTION INTRAVENOUS
OUTPATIENT
Start: 2026-02-06

## 2025-03-27 RX ORDER — METHYLPREDNISOLONE SODIUM SUCCINATE 40 MG/ML
40 INJECTION INTRAMUSCULAR; INTRAVENOUS
Start: 2025-12-26

## 2025-03-27 RX ORDER — MEPERIDINE HYDROCHLORIDE 25 MG/ML
25 INJECTION INTRAMUSCULAR; INTRAVENOUS; SUBCUTANEOUS
OUTPATIENT
Start: 2025-12-05

## 2025-03-27 RX ORDER — MEPERIDINE HYDROCHLORIDE 25 MG/ML
25 INJECTION INTRAMUSCULAR; INTRAVENOUS; SUBCUTANEOUS
OUTPATIENT
Start: 2026-04-11

## 2025-03-27 RX ORDER — ALBUTEROL SULFATE 90 UG/1
1-2 INHALANT RESPIRATORY (INHALATION)
Start: 2025-12-05

## 2025-03-27 RX ORDER — ALBUTEROL SULFATE 0.83 MG/ML
2.5 SOLUTION RESPIRATORY (INHALATION)
OUTPATIENT
Start: 2025-12-26

## 2025-03-27 RX ORDER — DIPHENHYDRAMINE HCL 25 MG
50 CAPSULE ORAL
OUTPATIENT
Start: 2026-03-21

## 2025-03-27 RX ORDER — EPINEPHRINE 1 MG/ML
0.3 INJECTION, SOLUTION, CONCENTRATE INTRAVENOUS EVERY 5 MIN PRN
OUTPATIENT
Start: 2026-05-02

## 2025-03-27 RX ADMIN — PERTUZUMAB, TRASTUZUMAB, AND HYALURONIDASE-ZZXF 600 MG: 600; 600; 2000 INJECTION, SOLUTION SUBCUTANEOUS at 09:51

## 2025-03-27 NOTE — PATIENT INSTRUCTIONS
1) Cycles 19-22 Treatment (9 months of treatment).  2) QUYEN w/ C20D1, C21D1.  3) BJT w/ C22D1.  4) ECHO prior to C20D1 and C22D1.  5) PET prior to C22D1.    Adiel Kim MD.

## 2025-03-27 NOTE — LETTER
3/27/2025      Tiffanie Mcdermott  [address] [facility] [address]      Dear Colleague,    Thank you for referring your patient, Tiffanie Mcdermott, to the [facility] [place]. Please see a copy of my visit note below.    [facility] Hematology / Oncology  Progress Note  Name: Tiffanie Mcdermott  :  1965  MRN:  [id number]    --------------------    Subjective:  Tiffanie returns for follow-up of breast cancer unaccompanied via video visit.  All in all, she is enjoying good health and tolerating treatments without side effects.  Just celebrated her 60nd birthday and had a great trip to [place] with a large group of family.    --------------------    Objective:  Video visit.    We reviewed CBC, CMP, CA 15-3.    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  # Mar 2022 - May 2022 Taxol / Herceptin / Perjeta; near-CR.  # May 2022 - ongoing Herceptin / Perjeta; CR.     Continue Herceptin/Perjeta subcutaneous; planning indefinite use pending continued response to therapy.  PET scan upcoming in 2 days to evaluate disease status; if remains in CR, will go to 9 month frequency.  Transaminitis of unclear etiology stable to improved; continuing ursodiol and continue to monitor.  Planning cardiac echo monitoring every 6 months to evaluate for chemo cardiomyopathy.  Counts stable; chemistries otherwise stable; tumor marker within normal limits.    Patient Instructions   1) Cycles 19-22 Treatment (9 months of treatment).  2) QUYEN w/ C20D1, C21D1.  3) BJT w/ C22D1.  4) ECHO prior to C20D1 and C22D1.  5) PET prior to C22D1.    Adiel Kim MD.    Video Visit:  Tiffanie is a 60 year old female who is being evaluated via a billable video visit.  }    Video start time:  8:00 AM  Video end  time:  8:20 AM  Provider location: [facility]  Patient location: Home  Mode of transmission:  Portal / AmSalezeo.      Again, thank you for allowing me to participate in the care of your patient.        Sincerely,        Adiel Kim MD    Electronically signed

## 2025-03-27 NOTE — PROGRESS NOTES
Infusion Nursing Note:  Tiffanie STEWART Sharron presents today for Phesgo.    Patient seen by provider today: Yes: Julio Virtual Visit   present during visit today: Not Applicable.    Note: subcutaneous injection given in left thigh.  Ice applied to injection site prior to injection.      Intravenous Access:  No Intravenous access/labs at this visit.    Treatment Conditions:  Lab Results   Component Value Date    HGB 13.7 03/26/2025    WBC 5.0 03/26/2025    ANEU 7.0 03/21/2022    ANEUTAUTO 3.0 03/26/2025     03/26/2025        Lab Results   Component Value Date     03/26/2025    POTASSIUM 4.0 03/26/2025    MAG 1.7 04/25/2022    CR 0.86 03/26/2025    SARAI 9.0 03/26/2025    BILITOTAL 0.6 03/26/2025    ALBUMIN 4.1 03/26/2025    ALT 90 (H) 03/26/2025    AST 60 (H) 03/26/2025       Results reviewed, labs MET treatment parameters, ok to proceed with treatment.      Post Infusion Assessment:  Patient tolerated injection without incident.  Patient observed for 15 minutes post subcutaneous injection per protocol.  Site patent and intact, free from redness, edema or discomfort.       Discharge Plan:   Patient declined prescription refills.  Patient and/or family verbalized understanding of discharge instructions and all questions answered.  AVS to patient via [employer].  Patient will return 4/17 for next appointment.   Patient discharged in stable condition accompanied by: self.  Departure Mode: Ambulatory.      Berta Joseph RN

## 2025-03-27 NOTE — PROGRESS NOTES
[facility] Hematology / Oncology  Progress Note  Name: Tiffanie Mcdermott  :  1965  MRN:  [id number]    --------------------    Subjective:  Tiffanie returns for follow-up of breast cancer unaccompanied via video visit.  All in all, she is enjoying good health and tolerating treatments without side effects.  Just celebrated her 60nd birthday and had a great trip to [place] with a large group of family.    --------------------    Objective:  Video visit.    We reviewed CBC, CMP, CA 15-3.    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  # Mar 2022 - May 2022 Taxol / Herceptin / Perjeta; near-CR.  # May 2022 - ongoing Herceptin / Perjeta; CR.     Continue Herceptin/Perjeta subcutaneous; planning indefinite use pending continued response to therapy.  PET scan upcoming in 2 days to evaluate disease status; if remains in CR, will go to 9 month frequency.  Transaminitis of unclear etiology stable to improved; continuing ursodiol and continue to monitor.  Planning cardiac echo monitoring every 6 months to evaluate for chemo cardiomyopathy.  Counts stable; chemistries otherwise stable; tumor marker within normal limits.    Patient Instructions   1) Cycles 19-22 Treatment (9 months of treatment).  2) QUYEN w/ C20D1, C21D1.  3) BJT w/ C22D1.  4) ECHO prior to C20D1 and C22D1.  5) PET prior to C22D1.    Adiel Kim MD.    Video Visit:  Tiffanie is a 60 year old female who is being evaluated via a billable video visit.  }    Video start time:  8:00 AM  Video end time:  8:20 AM  Provider location: [facility]  Patient location: Home  Mode of transmission: [facility] / [employer].

## 2025-03-29 ENCOUNTER — HOSPITAL ENCOUNTER (OUTPATIENT)
Dept: PET IMAGING | Facility: CLINIC | Age: 60
Discharge: HOME | End: 2025-03-29
Attending: INTERNAL MEDICINE | Admitting: INTERNAL MEDICINE
Payer: COMMERCIAL

## 2025-03-29 DIAGNOSIS — C50.511 MALIGNANT NEOPLASM OF LOWER-OUTER QUADRANT OF RIGHT BREAST OF FEMALE, ESTROGEN RECEPTOR NEGATIVE (H): ICD-10-CM

## 2025-03-29 DIAGNOSIS — C50.912 BREAST CANCER METASTASIZED TO LIVER, LEFT (H): ICD-10-CM

## 2025-03-29 DIAGNOSIS — C78.7 BREAST CANCER METASTASIZED TO LIVER, LEFT (H): ICD-10-CM

## 2025-03-29 DIAGNOSIS — Z17.1 MALIGNANT NEOPLASM OF LOWER-OUTER QUADRANT OF RIGHT BREAST OF FEMALE, ESTROGEN RECEPTOR NEGATIVE (H): ICD-10-CM

## 2025-03-29 PROCEDURE — A9552 F18 FDG: HCPCS | Performed by: INTERNAL MEDICINE

## 2025-03-29 PROCEDURE — 78815 PET IMAGE W/CT SKULL-THIGH: CPT | Mod: PS

## 2025-03-29 PROCEDURE — 343N000001 HC RX 343 MED OP 636: Performed by: INTERNAL MEDICINE

## 2025-03-29 RX ORDER — FLUDEOXYGLUCOSE F 18 200 MCI/ML
10-18 INJECTION, SOLUTION INTRAVENOUS ONCE
Status: COMPLETED | OUTPATIENT
Start: 2025-03-29 | End: 2025-03-29

## 2025-03-29 RX ADMIN — FLUDEOXYGLUCOSE F 18 12.36 MILLICURIE: 200 INJECTION, SOLUTION INTRAVENOUS at 08:23

## 2025-04-17 ENCOUNTER — INFUSION THERAPY VISIT (OUTPATIENT)
Dept: INFUSION THERAPY | Facility: CLINIC | Age: 60
End: 2025-04-17
Attending: INTERNAL MEDICINE
Payer: COMMERCIAL

## 2025-04-17 VITALS
RESPIRATION RATE: 18 BRPM | WEIGHT: 263.4 LBS | TEMPERATURE: 97.7 F | BODY MASS INDEX: 41.25 KG/M2 | HEART RATE: 99 BPM | SYSTOLIC BLOOD PRESSURE: 151 MMHG | DIASTOLIC BLOOD PRESSURE: 75 MMHG | OXYGEN SATURATION: 96 %

## 2025-04-17 DIAGNOSIS — C50.511 MALIGNANT NEOPLASM OF LOWER-OUTER QUADRANT OF RIGHT FEMALE BREAST, UNSPECIFIED ESTROGEN RECEPTOR STATUS (H): Primary | ICD-10-CM

## 2025-04-17 ASSESSMENT — PAIN SCALES - GENERAL: PAINLEVEL_OUTOF10: NO PAIN (0)

## 2025-04-17 NOTE — PROGRESS NOTES
Infusion Nursing Note:  Tiffanie STEWART Sharron presents today for C19D22 Phesgo.    Patient seen by provider today: No   present during visit today: Not Applicable.    Note: Feeling well. Just got back from vacation. Has started taking her Vitamin B12 again now that her stomach is doing better since c-diff last fall.    Intravenous Access:  No Intravenous access/labs at this visit.    Treatment Conditions:  Not Applicable.    Post Infusion Assessment:  Ice pack to right thigh prior to injection. Patient tolerated injection without incident.       Discharge Plan:   Patient discharged in stable condition accompanied by: self.  Departure Mode: Ambulatory.      Drea Haynes RN

## 2025-05-08 ENCOUNTER — INFUSION THERAPY VISIT (OUTPATIENT)
Dept: INFUSION THERAPY | Facility: CLINIC | Age: 60
End: 2025-05-08
Attending: INTERNAL MEDICINE
Payer: COMMERCIAL

## 2025-05-08 VITALS
TEMPERATURE: 97.5 F | WEIGHT: 268.9 LBS | SYSTOLIC BLOOD PRESSURE: 143 MMHG | DIASTOLIC BLOOD PRESSURE: 65 MMHG | OXYGEN SATURATION: 96 % | RESPIRATION RATE: 16 BRPM | BODY MASS INDEX: 42.12 KG/M2 | HEART RATE: 89 BPM

## 2025-05-08 DIAGNOSIS — C50.511 MALIGNANT NEOPLASM OF LOWER-OUTER QUADRANT OF RIGHT FEMALE BREAST, UNSPECIFIED ESTROGEN RECEPTOR STATUS (H): Primary | ICD-10-CM

## 2025-05-08 RX ORDER — HEPARIN SODIUM,PORCINE 10 UNIT/ML
5 VIAL (ML) INTRAVENOUS
Status: DISCONTINUED | OUTPATIENT
Start: 2025-05-08 | End: 2025-05-08 | Stop reason: HOSPADM

## 2025-05-08 NOTE — PROGRESS NOTES
Infusion Nursing Note:  Tiffanie Mcdermott presents today for C19D43.    Patient seen by provider today: No   present during visit today: Not Applicable.  /  Note: Tiffanie is here on her own today for her injection which was given in her right thigh.  Patient iced area prior to injection.  Patient tolerated the injection well. .      Intravenous Access:  No Intravenous access/labs at this visit.    Treatment Conditions:  Not Applicable.      Post Infusion Assessment:  Patient tolerated injection without incident.       Discharge Plan:   Patient discharged in stable condition accompanied by: self.  Departure Mode: Ambulatory.  Patient has appt to return to infusion on /.      Cindy Robbins RN

## 2025-05-29 ENCOUNTER — INFUSION THERAPY VISIT (OUTPATIENT)
Dept: INFUSION THERAPY | Facility: CLINIC | Age: 60
End: 2025-05-29
Attending: INTERNAL MEDICINE
Payer: COMMERCIAL

## 2025-05-29 VITALS
RESPIRATION RATE: 16 BRPM | DIASTOLIC BLOOD PRESSURE: 68 MMHG | TEMPERATURE: 97.5 F | WEIGHT: 268.4 LBS | SYSTOLIC BLOOD PRESSURE: 141 MMHG | OXYGEN SATURATION: 98 % | HEART RATE: 90 BPM | BODY MASS INDEX: 42.04 KG/M2

## 2025-05-29 DIAGNOSIS — C50.511 MALIGNANT NEOPLASM OF LOWER-OUTER QUADRANT OF RIGHT FEMALE BREAST, UNSPECIFIED ESTROGEN RECEPTOR STATUS (H): Primary | ICD-10-CM

## 2025-05-29 PROCEDURE — 250N000011 HC RX IP 250 OP 636: Mod: JZ | Performed by: INTERNAL MEDICINE

## 2025-05-29 RX ADMIN — PERTUZUMAB, TRASTUZUMAB, AND HYALURONIDASE-ZZXF 600 MG: 600; 600; 2000 INJECTION, SOLUTION SUBCUTANEOUS at 09:36

## 2025-05-29 ASSESSMENT — PAIN SCALES - GENERAL: PAINLEVEL_OUTOF10: NO PAIN (0)

## 2025-05-29 NOTE — PROGRESS NOTES
Infusion Nursing Note:  Tiffanie Mcdermott presents today for Phesgo.    Patient seen by provider today: No   present during visit today: Not Applicable.    Note: Patient arrived ambulatory to Infusion. Feeling tired this morning, didn't sleep as well last night. VSS. Seasonal allergies acting up, taking claritin daily and flonase as needed. Denies pain.    Ice pack applied to left thigh x 10 minutes prior to injection and again after. Tolerated well over 5 min..      Intravenous Access:  No Intravenous access/labs at this visit.    Treatment Conditions:  Not Applicable.      Post Infusion Assessment:  Patient tolerated injection without incident.       Discharge Plan:   AVS to patient via MYCHART.  Patient will return 6/19 for next appointment.   Patient discharged in stable condition accompanied by: self.  Departure Mode: Ambulatory.      Alicia Mcgee RN

## 2025-05-30 ENCOUNTER — HOSPITAL ENCOUNTER (OUTPATIENT)
Dept: CARDIOLOGY | Facility: CLINIC | Age: 60
Discharge: HOME | End: 2025-05-30
Attending: INTERNAL MEDICINE | Admitting: INTERNAL MEDICINE
Payer: COMMERCIAL

## 2025-05-30 DIAGNOSIS — I42.7 CHEMOTHERAPY INDUCED CARDIOMYOPATHY: ICD-10-CM

## 2025-05-30 DIAGNOSIS — T45.1X5A CHEMOTHERAPY INDUCED CARDIOMYOPATHY: ICD-10-CM

## 2025-05-30 LAB — LVEF ECHO: NORMAL

## 2025-05-30 PROCEDURE — 93306 TTE W/DOPPLER COMPLETE: CPT

## 2025-05-30 PROCEDURE — 93306 TTE W/DOPPLER COMPLETE: CPT | Mod: 26 | Performed by: INTERNAL MEDICINE

## 2025-06-02 ENCOUNTER — RESULTS FOLLOW-UP (OUTPATIENT)
Dept: ONCOLOGY | Facility: CLINIC | Age: 60
End: 2025-06-02

## 2025-06-10 ENCOUNTER — PATIENT OUTREACH (OUTPATIENT)
Dept: ONCOLOGY | Facility: CLINIC | Age: 60
End: 2025-06-10
Payer: COMMERCIAL

## 2025-06-10 NOTE — PROGRESS NOTES
[facility]: Cancer Care Follow-Up Note                                    Discussion with Patient:                                                      Patient has no questions or concerns and is aware of all future appts.      Dates of Treatment:                                                      Infusion given in last 28 days       Administered MAR Action Medication Dose Rate Visit    05/29/2025 09:36 Given pertuzumab 600 mg/trastuzumab 600 mg/hyaluronidase 20,000 units/10 mL (PHESGO) MAINTENANCE DOSE injection 600 mg   Infusion Therapy Visit on 05/29/2025 in [facility] [place]            Assessment:                                                          RNCC Short Symptom Review:     Assessment completed with:: Patient    General/Short Assessment  Does the patient have all their medications?: Yes  Is the patient experiencing any new or worsening symptoms?: No  Does the patient need any referrals to supportive care services?: No  Discussion with patient: Answered patient's questions and addressed concerns, Reviewed how and when to contact clinic, Reviewed patient's future appointments    Pain  Patient Reported Pain?: No  Pain Score: No Pain (0)    Patient Coping  Accepting    Clinic Utilization  Patient Aware of Next Appointment?: Yes    Other Patient Concerns  Other Patient Reported Concerns: No    Intervention/Education provided during outreach:                                                         Patient to follow up as scheduled at next appt  Patient to call/[employer] message with updates  Confirmed patient has clinic and triage numbers    Signature:  Amina Gardner RN

## 2025-06-14 ENCOUNTER — HEALTH MAINTENANCE LETTER (OUTPATIENT)
Age: 60
End: 2025-06-14

## 2025-06-18 NOTE — PROGRESS NOTES
"Oncology Follow-Up Visit: June 19, 2025    Oncologist: Dr. 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  # Mar 2022 - May 2022 Taxol / Herceptin / Perjeta; near-CR.  # May 2022 - ongoing Herceptin / Perjeta (Phesgo); CR.    Interval History:  Pt is seen in-person for review of Phesgo. Tolerating well without significant side effects. Previous loose stools have resolved with avoidance of triggering foods and use of vitamin B complex. No new aches or pains. No infectious s/s. No cardiac complaints. No debilitating headaches or acute vision changes. No other health concerns. Upcoming trip to [place]. No additional questions.     Patient denies any of the following except if noted above: fevers, chills, difficulty with energy, vision or hearing changes, chest pain, dyspnea, abdominal pain, nausea, vomiting, diarrhea, constipation, urinary concerns, headaches, numbness, tingling, issues with sleep or mood. She also denies lumps, bumps, rashes or skin lesions, bleeding or bruising issues.    Physical Exam:  /72 (BP Location: Right arm, Patient Position: Sitting, Cuff Size: Adult Large)   Pulse 87   Temp 97.5  F (36.4  C) (Temporal)   Resp 18   Ht 1.702 m (5' 7\")   Wt 121.1 kg (267 lb 1 oz)   LMP 08/06/2008   SpO2 95%   BMI 41.83 kg/m       BP Readings from Last 6 Encounters:   06/19/25 122/72   05/29/25 (!) 141/68   05/08/25 (!) 143/65   04/17/25 (!) 151/75   03/27/25 (!) 152/87   03/06/25 (!) 144/72     Wt Readings from Last 6 Encounters:   06/19/25 121.1 kg (267 lb 1 oz)   05/29/25 121.7 kg (268 lb 6.4 oz)   05/08/25 122 kg (268 lb 14.4 oz)   04/17/25 119.5 kg (263 lb 6.4 oz)   03/27/25 118.8 kg (261 lb 12.8 oz)   03/06/25 118.4 kg (261 lb)      Constitutional: No acute distress, " pleasant, appropriately groomed.   ENT: PERRLA, sclera without erythema. Patent nasal passages. Appropriate dentition.  Neck: Trachea midline, no adenopathy.   Resp: No respiratory distress, adequate depth and rate of respirations.   Cardiac: No cyanosis, JVD, or peripheral edema.  MS:  5/5 muscle strength, adequate ROM.   Skin: No rashes, lesions, or wounds.  Neuro: A/O x 4, sensation intact.   Psych: Appropriate mentation and affect.     Imaging:  I reviewed the result report of ECHO completed on 5/30/2025.    Labs:   Latest Reference Range & Units 06/19/25 09:22   Sodium 135 - 145 mmol/L 137   Potassium 3.4 - 5.3 mmol/L 4.2   Chloride 98 - 107 mmol/L 102   Carbon Dioxide (CO2) 22 - 29 mmol/L 27   Urea Nitrogen 8.0 - 23.0 mg/dL 15.5   Creatinine 0.51 - 0.95 mg/dL 1.02 (H)   GFR Estimate >60 mL/min/1.73m2 62   Calcium 8.8 - 10.4 mg/dL 9.4   Anion Gap 7 - 15 mmol/L 8   Albumin 3.5 - 5.2 g/dL 3.9   Protein Total 6.4 - 8.3 g/dL 7.1   Alkaline Phosphatase 40 - 150 U/L 124   ALT 0 - 50 U/L 91 (H)   AST 0 - 45 U/L 54 (H)   Bilirubin Total <=1.2 mg/dL 0.5   Glucose 70 - 99 mg/dL 259 (H)   WBC 4.0 - 11.0 10e3/uL 5.8   Hemoglobin 11.7 - 15.7 g/dL 13.8   Hematocrit 35.0 - 47.0 % 42.8   Platelet Count 150 - 450 10e3/uL 195   RBC Count 3.80 - 5.20 10e6/uL 4.75   MCV 78 - 100 fL 90   MCH 26.5 - 33.0 pg 29.1   MCHC 31.5 - 36.5 g/dL 32.2   RDW 10.0 - 15.0 % 12.6   % Neutrophils % 64   % Lymphocytes % 26   % Monocytes % 6   % Eosinophils % 3   % Basophils % 1   % Immature Granulocytes % 0   NRBC/W <1 /100 0   Absolute Neutrophil 1.6 - 8.3 10e3/uL 3.7   Absolute Lymphocytes 0.8 - 5.3 10e3/uL 1.5   Absolute Monocytes 0.0 - 1.3 10e3/uL 0.4   Absolute Eosinophils 0.0 - 0.7 10e3/uL 0.2   Absolute Basophils 0.0 - 0.2 10e3/uL 0.0   Absolute Immature Granulocytes <=0.4 10e3/uL 0.0   Absolute NRBCs 10e3/uL 0.0   (H): Data is abnormally high    Assessment and Plan:   Stage IV, hormone negative, HER2 positive breast cancer with dense liver  involvement and scattered bone involvement   - On treatment with Herceptin/Perjeta (Phesgo) subcutaneous with plan for indefinite use.  - Tolerating well with minimal side effects.   - Labs reviewed and discussed:  CMP w/elevated LFTs but normal total bili; stable  Mildly elevated creatinine    CBC WNL  Cancer antigen 15-3 in process  - Will proceed with cycle 20 without changes.   - ECHOs w/stable EF, continue monitoring q6 months while on treatment. No cardiac complaints today.  - No indication for haim MRI unless new or worsening neurological symptoms.  - PET q9 months, next scheduled for 11/2025, followed by review with Dr. Kim.   - RTC for in-person review with QUYEN w/Cycle 21.   - RTC for review with Dr. Kim w/Cycle 22, ECHO and PET scan completed prior.     Transaminitis of unclear etiology - hx of cirrhotic appearance of liver  - Elevated AST and ALT - stable   - Total bilirubin continues to be normal  - Reviewed avoiding acetaminophen and alcohol use  - Has seen gastroenterology previously with recommendation for close monitoring  - Continue ursodiol, prescription renewed for upcoming trip  - Continue to monitor     Elevated BG on CMPs  - Last Hgb A1c checked in 5/2023, elevated at 6%  - Labs today were non-fasting and did eat breakfast prior to draw but will add-on Hgb A1c since it has been 2 years since last check  - Recommended follow-up with PCP    Elevated creatinine  - Isolated elevation, mild  - Reviewed adequate hydration, minimum of 64 oz fluids daily  - Continue to monitor     Bone health w/osseous metastasis  - Continue calcium and vitamin D daily  - Weight bearing exercise as tolerated  - No new bone aches or pains  - Continue to monitor         Fidelia SHRESTHA, CNP  [address] [address]        The longitudinal plan of care for the diagnosis(es)/condition(s) as documented were addressed during this visit. Due  to the added complexity in care, I will continue to support Tiffanie in the subsequent management and with ongoing continuity of care.

## 2025-06-19 ENCOUNTER — ONCOLOGY VISIT (OUTPATIENT)
Dept: ONCOLOGY | Facility: CLINIC | Age: 60
End: 2025-06-19
Payer: COMMERCIAL

## 2025-06-19 ENCOUNTER — RESULTS FOLLOW-UP (OUTPATIENT)
Dept: ONCOLOGY | Facility: CLINIC | Age: 60
End: 2025-06-19

## 2025-06-19 ENCOUNTER — INFUSION THERAPY VISIT (OUTPATIENT)
Dept: INFUSION THERAPY | Facility: CLINIC | Age: 60
End: 2025-06-19
Attending: INTERNAL MEDICINE
Payer: COMMERCIAL

## 2025-06-19 VITALS
HEIGHT: 67 IN | BODY MASS INDEX: 41.92 KG/M2 | WEIGHT: 267.06 LBS | HEART RATE: 87 BPM | DIASTOLIC BLOOD PRESSURE: 72 MMHG | OXYGEN SATURATION: 95 % | TEMPERATURE: 97.5 F | RESPIRATION RATE: 18 BRPM | SYSTOLIC BLOOD PRESSURE: 122 MMHG

## 2025-06-19 DIAGNOSIS — T45.1X5A CHEMOTHERAPY-INDUCED NEUROPATHY: ICD-10-CM

## 2025-06-19 DIAGNOSIS — C50.511 MALIGNANT NEOPLASM OF LOWER-OUTER QUADRANT OF RIGHT FEMALE BREAST, UNSPECIFIED ESTROGEN RECEPTOR STATUS (H): Primary | ICD-10-CM

## 2025-06-19 DIAGNOSIS — R73.9 BLOOD GLUCOSE ELEVATED: ICD-10-CM

## 2025-06-19 DIAGNOSIS — R79.89 ABNORMAL LFTS: ICD-10-CM

## 2025-06-19 DIAGNOSIS — R73.9 BLOOD GLUCOSE ELEVATED: Primary | ICD-10-CM

## 2025-06-19 DIAGNOSIS — G62.0 CHEMOTHERAPY-INDUCED NEUROPATHY: ICD-10-CM

## 2025-06-19 LAB
ALBUMIN SERPL BCG-MCNC: 3.9 G/DL (ref 3.5–5.2)
ALP SERPL-CCNC: 124 U/L (ref 40–150)
ALT SERPL W P-5'-P-CCNC: 91 U/L (ref 0–50)
ANION GAP SERPL CALCULATED.3IONS-SCNC: 8 MMOL/L (ref 7–15)
AST SERPL W P-5'-P-CCNC: 54 U/L (ref 0–45)
BASOPHILS # BLD AUTO: 0 10E3/UL (ref 0–0.2)
BASOPHILS NFR BLD AUTO: 1 %
BILIRUB SERPL-MCNC: 0.5 MG/DL
BUN SERPL-MCNC: 15.5 MG/DL (ref 8–23)
CALCIUM SERPL-MCNC: 9.4 MG/DL (ref 8.8–10.4)
CANCER AG15-3 SERPL-ACNC: 19 U/ML
CHLORIDE SERPL-SCNC: 102 MMOL/L (ref 98–107)
CREAT SERPL-MCNC: 1.02 MG/DL (ref 0.51–0.95)
EGFRCR SERPLBLD CKD-EPI 2021: 62 ML/MIN/1.73M2
EOSINOPHIL # BLD AUTO: 0.2 10E3/UL (ref 0–0.7)
EOSINOPHIL NFR BLD AUTO: 3 %
ERYTHROCYTE [DISTWIDTH] IN BLOOD BY AUTOMATED COUNT: 12.6 % (ref 10–15)
EST. AVERAGE GLUCOSE BLD GHB EST-MCNC: 131 MG/DL
GLUCOSE SERPL-MCNC: 259 MG/DL (ref 70–99)
HBA1C MFR BLD: 6.2 %
HCO3 SERPL-SCNC: 27 MMOL/L (ref 22–29)
HCT VFR BLD AUTO: 42.8 % (ref 35–47)
HGB BLD-MCNC: 13.8 G/DL (ref 11.7–15.7)
IMM GRANULOCYTES # BLD: 0 10E3/UL
IMM GRANULOCYTES NFR BLD: 0 %
LYMPHOCYTES # BLD AUTO: 1.5 10E3/UL (ref 0.8–5.3)
LYMPHOCYTES NFR BLD AUTO: 26 %
MCH RBC QN AUTO: 29.1 PG (ref 26.5–33)
MCHC RBC AUTO-ENTMCNC: 32.2 G/DL (ref 31.5–36.5)
MCV RBC AUTO: 90 FL (ref 78–100)
MONOCYTES # BLD AUTO: 0.4 10E3/UL (ref 0–1.3)
MONOCYTES NFR BLD AUTO: 6 %
NEUTROPHILS # BLD AUTO: 3.7 10E3/UL (ref 1.6–8.3)
NEUTROPHILS NFR BLD AUTO: 64 %
NRBC # BLD AUTO: 0 10E3/UL
NRBC BLD AUTO-RTO: 0 /100
PLATELET # BLD AUTO: 195 10E3/UL (ref 150–450)
POTASSIUM SERPL-SCNC: 4.2 MMOL/L (ref 3.4–5.3)
PROT SERPL-MCNC: 7.1 G/DL (ref 6.4–8.3)
RBC # BLD AUTO: 4.75 10E6/UL (ref 3.8–5.2)
SODIUM SERPL-SCNC: 137 MMOL/L (ref 135–145)
WBC # BLD AUTO: 5.8 10E3/UL (ref 4–11)

## 2025-06-19 PROCEDURE — 83036 HEMOGLOBIN GLYCOSYLATED A1C: CPT

## 2025-06-19 PROCEDURE — 36415 COLL VENOUS BLD VENIPUNCTURE: CPT

## 2025-06-19 PROCEDURE — 85004 AUTOMATED DIFF WBC COUNT: CPT

## 2025-06-19 PROCEDURE — 86300 IMMUNOASSAY TUMOR CA 15-3: CPT

## 2025-06-19 PROCEDURE — 80053 COMPREHEN METABOLIC PANEL: CPT

## 2025-06-19 RX ORDER — URSODIOL 250 MG/1
500 TABLET, FILM COATED ORAL 2 TIMES DAILY
Qty: 120 TABLET | Refills: 11 | Status: SHIPPED | OUTPATIENT
Start: 2025-06-25

## 2025-06-19 ASSESSMENT — PAIN SCALES - GENERAL: PAINLEVEL_OUTOF10: NO PAIN (0)

## 2025-06-19 NOTE — PROGRESS NOTES
Infusion Nursing Note:  Tiffaniecornel Mcdermott presents today for Phesgo injection.    Patient seen by provider today: Yes: Fidelia SHRESTHA   present during visit today: Not Applicable.    Note: Pt arrived via ambulatory per self from Oncology visit.  Pt cheerful and talkative.  Pt denies concerns/ discomforts at this time.  Pt looking forward to upcoming trip to [place].      Intravenous Access:  No Intravenous access/labs at this visit.    Treatment Conditions:  Lab Results   Component Value Date    HGB 13.8 06/19/2025    WBC 5.8 06/19/2025    ANEU 3.7 06/19/2025     06/19/2025        Lab Results   Component Value Date     06/19/2025    POTASSIUM 4.2 06/19/2025    MAG 1.7 04/25/2022    CR 1.02 (H) 06/19/2025    SARAI 9.4 06/19/2025    BILITOTAL 0.5 06/19/2025    ALBUMIN 3.9 06/19/2025    ALT 91 (H) 06/19/2025    AST 54 (H) 06/19/2025       Not Applicable.      Post Infusion Assessment:  Patient tolerated injection with some c/o burning @ injection site.   Patient observed for 15 minutes post Phesgo injection per protocol.       Discharge Plan:   AVS to patient via MYCHART.  Patient will return 7/10 for next appointment.   Patient discharged in stable condition accompanied by: self.  Departure Mode: Ambulatory.      Maranda Cesar RN

## 2025-06-19 NOTE — LETTER
"6/19/2025      Tiffanie Mcdermott  [address] [facility] [address]      Dear Colleague,    Thank you for referring your patient, Tiffanie Mcdermott, to the [facility]. Please see a copy of my visit note below.    Oncology Follow-Up Visit: June 19, 2025    Oncologist: Dr. 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  # Mar 2022 - May 2022 Taxol / Herceptin / Perjeta; near-CR.  # May 2022 - ongoing Herceptin / Perjeta (Phesgo); CR.    Interval History:  Pt is seen in-person for review of ***    Patient denies any of the following except if noted above: fevers, chills, difficulty with energy, vision or hearing changes, chest pain, dyspnea, abdominal pain, nausea, vomiting, diarrhea, constipation, urinary concerns, headaches, numbness, tingling, issues with sleep or mood. She also denies lumps, bumps, rashes or skin lesions, bleeding or bruising issues.    Physical Exam:  /72 (BP Location: Right arm, Patient Position: Sitting, Cuff Size: Adult Large)   Pulse 87   Temp 97.5  F (36.4  C) (Temporal)   Resp 18   Ht 1.702 m (5' 7\")   Wt 121.1 kg (267 lb 1 oz)   LMP 08/06/2008   SpO2 95%   BMI 41.83 kg/m       BP Readings from Last 6 Encounters:   06/19/25 122/72   05/29/25 (!) 141/68   05/08/25 (!) 143/65   04/17/25 (!) 151/75   03/27/25 (!) 152/87   03/06/25 (!) 144/72     Wt Readings from Last 6 Encounters:   06/19/25 121.1 kg (267 lb 1 oz)   05/29/25 121.7 kg (268 lb 6.4 oz)   05/08/25 122 kg (268 lb 14.4 oz)   04/17/25 119.5 kg (263 lb 6.4 oz)   03/27/25 118.8 kg (261 lb 12.8 oz)   03/06/25 118.4 kg (261 lb)      Constitutional: No acute distress, pleasant, appropriately groomed.   ENT: PERRLA, sclera without erythema. Appropriate dentition.  Neck: " Trachea midline, no adenopathy.   Resp: CTA, adequate depth and rate of respirations.   Cardiac: S1/S2, RRR, no murmurs.   Abdomen: BS active, abdomen soft and non-tender. No masses or organomegaly.   MS:  5/5 muscle strength, adequate ROM.   Skin: No rashes, lesions, or wounds on exposed skin.  Neuro: A/O x 4, sensation intact.   Lymph: No palpable anterior/posterior cervical, axillary, or supraclavicular nodes.   Psych: Appropriate mentation and affect.    Imaging:  I reviewed the results report of ECHO completed on 5/30/2025.      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   - Tolerating well with manageable side effects.  - Labs reviewed and discussed. Will proceed with cycle 20 without changes.   - ECHOs w/stable EF, continue monitoring q6 months while on treatment. No cardiac complaints today.  - PET q9 months, next scheduled for 11/2025, followed by review with Dr. Kim.   - RTC for in-person review with QUYEN w/Cycle 21.   - RTC for review with Dr. Kim w/Cycle 22, ECHO and PET scan completed prior.     Transaminitis of unclear etiology - hx of cirrhotic appearance of liver  - Elevated AST and ALT - slightly improved today  - Reviewed avoiding acetaminophen and alcohol use  - Has seen gastroenterology previously with recommendation for close monitoring  - Continue ursodiol   - Continue to monitor     Elevated BG on CMPs  - Last Hgb A1c checked in 5/2023, elevated at 6%  - Advised follow-up with PCP      Fidelia SHRESTHA, CNP  [address] [address]    Oncology Follow-Up Visit: June 19, 2025    Oncologist: Dr. Kim   PCP: No Ref-Primary, Physician     Reason for Visit: Follow-up breast cancer      Diagnosis: Stage IV,  "hormone negative, HER2 positive breast cancer (liver and bone)  # Feb 2022 Presented liver failure and related symptoms secondary to dense tumor burden.  # Mar 2022 - May 2022 Taxol / Herceptin / Perjeta; near-CR.  # May 2022 - ongoing Herceptin / Perjeta (Phesgo); CR.    Interval History:  Pt is seen in-person for review of Phesgo. Tolerating well without significant side effects. Previous loose stools have resolved with avoidance of triggering foods and use of vitamin B complex. No new aches or pains. No infectious s/s. No cardiac complaints.     Patient denies any of the following except if noted above: fevers, chills, difficulty with energy, vision or hearing changes, chest pain, dyspnea, abdominal pain, nausea, vomiting, diarrhea, constipation, urinary concerns, headaches, numbness, tingling, issues with sleep or mood. She also denies lumps, bumps, rashes or skin lesions, bleeding or bruising issues.    Physical Exam:  /72 (BP Location: Right arm, Patient Position: Sitting, Cuff Size: Adult Large)   Pulse 87   Temp 97.5  F (36.4  C) (Temporal)   Resp 18   Ht 1.702 m (5' 7\")   Wt 121.1 kg (267 lb 1 oz)   LMP 08/06/2008   SpO2 95%   BMI 41.83 kg/m       BP Readings from Last 6 Encounters:   06/19/25 122/72   05/29/25 (!) 141/68   05/08/25 (!) 143/65   04/17/25 (!) 151/75   03/27/25 (!) 152/87   03/06/25 (!) 144/72     Wt Readings from Last 6 Encounters:   06/19/25 121.1 kg (267 lb 1 oz)   05/29/25 121.7 kg (268 lb 6.4 oz)   05/08/25 122 kg (268 lb 14.4 oz)   04/17/25 119.5 kg (263 lb 6.4 oz)   03/27/25 118.8 kg (261 lb 12.8 oz)   03/06/25 118.4 kg (261 lb)      Constitutional: No acute distress, pleasant, appropriately groomed.   ENT: PERRLA, sclera without erythema. Patent nasal passages. Appropriate dentition.  Neck: Trachea midline, no adenopathy.   Resp: No respiratory distress, adequate depth and rate of respirations.   Cardiac: No cyanosis, JVD, or peripheral edema.  MS:  5/5 muscle strength, " adequate ROM.   Skin: No rashes, lesions, or wounds.  Neuro: A/O x 4, sensation intact.   Psych: Appropriate mentation and affect.     Imaging:  I reviewed the results report of ECHO completed on 5/30/2025.      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  - Tolerating well with minimal side effects.   - Labs reviewed and discussed. Will proceed with cycle 20 without changes.   - ECHOs w/stable EF, continue monitoring q6 months while on treatment. No cardiac complaints today.  - PET q9 months, next scheduled for 11/2025, followed by review with Dr. Kim.   - RTC for in-person review with QUYEN w/Cycle 21.   - RTC for review with Dr. Kim w/Cycle 22, ECHO and PET scan completed prior.     Transaminitis of unclear etiology - hx of cirrhotic appearance of liver  - Elevated AST and ALT -   - Total bilirubin continues to be normal  - Reviewed avoiding acetaminophen and alcohol use  - Has seen gastroenterology previously with recommendation for close monitoring  - Continue ursodiol, prescription renewed for upcoming trip  - Continue to monitor     Elevated BG on CMPs  - Last Hgb A1c checked in 5/2023, elevated at 6%  - Non-fasting labs and did eat breakfast prior to lab draw but will add-on Hgb A1c to labs since it has been 2 years since last check  - Recommended she follow-up with her PCP      [place] [address]     Again, thank you for allowing me to participate in the care of your patient.        Sincerely,        HENRIETTA Motta CNP    Electronically signed

## 2025-06-20 ENCOUNTER — RESULTS FOLLOW-UP (OUTPATIENT)
Dept: INFUSION THERAPY | Facility: CLINIC | Age: 60
End: 2025-06-20

## 2025-06-24 ENCOUNTER — TELEPHONE (OUTPATIENT)
Dept: ONCOLOGY | Facility: CLINIC | Age: 60
End: 2025-06-24
Payer: COMMERCIAL

## 2025-06-24 NOTE — TELEPHONE ENCOUNTER
Reason for Call:  Other Please watch for FMLA form being faxed, hopefully today    Detailed comments: Tiffanie said her employer will be faxing over some FMLA forms. She said they didn't tell her it needed to be done until now and that there is a deadline of July 1st.     Please call her if there are any questions since she would really like it filled out asap. Thank you.     Phone Number Patient can be reached at: Cell number on file:    Telephone Information:   Mobile [phone]       Best Time: any    Can we leave a detailed message on this number? YES    Call taken on 6/24/2025 at 9:27 AM by Tyron Moya

## 2025-07-10 ENCOUNTER — INFUSION THERAPY VISIT (OUTPATIENT)
Dept: INFUSION THERAPY | Facility: CLINIC | Age: 60
End: 2025-07-10
Attending: INTERNAL MEDICINE
Payer: COMMERCIAL

## 2025-07-10 VITALS
TEMPERATURE: 97.7 F | WEIGHT: 270.4 LBS | OXYGEN SATURATION: 98 % | DIASTOLIC BLOOD PRESSURE: 65 MMHG | HEART RATE: 87 BPM | RESPIRATION RATE: 16 BRPM | SYSTOLIC BLOOD PRESSURE: 129 MMHG | BODY MASS INDEX: 42.35 KG/M2

## 2025-07-10 DIAGNOSIS — C50.511 MALIGNANT NEOPLASM OF LOWER-OUTER QUADRANT OF RIGHT FEMALE BREAST, UNSPECIFIED ESTROGEN RECEPTOR STATUS (H): Primary | ICD-10-CM

## 2025-07-10 ASSESSMENT — PAIN SCALES - GENERAL: PAINLEVEL_OUTOF10: NO PAIN (0)

## 2025-07-10 NOTE — PROGRESS NOTES
Infusion Nursing Note:  Tiffanie STEWART Sharron presents today for C20D22 Phesgo.    Patient seen by provider today: No   present during visit today: Not Applicable.    Note: Just returned from a trip to [place]. Allergies are flaring up. She's stuffed up with watery eyes.      Intravenous Access:  No Intravenous access/labs at this visit.    Treatment Conditions:  Not Applicable.      Post Infusion Assessment:  Patient tolerated injection without incident.       Discharge Plan:   Patient discharged in stable condition accompanied by: self.  Departure Mode: Ambulatory.      Drea Haynes RN

## 2025-07-16 ENCOUNTER — E-VISIT (OUTPATIENT)
Dept: URGENT CARE | Facility: CLINIC | Age: 60
End: 2025-07-16
Payer: COMMERCIAL

## 2025-07-16 DIAGNOSIS — H01.00A BLEPHARITIS OF BOTH UPPER AND LOWER EYELID OF RIGHT EYE, UNSPECIFIED TYPE: Primary | ICD-10-CM

## 2025-07-16 RX ORDER — ERYTHROMYCIN 5 MG/G
OINTMENT OPHTHALMIC
Qty: 3.5 G | Refills: 0 | Status: SHIPPED | OUTPATIENT
Start: 2025-07-16

## 2025-07-16 NOTE — PATIENT INSTRUCTIONS
Thank you for choosing us for your care. I have placed an order for a prescription so that you can start treatment. View your full visit summary for details by clicking on the link below. Your pharmacist will able to address any questions you may have about the medication.     If you re not feeling better within 2-3 days, please schedule an appointment.  You can schedule an appointment right here in [facility], or call [phone]  If the visit is for the same symptoms as your eVisit, we ll refund the cost of your eVisit if seen within seven days.

## 2025-07-30 ENCOUNTER — INFUSION THERAPY VISIT (OUTPATIENT)
Dept: INFUSION THERAPY | Facility: CLINIC | Age: 60
End: 2025-07-30
Attending: INTERNAL MEDICINE
Payer: COMMERCIAL

## 2025-07-30 VITALS
DIASTOLIC BLOOD PRESSURE: 60 MMHG | BODY MASS INDEX: 42.41 KG/M2 | WEIGHT: 270.8 LBS | SYSTOLIC BLOOD PRESSURE: 138 MMHG | TEMPERATURE: 97.7 F | OXYGEN SATURATION: 98 % | HEART RATE: 81 BPM

## 2025-07-30 DIAGNOSIS — C50.511 MALIGNANT NEOPLASM OF LOWER-OUTER QUADRANT OF RIGHT FEMALE BREAST, UNSPECIFIED ESTROGEN RECEPTOR STATUS (H): Primary | ICD-10-CM

## 2025-07-30 PROCEDURE — 250N000011 HC RX IP 250 OP 636: Mod: JZ | Performed by: INTERNAL MEDICINE

## 2025-07-30 PROCEDURE — 96401 CHEMO ANTI-NEOPL SQ/IM: CPT

## 2025-07-30 RX ADMIN — PERTUZUMAB, TRASTUZUMAB, AND HYALURONIDASE-ZZXF 600 MG: 600; 600; 2000 INJECTION, SOLUTION SUBCUTANEOUS at 09:37

## 2025-07-30 ASSESSMENT — PAIN SCALES - GENERAL: PAINLEVEL_OUTOF10: NO PAIN (0)

## 2025-07-30 NOTE — PROGRESS NOTES
Infusion Nursing Note:  Tiffanie Mcdermott presents today for [id number]  Phesgo injection.    Patient seen by provider today: No   present during visit today: Not Applicable.    Note: Patient is here today on her own for phesgo injection.  Given today in right thigh.  Patient iced the area prior to injection. Patient has had some stress due to her  just having a heart attack. He is doing okay now.      Intravenous Access:  No Intravenous access/labs at this visit.    Treatment Conditions:  Not Applicable.      Post Infusion Assessment:  Patient tolerated injection without incident.       Discharge Plan:   Patient discharged in stable condition accompanied by: self.  Departure Mode: Ambulatory.  Patient has appt to return to infusion on 08/21/2025.      Cindy Robbins RN

## 2025-08-21 ENCOUNTER — INFUSION THERAPY VISIT (OUTPATIENT)
Dept: INFUSION THERAPY | Facility: CLINIC | Age: 60
End: 2025-08-21
Attending: INTERNAL MEDICINE
Payer: COMMERCIAL

## 2025-08-21 VITALS
WEIGHT: 267 LBS | TEMPERATURE: 97.6 F | OXYGEN SATURATION: 97 % | BODY MASS INDEX: 41.82 KG/M2 | RESPIRATION RATE: 18 BRPM | HEART RATE: 91 BPM | DIASTOLIC BLOOD PRESSURE: 59 MMHG | SYSTOLIC BLOOD PRESSURE: 129 MMHG

## 2025-08-21 DIAGNOSIS — C50.511 MALIGNANT NEOPLASM OF LOWER-OUTER QUADRANT OF RIGHT FEMALE BREAST, UNSPECIFIED ESTROGEN RECEPTOR STATUS (H): Primary | ICD-10-CM

## 2025-08-21 ASSESSMENT — PAIN SCALES - GENERAL: PAINLEVEL_OUTOF10: NO PAIN (0)

## (undated) DEVICE — SU MONOCRYL 3-0 PS-2 18" UND Y497G

## (undated) DEVICE — SU MONOCRYL 4-0 PS-2 18" UND Y496G

## (undated) DEVICE — SOL SODIUM CHLORIDE 250ML

## (undated) DEVICE — DRAPE LAP TRANSVERSE 29421

## (undated) DEVICE — DRAPE IOBAN INCISE 23X17" 6650EZ

## (undated) DEVICE — SYR 10ML FINGER CONTROL W/O NDL 309695

## (undated) DEVICE — GUIDEWIRE BENTSON FLEX TIP 0.035"X150CM M0066201250

## (undated) DEVICE — DECANTER VIAL 2006S

## (undated) DEVICE — GLOVE ESTEEM POWDER FREE 5.5  2D72PL55

## (undated) DEVICE — SYR 10ML LL W/O NDL

## (undated) DEVICE — DRAPE C-ARM 60X42" 1013

## (undated) DEVICE — PACK MINOR PROCEDURE CUSTOM

## (undated) DEVICE — SUCTION MANIFOLD NEPTUNE 2 SYS 4 PORT 0702-020-000

## (undated) DEVICE — ADH SKIN CLOSURE PREMIERPRO EXOFIN 1.0ML 3470

## (undated) DEVICE — GEL ULTRASOUND AQUASONIC 20GM 01-01

## (undated) DEVICE — GLOVE PROTEXIS BLUE W/NEU-THERA 6.0  2D73EB60

## (undated) DEVICE — SYR 03ML 22GA 1.5" ECLIPSE

## (undated) DEVICE — SYR 10ML PREFILLED 0.9% NACL INJ NOT STERILE 306500

## (undated) DEVICE — SYR 05ML LL W/O NDL

## (undated) DEVICE — NDL ECLIPSE 18GA 1.5"

## (undated) DEVICE — BLADE KNIFE SURG 11 371111

## (undated) DEVICE — COVER TRANSDUCER PROBE 7X24" 610-575

## (undated) DEVICE — SU PROLENE 2-0 SHDA 36" 8523H

## (undated) DEVICE — ESU ELEC BLADE 2.75" COATED/INSULATED E1455

## (undated) RX ORDER — FENTANYL CITRATE 50 UG/ML
INJECTION, SOLUTION INTRAMUSCULAR; INTRAVENOUS
Status: DISPENSED
Start: 2022-03-02

## (undated) RX ORDER — PROPOFOL 10 MG/ML
INJECTION, EMULSION INTRAVENOUS
Status: DISPENSED
Start: 2022-03-02

## (undated) RX ORDER — HEPARIN SODIUM (PORCINE) LOCK FLUSH IV SOLN 100 UNIT/ML 100 UNIT/ML
SOLUTION INTRAVENOUS
Status: DISPENSED
Start: 2022-03-02

## (undated) RX ORDER — LIDOCAINE HYDROCHLORIDE 10 MG/ML
INJECTION, SOLUTION EPIDURAL; INFILTRATION; INTRACAUDAL; PERINEURAL
Status: DISPENSED
Start: 2022-03-02

## (undated) RX ORDER — LIDOCAINE HYDROCHLORIDE 10 MG/ML
INJECTION, SOLUTION INFILTRATION; PERINEURAL
Status: DISPENSED
Start: 2022-03-02

## (undated) RX ORDER — EPINEPHRINE 1 MG/ML
INJECTION, SOLUTION INTRAMUSCULAR; SUBCUTANEOUS
Status: DISPENSED
Start: 2022-03-02

## (undated) RX ORDER — BUPIVACAINE HYDROCHLORIDE 2.5 MG/ML
INJECTION, SOLUTION EPIDURAL; INFILTRATION; INTRACAUDAL
Status: DISPENSED
Start: 2022-03-02

## (undated) RX ORDER — HEPARIN SODIUM 1000 [USP'U]/ML
INJECTION, SOLUTION INTRAVENOUS; SUBCUTANEOUS
Status: DISPENSED
Start: 2022-03-02

## (undated) RX ORDER — LIDOCAINE HYDROCHLORIDE 20 MG/ML
INJECTION, SOLUTION EPIDURAL; INFILTRATION; INTRACAUDAL; PERINEURAL
Status: DISPENSED
Start: 2022-03-02